# Patient Record
Sex: FEMALE | Race: WHITE | NOT HISPANIC OR LATINO | Employment: OTHER | URBAN - METROPOLITAN AREA
[De-identification: names, ages, dates, MRNs, and addresses within clinical notes are randomized per-mention and may not be internally consistent; named-entity substitution may affect disease eponyms.]

---

## 2017-01-03 ENCOUNTER — TELEPHONE (OUTPATIENT)
Dept: PHYSICAL MEDICINE AND REHAB | Facility: CLINIC | Age: 72
End: 2017-01-03

## 2017-01-04 ENCOUNTER — OFFICE VISIT (OUTPATIENT)
Dept: PHYSICAL MEDICINE AND REHAB | Facility: CLINIC | Age: 72
End: 2017-01-04
Payer: MEDICARE

## 2017-01-04 VITALS
SYSTOLIC BLOOD PRESSURE: 121 MMHG | WEIGHT: 175.06 LBS | HEART RATE: 76 BPM | HEIGHT: 64 IN | BODY MASS INDEX: 29.89 KG/M2 | DIASTOLIC BLOOD PRESSURE: 70 MMHG

## 2017-01-04 DIAGNOSIS — M17.12 PRIMARY OSTEOARTHRITIS OF LEFT KNEE: Primary | ICD-10-CM

## 2017-01-04 PROCEDURE — 99999 PR PBB SHADOW E&M-EST. PATIENT-LVL II: CPT | Mod: PBBFAC,,, | Performed by: PHYSICAL MEDICINE & REHABILITATION

## 2017-01-04 PROCEDURE — 99499 UNLISTED E&M SERVICE: CPT | Mod: S$PBB,,, | Performed by: PHYSICAL MEDICINE & REHABILITATION

## 2017-01-04 PROCEDURE — M0076 PROLOTHERAPY: HCPCS | Mod: ,,, | Performed by: PHYSICAL MEDICINE & REHABILITATION

## 2017-01-04 PROCEDURE — 99212 OFFICE O/P EST SF 10 MIN: CPT | Mod: PBBFAC,PN | Performed by: PHYSICAL MEDICINE & REHABILITATION

## 2017-01-04 RX ORDER — TRAMADOL HYDROCHLORIDE 50 MG/1
50 TABLET ORAL EVERY 6 HOURS PRN
Qty: 40 TABLET | Refills: 1 | Status: SHIPPED | OUTPATIENT
Start: 2017-01-04 | End: 2017-01-14

## 2017-01-04 NOTE — MR AVS SNAPSHOT
[facility]  [address] [place] [address] [place] [address]  Phone: [phone]  Fax: [phone]                  Margarita Dunne   2017 11:00 AM   Office Visit    Description:  Female : 1945   Provider:  Shelby Miguel DO   Department:  [facility]           Reason for Visit     Follow-up           Diagnoses this Visit        Comments    Primary osteoarthritis of left knee    -  Primary            To Do List           Future Appointments        Provider Department Dept Phone    2017 9:00 AM Heather Salguero MD [facility] - [phone]    2017 1:00 PM Shelby Miguel DO [facility] [phone]      Goals (5 Years of Data)     None       These Medications        Disp Refills Start End    tramadol (ULTRAM) 50 mg tablet 40 tablet 1 2017    Take 1 tablet (50 mg total) by mouth every 6 (six) hours as needed for Pain. - Oral    Pharmacy: [facility] [address] [place] [address] N  Ph #: [phone]         [facility]     [facility] On Call Nurse Care Line -  Assistance  Registered nurses in the [facility] provide clinical advisement, health education, appointment booking, and other advisory services.  Call for this free service at [phone].             Medications           Message regarding Medications     Verify the changes and/or additions to your medication regime listed below are the same as discussed with your clinician today.  If any of these changes or additions are incorrect, please notify your healthcare provider.        START taking these NEW medications        Refills    tramadol (ULTRAM) 50 mg tablet 1    Sig: Take 1 tablet (50 mg total) by mouth every 6 (six) hours as needed for Pain.    Class: Print    Route: Oral           Verify that the below list of medications is an accurate representation of the medications you are currently taking.   "If none reported, the list may be blank. If incorrect, please contact your healthcare provider. Carry this list with you in case of emergency.           Current Medications     albuterol (PROVENTIL/VENTOLIN) 90 mcg/actuation inhaler Inhale 2 puffs into the lungs every 6 (six) hours as needed for Shortness of Breath.    atorvastatin (LIPITOR) 20 MG tablet TAKE ONE TABLET BY MOUTH ONCE DAILY    atorvastatin (LIPITOR) 20 MG tablet Take 1 tablet (20 mg total) by mouth once daily. Vacation, patient forgot med    conjugated estrogens (PREMARIN) vaginal cream Place 0.5 g vaginally twice a week.    multivitamin (THERAGRAN) per tablet Take 1 tablet by mouth once daily.    rizatriptan (MAXALT) 10 MG tablet     rizatriptan (MAXALT) 10 MG tablet TAKE ONE TABLET BY MOUTH ONCE AS NEEDED FOR  MIGRAINE AS DIRECTED    SYMBICORT 80-4.5 mcg/actuation HFAA INHALE TWO PUFFS BY MOUTH TWICE DAILY    triamcinolone acetonide 0.1% (KENALOG) 0.1 % cream Use topically daily for pruritis, thins the skin    baclofen (LIORESAL) 20 MG tablet Take 1 tablet (20 mg total) by mouth every evening. Start w. Half a tab nightly for 1wk. If tolerated in to one full tab    diclofenac sodium (VOLTAREN) 1 % Gel APPLY 4 GM TOPICALLY 4 TIMES DAILY    magnesium oxide-Mg AA chelate (MG-PLUS-PROTEIN) 133 mg Tab Take by mouth.    tramadol (ULTRAM) 50 mg tablet Take 1 tablet (50 mg total) by mouth every 6 (six) hours as needed for Pain.    valacyclovir (VALTREX) 500 MG tablet TAKE ONE TABLET BY MOUTH ONCE DAILY    VENTOLIN HFA 90 mcg/actuation inhaler INHALE 2 PUFFS INTO THE LUNGS EVERY 6 HOURS AS NEEDED    VOLTAREN 1 % Gel APPLY 4 GM TOPICALLY 4 TIMES DAILY           Clinical Reference Information           Vital Signs - Last Recorded  Most recent update: 1/4/2017 11:10 AM by Priyanka Ohara MA    BP Pulse Ht Wt BMI    121/70 76 5' 4" (1.626 m) 79.4 kg (175 lb 0.7 oz) 30.05 kg/m2      Blood Pressure          Most Recent Value    BP  121/70      Allergies as of " 1/4/2017     Doxycycline    Pcn [Penicillins]    Sulfa (Sulfonamide Antibiotics)    Corticosteroids (Glucocorticoids)      Immunizations Administered on Date of Encounter - 1/4/2017     None      Orders Placed During Today's Visit      Normal Orders This Visit     US Guidance For Needle Placement

## 2017-01-04 NOTE — PROGRESS NOTES
PROCEDURE NOTE:KNEE PROLOTHERAPY  Risk and benefit of right US guided intrarticular left KNEE injection given to pt. 25 g 2 inch needle utilized. lateral approach.  Injection performed after sterile prep w. Betadine and chlorohexedine, verbal  And written consent obtained no complications. Skin was prepped and needle entry was anesthetized with 1% lidocaine. 5ml of 15 %dextrose utilized.    Risk and benefit of US guided left MCL  injection given to pt. 25 g 2 inch needle utilized. Injection performed after sterile prep w. Betadine , verbal  And written consent explained, no complications. Skin was prepped and needle entry was anesthetized with 1% lidocaine. 8ml of 15 %dextrose utilized.   US guidance was used since it has been shown to have greater efficiency w. Accurate needle placement.    Ultrasound interpretation was performed prior to the procedure to identify the target and any adjacent neurovascular structures.  Subsequently, interpretation was performed during real- time needle guidance confirming placement. Post- intervention interpretation was also performed confirming appropriate injectate flow and hemostasis.  Images indicating needle placement have been saved in [employer].    Ultrasound interpretation was performed prior to the procedure to identify the target and any adjacent neurovascular structures.  Subsequently, interpretation was performed during real- time needle guidance confirming placement. Post- intervention interpretation was also performed confirming appropriate injectate flow and hemostasis.  Images indicating needle placement have been saved in [employer].

## 2017-01-05 ENCOUNTER — PATIENT MESSAGE (OUTPATIENT)
Dept: PHYSICAL MEDICINE AND REHAB | Facility: CLINIC | Age: 72
End: 2017-01-05

## 2017-01-10 ENCOUNTER — TELEPHONE (OUTPATIENT)
Dept: PHYSICAL MEDICINE AND REHAB | Facility: CLINIC | Age: 72
End: 2017-01-10

## 2017-01-10 NOTE — TELEPHONE ENCOUNTER
----- Message from Diane Eldridge sent at 1/10/2017 12:34 PM CST -----  Please call patient to reschedule appt [phone] (home)

## 2017-02-20 ENCOUNTER — OFFICE VISIT (OUTPATIENT)
Dept: OBSTETRICS AND GYNECOLOGY | Facility: CLINIC | Age: 72
End: 2017-02-20
Payer: MEDICARE

## 2017-02-20 ENCOUNTER — HOSPITAL ENCOUNTER (OUTPATIENT)
Dept: RADIOLOGY | Facility: CLINIC | Age: 72
Discharge: HOME | End: 2017-02-20
Attending: OBSTETRICS & GYNECOLOGY
Payer: MEDICARE

## 2017-02-20 VITALS
BODY MASS INDEX: 29.33 KG/M2 | DIASTOLIC BLOOD PRESSURE: 82 MMHG | WEIGHT: 170.88 LBS | SYSTOLIC BLOOD PRESSURE: 142 MMHG

## 2017-02-20 DIAGNOSIS — Z12.11 COLON CANCER SCREENING: ICD-10-CM

## 2017-02-20 DIAGNOSIS — Z01.419 ROUTINE GYNECOLOGICAL EXAMINATION: Primary | ICD-10-CM

## 2017-02-20 DIAGNOSIS — Z11.51 SCREENING FOR HPV (HUMAN PAPILLOMAVIRUS): ICD-10-CM

## 2017-02-20 DIAGNOSIS — N89.8 VAGINAL DISCHARGE: ICD-10-CM

## 2017-02-20 DIAGNOSIS — Z12.31 VISIT FOR SCREENING MAMMOGRAM: ICD-10-CM

## 2017-02-20 DIAGNOSIS — Z78.0 MENOPAUSE: ICD-10-CM

## 2017-02-20 DIAGNOSIS — M85.80 OSTEOPENIA: ICD-10-CM

## 2017-02-20 DIAGNOSIS — Z12.4 CERVICAL CANCER SCREENING: ICD-10-CM

## 2017-02-20 PROCEDURE — 77067 SCR MAMMO BI INCL CAD: CPT | Mod: TC

## 2017-02-20 PROCEDURE — 99999 PR PBB SHADOW E&M-EST. PATIENT-LVL III: CPT | Mod: PBBFAC,,, | Performed by: OBSTETRICS & GYNECOLOGY

## 2017-02-20 PROCEDURE — G0101 CA SCREEN;PELVIC/BREAST EXAM: HCPCS | Mod: S$PBB,,, | Performed by: OBSTETRICS & GYNECOLOGY

## 2017-02-20 PROCEDURE — 77067 SCR MAMMO BI INCL CAD: CPT | Mod: 26,,, | Performed by: RADIOLOGY

## 2017-02-20 PROCEDURE — 77063 BREAST TOMOSYNTHESIS BI: CPT | Mod: 26,,, | Performed by: RADIOLOGY

## 2017-02-20 PROCEDURE — 88175 CYTOPATH C/V AUTO FLUID REDO: CPT

## 2017-02-20 NOTE — MR AVS SNAPSHOT
[facility]  [address] [place] [address]  Phone: [phone]                  Margarita Dunen   2017 9:40 AM   Office Visit    Description:  Female : 1945   Provider:  Heather Salguero MD   Department:  [facility]           Reason for Visit     Well Woman     Vaginal Discharge           Diagnoses this Visit        Comments    Cervical cancer screening    -  Primary     Screening for HPV (human papillomavirus)         Visit for screening mammogram                To Do List           Future Appointments        Provider Department Dept Phone    3/8/2017 2:00 PM Shelby Miguel DO [facility] [phone]      Goals (5 Years of Data)     None      [facility]     [facility] Care Line -  Assistance  Registered nurses in the [facility] provide clinical advisement, health education, appointment booking, and other advisory services.  Call for this free service at [phone].             Medications           Message regarding Medications     Verify the changes and/or additions to your medication regime listed below are the same as discussed with your clinician today.  If any of these changes or additions are incorrect, please notify your healthcare provider.        STOP taking these medications     baclofen (LIORESAL) 20 MG tablet Take 1 tablet (20 mg total) by mouth every evening. Start w. Half a tab nightly for 1wk. If tolerated in to one full tab           Verify that the below list of medications is an accurate representation of the medications you are currently taking.  If none reported, the list may be blank. If incorrect, please contact your healthcare provider. Carry this list with you in case of emergency.           Current Medications     albuterol (PROVENTIL/VENTOLIN) 90 mcg/actuation inhaler Inhale 2 puffs into the lungs every 6 (six) hours as needed for Shortness of Breath.    atorvastatin  (LIPITOR) 20 MG tablet TAKE ONE TABLET BY MOUTH ONCE DAILY    conjugated estrogens (PREMARIN) vaginal cream Place 0.5 g vaginally twice a week.    magnesium oxide-Mg AA chelate (MG-PLUS-PROTEIN) 133 mg Tab Take by mouth.    multivitamin (THERAGRAN) per tablet Take 1 tablet by mouth once daily.    rizatriptan (MAXALT) 10 MG tablet     SYMBICORT 80-4.5 mcg/actuation HFAA INHALE TWO PUFFS BY MOUTH TWICE DAILY    valacyclovir (VALTREX) 500 MG tablet TAKE ONE TABLET BY MOUTH ONCE DAILY    VENTOLIN HFA 90 mcg/actuation inhaler INHALE 2 PUFFS INTO THE LUNGS EVERY 6 HOURS AS NEEDED    VOLTAREN 1 % Gel APPLY 4 GM TOPICALLY 4 TIMES DAILY    diclofenac sodium (VOLTAREN) 1 % Gel APPLY 4 GM TOPICALLY 4 TIMES DAILY    rizatriptan (MAXALT) 10 MG tablet TAKE ONE TABLET BY MOUTH ONCE AS NEEDED FOR  MIGRAINE AS DIRECTED    triamcinolone acetonide 0.1% (KENALOG) 0.1 % cream Use topically daily for pruritis, thins the skin           Clinical Reference Information           Your Vitals Were     BP Weight BMI          142/82 77.5 kg (170 lb 13.7 oz) 29.33 kg/m2        Blood Pressure          Most Recent Value    BP  (!)  142/82      Allergies as of 2/20/2017     Doxycycline    Pcn [Penicillins]    Sulfa (Sulfonamide Antibiotics)    Corticosteroids (Glucocorticoids)      Immunizations Administered on Date of Encounter - 2/20/2017     None      Orders Placed During Today's Visit      Normal Orders This Visit    HPV DNA probe, amplified     Liquid-based pap smear, screening     Future Labs/Procedures Expected by Expires    Mammo Digital Screening Bilat With CAD  2/20/2017 4/20/2018      Language Assistance Services     ATTENTION: Language assistance services are available, free of charge. Please call [phone].      ATENCIÓN: Si habla yueañol, tiene a vazquez disposición servicios gratuitos de asistencia lingüística. Llame al [phone].     CHÚ Ý: N?u b?n nói Ti?ng Vi?t, có các d?ch v? h? tr? ngôn ng? mi?n phí dành cho b?n. G?i s?  [phone].         [facility] complies with applicable Federal civil rights laws and does not discriminate on the basis of race, color, national origin, age, disability, or sex.

## 2017-02-20 NOTE — PROGRESS NOTES
Chief Complaint   Patient presents with    Well Woman     left breat swelling months ago    Vaginal Discharge     2 times 6 months ago 1 time each time appeared pink in color       History of Present Illness: Margarita Dunne is a 72 y.o. female that presents today 2/20/2017 for well gyn visit. She reports noticing some pink discharge several months ago that resolved.    Past Medical History   Diagnosis Date    Allergy     Angio-edema     Arthritis     Asthma     Cataract     Fibromyalgia 7/8/2012    Glaucoma suspect, steroid responders     Hand joint pain 7/8/2012    Headache(784.0)     Osteoporosis     Recurrent upper respiratory infection (URI)     Urticaria        Past Surgical History   Procedure Laterality Date    Dexa  2014     osteopenia       Current Outpatient Prescriptions   Medication Sig Dispense Refill    albuterol (PROVENTIL/VENTOLIN) 90 mcg/actuation inhaler Inhale 2 puffs into the lungs every 6 (six) hours as needed for Shortness of Breath. 1 each 10    atorvastatin (LIPITOR) 20 MG tablet TAKE ONE TABLET BY MOUTH ONCE DAILY 30 tablet 0    conjugated estrogens (PREMARIN) vaginal cream Place 0.5 g vaginally twice a week. 30 g 2    magnesium oxide-Mg AA chelate (MG-PLUS-PROTEIN) 133 mg Tab Take by mouth.      multivitamin (THERAGRAN) per tablet Take 1 tablet by mouth once daily.      rizatriptan (MAXALT) 10 MG tablet       SYMBICORT 80-4.5 mcg/actuation HFAA INHALE TWO PUFFS BY MOUTH TWICE DAILY 11 g 6    valacyclovir (VALTREX) 500 MG tablet TAKE ONE TABLET BY MOUTH ONCE DAILY 30 tablet 0    VENTOLIN HFA 90 mcg/actuation inhaler INHALE 2 PUFFS INTO THE LUNGS EVERY 6 HOURS AS NEEDED 18 each 6    VOLTAREN 1 % Gel APPLY 4 GM TOPICALLY 4 TIMES DAILY 2 Tube 0    diclofenac sodium (VOLTAREN) 1 % Gel APPLY 4 GM TOPICALLY 4 TIMES DAILY 2 Tube 11    rizatriptan (MAXALT) 10 MG tablet TAKE ONE TABLET BY MOUTH ONCE AS NEEDED FOR  MIGRAINE AS DIRECTED 30 tablet 0    triamcinolone acetonide  0.1% (KENALOG) 0.1 % cream Use topically daily for pruritis, thins the skin 1 Tube 0     No current facility-administered medications for this visit.        Review of patient's allergies indicates:   Allergen Reactions    Doxycycline Nausea And Vomiting    Pcn [penicillins] Rash    Sulfa (sulfonamide antibiotics) Rash    Corticosteroids (glucocorticoids) Other (See Comments)     migraine for 2 weeks       Family History   Problem Relation Age of Onset    Hypertension Mother     Diabetes Mother     Stroke Mother     Dementia Mother     Cancer Father      pancreas    Allergies Father     Allergic rhinitis Father     Macular degeneration Cousin     Allergic rhinitis Paternal Aunt     Allergies Paternal Aunt     Allergic rhinitis Paternal Uncle     Allergies Paternal Uncle     Glaucoma Neg Hx     Amblyopia Neg Hx     Blindness Neg Hx     Cataracts Neg Hx     Retinal detachment Neg Hx     Strabismus Neg Hx     Thyroid disease Neg Hx     Angioedema Neg Hx     Asthma Neg Hx     Eczema Neg Hx     Immunodeficiency Neg Hx        Social History     Social History    Marital status:      Spouse name: N/A    Number of children: 0    Years of education: N/A     Social History Main Topics    Smoking status: Never Smoker    Smokeless tobacco: Never Used    Alcohol use Yes      Comment: social wine use in the past not current.    Drug use: No    Sexual activity: Not Asked     Other Topics Concern    None     Social History Narrative       OB History    Para Term  AB SAB TAB Ectopic Multiple Living   0 0 0 0 0 0 0 0 0 0             Review of Symptoms:  GENERAL: Denies weight gain or weight loss. Feeling well overall.   SKIN: Denies rash or lesions.   HEAD: Denies head injury or headache.   NODES: Denies enlarged lymph nodes.   CHEST: Denies chest pain or shortness of breath.   CARDIOVASCULAR: Denies palpitations or left sided chest pain.   ABDOMEN: No abdominal pain,  constipation, diarrhea, nausea, vomiting or rectal bleeding.   URINARY: No frequency, dysuria, hematuria, or burning on urination.  HEMATOLOGIC: No easy bruisability or excessive bleeding.   MUSCULOSKELETAL: Denies joint pain or swelling.     Visit Vitals    BP (!) 142/82    Wt 77.5 kg (170 lb 13.7 oz)     Physical Exam:  APPEARANCE: Well nourished, well developed, in no acute distress.  SKIN: Normal skin turgor, no lesions.  NECK: Neck symmetric without masses   RESPIRATORY: Normal respiratory effort with no retractions or use of accessory muscles  CARDIOVASCULAR: Peripheral vascular system with no swelling no varicosities and palpation of pulses normal  LYMPHATIC: No enlargements of the lymph nodes noted in the neck, axillae, or groin  ABDOMEN: Soft. No tenderness or masses. No hepatosplenomegaly. No hernias.  BREASTS: Symmetrical, no skin changes or visible lesions. No palpable masses, nipple discharge or adenopathy bilaterally.  PELVIC: Normal external female genitalia without lesions. Normal hair distribution. Adequate perineal body, normal urethral meatus. Urethra with no masses.  Bladder nontender. Vagina moist and well rugated without lesions or discharge. Cervix pink and without lesions. No significant cystocele or rectocele. Bimanual exam showed uterus normal size, shape, position, mobile and nontender. Adnexa without masses or tenderness. Urethra and bladder normal. PAP DONE  EXTREMITIES: No clubbing cyanosis or edema.    ASSESSMENT/PLAN:  Routine gynecological examination    Cervical cancer screening  -     Liquid-based pap smear, screening    Screening for HPV (human papillomavirus)  -     HPV DNA probe, amplified    Visit for screening mammogram  -     Cancel: Mammo Digital Screening Bilat With CAD; Future; Expected date: 2/20/17    Osteopenia-2014  -     DXA Bone Density Spine And Hip_Axial Skeleton; Future; Expected date: 2/20/17    Colon cancer screening- refuses colonoscopy    Menopause  -      DXA Bone Density Spine And Hip_Axial Skeleton; Future; Expected date: 2/20/17    Vaginal discharge  -     US Pelvis Limited Non OB; Future; Expected date: 2/20/17          Patient was counseled today on Pap guidelines, recommendation for pelvic exams, mammograms every other year after the age of 40 and annually after the age of 50, Colonoscopy after the age of 50, Dexa Bone Scan and calcium and vitamin D supplementation in menopause and to see her PCP for other health maintenance.   FOLLOW-UP:prn

## 2017-03-01 LAB — HUMAN PAPILLOMAVIRUS (HPV): NOT DETECTED

## 2017-03-08 ENCOUNTER — HOSPITAL ENCOUNTER (OUTPATIENT)
Dept: RADIOLOGY | Facility: HOSPITAL | Age: 72
Discharge: HOME | End: 2017-03-08
Attending: OBSTETRICS & GYNECOLOGY
Payer: MEDICARE

## 2017-03-08 DIAGNOSIS — Z78.0 MENOPAUSE: ICD-10-CM

## 2017-03-08 DIAGNOSIS — N89.8 VAGINAL DISCHARGE: ICD-10-CM

## 2017-03-08 DIAGNOSIS — M85.80 OSTEOPENIA: ICD-10-CM

## 2017-03-08 PROCEDURE — 76830 TRANSVAGINAL US NON-OB: CPT | Mod: 26,,, | Performed by: RADIOLOGY

## 2017-03-08 PROCEDURE — 76856 US EXAM PELVIC COMPLETE: CPT | Mod: 26,,, | Performed by: RADIOLOGY

## 2017-03-08 PROCEDURE — 77080 DXA BONE DENSITY AXIAL: CPT | Mod: TC,PO

## 2017-03-08 PROCEDURE — 77080 DXA BONE DENSITY AXIAL: CPT | Mod: 26,,, | Performed by: RADIOLOGY

## 2017-03-08 PROCEDURE — 76856 US EXAM PELVIC COMPLETE: CPT | Mod: TC,PO

## 2017-03-09 ENCOUNTER — TELEPHONE (OUTPATIENT)
Dept: OBSTETRICS AND GYNECOLOGY | Facility: CLINIC | Age: 72
End: 2017-03-09

## 2017-03-09 NOTE — TELEPHONE ENCOUNTER
----- Message from Marilyn Pardo sent at 3/9/2017  3:43 PM CST -----  Contact: Margarita Mcqueen phone call regarding appointment for results. I only had appointments in May. Please call back [phone]. Thank you!

## 2017-03-14 ENCOUNTER — OFFICE VISIT (OUTPATIENT)
Dept: OBSTETRICS AND GYNECOLOGY | Facility: CLINIC | Age: 72
End: 2017-03-14
Payer: MEDICARE

## 2017-03-14 ENCOUNTER — TELEPHONE (OUTPATIENT)
Dept: INTERNAL MEDICINE | Facility: CLINIC | Age: 72
End: 2017-03-14

## 2017-03-14 VITALS — BODY MASS INDEX: 29.35 KG/M2 | WEIGHT: 171.94 LBS | HEIGHT: 64 IN

## 2017-03-14 DIAGNOSIS — N89.8 VAGINAL DISCHARGE: Primary | ICD-10-CM

## 2017-03-14 PROCEDURE — 99999 PR PBB SHADOW E&M-EST. PATIENT-LVL II: CPT | Mod: PBBFAC,,, | Performed by: OBSTETRICS & GYNECOLOGY

## 2017-03-14 PROCEDURE — 99212 OFFICE O/P EST SF 10 MIN: CPT | Mod: PBBFAC,PO | Performed by: OBSTETRICS & GYNECOLOGY

## 2017-03-14 PROCEDURE — 99213 OFFICE O/P EST LOW 20 MIN: CPT | Mod: S$PBB,,, | Performed by: OBSTETRICS & GYNECOLOGY

## 2017-03-14 NOTE — PROGRESS NOTES
Chief Complaint   Patient presents with    Results     US       History of Present Illness: Margarita Dunne is a 72 y.o. female that presents today 3/14/2017 for   Chief Complaint   Patient presents with    Results     US         Past Medical History:   Diagnosis Date    Allergy     Angio-edema     Arthritis     Asthma     Cataract     Fibromyalgia 7/8/2012    Glaucoma suspect, steroid responders     Hand joint pain 7/8/2012    Headache(784.0)     Osteoporosis     Recurrent upper respiratory infection (URI)     Urticaria        Past Surgical History:   Procedure Laterality Date    dexa  2014    osteopenia       Current Outpatient Prescriptions   Medication Sig Dispense Refill    albuterol (PROVENTIL/VENTOLIN) 90 mcg/actuation inhaler Inhale 2 puffs into the lungs every 6 (six) hours as needed for Shortness of Breath. 1 each 10    atorvastatin (LIPITOR) 20 MG tablet TAKE ONE TABLET BY MOUTH ONCE DAILY 30 tablet 0    diclofenac sodium (VOLTAREN) 1 % Gel APPLY 4 GM TOPICALLY 4 TIMES DAILY 2 Tube 11    multivitamin (THERAGRAN) per tablet Take 1 tablet by mouth once daily.      rizatriptan (MAXALT) 10 MG tablet       SYMBICORT 80-4.5 mcg/actuation HFAA INHALE TWO PUFFS BY MOUTH TWICE DAILY 11 g 6    triamcinolone acetonide 0.1% (KENALOG) 0.1 % cream Use topically daily for pruritis, thins the skin 1 Tube 0    valacyclovir (VALTREX) 500 MG tablet TAKE ONE TABLET BY MOUTH ONCE DAILY 30 tablet 0    conjugated estrogens (PREMARIN) vaginal cream Place 0.5 g vaginally twice a week. 30 g 2    magnesium oxide-Mg AA chelate (MG-PLUS-PROTEIN) 133 mg Tab Take by mouth.       No current facility-administered medications for this visit.        Review of patient's allergies indicates:   Allergen Reactions    Doxycycline Nausea And Vomiting    Pcn [penicillins] Rash    Sulfa (sulfonamide antibiotics) Rash    Corticosteroids (glucocorticoids) Other (See Comments)     migraine for 2 weeks       Family History  "  Problem Relation Age of Onset    Hypertension Mother     Diabetes Mother     Stroke Mother     Dementia Mother     Cancer Father      pancreas    Allergies Father     Allergic rhinitis Father     Macular degeneration Cousin     Allergic rhinitis Paternal Aunt     Allergies Paternal Aunt     Allergic rhinitis Paternal Uncle     Allergies Paternal Uncle     Glaucoma Neg Hx     Amblyopia Neg Hx     Blindness Neg Hx     Cataracts Neg Hx     Retinal detachment Neg Hx     Strabismus Neg Hx     Thyroid disease Neg Hx     Angioedema Neg Hx     Asthma Neg Hx     Eczema Neg Hx     Immunodeficiency Neg Hx        Social History   Substance Use Topics    Smoking status: Never Smoker    Smokeless tobacco: Never Used    Alcohol use Yes      Comment: social wine use in the past not current.       OB History    Para Term  AB SAB TAB Ectopic Multiple Living   0 0 0 0 0 0 0 0 0 0             Review of Symptoms:  GENERAL: Denies weight gain or weight loss. Feeling well overall.   SKIN: Denies rash or lesions.   HEAD: Denies head injury or headache.   NODES: Denies enlarged lymph nodes.   CHEST: Denies chest pain or shortness of breath.   CARDIOVASCULAR: Denies palpitations or left sided chest pain.   ABDOMEN: No abdominal pain, constipation, diarrhea, nausea, vomiting or rectal bleeding.   URINARY: No frequency, dysuria, hematuria, or burning on urination.  HEMATOLOGIC: No easy bruisability or excessive bleeding.   MUSCULOSKELETAL: Denies joint pain or swelling.     Ht 5' 4" (1.626 m)  Wt 78 kg (171 lb 15.3 oz)  BMI 29.52 kg/m2    ASSESSMENT/PLAN:  Vaginal discharge-pink discharge      We discussed EMB versus hysteroscopy D&C to evaluate the uterus for possible uterine cancer and she wants to wait and she will let us know. We discussed undiagnosed cancer and risks of need for additional treatment with advanced stages. She desires to notify us when ready    30 minutes spent today with greater " than half in counseling.

## 2017-03-14 NOTE — MR AVS SNAPSHOT
[facility] - OB/GYN  [address] [place] [address]  Phone: [phone]                  Margarita Dunne   3/14/2017 1:20 PM   Office Visit    Description:  Female : 1945   Provider:  Heather Salguero MD   Department:  [facility] - OB/GYN           Reason for Visit     Results                To Do List           Goals (5 Years of Data)     None      [facility]     [facility] -  Assistance  Registered nurses in the [facility] On Call Center provide clinical advisement, health education, appointment booking, and other advisory services.  Call for this free service at [phone].             Medications           Message regarding Medications     Verify the changes and/or additions to your medication regime listed below are the same as discussed with your clinician today.  If any of these changes or additions are incorrect, please notify your healthcare provider.        STOP taking these medications     VENTOLIN HFA 90 mcg/actuation inhaler INHALE 2 PUFFS INTO THE LUNGS EVERY 6 HOURS AS NEEDED           Verify that the below list of medications is an accurate representation of the medications you are currently taking.  If none reported, the list may be blank. If incorrect, please contact your healthcare provider. Carry this list with you in case of emergency.           Current Medications     albuterol (PROVENTIL/VENTOLIN) 90 mcg/actuation inhaler Inhale 2 puffs into the lungs every 6 (six) hours as needed for Shortness of Breath.    atorvastatin (LIPITOR) 20 MG tablet TAKE ONE TABLET BY MOUTH ONCE DAILY    diclofenac sodium (VOLTAREN) 1 % Gel APPLY 4 GM TOPICALLY 4 TIMES DAILY    multivitamin (THERAGRAN) per tablet Take 1 tablet by mouth once daily.    rizatriptan (MAXALT) 10 MG tablet     SYMBICORT 80-4.5 mcg/actuation HFAA INHALE TWO PUFFS BY MOUTH TWICE DAILY    triamcinolone acetonide 0.1% (KENALOG) 0.1 % cream Use topically daily for pruritis, thins  "the skin    valacyclovir (VALTREX) 500 MG tablet TAKE ONE TABLET BY MOUTH ONCE DAILY    conjugated estrogens (PREMARIN) vaginal cream Place 0.5 g vaginally twice a week.    magnesium oxide-Mg AA chelate (MG-PLUS-PROTEIN) 133 mg Tab Take by mouth.           Clinical Reference Information           Your Vitals Were     Height Weight BMI          5' 4" (1.626 m) 78 kg (171 lb 15.3 oz) 29.52 kg/m2        Allergies as of 3/14/2017     Doxycycline    Pcn [Penicillins]    Sulfa (Sulfonamide Antibiotics)    Corticosteroids (Glucocorticoids)      Immunizations Administered on Date of Encounter - 3/14/2017     None      Language Assistance Services     ATTENTION: Language assistance services are available, free of charge. Please call [phone].      ATENCIÓN: Si jennifer goldberg, tiene a vazquez disposición servicios gratuitos de asistencia lingüística. Llame al [phone].     VISHAL Ý: N?u b?n nói Ti?ng Vi?t, có các d?ch v? h? tr? ngôn ng? mi?n phí dành cho b?n. G?i s? [phone].         [facility] - [facility] complies with applicable Federal civil rights laws and does not discriminate on the basis of race, color, national origin, age, disability, or sex.        "

## 2017-03-14 NOTE — TELEPHONE ENCOUNTER
----- Message from Mariangel Fox sent at 3/14/2017  2:56 PM CDT -----  Contact: Patient  The patient has been recommended to have a D&C.  She would like to speak with Dr. nAderson about this.  Please call her at [phone].    Thanks!

## 2017-03-15 ENCOUNTER — PATIENT MESSAGE (OUTPATIENT)
Dept: OBSTETRICS AND GYNECOLOGY | Facility: CLINIC | Age: 72
End: 2017-03-15

## 2017-03-20 ENCOUNTER — PATIENT MESSAGE (OUTPATIENT)
Dept: OBSTETRICS AND GYNECOLOGY | Facility: CLINIC | Age: 72
End: 2017-03-20

## 2017-03-20 ENCOUNTER — TELEPHONE (OUTPATIENT)
Dept: INTERNAL MEDICINE | Facility: CLINIC | Age: 72
End: 2017-03-20

## 2017-03-20 DIAGNOSIS — D39.9 NEOPLASM OF UNCERTAIN BEHAVIOR OF FEMALE GENITAL ORGAN: ICD-10-CM

## 2017-03-20 DIAGNOSIS — R93.89 THICKENED ENDOMETRIUM: ICD-10-CM

## 2017-03-20 DIAGNOSIS — N89.8 VAGINAL DISCHARGE: Primary | ICD-10-CM

## 2017-03-20 NOTE — TELEPHONE ENCOUNTER
Spoke w/ pt, she would like to know if there are some test that can be ordered for endometrial or uterine cancer? Or should she be referred out to a specialist?

## 2017-03-20 NOTE — TELEPHONE ENCOUNTER
Not that I am aware of. Her Gyn may know of something I don't but I don't think there is any test. GMl

## 2017-03-20 NOTE — TELEPHONE ENCOUNTER
----- Message from Freiad Graff MA sent at 3/20/2017  9:56 AM CDT -----  Contact: self - [phone]  Requesting to speak with Dr Anderson regarding labs for cancer markers. Please call. Thanks!

## 2017-03-20 NOTE — TELEPHONE ENCOUNTER
I spoke with anesthesia at [facility], they are unable to see the patient for a pre-pre op exam he did notify me that she can have a spinal for the hysteroscopy D&C. Please advise.

## 2017-03-21 ENCOUNTER — LAB VISIT (OUTPATIENT)
Dept: LAB | Facility: HOSPITAL | Age: 72
End: 2017-03-21
Attending: OBSTETRICS & GYNECOLOGY
Payer: MEDICARE

## 2017-03-21 DIAGNOSIS — D39.9 NEOPLASM OF UNCERTAIN BEHAVIOR OF FEMALE GENITAL ORGAN: ICD-10-CM

## 2017-03-21 DIAGNOSIS — N89.8 VAGINAL DISCHARGE: ICD-10-CM

## 2017-03-21 LAB — CANCER AG125 SERPL-ACNC: 12 U/ML

## 2017-03-21 PROCEDURE — 86304 IMMUNOASSAY TUMOR CA 125: CPT

## 2017-03-21 NOTE — TELEPHONE ENCOUNTER
Scheduled labs. Patient would like to wait for those results before making a decision about surgery. She has severe anxiety about general anesthesia. We did speak about an in office EMB which she will also consider.

## 2017-03-23 LAB — HE4: 58 PMOL/L

## 2017-03-24 ENCOUNTER — TELEPHONE (OUTPATIENT)
Dept: OBSTETRICS AND GYNECOLOGY | Facility: CLINIC | Age: 72
End: 2017-03-24

## 2017-03-24 NOTE — TELEPHONE ENCOUNTER
Spoke with patient notified that I spoke with Dr Salguero and she does not see a need to retake labs at this time. Patient verbalized understanding no further questions.

## 2017-03-24 NOTE — TELEPHONE ENCOUNTER
PAteint states that she read online that she should not take the vit B, multi vit, or Biotin prior to the  or the HE 4 she states she had taken all of them and she would like to know if she should have these done over due to this. Please advise.

## 2017-03-29 ENCOUNTER — TELEPHONE (OUTPATIENT)
Dept: OBSTETRICS AND GYNECOLOGY | Facility: CLINIC | Age: 72
End: 2017-03-29

## 2017-03-29 NOTE — TELEPHONE ENCOUNTER
----- Message from Yessi Wolf sent at 3/29/2017 11:03 AM CDT -----  Contact: patient  Patient calling in regards to scheduling an appt to discuss having a DNC or an office biopsy. And she wants to go over the blood test results. She would like to come in next week if possible. She doesn't want to wait until May. Please advise.  Call back .  Thanks!

## 2017-03-30 RX ORDER — VALACYCLOVIR HYDROCHLORIDE 500 MG/1
TABLET, FILM COATED ORAL
Qty: 30 TABLET | Refills: 0 | Status: SHIPPED | OUTPATIENT
Start: 2017-03-30 | End: 2017-07-16 | Stop reason: SDUPTHER

## 2017-04-11 ENCOUNTER — OFFICE VISIT (OUTPATIENT)
Dept: OBSTETRICS AND GYNECOLOGY | Facility: CLINIC | Age: 72
End: 2017-04-11
Payer: MEDICARE

## 2017-04-11 VITALS — WEIGHT: 171.75 LBS | BODY MASS INDEX: 29.48 KG/M2

## 2017-04-11 DIAGNOSIS — N89.8 VAGINAL DISCHARGE: Primary | ICD-10-CM

## 2017-04-11 DIAGNOSIS — R93.89 THICKENED ENDOMETRIUM: ICD-10-CM

## 2017-04-11 PROCEDURE — 99999 PR PBB SHADOW E&M-EST. PATIENT-LVL II: CPT | Mod: PBBFAC,,, | Performed by: OBSTETRICS & GYNECOLOGY

## 2017-04-11 PROCEDURE — 99212 OFFICE O/P EST SF 10 MIN: CPT | Mod: PBBFAC,PO | Performed by: OBSTETRICS & GYNECOLOGY

## 2017-04-11 PROCEDURE — 58100 BIOPSY OF UTERUS LINING: CPT | Mod: PBBFAC,PO | Performed by: OBSTETRICS & GYNECOLOGY

## 2017-04-11 PROCEDURE — 88305 TISSUE EXAM BY PATHOLOGIST: CPT | Mod: 26,,, | Performed by: PATHOLOGY

## 2017-04-11 PROCEDURE — 58100 BIOPSY OF UTERUS LINING: CPT | Mod: S$PBB,,, | Performed by: OBSTETRICS & GYNECOLOGY

## 2017-04-11 PROCEDURE — 88305 TISSUE EXAM BY PATHOLOGIST: CPT | Performed by: PATHOLOGY

## 2017-04-11 PROCEDURE — 99213 OFFICE O/P EST LOW 20 MIN: CPT | Mod: 25,S$PBB,, | Performed by: OBSTETRICS & GYNECOLOGY

## 2017-04-11 RX ORDER — IBUPROFEN 600 MG/1
400 TABLET ORAL
Status: COMPLETED | OUTPATIENT
Start: 2017-04-11 | End: 2017-04-11

## 2017-04-11 RX ORDER — DICLOFENAC SODIUM 10 MG/G
GEL TOPICAL
Qty: 2 TUBE | Refills: 0 | Status: SHIPPED | OUTPATIENT
Start: 2017-04-11 | End: 2017-05-12 | Stop reason: SDUPTHER

## 2017-04-11 RX ADMIN — IBUPROFEN 600 MG: 600 TABLET ORAL at 03:04

## 2017-04-11 NOTE — MR AVS SNAPSHOT
[facility] - OB/GYN  [address] [place] [address]  Phone: [phone]                  Margarita Dunne   2017 2:40 PM   Office Visit    Description:  Female : 1945   Provider:  Heather Salguero MD   Department:  [facility] - OB/GYN           Reason for Visit     Results                To Do List           Goals (5 Years of Data)     None      [facility]     [facility] On Call Nurse Care Line -  Assistance  Unless otherwise directed by your provider, please contact [facility], our nurse care line that is available for  assistance.     Registered nurses in the [facility] Center provide: appointment scheduling, clinical advisement, health education, and other advisory services.  Call: [phone] (toll free)               Medications           Message regarding Medications     Verify the changes and/or additions to your medication regime listed below are the same as discussed with your clinician today.  If any of these changes or additions are incorrect, please notify your healthcare provider.             Verify that the below list of medications is an accurate representation of the medications you are currently taking.  If none reported, the list may be blank. If incorrect, please contact your healthcare provider. Carry this list with you in case of emergency.           Current Medications     albuterol (PROVENTIL/VENTOLIN) 90 mcg/actuation inhaler Inhale 2 puffs into the lungs every 6 (six) hours as needed for Shortness of Breath.    atorvastatin (LIPITOR) 20 MG tablet TAKE ONE TABLET BY MOUTH ONCE DAILY    multivitamin (THERAGRAN) per tablet Take 1 tablet by mouth once daily.    rizatriptan (MAXALT) 10 MG tablet     SYMBICORT 80-4.5 mcg/actuation HFAA INHALE TWO PUFFS BY MOUTH TWICE DAILY    triamcinolone acetonide 0.1% (KENALOG) 0.1 % cream Use topically daily for pruritis, thins the skin    VOLTAREN 1 % Gel APPLY 4 GM TOPICALLY 4 TIMES DAILY     conjugated estrogens (PREMARIN) vaginal cream Place 0.5 g vaginally twice a week.    magnesium oxide-Mg AA chelate (MG-PLUS-PROTEIN) 133 mg Tab Take by mouth.    valacyclovir (VALTREX) 500 MG tablet TAKE ONE TABLET BY MOUTH ONCE DAILY    valacyclovir (VALTREX) 500 MG tablet TAKE ONE TABLET BY MOUTH ONCE DAILY           Clinical Reference Information           Your Vitals Were     Weight BMI             77.9 kg (171 lb 11.8 oz) 29.48 kg/m2         Allergies as of 4/11/2017     Doxycycline    Pcn [Penicillins]    Sulfa (Sulfonamide Antibiotics)    Corticosteroids (Glucocorticoids)      Immunizations Administered on Date of Encounter - 4/11/2017     None      Language Assistance Services     ATTENTION: Language assistance services are available, free of charge. Please call [phone].      ATENCIÓN: Si beckiela yusuf, tiene a vazquez disposición servicios gratuitos de asistencia lingüística. Llame al [phone].     VISHAL Ý: N?u b?n nói Ti?ng Vi?t, có các d?ch v? h? tr? ngôn ng? mi?n phí dành cho b?n. G?i s? [phone].         [facility] - [facility] complies with applicable Federal civil rights laws and does not discriminate on the basis of race, color, national origin, age, disability, or sex.

## 2017-04-11 NOTE — PROGRESS NOTES
Chief Complaint   Patient presents with    Results     discuss the results of her recent labs, discuss the options for treatment       History of Present Illness: Margarita Dunne is a 72 y.o. female that presents today 4/11/2017 for   Chief Complaint   Patient presents with    Results     discuss the results of her recent labs, discuss the options for treatment     She reports that she does not want general anesthesia at all. She says that she would rather die than have general anesthesia. She says that she is concerned that she would have symptoms of feeling loopy after surgery. She reports that her mother had vascular dementia and she worries about this may happen to her after general anesthesia. She reports desire for office EMB.       Past Medical History:   Diagnosis Date    Allergy     Angio-edema     Arthritis     Asthma     Cataract     Chest pain at rest     Fibromyalgia 7/8/2012    Glaucoma suspect, steroid responders     Hand joint pain 7/8/2012    Headache     Migraine     Osteoporosis     Recurrent upper respiratory infection (URI)     Urticaria        Past Surgical History:   Procedure Laterality Date    dexa  2014    osteopenia       Current Outpatient Prescriptions   Medication Sig Dispense Refill    albuterol (PROVENTIL/VENTOLIN) 90 mcg/actuation inhaler Inhale 2 puffs into the lungs every 6 (six) hours as needed for Shortness of Breath. 1 each 10    atorvastatin (LIPITOR) 20 MG tablet TAKE ONE TABLET BY MOUTH ONCE DAILY 30 tablet 0    multivitamin (THERAGRAN) per tablet Take 1 tablet by mouth once daily.      rizatriptan (MAXALT) 10 MG tablet       SYMBICORT 80-4.5 mcg/actuation HFAA INHALE TWO PUFFS BY MOUTH TWICE DAILY 11 g 6    triamcinolone acetonide 0.1% (KENALOG) 0.1 % cream Use topically daily for pruritis, thins the skin 1 Tube 0    VOLTAREN 1 % Gel APPLY 4 GM TOPICALLY 4 TIMES DAILY 2 Tube 0    conjugated estrogens (PREMARIN) vaginal cream Place 0.5 g vaginally twice  a week. 30 g 2    magnesium oxide-Mg AA chelate (MG-PLUS-PROTEIN) 133 mg Tab Take by mouth.      valacyclovir (VALTREX) 500 MG tablet TAKE ONE TABLET BY MOUTH ONCE DAILY 30 tablet 0    valacyclovir (VALTREX) 500 MG tablet TAKE ONE TABLET BY MOUTH ONCE DAILY 30 tablet 0     No current facility-administered medications for this visit.        Review of patient's allergies indicates:   Allergen Reactions    Doxycycline Nausea And Vomiting    Pcn [penicillins] Rash    Sulfa (sulfonamide antibiotics) Rash    Corticosteroids (glucocorticoids) Other (See Comments)     migraine for 2 weeks       Family History   Problem Relation Age of Onset    Hypertension Mother     Diabetes Mother     Stroke Mother     Dementia Mother     Cancer Father      pancreas    Allergies Father     Allergic rhinitis Father     Macular degeneration Cousin     Allergic rhinitis Paternal Aunt     Allergies Paternal Aunt     Allergic rhinitis Paternal Uncle     Allergies Paternal Uncle     Glaucoma Neg Hx     Amblyopia Neg Hx     Blindness Neg Hx     Cataracts Neg Hx     Retinal detachment Neg Hx     Strabismus Neg Hx     Thyroid disease Neg Hx     Angioedema Neg Hx     Asthma Neg Hx     Eczema Neg Hx     Immunodeficiency Neg Hx        Social History   Substance Use Topics    Smoking status: Never Smoker    Smokeless tobacco: Never Used    Alcohol use Yes      Comment: social wine use in the past not current.       OB History    Para Term  AB SAB TAB Ectopic Multiple Living   0 0 0 0 0 0 0 0 0 0             Review of Symptoms:  GENERAL: Denies weight gain or weight loss. Feeling well overall.       Wt 77.9 kg (171 lb 11.8 oz)  BMI 29.48 kg/m2  Physical Exam:  APPEARANCE: Well nourished, well developed, in no acute distress.    ASSESSMENT/PLAN:  Vaginal discharge-pink discharge    Thickened endometrium        ENDOMETRIAL BIOPSY:    Female patient presents for an endometrial biopsy due to abnormal  bleeding.      UPT is negative.    PRE ENDOMETRIAL BIOPSY COUNSELING:  The patient was informed of the risk of bleeding, infection, uterine perforation and pain and that the test will rule-out endometrial cancer with accuracy greater than 95%. She was counseled on the alternatives to endometrial biopsy and agrees to proceed.    PROCEDURE:  TIME OUT PERFORMED.  The cervix was visualized with a speculum.  A single tooth tenaculum was placed on the anterior lip prior to the biopsy.  A sterile endometrial pipelle was passed without difficulty to a depth of 8 cm.  Adequate endometrial tissue was obtained.    The specimen was placed in formalyn and sent to Pathology for histology evaluation.  The patient tolerated the procedure well.    ASSESSMENT:   Abnormal uterine bleeding  626.8.    POST ENDOMETRIAL BIOPSY COUNSELING:  Manage post biopsy cramping with NSAIDs or Tylenol.  Expect spotting or light bleeding for a few days.  Report bleeding heavier than a period, fever > 101.0 F, worsening pain or a foul smelling vaginal discharge.    Counseling lasted approximately 15 minutes and all her questions were answered.    FOLLOW-UP: Pending biopsy results.

## 2017-04-18 ENCOUNTER — PATIENT MESSAGE (OUTPATIENT)
Dept: OBSTETRICS AND GYNECOLOGY | Facility: CLINIC | Age: 72
End: 2017-04-18

## 2017-04-19 ENCOUNTER — PATIENT MESSAGE (OUTPATIENT)
Dept: OBSTETRICS AND GYNECOLOGY | Facility: CLINIC | Age: 72
End: 2017-04-19

## 2017-05-08 ENCOUNTER — TELEPHONE (OUTPATIENT)
Dept: OBSTETRICS AND GYNECOLOGY | Facility: CLINIC | Age: 72
End: 2017-05-08

## 2017-05-12 RX ORDER — DICLOFENAC SODIUM 10 MG/G
GEL TOPICAL
Qty: 2 TUBE | Refills: 0 | Status: SHIPPED | OUTPATIENT
Start: 2017-05-12 | End: 2017-07-16 | Stop reason: SDUPTHER

## 2017-05-17 RX ORDER — RIZATRIPTAN BENZOATE 10 MG/1
10 TABLET ORAL ONCE AS NEEDED
Qty: 10 TABLET | Refills: 3 | Status: SHIPPED | OUTPATIENT
Start: 2017-05-17 | End: 2017-05-23 | Stop reason: SDUPTHER

## 2017-05-23 RX ORDER — RIZATRIPTAN BENZOATE 10 MG/1
10 TABLET ORAL DAILY PRN
Qty: 10 TABLET | Refills: 3 | Status: SHIPPED | OUTPATIENT
Start: 2017-05-23 | End: 2017-08-22 | Stop reason: SDUPTHER

## 2017-05-24 ENCOUNTER — OFFICE VISIT (OUTPATIENT)
Dept: OBSTETRICS AND GYNECOLOGY | Facility: CLINIC | Age: 72
End: 2017-05-24
Payer: MEDICARE

## 2017-05-24 VITALS
BODY MASS INDEX: 29.59 KG/M2 | WEIGHT: 172.38 LBS | DIASTOLIC BLOOD PRESSURE: 70 MMHG | SYSTOLIC BLOOD PRESSURE: 142 MMHG

## 2017-05-24 DIAGNOSIS — R93.89 THICKENED ENDOMETRIUM: Primary | ICD-10-CM

## 2017-05-24 PROCEDURE — 99213 OFFICE O/P EST LOW 20 MIN: CPT | Mod: PBBFAC,PN,25 | Performed by: OBSTETRICS & GYNECOLOGY

## 2017-05-24 PROCEDURE — 58100 BIOPSY OF UTERUS LINING: CPT | Mod: PBBFAC,PN | Performed by: OBSTETRICS & GYNECOLOGY

## 2017-05-24 PROCEDURE — 99499 UNLISTED E&M SERVICE: CPT | Mod: S$PBB,,, | Performed by: OBSTETRICS & GYNECOLOGY

## 2017-05-24 PROCEDURE — 58100 BIOPSY OF UTERUS LINING: CPT | Mod: S$PBB,,, | Performed by: OBSTETRICS & GYNECOLOGY

## 2017-05-24 PROCEDURE — 88305 TISSUE EXAM BY PATHOLOGIST: CPT | Performed by: PATHOLOGY

## 2017-05-24 PROCEDURE — 88305 TISSUE EXAM BY PATHOLOGIST: CPT | Mod: 26,,, | Performed by: PATHOLOGY

## 2017-05-24 PROCEDURE — 99999 PR PBB SHADOW E&M-EST. PATIENT-LVL III: CPT | Mod: PBBFAC,,, | Performed by: OBSTETRICS & GYNECOLOGY

## 2017-05-24 NOTE — PROGRESS NOTES
ENDOMETRIAL BIOPSY:    Female patient presents for an endometrial biopsy due to abnormal bleeding.      UPT is negative.    PRE ENDOMETRIAL BIOPSY COUNSELING:  The patient was informed of the risk of bleeding, infection, uterine perforation and pain and that the test will rule-out endometrial cancer with accuracy greater than 95%. She was counseled on the alternatives to endometrial biopsy and agrees to proceed.    PROCEDURE:  TIME OUT PERFORMED.  The cervix was visualized with a speculum.  A single tooth tenaculum was placed on the anterior lip prior to the biopsy.  A sterile endometrial pipelle was passed without difficulty to a depth of 8 cm.  Adequate endometrial tissue was obtained.    The specimen was placed in formalyn and sent to Pathology for histology evaluation.  The patient tolerated the procedure well.    ASSESSMENT:   Abnormal uterine bleeding  626.8.    POST ENDOMETRIAL BIOPSY COUNSELING:  Manage post biopsy cramping with NSAIDs or Tylenol.  Expect spotting or light bleeding for a few days.  Report bleeding heavier than a period, fever > 101.0 F, worsening pain or a foul smelling vaginal discharge.    Counseling lasted approximately 15 minutes and all her questions were answered.    FOLLOW-UP: Pending biopsy results.

## 2017-07-17 RX ORDER — DICLOFENAC SODIUM 10 MG/G
GEL TOPICAL
Qty: 2 TUBE | Refills: 3 | Status: SHIPPED | OUTPATIENT
Start: 2017-07-17 | End: 2017-08-23 | Stop reason: SDUPTHER

## 2017-07-17 RX ORDER — VALACYCLOVIR HYDROCHLORIDE 500 MG/1
TABLET, FILM COATED ORAL
Qty: 30 TABLET | Refills: 0 | Status: SHIPPED | OUTPATIENT
Start: 2017-07-17 | End: 2017-08-23

## 2017-07-20 RX ORDER — BUDESONIDE AND FORMOTEROL FUMARATE DIHYDRATE 80; 4.5 UG/1; UG/1
AEROSOL RESPIRATORY (INHALATION)
Qty: 11 G | Refills: 6 | Status: SHIPPED | OUTPATIENT
Start: 2017-07-20 | End: 2018-09-04 | Stop reason: SDUPTHER

## 2017-07-25 DIAGNOSIS — R51.9 INCREASED FREQUENCY OF HEADACHES: Primary | ICD-10-CM

## 2017-07-25 RX ORDER — ATORVASTATIN CALCIUM 20 MG/1
20 TABLET, FILM COATED ORAL DAILY
Qty: 30 TABLET | Refills: 0 | Status: SHIPPED | OUTPATIENT
Start: 2017-07-25 | End: 2017-08-23

## 2017-07-25 NOTE — TELEPHONE ENCOUNTER
----- Message from Keila Connolly sent at 7/25/2017 10:47 AM CDT -----  Contact: self/[phone]  Pt called in regards to getting a referral to see  (neurology) Lyn toledo for migraine headaches.      Please advise

## 2017-07-26 NOTE — TELEPHONE ENCOUNTER
----- Message from Yohana Roth sent at 7/26/2017  7:28 AM CDT -----  Contact: Self Call  [phone]  Patient would like to get a referral.  Does the patient already have the specialty clinic appointment scheduled:  N/a  If yes, what date is the appointment scheduled:   n/a  Referral to what specialty:  Neurology  Reason (be specific):  Headaches / Migraine  Does the patient want the referral with a specific physician:  Dr. Lyn Ybarra  [facility]    Comments:  Phone  [phone] and Fax  [phone] / [facility]

## 2017-07-27 ENCOUNTER — TELEPHONE (OUTPATIENT)
Dept: NEUROLOGY | Facility: CLINIC | Age: 72
End: 2017-07-27

## 2017-07-27 NOTE — TELEPHONE ENCOUNTER
----- Message from Jana Gray sent at 7/27/2017 10:19 AM CDT -----  Call [phone] ..the patient has a referral from Dr Columba Anderson in system ... Asking to schedule appt for migraines

## 2017-08-14 ENCOUNTER — TELEPHONE (OUTPATIENT)
Dept: OBSTETRICS AND GYNECOLOGY | Facility: CLINIC | Age: 72
End: 2017-08-14

## 2017-08-14 DIAGNOSIS — Z12.31 VISIT FOR SCREENING MAMMOGRAM: Primary | ICD-10-CM

## 2017-08-14 NOTE — TELEPHONE ENCOUNTER
----- Message from Maurizio García sent at 8/14/2017  2:52 PM CDT -----  Contact: self-   [phone]  Patient called asking for orders for mammogram and requesting to schedule mammogram at the location... Thanks!

## 2017-08-21 ENCOUNTER — TELEPHONE (OUTPATIENT)
Dept: INTERNAL MEDICINE | Facility: CLINIC | Age: 72
End: 2017-08-21

## 2017-08-21 ENCOUNTER — OFFICE VISIT (OUTPATIENT)
Dept: NEUROLOGY | Facility: CLINIC | Age: 72
End: 2017-08-21
Payer: MEDICARE

## 2017-08-21 VITALS
TEMPERATURE: 99 F | SYSTOLIC BLOOD PRESSURE: 134 MMHG | HEART RATE: 83 BPM | DIASTOLIC BLOOD PRESSURE: 78 MMHG | WEIGHT: 171.31 LBS | BODY MASS INDEX: 29.24 KG/M2 | HEIGHT: 64 IN | RESPIRATION RATE: 18 BRPM

## 2017-08-21 DIAGNOSIS — E78.00 PURE HYPERCHOLESTEROLEMIA: Primary | ICD-10-CM

## 2017-08-21 DIAGNOSIS — R20.2 NUMBNESS AND TINGLING OF BOTH LEGS BELOW KNEES: ICD-10-CM

## 2017-08-21 DIAGNOSIS — G43.109 MIGRAINE WITH AURA AND WITHOUT STATUS MIGRAINOSUS, NOT INTRACTABLE: Primary | ICD-10-CM

## 2017-08-21 DIAGNOSIS — R20.0 NUMBNESS AND TINGLING OF BOTH LEGS BELOW KNEES: ICD-10-CM

## 2017-08-21 PROCEDURE — 1159F MED LIST DOCD IN RCRD: CPT | Mod: ,,, | Performed by: PSYCHIATRY & NEUROLOGY

## 2017-08-21 PROCEDURE — 1126F AMNT PAIN NOTED NONE PRSNT: CPT | Mod: ,,, | Performed by: PSYCHIATRY & NEUROLOGY

## 2017-08-21 PROCEDURE — 99999 PR PBB SHADOW E&M-EST. PATIENT-LVL III: CPT | Mod: PBBFAC,,, | Performed by: PSYCHIATRY & NEUROLOGY

## 2017-08-21 PROCEDURE — 99213 OFFICE O/P EST LOW 20 MIN: CPT | Mod: PBBFAC,PN | Performed by: PSYCHIATRY & NEUROLOGY

## 2017-08-21 PROCEDURE — 3008F BODY MASS INDEX DOCD: CPT | Mod: ,,, | Performed by: PSYCHIATRY & NEUROLOGY

## 2017-08-21 PROCEDURE — 99215 OFFICE O/P EST HI 40 MIN: CPT | Mod: S$PBB,,, | Performed by: PSYCHIATRY & NEUROLOGY

## 2017-08-21 RX ORDER — LAMOTRIGINE 25 MG/1
TABLET ORAL
Qty: 98 TABLET | Refills: 0 | Status: SHIPPED | OUTPATIENT
Start: 2017-08-21 | End: 2017-08-22 | Stop reason: ALTCHOICE

## 2017-08-21 NOTE — PROGRESS NOTES
Date of service: 8/21/2017  Referring provider: Dr. Columba Anderson    Subjective:      Chief complaint: Migraine       Patient ID: Margarita Dunne is a 72 y.o. lady with migraine with aura, fibromyalgia, asthma, glaucoma (suspect) who presents for evaluation of her migraines. She is a new patient to me. She had  Previously seen neurologist Dr. Medrano in 2015.     History of Present Illness    Headache History:  Age at onset and course over time: began in 1968 college as menstrual migraines, improved for a time with menopause but in 2013 started returning (moved to [place] from the [facility] and increased from 1 migraine every other month to several per month). Followed with Angel Rivera and Marcela but was not on preventatives at that time. Recently experienced increase frequency x last 3-6 months, had 2 in one day, interfering more with her life. Also has hemicranial headaches which she does not think are the same as her migraines, no other symptoms.   Location: either temple   Quality: throbbing, tight band   Severity: current 2/10; worst 8/10   Duration: >4 hours   Frequency: 1-6 per month on review of 2017 diary, recently in 3-6 range. May and June had 1 day with 2 migraines in each day.   Alleviated by: sleep, darkness, local pressure, massage, ice, medication   Exacerbated by: fatigue, light, noise, smells, stress  Associated with: all migraines have visual aura (blurred vision, sparkling lights, scotoma), hunger, craving sweets , Photophobia, phonophobia, osmophobia, problems with memory   Sleep habits: no difficulty falling asleep; but sleep is terrible because she wakes up, can't get back to sleep, this is since menopausea   Caffeine intake: 0    Current acute treatment:  -- maxalt - helps with migraine, no aura, never repeats    Current prevention:  -- none    Previously tried/failed acute treatment:  -- ergotomine  -- sumatriptan - ineffective  -- excedrin   -- iburpofen  -- tylenol  -- phenergan     Previously  tried/failed preventative treatment:  -- none     Review of patient's allergies indicates:   Allergen Reactions    Doxycycline Nausea And Vomiting    Pcn [penicillins] Rash    Sulfa (sulfonamide antibiotics) Rash    Corticosteroids (glucocorticoids) Other (See Comments)     migraine for 2 weeks     Current Outpatient Prescriptions   Medication Sig Dispense Refill    albuterol (PROVENTIL/VENTOLIN) 90 mcg/actuation inhaler Inhale 2 puffs into the lungs every 6 (six) hours as needed for Shortness of Breath. 1 each 10    atorvastatin (LIPITOR) 20 MG tablet Take 1 tablet (20 mg total) by mouth once daily. 30 tablet 0    multivitamin (THERAGRAN) per tablet Take 1 tablet by mouth once daily.      rizatriptan (MAXALT) 10 MG tablet Take 1 tablet (10 mg total) by mouth daily as needed for Migraine (take 1 (10 mg tablet) once daily as needed). 10 tablet 3    SYMBICORT 80-4.5 mcg/actuation HFAA INHALE TWO PUFFS BY MOUTH TWICE DAILY 11 g 6    triamcinolone acetonide 0.1% (KENALOG) 0.1 % cream Use topically daily for pruritis, thins the skin 1 Tube 0    valacyclovir (VALTREX) 500 MG tablet TAKE ONE TABLET BY MOUTH ONCE DAILY 30 tablet 0    valacyclovir (VALTREX) 500 MG tablet TAKE ONE TABLET BY MOUTH ONCE DAILY 30 tablet 0    VOLTAREN 1 % Gel APPLY 4 GRAMS TOPICALLY 4 TIMES DAILY AS DIRECTED 2 Tube 3    lamotrigine (LAMICTAL) 25 MG tablet 1 tab PO daily x 2 weeks, then 2 tab PO daily x 2 weeks, then 4 tab daily x 2 weeks then get new prescription. 98 tablet 0     No current facility-administered medications for this visit.        Past Medical History  Past Medical History:   Diagnosis Date    Allergy     Angio-edema     Arthritis     Asthma     Cataract     Chest pain at rest     Fibromyalgia 7/8/2012    Glaucoma suspect, steroid responders     Hand joint pain 7/8/2012    Headache(784.0)     Migraine     Osteoporosis     Recurrent upper respiratory infection (URI)     Urticaria        Past Surgical  History  Past Surgical History:   Procedure Laterality Date    dexa  2014    osteopenia       Family History  Family History   Problem Relation Age of Onset    Hypertension Mother     Diabetes Mother     Stroke Mother     Dementia Mother     Cancer Father      pancreas    Allergies Father     Allergic rhinitis Father     Macular degeneration Cousin     Allergic rhinitis Paternal Aunt     Allergies Paternal Aunt     Allergic rhinitis Paternal Uncle     Allergies Paternal Uncle     Glaucoma Neg Hx     Amblyopia Neg Hx     Blindness Neg Hx     Cataracts Neg Hx     Retinal detachment Neg Hx     Strabismus Neg Hx     Thyroid disease Neg Hx     Angioedema Neg Hx     Asthma Neg Hx     Eczema Neg Hx     Immunodeficiency Neg Hx        Social History  Social History     Social History    Marital status:      Spouse name: N/A    Number of children: 0    Years of education: N/A     Occupational History    Not on file.     Social History Main Topics    Smoking status: Never Smoker    Smokeless tobacco: Never Used    Alcohol use Yes      Comment: social wine use in the past not current.    Drug use: No    Sexual activity: Not on file     Other Topics Concern    Not on file     Social History Narrative    No narrative on file        Review of Systems  Constitutional: no fever, no chills  Eyes: no change to vision, no redness, no tearing  Ears, nose, mouth, throat: no hearing loss, no tinnitus, no rhinorrhea, no difficulty swallowing   Cardiovascular: no palpitations + wheezing   Respiratory: no shortness of breath  Gastrointestinal: no diarrhea, no constipation  Gu: + incontinence   Musculoskeletal: + muscle pain, + joint pain  Skin: no rashes  Neurologic: + numbness, weakness, no change to speech, loss of coordination   Endocrine: + heat or cold intolerance     Objective:        Vitals:    08/21/17 1352   BP: 134/78   Pulse: 83   Resp: 18   Temp: 98.5 °F (36.9 °C)     Body mass index is  29.4 kg/m².    Constitutional: appears in no acute distress, well-developed, well-nourished     Eyes: normal conjunctiva, PERRLA    Ears, nose, mouth, throat: external appearance of ears and nose normal, hearing intact to finger rub     Cardiovascular: regular rate and rhythm, no murmurs appreciated, no carotid bruits     Respiratory: unlabored respirations, breath sounds normal bilaterally    Gastrointestinal: no abdominal masses, + diffuse tenderness, no rebound, no visible hernia    Musculoskeletal: normal tone in all four extremities. No atrophy. No abnormal movements. Strength in all muscles groups of the upper and lower extremities is 5/5. Normal gait and station. Digits and nails normal.      Spine: cervical spine with very reduced ROM in extension and lateral rotation; + trapezius muscle spasm + FLOYD SHABBIR tenderness, neg facet loading     Psychiatric: normal judgment and insight. Oriented to person, place, and time.     Neurologic:   Cortical functions: recent and remote memory intact, normal attention span and concentration, speech fluent, adequate fund of knowledge   Cranial nerves: visual fields full, PERRLA, EOMI, facial sensation REDUCED to light touch and temp in V3 (chronic), symmetric facial strength, hearing REDUCED on left, palate elevates symmetrically, shoulder shrug 5/5, tongue protrudes midline   Reflexes: 2+ in the upper extremities, 1+ in lower extremities, no Babinski, no Ferris  Sensation: intact to light touch and temperature in upper exremities but reduced in a stocking pattern in the lower extremities  Coordination: normal finger to noise    Data Review:     Results for orders placed or performed during the hospital encounter of 09/26/13   CT Head Without Contrast    Narrative    Comparison: None    Technique: 5 mm noncontrast axial images through the brain were obtained.    Findings:     The brain is normally formed and exhibits normal density throughout with no indication of  acute/recent major vascular distribution cerebral infarction, intraparenchymal hemorrhage, or intra-axial space occupying lesion. There is preserved gray-white   matter junction differentiation. The ventricular system is normal in appearance for age. No hydrocephalus. No effacement of the skull-base cisterns. No abnormal extra-axial fluid collections or blood products. The paranasal sinuses and mastoid air cells   are unremarkable. The calvarium shows no significant abnormality.    Impression     No acute intracranial abnormalities are appreciated.      Electronically signed by: Celia Fraser MD  Date:     09/26/13  Time:    12:44        Lab Results   Component Value Date     08/12/2016    K 4.6 08/12/2016     08/12/2016    CO2 24 08/12/2016    BUN 19 08/12/2016    CREATININE 0.9 08/12/2016     08/12/2016    HGBA1C 5.9 08/12/2016    AST 35 08/12/2016    ALT 40 08/12/2016    ALBUMIN 4.1 08/12/2016    PROT 7.2 08/12/2016    BILITOT 0.7 08/12/2016    CHOL 139 08/12/2016    HDL 46 08/12/2016    LDLCALC 79.4 08/12/2016    TRIG 68 08/12/2016       Lab Results   Component Value Date    WBC 5.92 08/12/2016    HGB 14.9 08/12/2016    HCT 45.2 08/12/2016    MCV 93 08/12/2016     08/12/2016       Lab Results   Component Value Date    TSH 1.680 06/10/2015     5/2016  A1C 5.9     Assessment & Plan:       Problem List Items Addressed This Visit     Migraine with aura and without status migrainosus, not intractable - Primary    Current Assessment & Plan     Currently experiences up to 6 distinct days of migraine with classic visual aura per month. Frequency is increasing over time. A preventative medication is recommended at this point especially since migraines are interfering with quality of life. We discussed that specifically for aura prevention we prefer to use anti-epileptics. However, we are limited with use of depakote as patient is poorly tolerant of medication side effects, topiramate and  zonisamide due to having glaucoma. We are also limited with TCAs from the glaucoma and side effect standpoint and beta blockers due to concurrent asthma. We discussed lamotrigine as the logical solution, which is fairly well tolerated, has efficacy against aura, and may help with migraine prevention as well. Discussed side effects of SJS if titrated too quickly so for this reason provided a dosing chart and also 10% incidence of benign rash.     For acute, can continue using rizatriptan but can combine with NSAID, also OK to repeat up to 3x in one day if needed for recurrent headache.          Relevant Medications    lamotrigine (LAMICTAL) 25 MG tablet    Numbness and tingling of both legs below knees    Current Assessment & Plan     Incidental finding on exam, concerned for a length dependent peripheral neuropathy. Not diabetic, and based on low a1C probably not pre-diabetic either. Will send for NCS/EMG and then order remaining neuropathy labs if needed.         Relevant Orders    Nerve conduction test    EMG - 2 Extremities      Other Visit Diagnoses    None.             WORKUP:  -- nerve conduction study     PREVENTION (use daily regardless of headache):  -- start lamotrigine 25mg daily (night or day) and increase up to the dose that works for you based on the table   -- start a magnesium supplement (either magnesium citrate 600mg daily or magnesium oxide 400mg twice per day) magnesium oxide may cause loose stools     ACUTE TREATMENT (use total no more than 10 days per month unless otherwise stated):  -- continue using rizatriptan, may repeat in 2 hours for a total of 3 tabs per day. May take with Advil or Aleve for increased efficacy.       Return in about 2 months (around 10/21/2017).     A total of 40 minutes were spent face to face with the patient and more than half of this time was spent coordinating care and/or counseling.       Crys Delong MD    12/14/17 ADDENDUM    EMG/NCs results reviewed. Study was  not tolerated thus findings are minimally helpful but do suggest presence of an axonal neuropathy. Will send for neuropathy blood work prior to next visit.     Crys Delong

## 2017-08-21 NOTE — ASSESSMENT & PLAN NOTE
Incidental finding on exam, concerned for a length dependent peripheral neuropathy. Not diabetic, and based on low a1C probably not pre-diabetic either. Will send for NCS/EMG and then order remaining neuropathy labs if needed.

## 2017-08-21 NOTE — TELEPHONE ENCOUNTER
----- Message from Daniella Grace sent at 8/21/2017  9:13 AM CDT -----  Contact: Self/ [phone]   Type: Orders Request    What orders/ testing are being requested? Blood work     Is there a future appointment scheduled for the patient with PCP? Yes     When? 08/23/2017     Comments: pt is calling to have blood work on Tuesday in [place]. Please call and advise     Thank you

## 2017-08-21 NOTE — LETTER
August 21, 2017      Columba Anderson MD  [address]  [facility] [address]           [facility]  [address] [facility] [address] [place] [address]  Phone: [phone]  Fax: [phone]          Patient: Margarita Dunne   MR Number: [id number]   YOB: 1945   Date of Visit: 8/21/2017       Dear Dr. Columba Anderson:    Thank you for referring Margarita Dunne to me for evaluation. Attached you will find relevant portions of my assessment and plan of care.    If you have questions, please do not hesitate to call me. I look forward to following Margarita Dunne along with you.    Sincerely,    Crys Delong MD    Enclosure  CC:  No Recipients    If you would like to receive this communication electronically, please contact [email or web address] or [phone] to request more information on [employer] Link access.    For providers and/or their staff who would like to refer a patient to [facility], please contact us through our one-stop-shop provider referral line, [facility], at [phone].    If you feel you have received this communication in error or would no longer like to receive these types of communications, please e-mail [email or web address]

## 2017-08-21 NOTE — ASSESSMENT & PLAN NOTE
Currently experiences up to 6 distinct days of migraine with classic visual aura per month. Frequency is increasing over time. A preventative medication is recommended at this point especially since migraines are interfering with quality of life. We discussed that specifically for aura prevention we prefer to use anti-epileptics. However, we are limited with use of depakote as patient is poorly tolerant of medication side effects, topiramate and zonisamide due to having glaucoma. We are also limited with TCAs from the glaucoma and side effect standpoint and beta blockers due to concurrent asthma. We discussed lamotrigine as the logical solution, which is fairly well tolerated, has efficacy against aura, and may help with migraine prevention as well. Discussed side effects of SJS if titrated too quickly so for this reason provided a dosing chart and also 10% incidence of benign rash.     For acute, can continue using rizatriptan but can combine with NSAID, also OK to repeat up to 3x in one day if needed for recurrent headache.

## 2017-08-21 NOTE — TELEPHONE ENCOUNTER
Procedures Ordered This Visit    CBC auto differential              Comprehensive metabolic panel              Lipid panel               Please schedule for Tuesday as she requested

## 2017-08-22 ENCOUNTER — LAB VISIT (OUTPATIENT)
Dept: LAB | Facility: HOSPITAL | Age: 72
End: 2017-08-22
Attending: INTERNAL MEDICINE
Payer: MEDICARE

## 2017-08-22 ENCOUNTER — PATIENT MESSAGE (OUTPATIENT)
Dept: NEUROLOGY | Facility: CLINIC | Age: 72
End: 2017-08-22

## 2017-08-22 DIAGNOSIS — E78.00 PURE HYPERCHOLESTEROLEMIA: ICD-10-CM

## 2017-08-22 LAB
ALBUMIN SERPL BCP-MCNC: 3.8 G/DL
ALP SERPL-CCNC: 85 U/L
ALT SERPL W/O P-5'-P-CCNC: 23 U/L
ANION GAP SERPL CALC-SCNC: 8 MMOL/L
AST SERPL-CCNC: 24 U/L
BASOPHILS # BLD AUTO: 0.04 K/UL
BASOPHILS NFR BLD: 0.8 %
BILIRUB SERPL-MCNC: 0.6 MG/DL
BUN SERPL-MCNC: 14 MG/DL
CALCIUM SERPL-MCNC: 9.5 MG/DL
CHLORIDE SERPL-SCNC: 108 MMOL/L
CHOLEST/HDLC SERPL: 3.5 {RATIO}
CO2 SERPL-SCNC: 25 MMOL/L
CREAT SERPL-MCNC: 0.9 MG/DL
DIFFERENTIAL METHOD: ABNORMAL
EOSINOPHIL # BLD AUTO: 0.1 K/UL
EOSINOPHIL NFR BLD: 2.1 %
ERYTHROCYTE [DISTWIDTH] IN BLOOD BY AUTOMATED COUNT: 13.6 %
EST. GFR  (AFRICAN AMERICAN): >60 ML/MIN/1.73 M^2
EST. GFR  (NON AFRICAN AMERICAN): >60 ML/MIN/1.73 M^2
GLUCOSE SERPL-MCNC: 96 MG/DL
HCT VFR BLD AUTO: 41.7 %
HDL/CHOLESTEROL RATIO: 28.4 %
HDLC SERPL-MCNC: 169 MG/DL
HDLC SERPL-MCNC: 48 MG/DL
HGB BLD-MCNC: 14.1 G/DL
LDLC SERPL CALC-MCNC: 101.6 MG/DL
LYMPHOCYTES # BLD AUTO: 1.2 K/UL
LYMPHOCYTES NFR BLD: 23.2 %
MCH RBC QN AUTO: 31.3 PG
MCHC RBC AUTO-ENTMCNC: 33.8 G/DL
MCV RBC AUTO: 93 FL
MONOCYTES # BLD AUTO: 0.5 K/UL
MONOCYTES NFR BLD: 10.1 %
NEUTROPHILS # BLD AUTO: 3.3 K/UL
NEUTROPHILS NFR BLD: 63.6 %
NONHDLC SERPL-MCNC: 121 MG/DL
PLATELET # BLD AUTO: 241 K/UL
PMV BLD AUTO: 10.5 FL
POTASSIUM SERPL-SCNC: 4.7 MMOL/L
PROT SERPL-MCNC: 7.2 G/DL
RBC # BLD AUTO: 4.5 M/UL
SODIUM SERPL-SCNC: 141 MMOL/L
TRIGL SERPL-MCNC: 97 MG/DL
WBC # BLD AUTO: 5.14 K/UL

## 2017-08-22 PROCEDURE — 80053 COMPREHEN METABOLIC PANEL: CPT

## 2017-08-22 PROCEDURE — 85025 COMPLETE CBC W/AUTO DIFF WBC: CPT

## 2017-08-22 PROCEDURE — 80061 LIPID PANEL: CPT

## 2017-08-22 PROCEDURE — 36415 COLL VENOUS BLD VENIPUNCTURE: CPT | Mod: PO

## 2017-08-22 RX ORDER — RIZATRIPTAN BENZOATE 10 MG/1
TABLET ORAL
Qty: 10 TABLET | Refills: 3 | Status: SHIPPED | OUTPATIENT
Start: 2017-08-22 | End: 2018-05-24 | Stop reason: SDUPTHER

## 2017-08-23 ENCOUNTER — OFFICE VISIT (OUTPATIENT)
Dept: INTERNAL MEDICINE | Facility: CLINIC | Age: 72
End: 2017-08-23
Payer: MEDICARE

## 2017-08-23 ENCOUNTER — DOCUMENTATION ONLY (OUTPATIENT)
Dept: INTERNAL MEDICINE | Facility: CLINIC | Age: 72
End: 2017-08-23

## 2017-08-23 VITALS
HEIGHT: 64 IN | OXYGEN SATURATION: 98 % | WEIGHT: 170.63 LBS | SYSTOLIC BLOOD PRESSURE: 132 MMHG | DIASTOLIC BLOOD PRESSURE: 64 MMHG | HEART RATE: 74 BPM | BODY MASS INDEX: 29.13 KG/M2

## 2017-08-23 DIAGNOSIS — Z23 IMMUNIZATION DUE: ICD-10-CM

## 2017-08-23 DIAGNOSIS — M54.2 NECK PAIN: Primary | ICD-10-CM

## 2017-08-23 DIAGNOSIS — M25.50 ARTHRALGIA, UNSPECIFIED JOINT: ICD-10-CM

## 2017-08-23 DIAGNOSIS — Z12.11 COLON CANCER SCREENING: ICD-10-CM

## 2017-08-23 DIAGNOSIS — J45.20 MILD INTERMITTENT ASTHMA WITHOUT COMPLICATION: ICD-10-CM

## 2017-08-23 DIAGNOSIS — L29.9 ITCHING: ICD-10-CM

## 2017-08-23 DIAGNOSIS — E78.00 PURE HYPERCHOLESTEROLEMIA: ICD-10-CM

## 2017-08-23 PROCEDURE — 99213 OFFICE O/P EST LOW 20 MIN: CPT | Mod: PBBFAC | Performed by: INTERNAL MEDICINE

## 2017-08-23 PROCEDURE — G0009 ADMIN PNEUMOCOCCAL VACCINE: HCPCS | Mod: PBBFAC

## 2017-08-23 PROCEDURE — 90670 PCV13 VACCINE IM: CPT | Mod: PBBFAC

## 2017-08-23 PROCEDURE — 99999 PR PBB SHADOW E&M-EST. PATIENT-LVL III: CPT | Mod: PBBFAC,,, | Performed by: INTERNAL MEDICINE

## 2017-08-23 PROCEDURE — 1125F AMNT PAIN NOTED PAIN PRSNT: CPT | Mod: ,,, | Performed by: INTERNAL MEDICINE

## 2017-08-23 PROCEDURE — 1159F MED LIST DOCD IN RCRD: CPT | Mod: ,,, | Performed by: INTERNAL MEDICINE

## 2017-08-23 PROCEDURE — 99214 OFFICE O/P EST MOD 30 MIN: CPT | Mod: S$PBB,,, | Performed by: INTERNAL MEDICINE

## 2017-08-23 RX ORDER — DICLOFENAC SODIUM 10 MG/G
GEL TOPICAL
Qty: 2 TUBE | Refills: 6 | Status: SHIPPED | OUTPATIENT
Start: 2017-08-23 | End: 2018-08-06 | Stop reason: SDUPTHER

## 2017-08-23 NOTE — PROGRESS NOTES
Subjective:      Patient ID: Margarita Dunne is a 72 y.o. female.    Chief Complaint: No chief complaint on file.    HPI:  HPI   Patient has Form to be completed    Neurology consultation: for increase in migraines, appt was yesterday She feels that she needs help with Aura: declined lamotrigine, keppra uncertain. She currently is on maxalt.    Biopsy of uterus: office biopsy, 1st biopsy inconclusive, 2nd biopsy no cancer. Patient should return for an appt to her Gyn. Patient did take herself off premarin vaginal cream:    Statins: took 1/2 a tablet of statin, neurology found that she had sensory decrease from the knee down: Nerve conduction study was ordered. Diet vegetarian diet with fish may help as she is stopping the statin.    Joint gel: Voltaren gel, uses it at  night and helps    Chronic itch: arms, neck and under the arm.Now has had it 6 or more years. Discussed  Mast cell disorders , she will be seeing a dermatologist next week,Discussed Singulair    Patient on Symbicort for asthma: 2 puffs bid, she will see Dr. Boss next week    MVI she is still on      Consultant:  Neurology: Dr. Crys Delong  OB-Gyn: Dr. 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    Glaucoma suspect - Both Eyes    Nuclear sclerosis - Both Eyes    Chronic rhinitis    Eyelid myokymia - Right Eye    Herpes labialis    Visual field defect - Both Eyes    Penicillin allergy    Itching    Hyperlipidemia    Migraine with aura and without status migrainosus, not intractable    Osteopenia    Numbness and tingling of both legs below knees     Past Medical History:   Diagnosis Date    Allergy     Angio-edema     Arthritis     Asthma     Cataract     Chest pain at rest     Fibromyalgia 7/8/2012    Glaucoma suspect, steroid responders     Hand joint pain 7/8/2012     "Headache(784.0)     Migraine     Osteoporosis     Recurrent upper respiratory infection (URI)     Urticaria      Past Surgical History:   Procedure Laterality Date    dexa  2014    osteopenia     Family History   Problem Relation Age of Onset    Hypertension Mother     Diabetes Mother     Stroke Mother     Dementia Mother     Cancer Father      pancreas    Allergies Father     Allergic rhinitis Father     Macular degeneration Cousin     Allergic rhinitis Paternal Aunt     Allergies Paternal Aunt     Allergic rhinitis Paternal Uncle     Allergies Paternal Uncle     Glaucoma Neg Hx     Amblyopia Neg Hx     Blindness Neg Hx     Cataracts Neg Hx     Retinal detachment Neg Hx     Strabismus Neg Hx     Thyroid disease Neg Hx     Angioedema Neg Hx     Asthma Neg Hx     Eczema Neg Hx     Immunodeficiency Neg Hx      Review of Systems   Constitutional: Negative for activity change, chills, fever and unexpected weight change.   HENT: Negative for hearing loss, rhinorrhea and trouble swallowing.    Eyes: Positive for visual disturbance. Negative for discharge.   Respiratory: Positive for wheezing. Negative for cough, chest tightness and shortness of breath.    Cardiovascular: Negative for chest pain and palpitations.   Gastrointestinal: Negative for abdominal distention, abdominal pain, blood in stool, constipation, diarrhea and vomiting.   Endocrine: Positive for polyuria. Negative for polydipsia.   Genitourinary: Negative for difficulty urinating, dysuria, hematuria and menstrual problem.   Musculoskeletal: Positive for arthralgias and neck pain. Negative for back pain and joint swelling.   Neurological: Positive for weakness and headaches.   Psychiatric/Behavioral: Negative for confusion and dysphoric mood.     Objective:     Vitals:    08/23/17 1258   BP: 132/64   Pulse: 74   SpO2: 98%   Weight: 77.4 kg (170 lb 10.2 oz)   Height: 5' 4" (1.626 m)   PainSc:   6   PainLoc: Knee     Body mass index " is 29.29 kg/m².  Physical Exam   Constitutional: She is oriented to person, place, and time. She appears well-developed and well-nourished. No distress.   Neck: Carotid bruit is not present. No thyromegaly present.   Cardiovascular: Normal rate, regular rhythm and normal heart sounds.  PMI is not displaced.    Pulmonary/Chest: Effort normal and breath sounds normal. No respiratory distress.   Abdominal: Soft. Bowel sounds are normal. She exhibits no distension. There is no tenderness.   Musculoskeletal: She exhibits no edema.   Neurological: She is alert and oriented to person, place, and time.     Assessment:     1. Neck pain    2. Immunization due    3. Pure hypercholesterolemia    4. Itching    5. Arthralgia, unspecified joint    6. Mild intermittent asthma without complication      Plan:   Diagnoses and all orders for this visit:    Neck pain  -     Ambulatory consult to Physical Therapy    Immunization due  -     (In Office Administered) Pneumococcal Conjugate Vaccine (13 Valent) (IM)    Pure hypercholesterolemia: Patient was told that she had sensory deficits in her legs below the knee and has stopped the statin.  Her neurologist told her that this could be related to the statin.  She will work on diet she wants no additional medication    Itching: For 6 years the patient has had various areas of itching and I have discussed mast cell disorder.  She is seeing a dermatology at times Singulair will help for this    Arthralgia, unspecified joint: Patient uses Voltaren gel and I've told her that I would refill this for her    Mild intermittent asthma without complication: She is stable at this time and will see her pulmonologist    Other orders  -     VOLTAREN 1 % Gel; APPLY 4 GRAMS TOPICALLY 4 TIMES DAILY AS DIRECTED    Return in about 1 year (around 8/23/2018).         Medication List          Accurate as of 8/23/17  1:58 PM. If you have any questions, ask your nurse or doctor.               CHANGE how you take  these medications    valacyclovir 500 MG tablet  Commonly known as:  VALTREX  TAKE ONE TABLET BY MOUTH ONCE DAILY  What changed:  Another medication with the same name was removed. Continue taking this medication, and follow the directions you see here.     VOLTAREN 1 % Gel  Generic drug:  diclofenac sodium  APPLY 4 GRAMS TOPICALLY 4 TIMES DAILY AS DIRECTED  What changed:  See the new instructions.        CONTINUE taking these medications    albuterol 90 mcg/actuation inhaler  Commonly known as:  PROVENTIL/VENTOLIN  Inhale 2 puffs into the lungs every 6 (six) hours as needed for Shortness of Breath.     multivitamin per tablet  Commonly known as:  THERAGRAN     rizatriptan 10 MG tablet  Commonly known as:  MAXALT  1 tab PO at onset of migraine, may repeat ever 2 hours for max 3 tab in 24 hr     SYMBICORT 80-4.5 mcg/actuation Hfaa  Generic drug:  budesonide-formoterol 80-4.5 mcg  INHALE TWO PUFFS BY MOUTH TWICE DAILY     triamcinolone acetonide 0.1% 0.1 % cream  Commonly known as:  KENALOG  Use topically daily for pruritis, thins the skin        STOP taking these medications    atorvastatin 20 MG tablet  Commonly known as:  LIPITOR           Where to Get Your Medications      These medications were sent to [facility] [address] [place] [address], [place] [address] [facility] [address]    Phone:  [phone]   · VOLTAREN 1 % Gel

## 2017-08-29 ENCOUNTER — TELEPHONE (OUTPATIENT)
Dept: NEUROLOGY | Facility: CLINIC | Age: 72
End: 2017-08-29

## 2017-09-20 ENCOUNTER — OFFICE VISIT (OUTPATIENT)
Dept: PULMONOLOGY | Facility: CLINIC | Age: 72
End: 2017-09-20
Payer: MEDICARE

## 2017-09-20 ENCOUNTER — HOSPITAL ENCOUNTER (OUTPATIENT)
Dept: PULMONOLOGY | Facility: CLINIC | Age: 72
Discharge: HOME | End: 2017-09-20
Payer: MEDICARE

## 2017-09-20 VITALS
BODY MASS INDEX: 51.91 KG/M2 | DIASTOLIC BLOOD PRESSURE: 76 MMHG | HEIGHT: 63 IN | HEART RATE: 76 BPM | WEIGHT: 293 LBS | RESPIRATION RATE: 14 BRPM | OXYGEN SATURATION: 98 % | SYSTOLIC BLOOD PRESSURE: 138 MMHG

## 2017-09-20 DIAGNOSIS — M79.7 FIBROMYALGIA: ICD-10-CM

## 2017-09-20 DIAGNOSIS — J45.30 CHRONIC ASTHMA, MILD PERSISTENT, UNCOMPLICATED: ICD-10-CM

## 2017-09-20 DIAGNOSIS — G43.109 MIGRAINE WITH AURA AND WITHOUT STATUS MIGRAINOSUS, NOT INTRACTABLE: Primary | ICD-10-CM

## 2017-09-20 DIAGNOSIS — L29.9 ITCHING: ICD-10-CM

## 2017-09-20 LAB
PRE FEV1 FVC: 66
PRE FEV1: 1.94
PRE FVC: 2.92
PREDICTED FEV1 FVC: 77
PREDICTED FEV1: 2.14
PREDICTED FVC: 2.78

## 2017-09-20 PROCEDURE — 99214 OFFICE O/P EST MOD 30 MIN: CPT | Mod: 25,S$PBB,, | Performed by: INTERNAL MEDICINE

## 2017-09-20 PROCEDURE — 1159F MED LIST DOCD IN RCRD: CPT | Mod: ,,, | Performed by: INTERNAL MEDICINE

## 2017-09-20 PROCEDURE — 94010 BREATHING CAPACITY TEST: CPT | Mod: 26,S$PBB,, | Performed by: INTERNAL MEDICINE

## 2017-09-20 PROCEDURE — 99213 OFFICE O/P EST LOW 20 MIN: CPT | Mod: PBBFAC | Performed by: INTERNAL MEDICINE

## 2017-09-20 PROCEDURE — 1126F AMNT PAIN NOTED NONE PRSNT: CPT | Mod: ,,, | Performed by: INTERNAL MEDICINE

## 2017-09-20 PROCEDURE — 94010 BREATHING CAPACITY TEST: CPT | Mod: PBBFAC | Performed by: INTERNAL MEDICINE

## 2017-09-20 PROCEDURE — 99999 PR PBB SHADOW E&M-EST. PATIENT-LVL III: CPT | Mod: PBBFAC,,, | Performed by: INTERNAL MEDICINE

## 2017-09-20 NOTE — PATIENT INSTRUCTIONS
Continue present respiratory medications (roles reviewed at today's visit).  Patient encouraged to discuss issue of current medications aggravating migraines with Neurology.  Call if she needs to alter current regimen of resp meds; otherwise, return visit 1 year.

## 2017-09-20 NOTE — PROGRESS NOTES
Subjective:       Patient ID: Margarita Dunne is a 72 y.o. female.    Chief Complaint: Asthma    HPI HISTORY OF PRESENT ILLNESS:  Mrs. Dunne is a 72-year-old nonsmoker, who comes   for an interval assessment of chronic asthma.  Her most recent previous visit   here was in September of last year.  She feels that her respiratory symptoms   have been fairly stable during this past year.  She continues to use Symbicort,   but has reduced the dose to one puff twice daily.  She has not felt that she   needed to use albuterol in recent months.  She is not having any ongoing   nighttime respiratory problems.  She has not had any recent symptoms to suggest   respiratory infection.    Mrs. Dunne does have an ongoing problem with migraine headaches.  She is   concerned that these may be aggravated by some of her medications.    Specifically, she is concerned about whether Symbicort might be playing an   aggravating role.  She has been seen in [facility] for assessment and   management of this problem.    Finally, Mrs. Dunne reports a problem with itching, which seems to occur in the   evening.  Her symptoms are typically localized over the forearms or chest.  She   has not recognized any associated rash.  She has not taken any medications for   control of this problem.    DATA:  I have reviewed the results of a spirometry study done earlier today.    The forced vital capacity is 2.92 L, which is 109% of predicted.  The FEV1 is   1.94 L, or 93% of predicted.  The ratio of these values is 66%.  When today's   spirometry results are compared to a previous study from January 2013, the   current values are mild to marginally higher.  The current study shows mild   obstruction.      BHAVNA/KIKI  dd: 09/20/2017 20:28:19 (CDT)  td: 09/21/2017 13:17:22 (CDT)  Doc ID   #[id number]  Job ID #[id number]    CC:       Review of Systems   Constitutional: Negative for fever and fatigue.   HENT: Negative for postnasal drip, sinus pressure, voice change  and congestion.    Respiratory: Negative for cough, sputum production, shortness of breath, wheezing and dyspnea on extertion.    Cardiovascular: Negative for chest pain and leg swelling.   Genitourinary: Negative for difficulty urinating.   Musculoskeletal: Positive for myalgias. Negative for arthralgias and back pain.   Skin: Negative for rash.   Gastrointestinal: Negative for abdominal pain and acid reflux.   Neurological: Positive for headaches. Negative for dizziness and weakness.   Hematological: Negative for adenopathy.       Objective:      Physical Exam   Constitutional: She is oriented to person, place, and time. She appears well-developed and well-nourished.   HENT:   Head: Normocephalic.   Nose: Nose normal.   Mouth/Throat: No oropharyngeal exudate.   Neck: Normal range of motion. No JVD present. No tracheal deviation present. No thyromegaly present.   Cardiovascular: Normal rate, regular rhythm and normal heart sounds.    No murmur heard.  Pulmonary/Chest: Normal expansion. No stridor. She has no wheezes. She has no rhonchi. She has no rales. She exhibits no tenderness.   Abdominal: Soft. There is no tenderness.   Musculoskeletal: She exhibits no edema.   Lymphadenopathy:     She has no cervical adenopathy.   Neurological: She is alert and oriented to person, place, and time.   Skin: Skin is warm and dry. No rash noted. No erythema. Nails show no clubbing.   Psychiatric: She has a normal mood and affect.   Vitals reviewed.    Personal Diagnostic Review    No flowsheet data found.      Assessment:       1. Migraine with aura and without status migrainosus, not intractable    2. Chronic asthma, mild persistent, uncomplicated    3. Fibromyalgia    4. Itching        Outpatient Encounter Prescriptions as of 9/20/2017   Medication Sig Dispense Refill    albuterol (PROVENTIL/VENTOLIN) 90 mcg/actuation inhaler Inhale 2 puffs into the lungs every 6 (six) hours as needed for Shortness of Breath. 1 each 10     multivitamin (THERAGRAN) per tablet Take 1 tablet by mouth once daily.      rizatriptan (MAXALT) 10 MG tablet 1 tab PO at onset of migraine, may repeat ever 2 hours for max 3 tab in 24 hr 10 tablet 3    SYMBICORT 80-4.5 mcg/actuation HFAA INHALE TWO PUFFS BY MOUTH TWICE DAILY 11 g 6    triamcinolone acetonide 0.1% (KENALOG) 0.1 % cream Use topically daily for pruritis, thins the skin 1 Tube 0    valacyclovir (VALTREX) 500 MG tablet TAKE ONE TABLET BY MOUTH ONCE DAILY 30 tablet 0    VOLTAREN 1 % Gel APPLY 4 GRAMS TOPICALLY 4 TIMES DAILY AS DIRECTED 2 Tube 6     No facility-administered encounter medications on file as of 9/20/2017.      No orders of the defined types were placed in this encounter.    Plan:     Continue present respiratory medications (roles reviewed at today's visit).  Patient encouraged to discuss issue of current medications aggravating migraines with Neurology.  Call if she needs to alter current regimen of resp meds; otherwise, return visit 1 year.

## 2017-09-22 ENCOUNTER — CLINICAL SUPPORT (OUTPATIENT)
Dept: REHABILITATION | Facility: HOSPITAL | Age: 72
End: 2017-09-22
Attending: INTERNAL MEDICINE
Payer: MEDICARE

## 2017-09-22 DIAGNOSIS — M54.2 NECK PAIN: Primary | ICD-10-CM

## 2017-09-22 PROCEDURE — G8981 BODY POS CURRENT STATUS: HCPCS | Mod: CK,PN

## 2017-09-22 PROCEDURE — G8982 BODY POS GOAL STATUS: HCPCS | Mod: CK,PN

## 2017-09-22 PROCEDURE — 97161 PT EVAL LOW COMPLEX 20 MIN: CPT | Mod: PN

## 2017-09-22 NOTE — PROGRESS NOTES
OUTPATIENT PHYSICAL THERAPY  PHYSICAL THERAPY EVALUATION    Name: Margarita Dunne  Clinic Number: [id number]    Evaluation Date: 09/22/2017  Visit #: 1/20   Precautions: osteoporosis     Diagnosis:   Encounter Diagnosis   Name Primary?    Neck pain Yes     Physician: Columba Anderson MD  Treatment Orders: PT Eval and Treat  Past Medical History:   Diagnosis Date    Allergy     Angio-edema     Arthritis     Asthma     Cataract     Chest pain at rest     Fibromyalgia 7/8/2012    Glaucoma suspect, steroid responders     Hand joint pain 7/8/2012    Headache(784.0)     Migraine     Osteoporosis     Recurrent upper respiratory infection (URI)     Urticaria      Current Outpatient Prescriptions   Medication Sig    albuterol (PROVENTIL/VENTOLIN) 90 mcg/actuation inhaler Inhale 2 puffs into the lungs every 6 (six) hours as needed for Shortness of Breath.    multivitamin (THERAGRAN) per tablet Take 1 tablet by mouth once daily.    rizatriptan (MAXALT) 10 MG tablet 1 tab PO at onset of migraine, may repeat ever 2 hours for max 3 tab in 24 hr    SYMBICORT 80-4.5 mcg/actuation HFAA INHALE TWO PUFFS BY MOUTH TWICE DAILY    triamcinolone acetonide 0.1% (KENALOG) 0.1 % cream Use topically daily for pruritis, thins the skin    valacyclovir (VALTREX) 500 MG tablet TAKE ONE TABLET BY MOUTH ONCE DAILY    VOLTAREN 1 % Gel APPLY 4 GRAMS TOPICALLY 4 TIMES DAILY AS DIRECTED     No current facility-administered medications for this visit.      Review of patient's allergies indicates:   Allergen Reactions    Doxycycline Nausea And Vomiting    Pcn [penicillins] Rash    Sulfa (sulfonamide antibiotics) Rash    Corticosteroids (glucocorticoids) Other (See Comments)     migraine for 2 weeks       History   Prior Therapy/PMH: none recently  Social History:  Semi-retired  Previous functional status: ongoing neck and shld pain   Current functional status: increasing neck pain with daily activity, active with studying and taking  classes  Work: part time supervisor for social workers    Subjective   History of Present Illness: ongoing neck pain with migraines   Chief complaint: neck pain   Pain: current 5/10, worst 8/10, best 4/10, Aching and Dull  Radicular symptoms: none   Aggravating factors: turning head and looking up  Easing factors: voltaren gel, tylenol,   Pts goals: reduce pain     Objective   Mental status: alert    Static Postural Assessment: ~forward head posture, increased cervical lordosis and thoracic kyphosis,  protracted shoulders, rotation/ abduction and winging of the scapulae  R shld/R hip higher than L   APT ~ increased lordosis   Fwd scaps, rounded shlds   Fwd head with decreased cervical lordosis    GAIT: Margarita ambulates with no assistive device with independently.     GAIT DEVIATIONS: Margarita displays decreased bonilla    Dynamic Movement Screen:  - Cervical Flexion: FN  - Cervical Ext: DP  - Cervical Rot R: FN  - Cervical Rot L: DP  - Apley ER R: FN  - Apley ER L: DN  - Apley IR R: DN  - Apley IR L: DP  - Trunk Flexion: DN   - Trunk Ext: DP   - Trunk Rot R: DN  - Trunk Rot L: DN    * FN =Functional Non-painful   * FP = Functional Painful   * DN = Dysfunctional Non-painful   * DP = Dysfunctional Painful     ROM:  Cervical Extension 50% limited with pain   Cervical SB R 25% limited/ L 50% with pain   Cervical Rotation R 50% with pain/ L Rotation 50% with pain       Strength:   tightness of the upper trapezius and levator scapula on the dorsal side/tightness of the pectoralis major and minor.   Weakness of the deep cervical flexors/weakness of the middle and lower trapezius.     Mid trap = 2  Low trap =1   Deep cervical flexors = 2     Level of strength:   3 = Normal   2 = Compensation (recruits other muscles)  1= Weak        Pt/family was provided educational information, including: role of PT, goals for PT, scheduling - pt verbalized understanding. Discussed insurance plan with pt.     TREATMENT   Time In: 8:00  AM  Time Out:  9:00 AM    Discussed Plan of Care with patient: Yes    Margarita received 5 minutes of therapeutic exercise including:   scap retraction       Written Home Exercises Provided: yes  Exercises were reviewed and Margarita was able to demonstrate them prior to the end of the session. Pt received a written copy of exercises to perform at home. Margarita demonstrated good  understanding of the education provided.     Assessment   Margarita is a 72 y.o. female referred to outpatient physical therapy with a medical diagnosis of neck pain . Demonstrates limitations as described in the problem list. Pt prognosis is Good. Positive prognostic factors include motivated . Negative prognostic factors include age, chronicity, activity tolerance. Pt will benefit from skilled outpatient physical therapy to address the above stated deficits, provide pt/family education, and to maximize pt's level of independence.     Medical necessity is demonstrated by the following IMPAIRMENTS/PROBLEMS:  weakness, decreased upper extremity function, pain, decreased ROM, impaired joint extensibility and impaired muscle length  Co-morbidities and personal factors: osteoarthritis.  Activity Limitations: changing and maintaining  Body position   Participation restrictions: turning head and neck .   Clinical presentation: evolving and changing  Complexity: low  Pt's spiritual, cultural and educational needs considered and pt agreeable to plan of care and goals as stated below:       Anticipated Barriers for physical therapy: insurance limitation     GOALS: 8-10 weeks   Independent with HEP for mgmt of neck pain sx   Improved thoracic and cervical ROM WFl with minimal pain   Improved postural awareness   Increased mid and lower trap strength WFL for improved postural stability   FOTO reporting to predicted level of function       Plan     Certification Period: 9/22/17  - 12/6/17    Outpatient physical therapy 1- 2 times weekly to include: pt ed, HEP,  therapeutic exercises, therapeutic activities, neuromuscular re-education/ balance exercises, manual therapy, and modalities prn. Cont PT for  10 weeks. Pt may be seen by PTA as part of the rehabilitation team.     I certify the need for these services furnished under this plan of treatment and while under my care.    Jose Arora, PT

## 2017-10-02 ENCOUNTER — CLINICAL SUPPORT (OUTPATIENT)
Dept: REHABILITATION | Facility: HOSPITAL | Age: 72
End: 2017-10-02
Attending: INTERNAL MEDICINE
Payer: MEDICARE

## 2017-10-02 DIAGNOSIS — M54.2 NECK PAIN: Primary | ICD-10-CM

## 2017-10-02 PROCEDURE — 97140 MANUAL THERAPY 1/> REGIONS: CPT | Mod: PN

## 2017-10-02 PROCEDURE — 97110 THERAPEUTIC EXERCISES: CPT | Mod: PN

## 2017-10-04 ENCOUNTER — CLINICAL SUPPORT (OUTPATIENT)
Dept: REHABILITATION | Facility: HOSPITAL | Age: 72
End: 2017-10-04
Attending: INTERNAL MEDICINE
Payer: MEDICARE

## 2017-10-04 DIAGNOSIS — M54.2 NECK PAIN: Primary | ICD-10-CM

## 2017-10-04 PROCEDURE — 97140 MANUAL THERAPY 1/> REGIONS: CPT | Mod: PN

## 2017-10-04 PROCEDURE — 97110 THERAPEUTIC EXERCISES: CPT | Mod: PN

## 2017-10-04 NOTE — PROGRESS NOTES
OUTPATIENT PHYSICAL THERAPY        Name: Margarita Dunne  Clinic Number: [id number]     Evaluation Date: 10/09/2017  Visit #: 5/20   Precautions: osteoporosis      Diagnosis:        Encounter Diagnosis   Name Primary?    Neck pain Yes      Physician: Columba Anderson MD  Treatment Orders: PT Eval and Treat       Past Medical History:   Diagnosis Date    Allergy      Angio-edema      Arthritis      Asthma      Cataract      Chest pain at rest      Fibromyalgia 7/8/2012    Glaucoma suspect, steroid responders      Hand joint pain 7/8/2012    Headache(784.0)      Migraine      Osteoporosis      Recurrent upper respiratory infection (URI)      Urticaria             Current Outpatient Prescriptions   Medication Sig    albuterol (PROVENTIL/VENTOLIN) 90 mcg/actuation inhaler Inhale 2 puffs into the lungs every 6 (six) hours as needed for Shortness of Breath.    multivitamin (THERAGRAN) per tablet Take 1 tablet by mouth once daily.    rizatriptan (MAXALT) 10 MG tablet 1 tab PO at onset of migraine, may repeat ever 2 hours for max 3 tab in 24 hr    SYMBICORT 80-4.5 mcg/actuation HFAA INHALE TWO PUFFS BY MOUTH TWICE DAILY    triamcinolone acetonide 0.1% (KENALOG) 0.1 % cream Use topically daily for pruritis, thins the skin    valacyclovir (VALTREX) 500 MG tablet TAKE ONE TABLET BY MOUTH ONCE DAILY    valacyclovir (VALTREX) 500 MG tablet TAKE ONE TABLET BY MOUTH ONCE DAILY    VOLTAREN 1 % Gel APPLY 4 GRAMS TOPICALLY 4 TIMES DAILY AS DIRECTED      No current facility-administered medications for this visit.             Review of patient's allergies indicates:   Allergen Reactions    Doxycycline Nausea And Vomiting    Pcn [penicillins] Rash    Sulfa (sulfonamide antibiotics) Rash    Corticosteroids (glucocorticoids) Other (See Comments)       migraine for 2 weeks         History   Prior Therapy/PMH: none recently  Social History:  Semi-retired  Previous functional status: ongoing neck and shld pain    Current functional status: increasing neck pain with daily activity, active with studying and taking classes  Work: part time supervisor for social workers     Subjective   History of Present Illness: ongoing neck pain with migraines   Chief complaint: neck pain   Pain: neck 4-5/10   Radicular symptoms: none   Aggravating factors: turning head and looking up  Easing factors: voltaren gel, tylenol,   Pts goals: reduce pain      Objective   Mental status: alert        Pt/family was provided educational information, including: postural re-education      TREATMENT   Time In: 8:00 AM   Time Out:  900 AM     Discussed Plan of Care with patient: Yes     Margarita received 30 minutes ( 15 min 1:1)  of therapeutic exercise including:   Supine  diaphragmatic breathing    pec stretch over towel roll   bruggers over towel roll 2 x 20      Seated:   Upper trap stretch  scap retractions    Cervical flexion/extension with towel roll        20  minutes of manual therapy:   Suboccipital release   STM upper traps, superficial MFR pec minor        Written Home Exercises Provided: yes  Exercises were reviewed and Margarita was able to demonstrate them prior to the end of the session. Pt received a written copy of exercises to perform at home. Margarita demonstrated good  understanding of the education provided.      Assessment      + tight upper traps, inhibited/weak lower traps improved activation of lower traps after session today.   Improved tolerance for session today, progress exercises for postural strength as tolerated         Plan         Outpatient physical therapy 1- 2 times weekly to include: pt ed, HEP, therapeutic exercises, therapeutic activities, neuromuscular re-education/ balance exercises, manual therapy, and modalities prn. Cont PT for  6  weeks. Pt may be seen by PTA as part of the rehabilitation team.      I certify the need for these services furnished under this plan of treatment and while under my care.     Jose Arora,  PT

## 2017-10-05 NOTE — PLAN OF CARE
OUTPATIENT PHYSICAL THERAPY  PHYSICAL THERAPY EVALUATION    Name: Margarita Dunne  Clinic Number: [id number]    Evaluation Date: 09/22/2017  Visit #: 1/20   Precautions: osteoporosis     Diagnosis:   Encounter Diagnosis   Name Primary?    Neck pain Yes     Physician: Columba Anderson MD  Treatment Orders: PT Eval and Treat  Past Medical History:   Diagnosis Date    Allergy     Angio-edema     Arthritis     Asthma     Cataract     Chest pain at rest     Fibromyalgia 7/8/2012    Glaucoma suspect, steroid responders     Hand joint pain 7/8/2012    Headache(784.0)     Migraine     Osteoporosis     Recurrent upper respiratory infection (URI)     Urticaria      Current Outpatient Prescriptions   Medication Sig    albuterol (PROVENTIL/VENTOLIN) 90 mcg/actuation inhaler Inhale 2 puffs into the lungs every 6 (six) hours as needed for Shortness of Breath.    multivitamin (THERAGRAN) per tablet Take 1 tablet by mouth once daily.    rizatriptan (MAXALT) 10 MG tablet 1 tab PO at onset of migraine, may repeat ever 2 hours for max 3 tab in 24 hr    SYMBICORT 80-4.5 mcg/actuation HFAA INHALE TWO PUFFS BY MOUTH TWICE DAILY    triamcinolone acetonide 0.1% (KENALOG) 0.1 % cream Use topically daily for pruritis, thins the skin    valacyclovir (VALTREX) 500 MG tablet TAKE ONE TABLET BY MOUTH ONCE DAILY    VOLTAREN 1 % Gel APPLY 4 GRAMS TOPICALLY 4 TIMES DAILY AS DIRECTED     No current facility-administered medications for this visit.      Review of patient's allergies indicates:   Allergen Reactions    Doxycycline Nausea And Vomiting    Pcn [penicillins] Rash    Sulfa (sulfonamide antibiotics) Rash    Corticosteroids (glucocorticoids) Other (See Comments)     migraine for 2 weeks       History   Prior Therapy/PMH: none recently  Social History:  Semi-retired  Previous functional status: ongoing neck and shld pain   Current functional status: increasing neck pain with daily activity, active with studying and taking  classes  Work: part time supervisor for social workers    Subjective   History of Present Illness: ongoing neck pain with migraines   Chief complaint: neck pain   Pain: current 5/10, worst 8/10, best 4/10, Aching and Dull  Radicular symptoms: none   Aggravating factors: turning head and looking up  Easing factors: voltaren gel, tylenol,   Pts goals: reduce pain     Objective   Mental status: alert    Static Postural Assessment: ~forward head posture, increased cervical lordosis and thoracic kyphosis,  protracted shoulders, rotation/ abduction and winging of the scapulae  R shld/R hip higher than L   APT ~ increased lordosis   Fwd scaps, rounded shlds   Fwd head with decreased cervical lordosis    GAIT: Margarita ambulates with no assistive device with independently.     GAIT DEVIATIONS: Margarita displays decreased bonilla    Dynamic Movement Screen:  - Cervical Flexion: FN  - Cervical Ext: DP  - Cervical Rot R: FN  - Cervical Rot L: DP  - Apley ER R: FN  - Apley ER L: DN  - Apley IR R: DN  - Apley IR L: DP  - Trunk Flexion: DN   - Trunk Ext: DP   - Trunk Rot R: DN  - Trunk Rot L: DN    * FN =Functional Non-painful   * FP = Functional Painful   * DN = Dysfunctional Non-painful   * DP = Dysfunctional Painful     ROM:  Cervical Extension 50% limited with pain   Cervical SB R 25% limited/ L 50% with pain   Cervical Rotation R 50% with pain/ L Rotation 50% with pain       Strength:   tightness of the upper trapezius and levator scapula on the dorsal side/tightness of the pectoralis major and minor.   Weakness of the deep cervical flexors/weakness of the middle and lower trapezius.     Mid trap = 2  Low trap =1   Deep cervical flexors = 2     Level of strength:   3 = Normal   2 = Compensation (recruits other muscles)  1= Weak        Pt/family was provided educational information, including: role of PT, goals for PT, scheduling - pt verbalized understanding. Discussed insurance plan with pt.     TREATMENT   Time In: 8:00  AM  Time Out:  9:00 AM    Discussed Plan of Care with patient: Yes    Margarita received 5 minutes of therapeutic exercise including:   scap retraction       Written Home Exercises Provided: yes  Exercises were reviewed and Margarita was able to demonstrate them prior to the end of the session. Pt received a written copy of exercises to perform at home. Margarita demonstrated good  understanding of the education provided.     Assessment   Magrarita is a 72 y.o. female referred to outpatient physical therapy with a medical diagnosis of neck pain . Demonstrates limitations as described in the problem list. Pt prognosis is Good. Positive prognostic factors include motivated . Negative prognostic factors include age, chronicity, activity tolerance. Pt will benefit from skilled outpatient physical therapy to address the above stated deficits, provide pt/family education, and to maximize pt's level of independence.     Medical necessity is demonstrated by the following IMPAIRMENTS/PROBLEMS:  weakness, decreased upper extremity function, pain, decreased ROM, impaired joint extensibility and impaired muscle length  Co-morbidities and personal factors: osteoarthritis.  Activity Limitations: changing and maintaining  Body position   Participation restrictions: turning head and neck .   Clinical presentation: evolving and changing  Complexity: low  Pt's spiritual, cultural and educational needs considered and pt agreeable to plan of care and goals as stated below:       Anticipated Barriers for physical therapy: insurance limitation     GOALS: 8-10 weeks   Independent with HEP for mgmt of neck pain sx   Improved thoracic and cervical ROM WFl with minimal pain   Improved postural awareness   Increased mid and lower trap strength WFL for improved postural stability   FOTO reporting to predicted level of function       Plan     Certification Period: 9/22/17  - 12/6/17    Outpatient physical therapy 1- 2 times weekly to include: pt ed, HEP,  therapeutic exercises, therapeutic activities, neuromuscular re-education/ balance exercises, manual therapy, and modalities prn. Cont PT for  10 weeks. Pt may be seen by PTA as part of the rehabilitation team.     I certify the need for these services furnished under this plan of treatment and while under my care.    Jose Arora, PT

## 2017-10-05 NOTE — PROGRESS NOTES
OUTPATIENT PHYSICAL THERAPY      Name: Margarita Dunne  Clinic Number: [id number]    Evaluation Date: 10/04/2017  Visit #: 2/20   Precautions: osteoporosis     Diagnosis:   Encounter Diagnosis   Name Primary?    Neck pain Yes     Physician: Columba Anderson MD  Treatment Orders: PT Eval and Treat  Past Medical History:   Diagnosis Date    Allergy     Angio-edema     Arthritis     Asthma     Cataract     Chest pain at rest     Fibromyalgia 7/8/2012    Glaucoma suspect, steroid responders     Hand joint pain 7/8/2012    Headache(784.0)     Migraine     Osteoporosis     Recurrent upper respiratory infection (URI)     Urticaria      Current Outpatient Prescriptions   Medication Sig    albuterol (PROVENTIL/VENTOLIN) 90 mcg/actuation inhaler Inhale 2 puffs into the lungs every 6 (six) hours as needed for Shortness of Breath.    multivitamin (THERAGRAN) per tablet Take 1 tablet by mouth once daily.    rizatriptan (MAXALT) 10 MG tablet 1 tab PO at onset of migraine, may repeat ever 2 hours for max 3 tab in 24 hr    SYMBICORT 80-4.5 mcg/actuation HFAA INHALE TWO PUFFS BY MOUTH TWICE DAILY    triamcinolone acetonide 0.1% (KENALOG) 0.1 % cream Use topically daily for pruritis, thins the skin    valacyclovir (VALTREX) 500 MG tablet TAKE ONE TABLET BY MOUTH ONCE DAILY    VOLTAREN 1 % Gel APPLY 4 GRAMS TOPICALLY 4 TIMES DAILY AS DIRECTED     No current facility-administered medications for this visit.      Review of patient's allergies indicates:   Allergen Reactions    Doxycycline Nausea And Vomiting    Pcn [penicillins] Rash    Sulfa (sulfonamide antibiotics) Rash    Corticosteroids (glucocorticoids) Other (See Comments)     migraine for 2 weeks           Subjective   History of Present Illness: ongoing neck pain with migraines   Chief complaint: neck pain   Pain: current 5/10, worst 8/10, best 4/10, Aching and Dull    Pts goals: reduce pain     Objective       Pt/family was provided educational  information, including: HEP      TREATMENT   Time In: 1:00 PM  Time Out:  2:00 PM       Margarita received 50  minutes of therapeutic exercise ( 20 min 1:1)  including:     Supine   pec stretch over towel   diaphragmatic breathing   scap retraction/depression   Passive UT and levator scap stretch     Seated   Thoracic ext   Upper cervical flexion with towel roll support   Cervical rotation self mobs with towel     Standing scap retract/shld ext     Manual therapy x 10 min suboccipital release, STM upper traps     Written Home Exercises Provided: yes  Exercises were reviewed and Margarita was able to demonstrate them prior to the end of the session. Pt received a written copy of exercises to perform at home. Margarita demonstrated good  understanding of the education provided.     Assessment   Margarita is a 72 y.o. female referred to outpatient physical therapy with a medical diagnosis of neck pain . Demonstrates limitations as described in the problem list. Pt prognosis is Good. Positive prognostic factors include motivated . Negative prognostic factors include age, chronicity, activity tolerance. Pt will benefit from skilled outpatient physical therapy to address the above stated deficits, provide pt/family education, and to maximize pt's level of independence.     Medical necessity is demonstrated by the following IMPAIRMENTS/PROBLEMS:  weakness, decreased upper extremity function, pain, decreased ROM, impaired joint extensibility and impaired muscle length  Co-morbidities and personal factors: osteoarthritis.  Activity Limitations: changing and maintaining  Body position   Participation restrictions: turning head and neck .   Clinical presentation: evolving and changing  Complexity: low  Pt's spiritual, cultural and educational needs considered and pt agreeable to plan of care and goals as stated below:       Anticipated Barriers for physical therapy: insurance limitation     GOALS: 8-10 weeks   Independent with HEP for mgmt of  neck pain sx   Improved thoracic and cervical ROM WFl with minimal pain   Improved postural awareness   Increased mid and lower trap strength WFL for improved postural stability   FOTO reporting to predicted level of function       Plan     Certification Period: 9/22/17  - 12/6/17    Outpatient physical therapy 1- 2 times weekly to include: pt ed, HEP, therapeutic exercises, therapeutic activities, neuromuscular re-education/ balance exercises, manual therapy, and modalities prn. Cont PT for  10 weeks. Pt may be seen by PTA as part of the rehabilitation team.     I certify the need for these services furnished under this plan of treatment and while under my care.    Jose Arora, PT

## 2017-10-09 ENCOUNTER — CLINICAL SUPPORT (OUTPATIENT)
Dept: REHABILITATION | Facility: HOSPITAL | Age: 72
End: 2017-10-09
Attending: INTERNAL MEDICINE
Payer: MEDICARE

## 2017-10-09 DIAGNOSIS — M54.2 NECK PAIN: Primary | ICD-10-CM

## 2017-10-09 PROCEDURE — 97110 THERAPEUTIC EXERCISES: CPT | Mod: PN

## 2017-10-09 PROCEDURE — 97140 MANUAL THERAPY 1/> REGIONS: CPT | Mod: PN

## 2017-10-09 RX ORDER — VALACYCLOVIR HYDROCHLORIDE 500 MG/1
TABLET, FILM COATED ORAL
Qty: 30 TABLET | Refills: 0 | Status: SHIPPED | OUTPATIENT
Start: 2017-10-09 | End: 2018-01-26 | Stop reason: SDUPTHER

## 2017-10-09 NOTE — PROGRESS NOTES
OUTPATIENT PHYSICAL THERAPY      Name: Margarita Dunne  Clinic Number: [id number]    Evaluation Date: 10/09/2017  Visit #: 4/20   Precautions: osteoporosis     Diagnosis:   Encounter Diagnosis   Name Primary?    Neck pain Yes     Physician: Columba Anderson MD  Treatment Orders: PT Eval and Treat  Past Medical History:   Diagnosis Date    Allergy     Angio-edema     Arthritis     Asthma     Cataract     Chest pain at rest     Fibromyalgia 7/8/2012    Glaucoma suspect, steroid responders     Hand joint pain 7/8/2012    Headache(784.0)     Migraine     Osteoporosis     Recurrent upper respiratory infection (URI)     Urticaria      Current Outpatient Prescriptions   Medication Sig    albuterol (PROVENTIL/VENTOLIN) 90 mcg/actuation inhaler Inhale 2 puffs into the lungs every 6 (six) hours as needed for Shortness of Breath.    multivitamin (THERAGRAN) per tablet Take 1 tablet by mouth once daily.    rizatriptan (MAXALT) 10 MG tablet 1 tab PO at onset of migraine, may repeat ever 2 hours for max 3 tab in 24 hr    SYMBICORT 80-4.5 mcg/actuation HFAA INHALE TWO PUFFS BY MOUTH TWICE DAILY    triamcinolone acetonide 0.1% (KENALOG) 0.1 % cream Use topically daily for pruritis, thins the skin    valacyclovir (VALTREX) 500 MG tablet TAKE ONE TABLET BY MOUTH ONCE DAILY    valacyclovir (VALTREX) 500 MG tablet TAKE ONE TABLET BY MOUTH ONCE DAILY    VOLTAREN 1 % Gel APPLY 4 GRAMS TOPICALLY 4 TIMES DAILY AS DIRECTED     No current facility-administered medications for this visit.      Review of patient's allergies indicates:   Allergen Reactions    Doxycycline Nausea And Vomiting    Pcn [penicillins] Rash    Sulfa (sulfonamide antibiotics) Rash    Corticosteroids (glucocorticoids) Other (See Comments)     migraine for 2 weeks       History   Prior Therapy/PMH: none recently  Social History:  Semi-retired  Previous functional status: ongoing neck and shld pain   Current functional status: increasing neck pain  with daily activity, active with studying and taking classes  Work: part time supervisor for social [employer]    Subjective   History of Present Illness: ongoing neck pain with migraines   Chief complaint: neck pain   Pain: neck 4-5/10   Radicular symptoms: none   Aggravating factors: turning head and looking up  Easing factors: voltaren gel, tylenol,   Pts goals: reduce pain     Objective   Mental status: alert      Pt/family was provided educational information, including: postural re-education     TREATMENT   Time In: 1:00 PM   Time Out:  2:00 PM    Discussed Plan of Care with patient: Yes    Margarita received 30 minutes of therapeutic exercise including:   Supine  diaphragmatic breathing    pec stretch over towel roll   bruggers over towel roll 2 x 20     Seated:   Upper trap stretch  scap retractions    Cervical flexion/extension with towel roll       25 minutes of manual therapy:   Suboccipital release   STM upper traps, superficial MFR pec minor      Written Home Exercises Provided: yes  Exercises were reviewed and Margarita was able to demonstrate them prior to the end of the session. Pt received a written copy of exercises to perform at home. Margarita demonstrated good  understanding of the education provided.     Assessment     + tight upper traps, inhibited/weak lower traps improved activation of lower traps after session today.   Improved tolerance for session today, progress exercises for postural strength as tolerated       Plan       Outpatient physical therapy 1- 2 times weekly to include: pt ed, HEP, therapeutic exercises, therapeutic activities, neuromuscular re-education/ balance exercises, manual therapy, and modalities prn. Cont PT for  6  weeks. Pt may be seen by PTA as part of the rehabilitation team.     I certify the need for these services furnished under this plan of treatment and while under my care.    Jose Arora, PT

## 2017-10-11 ENCOUNTER — CLINICAL SUPPORT (OUTPATIENT)
Dept: REHABILITATION | Facility: HOSPITAL | Age: 72
End: 2017-10-11
Attending: INTERNAL MEDICINE
Payer: MEDICARE

## 2017-10-11 DIAGNOSIS — M54.2 NECK PAIN: Primary | ICD-10-CM

## 2017-10-11 PROCEDURE — 97140 MANUAL THERAPY 1/> REGIONS: CPT | Mod: PN

## 2017-10-11 PROCEDURE — G8990 OTHER PT/OT CURRENT STATUS: HCPCS | Mod: CJ,PN

## 2017-10-11 PROCEDURE — 97110 THERAPEUTIC EXERCISES: CPT | Mod: PN

## 2017-10-11 PROCEDURE — G8991 OTHER PT/OT GOAL STATUS: HCPCS | Mod: CJ,PN

## 2017-10-11 NOTE — PROGRESS NOTES
Name: Margarita Dunne  [facility] Number: [id number]  Date of Treatment: 10/11/2017   Diagnosis:   Encounter Diagnosis   Name Primary?    Neck pain Yes       Time in: 1000   Time Out: 1100  Total Treatment Time: 60  Group Time: 0      Subjective:    Margarita reports improvement of symptoms.  Patient reports their pain to be 5/10 on a 0-10 scale with 0 being no pain and 10 being the worst pain imaginable.    Objective      Margarita received therapeutic exercises to develop strength, ROM, flexibility and posture for 25 minutes including:     diaphragmatic breathing: side bend and breathe, pursed lip breathing over towel roll   scap sets 2 x 10   UT stretch     Margarita received the following manual therapy techniques: Myofacial release, Soft tissue Mobilization, Friction Massage and MFD were applied to the: upper traps and suboiccipitals for 30 minutes.       Written Home Exercises Provided: Good UT stretch, scap sets, diaphragmatic breathing   Pt demo good understanding of the education provided. Margarita demonstrated good return demonstration of activities.       Assessment:     Pt will continue to benefit from skilled PT intervention. Medical Necessity is demonstrated by:  Pain limits function of effected part for some activities.    Patient is making good progress towards established goals.    New/Revised goals: none      Plan:  Continue with established Plan of Care towards PT goals.   Jose Arora

## 2017-10-17 ENCOUNTER — TELEPHONE (OUTPATIENT)
Dept: NEUROLOGY | Facility: CLINIC | Age: 72
End: 2017-10-17

## 2017-10-17 NOTE — TELEPHONE ENCOUNTER
----- Message from Leonieanant Clifford sent at 10/17/2017  9:34 AM CDT -----  Patient states that she need to know the name of the doctor you referred her for the nerve conduction test.  Please call [phone].

## 2017-10-20 ENCOUNTER — CLINICAL SUPPORT (OUTPATIENT)
Dept: REHABILITATION | Facility: HOSPITAL | Age: 72
End: 2017-10-20
Attending: INTERNAL MEDICINE
Payer: MEDICARE

## 2017-10-20 DIAGNOSIS — M54.2 NECK PAIN: Primary | ICD-10-CM

## 2017-10-20 PROCEDURE — 97140 MANUAL THERAPY 1/> REGIONS: CPT | Mod: PN

## 2017-10-20 PROCEDURE — 97110 THERAPEUTIC EXERCISES: CPT | Mod: PN

## 2017-10-20 NOTE — PROGRESS NOTES
Name: Margarita Dunne  [facility] Number: [id number]  Date of Treatment: 10/20/2017   Diagnosis:   Encounter Diagnosis   Name Primary?    Neck pain Yes       Time in: 1000   Time Out: 1100  Total Treatment Time: 60  Group Time: 0      Subjective:    Margarita reports improvement of symptoms.  Patient reports their pain to be 5/10 on a 0-10 scale with 0 being no pain and 10 being the worst pain imaginable.    Objective      Margarita received therapeutic exercises to develop strength, ROM, flexibility and posture for 15 minutes including:     diaphragmatic breathing: side bend and breathe, pursed lip breathing over towel roll   scap sets 2 x 10   No money     Margarita received the following manual therapy techniques: Myofacial release, Soft tissue Mobilization, Friction Massage and MFD were applied to the: upper traps and suboiccipitals for 30 minutes.       Written Home Exercises Provided: Good UT stretch, scap sets, diaphragmatic breathing   Pt demo good understanding of the education provided. Margarita demonstrated good return demonstration of activities.       Assessment: Improving activation of lower traps.     Pt will continue to benefit from skilled PT intervention. Medical Necessity is demonstrated by:  Pain limits function of effected part for some activities.    Patient is making good progress towards established goals.    New/Revised goals: none    Plan:  Continue with established Plan of Care towards PT goals.   Jose Arora

## 2017-10-27 ENCOUNTER — CLINICAL SUPPORT (OUTPATIENT)
Dept: REHABILITATION | Facility: HOSPITAL | Age: 72
End: 2017-10-27
Attending: INTERNAL MEDICINE
Payer: MEDICARE

## 2017-10-27 DIAGNOSIS — M54.2 NECK PAIN: Primary | ICD-10-CM

## 2017-10-27 PROCEDURE — 97110 THERAPEUTIC EXERCISES: CPT | Mod: PN

## 2017-10-27 PROCEDURE — 97140 MANUAL THERAPY 1/> REGIONS: CPT | Mod: PN

## 2017-10-27 NOTE — PROGRESS NOTES
Name: Margarita Dunne  [facility] Number: [id number]  Date of Treatment: 10/27/2017   Diagnosis:   Encounter Diagnosis   Name Primary?    Neck pain Yes       Time in: 900   Time Out: 945  Total Treatment Time: 45  Group Time: 0      Subjective:    Margarita reports no change in sx, pt has personal stressor related to new diagnosis of her spouse.     Patient reports their pain to be 5/10 on a 0-10 scale with 0 being no pain and 10 being the worst pain imaginable.    Objective    Margarita received therapeutic exercises to develop strength, ROM, flexibility and posture for 15  minutes including:       diaphragmatic breathing: side bend and breathe, pursed lip breathing over towel roll   scap sets 2 x 10   bruggers with YTB x 30   Chin tucks supine x 30   Seated UT and lev scap stretch x 3 sets     Seated scap sets x 20       Margarita received the following manual therapy techniques: Myofacial release, Soft tissue Mobilization, Friction Massage and MFD were applied to the: upper traps and suboiccipitals for 30 minutes.       Written Home Exercises Provided: Good UT stretch, scap sets, diaphragmatic breathing   Pt demo good understanding of the education provided. Margarita demonstrated good return demonstration of activities.       Assessment: Improving activation of lower traps, good progress towards goals, improved postural awareness      Pt will continue to benefit from skilled PT intervention. Medical Necessity is demonstrated by:  Pain limits function of effected part for some activities.    Patient is making good progress towards established goals.    New/Revised goals: none    Plan:  Continue with established Plan of Care towards PT goals.   Jose Arora

## 2017-11-28 ENCOUNTER — TELEPHONE (OUTPATIENT)
Dept: NEUROLOGY | Facility: CLINIC | Age: 72
End: 2017-11-28

## 2017-11-28 NOTE — TELEPHONE ENCOUNTER
Spoke with the pt, informed her that Dr. Delong will not be in the office Dec 6th and we need to reschedule. Appt rescheduled, pt verbalized understanding.

## 2017-12-15 ENCOUNTER — TELEPHONE (OUTPATIENT)
Dept: NEUROLOGY | Facility: CLINIC | Age: 72
End: 2017-12-15

## 2017-12-15 DIAGNOSIS — R20.0 NUMBNESS AND TINGLING OF BOTH LEGS BELOW KNEES: ICD-10-CM

## 2017-12-15 DIAGNOSIS — E78.5 HYPERLIPIDEMIA, UNSPECIFIED HYPERLIPIDEMIA TYPE: Primary | ICD-10-CM

## 2017-12-15 DIAGNOSIS — M85.80 OSTEOPENIA, UNSPECIFIED LOCATION: ICD-10-CM

## 2017-12-15 DIAGNOSIS — R20.2 NUMBNESS AND TINGLING OF BOTH LEGS BELOW KNEES: ICD-10-CM

## 2017-12-15 NOTE — TELEPHONE ENCOUNTER
----- Message from Crys Delong MD sent at 12/14/2017  4:43 PM CST -----  Reviewed results of her nerve conduction test which was limited by tolerance but suggestive of neuropathy in her leg.    I have ordered neuropathy labs, please instruct patient to get labs done before her follow up with me on 12/21. Results take about 1 week.

## 2017-12-15 NOTE — TELEPHONE ENCOUNTER
----- Message from Liseth Daly RT sent at 12/15/2017  9:20 AM CST -----  Contact: pt    pt , requesting a call back soon concerning her labs, thanks.

## 2017-12-18 NOTE — TELEPHONE ENCOUNTER
Returned call and spoke with patient. Appointment for lab work scheduled for 12/19 at 8:15 am. Patient verbalized understanding.

## 2017-12-19 ENCOUNTER — LAB VISIT (OUTPATIENT)
Dept: LAB | Facility: HOSPITAL | Age: 72
End: 2017-12-19
Attending: PSYCHIATRY & NEUROLOGY
Payer: MEDICARE

## 2017-12-19 DIAGNOSIS — E78.5 HYPERLIPIDEMIA, UNSPECIFIED HYPERLIPIDEMIA TYPE: ICD-10-CM

## 2017-12-19 DIAGNOSIS — R20.2 NUMBNESS AND TINGLING OF BOTH LEGS BELOW KNEES: ICD-10-CM

## 2017-12-19 DIAGNOSIS — R20.0 NUMBNESS AND TINGLING OF BOTH LEGS BELOW KNEES: ICD-10-CM

## 2017-12-19 DIAGNOSIS — M85.80 OSTEOPENIA, UNSPECIFIED LOCATION: ICD-10-CM

## 2017-12-19 LAB
25(OH)D3+25(OH)D2 SERPL-MCNC: 27 NG/ML
CHOLEST SERPL-MCNC: 214 MG/DL
CHOLEST/HDLC SERPL: 3.6 {RATIO}
ERYTHROCYTE [SEDIMENTATION RATE] IN BLOOD BY WESTERGREN METHOD: 8 MM/HR
FOLATE SERPL-MCNC: 12.5 NG/ML
HDLC SERPL-MCNC: 60 MG/DL
HDLC SERPL: 28 %
LDLC SERPL CALC-MCNC: 140.4 MG/DL
NONHDLC SERPL-MCNC: 154 MG/DL
TRIGL SERPL-MCNC: 68 MG/DL

## 2017-12-19 PROCEDURE — 80061 LIPID PANEL: CPT

## 2017-12-19 PROCEDURE — 84425 ASSAY OF VITAMIN B-1: CPT

## 2017-12-19 PROCEDURE — 84630 ASSAY OF ZINC: CPT

## 2017-12-19 PROCEDURE — 84446 ASSAY OF VITAMIN E: CPT

## 2017-12-19 PROCEDURE — 84207 ASSAY OF VITAMIN B-6: CPT

## 2017-12-19 PROCEDURE — 86334 IMMUNOFIX E-PHORESIS SERUM: CPT | Mod: 26,,, | Performed by: PATHOLOGY

## 2017-12-19 PROCEDURE — 36415 COLL VENOUS BLD VENIPUNCTURE: CPT | Mod: PO

## 2017-12-19 PROCEDURE — 84165 PROTEIN E-PHORESIS SERUM: CPT | Mod: 26,,, | Performed by: PATHOLOGY

## 2017-12-19 PROCEDURE — 82306 VITAMIN D 25 HYDROXY: CPT

## 2017-12-19 PROCEDURE — 84165 PROTEIN E-PHORESIS SERUM: CPT

## 2017-12-19 PROCEDURE — 86038 ANTINUCLEAR ANTIBODIES: CPT

## 2017-12-19 PROCEDURE — 86334 IMMUNOFIX E-PHORESIS SERUM: CPT

## 2017-12-19 PROCEDURE — 85651 RBC SED RATE NONAUTOMATED: CPT | Mod: PO

## 2017-12-19 PROCEDURE — 82525 ASSAY OF COPPER: CPT

## 2017-12-19 PROCEDURE — 82746 ASSAY OF FOLIC ACID SERUM: CPT

## 2017-12-20 LAB
ALBUMIN SERPL ELPH-MCNC: 4.04 G/DL
ALPHA1 GLOB SERPL ELPH-MCNC: 0.31 G/DL
ALPHA2 GLOB SERPL ELPH-MCNC: 0.78 G/DL
ANA SER QL IF: NORMAL
ARSENIC BLD-MCNC: <1 NG/ML (ref 0–12)
B-GLOBULIN SERPL ELPH-MCNC: 0.83 G/DL
CADMIUM BLD-MCNC: 0.3 NG/ML (ref 0–4.9)
CITY: NORMAL
COUNTY: NORMAL
GAMMA GLOB SERPL ELPH-MCNC: 0.95 G/DL
GUARDIAN FIRST NAME: NORMAL
GUARDIAN LAST NAME: NORMAL
HOME PHONE: NORMAL
INTERPRETATION SERPL IFE-IMP: NORMAL
LEAD BLD-MCNC: 1.9 MCG/DL (ref 0–4.9)
MERCURY BLD-MCNC: 3 NG/ML (ref 0–9)
PATHOLOGIST INTERPRETATION IFE: NORMAL
PATHOLOGIST INTERPRETATION SPE: NORMAL
PROT SERPL-MCNC: 6.9 G/DL
RACE: NORMAL
STATE: NORMAL
STREET ADDRESS: NORMAL
VENOUS/CAPILLARY: NORMAL
VIT B12 SERPL-MCNC: 458 NG/L
ZIP: NORMAL

## 2017-12-21 ENCOUNTER — OFFICE VISIT (OUTPATIENT)
Dept: NEUROLOGY | Facility: CLINIC | Age: 72
End: 2017-12-21
Payer: MEDICARE

## 2017-12-21 VITALS
SYSTOLIC BLOOD PRESSURE: 135 MMHG | BODY MASS INDEX: 30.56 KG/M2 | DIASTOLIC BLOOD PRESSURE: 77 MMHG | HEART RATE: 77 BPM | RESPIRATION RATE: 16 BRPM | WEIGHT: 172.5 LBS | HEIGHT: 63 IN

## 2017-12-21 DIAGNOSIS — E55.9 VITAMIN D DEFICIENCY: ICD-10-CM

## 2017-12-21 DIAGNOSIS — R41.3 MEMORY LOSS: ICD-10-CM

## 2017-12-21 DIAGNOSIS — R20.2 NUMBNESS AND TINGLING OF BOTH LEGS BELOW KNEES: ICD-10-CM

## 2017-12-21 DIAGNOSIS — R20.0 NUMBNESS AND TINGLING OF BOTH LEGS BELOW KNEES: ICD-10-CM

## 2017-12-21 DIAGNOSIS — G43.109 MIGRAINE WITH AURA AND WITHOUT STATUS MIGRAINOSUS, NOT INTRACTABLE: ICD-10-CM

## 2017-12-21 LAB
COPPER SERPL-MCNC: 1270 UG/L (ref 810–1990)
VIT B1 SERPL-MCNC: 46 UG/L (ref 38–122)
ZINC SERPL-MCNC: 75 UG/DL (ref 60–130)

## 2017-12-21 PROCEDURE — 99999 PR PBB SHADOW E&M-EST. PATIENT-LVL IV: CPT | Mod: PBBFAC,,, | Performed by: PSYCHIATRY & NEUROLOGY

## 2017-12-21 PROCEDURE — 99214 OFFICE O/P EST MOD 30 MIN: CPT | Mod: PBBFAC,PN | Performed by: PSYCHIATRY & NEUROLOGY

## 2017-12-21 PROCEDURE — 99214 OFFICE O/P EST MOD 30 MIN: CPT | Mod: S$PBB,,, | Performed by: PSYCHIATRY & NEUROLOGY

## 2017-12-21 NOTE — PATIENT INSTRUCTIONS
WORKUP:  -- I'll let you know when the remaining lab tests return     PREVENTION (use daily regardless of headache):  -- start a magnesium supplement (either magnesium citrate 500mg daily or magnesium oxide 400mg twice per day) magnesium oxide may cause loose stools for aura  -- other options for aura prevention include starting low dose lamotrigine 25mg daily and staying on that dose (rather than raising it) or low dose keppra 500mg daily. Both are seizure medications.     ACUTE TREATMENT (use total no more than 10 days per month unless otherwise stated):  -- continue using rizatriptan, may repeat in 2 hours for a total of 3 tabs per day. May take with Advil or Aleve for increased efficacy.

## 2017-12-21 NOTE — PROGRESS NOTES
Date of service: 12/21/2017  Referring provider: No ref. provider found    Subjective:      Chief complaint: Follow-up (Headaches. Pt states she is doing a little better since last visit. Medications taken as need basis.)       Patient ID: Margarita Dunne is a 72 y.o. lady with migraine with aura, peripheral neuropathy, fibromyalgia, asthma, glaucoma (suspect) who presents for follow up migraines and neuropathy.     History of Present Illness    INTERVAL HISTORY - 12/21/17    Last seen 4 months ago during which time I recommended lamotrigine for migraine with aura and rizatriptan for acute. I incidentally found a length dependent sensory loss and send her for NCS.EMG which was not tolerated so I sent for serological workup.     She did not try the lamotrigine due to concern for rash/SJS as adverse effect. She has not used magnesium.     She has done a course of PT for her neck in the fall.     Today she reports her migraines have reduced by almost half - 2.5 per month, with 5 in November though. They are usually right temple. Her current pain score is 4, with range of 2 to 0.     She reports changes to her short and long term memory. This is gradually progressive.     HEADACHE HISTORY - 8/21/17   Age at onset and course over time: began in 1968 college as menstrual migraines, improved for a time with menopause but in 2013 started returning (moved to [place] from the [facility] and increased from 1 migraine every other month to several per month). Followed with Angel Rivera and Marcela but was not on preventatives at that time. Recently experienced increase frequency x last 3-6 months, had 2 in one day, interfering more with her life. Also has hemicranial headaches which she does not think are the same as her migraines, no other symptoms.   Location: either temple   Quality: throbbing, tight band   Severity: current 2/10; worst 8/10   Duration: >4 hours   Frequency: 1-6 per month on review of 2017 diary, recently in 3-6 range.  May and June had 1 day with 2 migraines in each day.   Alleviated by: sleep, darkness, local pressure, massage, ice, medication   Exacerbated by: fatigue, light, noise, smells, stress  Associated with: all migraines have visual aura (blurred vision, sparkling lights, scotoma), hunger, craving sweets , Photophobia, phonophobia, osmophobia, problems with memory   Sleep habits: no difficulty falling asleep; but sleep is terrible because she wakes up, can't get back to sleep, this is since menopausea   Caffeine intake: 0    Current acute treatment:  -- maxalt - helps with migraine, no aura, never repeats    Current prevention:  -- none    Previously tried/failed acute treatment:  -- ergotomine  -- sumatriptan - ineffective  -- excedrin   -- iburpofen  -- tylenol  -- phenergan     Previously tried/failed preventative treatment:  -- none     Review of patient's allergies indicates:   Allergen Reactions    Doxycycline Nausea And Vomiting    Pcn [penicillins] Rash    Sulfa (sulfonamide antibiotics) Rash    Corticosteroids (glucocorticoids) Other (See Comments)     migraine for 2 weeks     Current Outpatient Prescriptions   Medication Sig Dispense Refill    albuterol (PROVENTIL/VENTOLIN) 90 mcg/actuation inhaler Inhale 2 puffs into the lungs every 6 (six) hours as needed for Shortness of Breath. 1 each 10    multivitamin (THERAGRAN) per tablet Take 1 tablet by mouth once daily.      rizatriptan (MAXALT) 10 MG tablet 1 tab PO at onset of migraine, may repeat ever 2 hours for max 3 tab in 24 hr 10 tablet 3    SYMBICORT 80-4.5 mcg/actuation HFAA INHALE TWO PUFFS BY MOUTH TWICE DAILY 11 g 6    triamcinolone acetonide 0.1% (KENALOG) 0.1 % cream Use topically daily for pruritis, thins the skin 1 Tube 0    valacyclovir (VALTREX) 500 MG tablet TAKE ONE TABLET BY MOUTH ONCE DAILY 30 tablet 0    VOLTAREN 1 % Gel APPLY 4 GRAMS TOPICALLY 4 TIMES DAILY AS DIRECTED 2 Tube 6     No current facility-administered medications for  this visit.        Past Medical History  Past Medical History:   Diagnosis Date    Allergy     Angio-edema     Arthritis     Asthma     Cataract     Chest pain at rest     Fibromyalgia 7/8/2012    Glaucoma suspect, steroid responders     Hand joint pain 7/8/2012    Headache(784.0)     Migraine     Osteoporosis     Recurrent upper respiratory infection (URI)     Urticaria        Past Surgical History  Past Surgical History:   Procedure Laterality Date    dexa  2014    osteopenia       Family History  Family History   Problem Relation Age of Onset    Hypertension Mother     Diabetes Mother     Stroke Mother     Dementia Mother     Cancer Father      pancreas    Allergies Father     Allergic rhinitis Father     Macular degeneration Cousin     Allergic rhinitis Paternal Aunt     Allergies Paternal Aunt     Allergic rhinitis Paternal Uncle     Allergies Paternal Uncle     Glaucoma Neg Hx     Amblyopia Neg Hx     Blindness Neg Hx     Cataracts Neg Hx     Retinal detachment Neg Hx     Strabismus Neg Hx     Thyroid disease Neg Hx     Angioedema Neg Hx     Asthma Neg Hx     Eczema Neg Hx     Immunodeficiency Neg Hx        Social History  Social History     Social History    Marital status:      Spouse name: N/A    Number of children: 0    Years of education: N/A     Occupational History    Not on file.     Social History Main Topics    Smoking status: Never Smoker    Smokeless tobacco: Never Used    Alcohol use Yes      Comment: social wine use in the past not current.    Drug use: No    Sexual activity: Not on file     Other Topics Concern    Not on file     Social History Narrative    No narrative on file        Review of Systems  Constitutional: no fever, no chills + change to appetite   Eyes: no change to vision, no redness, no tearing  Ears, nose, mouth, throat: no hearing loss, + tinnitus, no rhinorrhea, no difficulty swallowing   Cardiovascular: no palpitations +  wheezing   Respiratory: no shortness of breath  Gastrointestinal: no diarrhea, no constipation  Gu: + incontinence   Musculoskeletal: + muscle pain, + joint pain  Skin: no rashes  Neurologic: + numbness, weakness, no change to speech, loss of coordination   Endocrine: + heat or cold intolerance     Objective:        Vitals:    12/21/17 1133   BP: 135/77   Pulse: 77   Resp: 16     Body mass index is 30.56 kg/m².    General: Well developed, well nourished.  No acute distress.  HEENT: Atraumatic, normocephalic.  Neck: Trachea midline  Cardiovascular: Vitals reviewed. Normal peripheral perfusion.   Pulmonary: No increased work of breathing.  Abdomen/GI: No guarding  Musculoskeletal: No obvious joint deformities, moves all extremities symmetrically and well.     Neurological exam:  Mental status: Awake and alert. Oriented to situation.  Speech fluent and appropriate. Recent and remote memory appear to be intact.  Fund of knowledge normal. MOCA 23/30 see below.   Cranial nerves: Pupils equal round, extraocular movements intact, facial strength intact bilaterally, hearing grossly intact bilaterally.  Reflexes: 2+ in the upper extremities, 1+ in lower extremities, no Babinski, no Ferris  Sensation: intact to light touch and temperature in upper exremities but reduced in a stocking pattern in the lower extremities  Gait: Normal     Data Review:     11/17/17 NCS/EMG Dr Montano   Study was not tolerated thus findings are minimally helpful but do suggest presence of an axonal neuropathy.   1. Right fibular motor amplitude demonstrated reduced amplitude distally, sural sensory amplitudes were normal - suggest axonal motor greater than sensory neuropathy  2. Left median nerve with prolonged latency     Results for orders placed or performed during the hospital encounter of 09/26/13   CT Head Without Contrast    Narrative    Comparison: None    Technique: 5 mm noncontrast axial images through the brain were obtained.    Findings:      The brain is normally formed and exhibits normal density throughout with no indication of acute/recent major vascular distribution cerebral infarction, intraparenchymal hemorrhage, or intra-axial space occupying lesion. There is preserved gray-white   matter junction differentiation. The ventricular system is normal in appearance for age. No hydrocephalus. No effacement of the skull-base cisterns. No abnormal extra-axial fluid collections or blood products. The paranasal sinuses and mastoid air cells   are unremarkable. The calvarium shows no significant abnormality.    Impression     No acute intracranial abnormalities are appreciated.      Electronically signed by: Celia Fraser MD  Date:     09/26/13  Time:    12:44        Lab Results   Component Value Date     08/22/2017    K 4.7 08/22/2017     08/22/2017    CO2 25 08/22/2017    BUN 14 08/22/2017    CREATININE 0.9 08/22/2017    GLU 96 08/22/2017    HGBA1C 5.9 08/12/2016    AST 24 08/22/2017    ALT 23 08/22/2017    ALBUMIN 3.8 08/22/2017    PROT 7.2 08/22/2017    BILITOT 0.6 08/22/2017    CHOL 214 (H) 12/19/2017    HDL 60 12/19/2017    LDLCALC 140.4 12/19/2017    TRIG 68 12/19/2017       Lab Results   Component Value Date    WBC 5.14 08/22/2017    HGB 14.1 08/22/2017    HCT 41.7 08/22/2017    MCV 93 08/22/2017     08/22/2017       Lab Results   Component Value Date    TSH 1.680 06/10/2015     5/2016  A1C 5.9     Vit D 27 low     12/19/17 normal -   Zinc  SPEP/SERGEY  Thiamine  ESR 8  Heavy metal screen  Folate  RONALD  Copper    Pending:  Vit E  B6  UPEP      Assessment & Plan:       Problem List Items Addressed This Visit        Neuro    Migraine with aura and without status migrainosus, not intractable    Overview     Migraines with prominent visual auras have spontaneously gone down in frequency. We again discussed the use of antiepileptic regimens for prevention which she again declined. I did encourage again the use of magnesium. We also  discussed off label methods such as PFO closure if one is present (we do not know) which I did not recommend testing for as she has not tried to control symptoms medically, and also the transcranial magnetic stimulation         Memory loss    Overview     MOCA score low at 23/30 but some tests were borderline abnormal. Will monitor.             Endocrine    Vitamin D deficiency    Overview     A new problem we screened for on labs. Borderline low. I recommended over the counter supplementation            Other    Numbness and tingling of both legs below knees    Overview     Does appear to have an axonal neuropathy on 11/2017 NCS but full study was not tolerated. Workup for secondary causes negative so most likely idiopathic                    WORKUP:  -- I'll let you know when the remaining lab tests return     PREVENTION (use daily regardless of headache):  -- start a magnesium supplement (either magnesium citrate 500mg daily or magnesium oxide 400mg twice per day) magnesium oxide may cause loose stools for aura  -- other options for aura prevention include starting low dose lamotrigine 25mg daily and staying on that dose (rather than raising it) or low dose keppra 500mg daily. Both are seizure medications.     ACUTE TREATMENT (use total no more than 10 days per month unless otherwise stated):  -- continue using rizatriptan, may repeat in 2 hours for a total of 3 tabs per day. May take with Advil or Aleve for increased efficacy.       Return in about 3 months (around 3/21/2018).     A total of 30 minutes were spent face to face with the patient and more than half of this time was spent coordinating care and/or counseling.       Crys Delong MD

## 2017-12-22 ENCOUNTER — PATIENT MESSAGE (OUTPATIENT)
Dept: NEUROLOGY | Facility: CLINIC | Age: 72
End: 2017-12-22

## 2017-12-22 LAB
A-TOCOPHEROL VIT E SERPL-MCNC: 1161 UG/DL (ref 500–1800)
PYRIDOXAL SERPL-MCNC: 46 UG/L (ref 5–50)

## 2017-12-29 ENCOUNTER — TELEPHONE (OUTPATIENT)
Dept: INTERNAL MEDICINE | Facility: CLINIC | Age: 72
End: 2017-12-29

## 2018-01-25 ENCOUNTER — OFFICE VISIT (OUTPATIENT)
Dept: INTERNAL MEDICINE | Facility: CLINIC | Age: 73
End: 2018-01-25
Payer: MEDICARE

## 2018-01-25 VITALS
OXYGEN SATURATION: 97 % | WEIGHT: 172 LBS | DIASTOLIC BLOOD PRESSURE: 68 MMHG | HEART RATE: 86 BPM | HEIGHT: 63 IN | SYSTOLIC BLOOD PRESSURE: 128 MMHG | BODY MASS INDEX: 30.48 KG/M2

## 2018-01-25 DIAGNOSIS — E78.00 HYPERCHOLESTEROLEMIA: Primary | ICD-10-CM

## 2018-01-25 PROCEDURE — 99214 OFFICE O/P EST MOD 30 MIN: CPT | Mod: S$PBB,,, | Performed by: INTERNAL MEDICINE

## 2018-01-25 PROCEDURE — 99999 PR PBB SHADOW E&M-EST. PATIENT-LVL III: CPT | Mod: PBBFAC,,, | Performed by: INTERNAL MEDICINE

## 2018-01-25 PROCEDURE — 99213 OFFICE O/P EST LOW 20 MIN: CPT | Mod: PBBFAC | Performed by: INTERNAL MEDICINE

## 2018-01-25 RX ORDER — ROSUVASTATIN CALCIUM 5 MG/1
5 TABLET, COATED ORAL NIGHTLY
Qty: 30 TABLET | Refills: 2 | Status: SHIPPED | OUTPATIENT
Start: 2018-01-25 | End: 2018-11-08 | Stop reason: SDUPTHER

## 2018-01-25 NOTE — PROGRESS NOTES
Subjective:      Patient ID: Margarita Dunne is a 73 y.o. female.    Chief Complaint: Follow-up    HPI:  HPI   Discussion  Migraines: 2.5 migraines a month average until January 10 instances of aura and migraine. She is under the care of her neurologist. She was taken off the cholesterol medication as her neurologist felt that is may have been causing her leg symptoms. A recent cholesterol was elevated. Her ASCVD was 14.4% .  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    Glaucoma suspect - Both Eyes    Nuclear sclerosis - Both Eyes    Chronic rhinitis    Eyelid myokymia - Right Eye    Herpes labialis    Visual field defect - Both Eyes    Penicillin allergy    Itching    Hyperlipidemia    Migraine with aura and without status migrainosus, not intractable    Osteopenia    Numbness and tingling of both legs below knees    Neck pain    Vitamin D deficiency    Memory loss     Past Medical History:   Diagnosis Date    Allergy     Angio-edema     Arthritis     Asthma     Cataract     Chest pain at rest     Fibromyalgia 7/8/2012    Glaucoma suspect, steroid responders     Hand joint pain 7/8/2012    Headache(784.0)     Migraine     Osteoporosis     Recurrent upper respiratory infection (URI)     Urticaria      Past Surgical History:   Procedure Laterality Date    dexa  2014    osteopenia     Family History   Problem Relation Age of Onset    Hypertension Mother     Diabetes Mother     Stroke Mother     Dementia Mother     Cancer Father      pancreas    Allergies Father     Allergic rhinitis Father     Macular degeneration Cousin     Allergic rhinitis Paternal Aunt     Allergies Paternal Aunt     Allergic rhinitis Paternal Uncle     Allergies Paternal Uncle     Glaucoma Neg Hx     Amblyopia Neg Hx     Blindness Neg Hx     Cataracts Neg Hx     Retinal detachment Neg Hx      "Strabismus Neg Hx     Thyroid disease Neg Hx     Angioedema Neg Hx     Asthma Neg Hx     Eczema Neg Hx     Immunodeficiency Neg Hx      Review of Systems   Constitutional: Negative for chills, fever and unexpected weight change.   HENT: Negative for trouble swallowing.    Respiratory: Negative for cough, shortness of breath and wheezing.    Cardiovascular: Negative for chest pain and palpitations.   Gastrointestinal: Negative for abdominal distention, abdominal pain, blood in stool and vomiting.   Musculoskeletal: Negative for back pain.     Objective:     Vitals:    01/25/18 0742 01/25/18 0822   BP:  128/68   Pulse: 86    SpO2: 97%    Weight: 78 kg (172 lb)    Height: 5' 3" (1.6 m)    PainSc:   6    PainLoc: Generalized      Body mass index is 30.47 kg/m².  Physical Exam   Constitutional: She is oriented to person, place, and time. She appears well-developed and well-nourished. No distress.   Neck: Carotid bruit is not present. No thyromegaly present.   Cardiovascular: Normal rate, regular rhythm and normal heart sounds.  PMI is not displaced.    Pulmonary/Chest: Effort normal and breath sounds normal. No respiratory distress.   Abdominal: Soft. Bowel sounds are normal. She exhibits no distension. There is no tenderness.   Musculoskeletal: She exhibits no edema.   Neurological: She is alert and oriented to person, place, and time.     Assessment:     1. Hypercholesterolemia      Plan:   Margarita was seen today for follow-up.    Diagnoses and all orders for this visit:    Hypercholesterolemia  -     Lipid panel; Future  -     Comprehensive metabolic panel; Future    Other orders  -     rosuvastatin (CRESTOR) 5 MG tablet; Take 1 tablet (5 mg total) by mouth every evening.      Follow up per Portal.     Medication List          Accurate as of 1/25/18  8:33 AM. If you have any questions, ask your nurse or doctor.               START taking these medications    rosuvastatin 5 MG tablet  Commonly known as:  " CRESTOR  Take 1 tablet (5 mg total) by mouth every evening.  Started by:  Columba Anderson MD        CONTINUE taking these medications    albuterol 90 mcg/actuation inhaler  Commonly known as:  PROVENTIL/VENTOLIN  Inhale 2 puffs into the lungs every 6 (six) hours as needed for Shortness of Breath.     multivitamin per tablet  Commonly known as:  THERAGRAN     rizatriptan 10 MG tablet  Commonly known as:  MAXALT  1 tab PO at onset of migraine, may repeat ever 2 hours for max 3 tab in 24 hr     SYMBICORT 80-4.5 mcg/actuation Hfaa  Generic drug:  budesonide-formoterol 80-4.5 mcg  INHALE TWO PUFFS BY MOUTH TWICE DAILY     triamcinolone acetonide 0.1% 0.1 % cream  Commonly known as:  KENALOG  Use topically daily for pruritis, thins the skin     valACYclovir 500 MG tablet  Commonly known as:  VALTREX  TAKE ONE TABLET BY MOUTH ONCE DAILY     VOLTAREN 1 % Gel  Generic drug:  diclofenac sodium  APPLY 4 GRAMS TOPICALLY 4 TIMES DAILY AS DIRECTED           Where to Get Your Medications      These medications were sent to [facility] [address] [place] [address] - [address]  [address], [facility] [address]    Phone:  [phone]   · rosuvastatin 5 MG tablet

## 2018-01-26 RX ORDER — VALACYCLOVIR HYDROCHLORIDE 500 MG/1
TABLET, FILM COATED ORAL
Qty: 30 TABLET | Refills: 0 | Status: SHIPPED | OUTPATIENT
Start: 2018-01-26 | End: 2018-03-23 | Stop reason: SDUPTHER

## 2018-02-06 ENCOUNTER — CLINICAL SUPPORT (OUTPATIENT)
Dept: OPHTHALMOLOGY | Facility: CLINIC | Age: 73
End: 2018-02-06
Payer: MEDICARE

## 2018-02-06 ENCOUNTER — OFFICE VISIT (OUTPATIENT)
Dept: OPHTHALMOLOGY | Facility: CLINIC | Age: 73
End: 2018-02-06
Payer: MEDICARE

## 2018-02-06 DIAGNOSIS — H53.433 ARCUATE VISUAL FIELD DEFECT OF BOTH EYES: ICD-10-CM

## 2018-02-06 DIAGNOSIS — H25.13 NUCLEAR SCLEROTIC CATARACT OF BOTH EYES: ICD-10-CM

## 2018-02-06 DIAGNOSIS — H47.013: ICD-10-CM

## 2018-02-06 DIAGNOSIS — H53.453 OTHER LOCALIZED VISUAL FIELD DEFECT, BILATERAL: ICD-10-CM

## 2018-02-06 DIAGNOSIS — G43.709 CHRONIC MIGRAINE WITHOUT AURA WITHOUT STATUS MIGRAINOSUS, NOT INTRACTABLE: ICD-10-CM

## 2018-02-06 DIAGNOSIS — H40.003 GLAUCOMA SUSPECT OF BOTH EYES: Primary | ICD-10-CM

## 2018-02-06 PROCEDURE — 92012 INTRM OPH EXAM EST PATIENT: CPT | Mod: S$PBB,,, | Performed by: OPHTHALMOLOGY

## 2018-02-06 PROCEDURE — 92134 CPTRZ OPH DX IMG PST SGM RTA: CPT | Mod: PBBFAC | Performed by: OPHTHALMOLOGY

## 2018-02-06 PROCEDURE — 99999 PR PBB SHADOW E&M-EST. PATIENT-LVL II: CPT | Mod: PBBFAC,,, | Performed by: OPHTHALMOLOGY

## 2018-02-06 PROCEDURE — 99212 OFFICE O/P EST SF 10 MIN: CPT | Mod: PBBFAC | Performed by: OPHTHALMOLOGY

## 2018-02-06 NOTE — PROGRESS NOTES
HPI     DLS: 6/17/14    Pt her efor HRT review;    Meds: No GTTS    1) Glaucoma Suspect   2) AION   3) NS OU   4) Migraines   5) VF Defect     Last edited by Elle Goodman on 2/6/2018  3:53 PM. (History)            Assessment /Plan     For exam results, see Encounter Report.    Glaucoma suspect of both eyes    Nuclear sclerotic cataract of both eyes    Chronic migraine without aura without status migrainosus, not intractable        1. NON-GLAUCOMATOUS VF LOSS- etiology uncertain- ?AION     - ?? 2/2 vascular spasms assoc with migraines  Has seen neuro-ophthalmologist - Dr. Bhakta   Has seen Dr. Iyer - [facility]     2. Glaucoma suspect 2/2 VF loss - see above - would be LTG   First HVF 1999   First photos 1994     Family history neg   Glaucoma meds none   H/O adverse rxn to glaucoma drops none   LASERS none   GLAUCOMA SURGERIES none   OTHER EYE SURGERIES none   CDR 0.8 with sup notch / 0.85 with sup notch   Tbase 11-16 / 13-16   Tmax 16/16   Ttarget 14 OU  HVF 13 test 1999 to 2014 - IAD od // IAD os   Gonio +3 ou with pale TM   /586   OCT 4 tests 2006 to 2014 - RNFL - bord. S od / dec S os   HRT 4 tests 2007 to 2014 - MR nl od / dec S/ IT os   Disc photos 1994, 2005-slides / 3/30/2009, 8/2/2011 - [facility]     - Ttoday 12/12  - Test done today HRT     3. Nuclear sclerosis - not visually significant. Observe. MR =Rx today. Distance only. Patient happy w/ OTC readers    4. Migraines   -Triggered by certain foods, sausage, cheese, conley   -More frequent - pre-menopausal     5. Steroid exposure - ? Steroid responder - on every other day inhaled steroids     Plan   Stable IOP  VF  Appears stable today and has been stable for some time now  Patient not very interested in starting any IOP drops  Discussed with patient that her nerves do appear glaucomatous with the notching but still not interested in treatment  Photos from 1994, 2005 and 2011 all similar    Cataracts may start becoming visually significnat- can start testing  at future appointments      Patient had steroid injections in joints in March- but IOP remains good- no more steroid injections    RTC 4 month HVF/DFE/Photos    I have seen and personally examined the patient.  I agree with the findings, assessment and plan of the resident and/or fellow.     Opal Painting MD

## 2018-03-23 RX ORDER — VALACYCLOVIR HYDROCHLORIDE 500 MG/1
TABLET, FILM COATED ORAL
Qty: 30 TABLET | Refills: 0 | Status: SHIPPED | OUTPATIENT
Start: 2018-03-23 | End: 2018-05-24 | Stop reason: SDUPTHER

## 2018-05-24 RX ORDER — VALACYCLOVIR HYDROCHLORIDE 500 MG/1
TABLET, FILM COATED ORAL
Qty: 30 TABLET | Refills: 0 | Status: SHIPPED | OUTPATIENT
Start: 2018-05-24 | End: 2018-09-28 | Stop reason: SDUPTHER

## 2018-05-24 RX ORDER — RIZATRIPTAN BENZOATE 10 MG/1
TABLET ORAL
Qty: 10 TABLET | Refills: 3 | Status: SHIPPED | OUTPATIENT
Start: 2018-05-24 | End: 2019-07-05 | Stop reason: SDUPTHER

## 2018-06-19 ENCOUNTER — OFFICE VISIT (OUTPATIENT)
Dept: OPHTHALMOLOGY | Facility: CLINIC | Age: 73
End: 2018-06-19
Payer: MEDICARE

## 2018-06-19 ENCOUNTER — CLINICAL SUPPORT (OUTPATIENT)
Dept: OPHTHALMOLOGY | Facility: CLINIC | Age: 73
End: 2018-06-19
Payer: MEDICARE

## 2018-06-19 DIAGNOSIS — G43.709 CHRONIC MIGRAINE WITHOUT AURA WITHOUT STATUS MIGRAINOSUS, NOT INTRACTABLE: ICD-10-CM

## 2018-06-19 DIAGNOSIS — H47.013: ICD-10-CM

## 2018-06-19 DIAGNOSIS — H40.003 GLAUCOMA SUSPECT OF BOTH EYES: Primary | ICD-10-CM

## 2018-06-19 DIAGNOSIS — H40.003 GLAUCOMA SUSPECT OF BOTH EYES: ICD-10-CM

## 2018-06-19 DIAGNOSIS — H25.13 NUCLEAR SCLEROTIC CATARACT OF BOTH EYES: ICD-10-CM

## 2018-06-19 DIAGNOSIS — H53.433 ARCUATE VISUAL FIELD DEFECT OF BOTH EYES: ICD-10-CM

## 2018-06-19 PROCEDURE — 99999 PR PBB SHADOW E&M-EST. PATIENT-LVL II: CPT | Mod: PBBFAC,,, | Performed by: OPHTHALMOLOGY

## 2018-06-19 PROCEDURE — 99212 OFFICE O/P EST SF 10 MIN: CPT | Mod: PBBFAC | Performed by: OPHTHALMOLOGY

## 2018-06-19 PROCEDURE — 92083 EXTENDED VISUAL FIELD XM: CPT | Mod: PBBFAC

## 2018-06-19 PROCEDURE — 92083 EXTENDED VISUAL FIELD XM: CPT | Mod: 26,S$PBB,, | Performed by: OPHTHALMOLOGY

## 2018-06-19 PROCEDURE — 92012 INTRM OPH EXAM EST PATIENT: CPT | Mod: S$PBB,,, | Performed by: OPHTHALMOLOGY

## 2018-08-06 ENCOUNTER — OFFICE VISIT (OUTPATIENT)
Dept: INTERNAL MEDICINE | Facility: CLINIC | Age: 73
End: 2018-08-06
Payer: MEDICARE

## 2018-08-06 ENCOUNTER — LAB VISIT (OUTPATIENT)
Dept: LAB | Facility: HOSPITAL | Age: 73
End: 2018-08-06
Attending: INTERNAL MEDICINE
Payer: MEDICARE

## 2018-08-06 VITALS
BODY MASS INDEX: 30.31 KG/M2 | WEIGHT: 171.06 LBS | SYSTOLIC BLOOD PRESSURE: 132 MMHG | HEART RATE: 77 BPM | OXYGEN SATURATION: 98 % | DIASTOLIC BLOOD PRESSURE: 78 MMHG | HEIGHT: 63 IN

## 2018-08-06 DIAGNOSIS — E78.00 PURE HYPERCHOLESTEROLEMIA: ICD-10-CM

## 2018-08-06 DIAGNOSIS — R10.32 LLQ PAIN: ICD-10-CM

## 2018-08-06 DIAGNOSIS — N93.9 VAGINAL SPOTTING: Primary | ICD-10-CM

## 2018-08-06 DIAGNOSIS — R52 PAIN MANAGEMENT: ICD-10-CM

## 2018-08-06 LAB
ALBUMIN SERPL BCP-MCNC: 4.2 G/DL
ALP SERPL-CCNC: 97 U/L
ALT SERPL W/O P-5'-P-CCNC: 24 U/L
ANION GAP SERPL CALC-SCNC: 8 MMOL/L
AST SERPL-CCNC: 21 U/L
BASOPHILS # BLD AUTO: 0.06 K/UL
BASOPHILS NFR BLD: 0.8 %
BILIRUB SERPL-MCNC: 0.5 MG/DL
BUN SERPL-MCNC: 19 MG/DL
CALCIUM SERPL-MCNC: 9.9 MG/DL
CHLORIDE SERPL-SCNC: 107 MMOL/L
CHOLEST SERPL-MCNC: 232 MG/DL
CHOLEST/HDLC SERPL: 4.7 {RATIO}
CO2 SERPL-SCNC: 25 MMOL/L
CREAT SERPL-MCNC: 0.8 MG/DL
CREAT SERPL-MCNC: 0.8 MG/DL
DIFFERENTIAL METHOD: ABNORMAL
EOSINOPHIL # BLD AUTO: 0.1 K/UL
EOSINOPHIL NFR BLD: 1.8 %
ERYTHROCYTE [DISTWIDTH] IN BLOOD BY AUTOMATED COUNT: 13.4 %
EST. GFR  (AFRICAN AMERICAN): >60 ML/MIN/1.73 M^2
EST. GFR  (AFRICAN AMERICAN): >60 ML/MIN/1.73 M^2
EST. GFR  (NON AFRICAN AMERICAN): >60 ML/MIN/1.73 M^2
EST. GFR  (NON AFRICAN AMERICAN): >60 ML/MIN/1.73 M^2
GLUCOSE SERPL-MCNC: 88 MG/DL
HCT VFR BLD AUTO: 44.2 %
HDLC SERPL-MCNC: 49 MG/DL
HDLC SERPL: 21.1 %
HGB BLD-MCNC: 14.7 G/DL
LDLC SERPL CALC-MCNC: 159.6 MG/DL
LYMPHOCYTES # BLD AUTO: 1.5 K/UL
LYMPHOCYTES NFR BLD: 21.4 %
MCH RBC QN AUTO: 31.1 PG
MCHC RBC AUTO-ENTMCNC: 33.3 G/DL
MCV RBC AUTO: 94 FL
MONOCYTES # BLD AUTO: 0.6 K/UL
MONOCYTES NFR BLD: 8.2 %
NEUTROPHILS # BLD AUTO: 4.9 K/UL
NEUTROPHILS NFR BLD: 67.7 %
NONHDLC SERPL-MCNC: 183 MG/DL
PLATELET # BLD AUTO: 257 K/UL
PMV BLD AUTO: 9.7 FL
POTASSIUM SERPL-SCNC: 4.2 MMOL/L
PROT SERPL-MCNC: 7.7 G/DL
RBC # BLD AUTO: 4.72 M/UL
SODIUM SERPL-SCNC: 140 MMOL/L
TRIGL SERPL-MCNC: 117 MG/DL
WBC # BLD AUTO: 7.18 K/UL

## 2018-08-06 PROCEDURE — 99215 OFFICE O/P EST HI 40 MIN: CPT | Mod: S$PBB,,, | Performed by: INTERNAL MEDICINE

## 2018-08-06 PROCEDURE — 85025 COMPLETE CBC W/AUTO DIFF WBC: CPT

## 2018-08-06 PROCEDURE — 99215 OFFICE O/P EST HI 40 MIN: CPT | Mod: PBBFAC | Performed by: INTERNAL MEDICINE

## 2018-08-06 PROCEDURE — 99999 PR PBB SHADOW E&M-EST. PATIENT-LVL V: CPT | Mod: PBBFAC,,, | Performed by: INTERNAL MEDICINE

## 2018-08-06 PROCEDURE — 80061 LIPID PANEL: CPT

## 2018-08-06 PROCEDURE — 36415 COLL VENOUS BLD VENIPUNCTURE: CPT

## 2018-08-06 PROCEDURE — 80053 COMPREHEN METABOLIC PANEL: CPT

## 2018-08-06 RX ORDER — DICLOFENAC SODIUM 10 MG/G
GEL TOPICAL
Qty: 2 TUBE | Refills: 6 | Status: SHIPPED | OUTPATIENT
Start: 2018-08-06 | End: 2020-05-17

## 2018-08-06 NOTE — PROGRESS NOTES
Subjective:      Patient ID: Margarita Dunne is a 73 y.o. female.    Chief Complaint: Annual Exam    HPI:  HPI   Chronic pain:is her overall problems according to the patient    In the past 6 months she had vaginal spotting, she had a work up in spring on 2017.    Stomach pain:She is uncertain of the etiology, it  Is in the left lower quadrant. She has been treated in the past for diverticulitis. When she eats certain foods she has noted it such as a very rich food. She has never had a colonoscopy. She is very claustrophobic but is willing to do a CT.    Migraines:  Dr. Delong has been handling her migraines , please follow up in 3/2018    Dental:    EMG/NCV: patient was unable to complete the test, and did not follow up    Pain in the right hip: below the waist , getting up is when it is the worst and over the right lateral hip    She was getting injections but did not help: neck, hip and knees. 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    Glaucoma suspect - Both Eyes    Nuclear sclerosis - Both Eyes    Chronic rhinitis    Eyelid myokymia - Right Eye    Herpes labialis    Visual field defect - Both Eyes    Penicillin allergy    Itching    Hyperlipidemia    Migraine with aura and without status migrainosus, not intractable    Osteopenia    Numbness and tingling of both legs below knees    Neck pain    Vitamin D deficiency    Memory loss    Pain management     Past Medical History:   Diagnosis Date    Allergy     Angio-edema     Arthritis     Asthma     Cataract     Chest pain at rest     Fibromyalgia 7/8/2012    Glaucoma suspect, steroid responders     Hand joint pain 7/8/2012    Headache(784.0)     Migraine     Osteoporosis     Recurrent upper respiratory infection  "(URI)     Urticaria      Past Surgical History:   Procedure Laterality Date    dexa  2014    osteopenia     Family History   Problem Relation Age of Onset    Hypertension Mother     Diabetes Mother     Stroke Mother     Dementia Mother     Cancer Father         pancreas    Allergies Father     Allergic rhinitis Father     Macular degeneration Cousin     Allergic rhinitis Paternal Aunt     Allergies Paternal Aunt     Allergic rhinitis Paternal Uncle     Allergies Paternal Uncle     Glaucoma Neg Hx     Amblyopia Neg Hx     Blindness Neg Hx     Cataracts Neg Hx     Retinal detachment Neg Hx     Strabismus Neg Hx     Thyroid disease Neg Hx     Angioedema Neg Hx     Asthma Neg Hx     Eczema Neg Hx     Immunodeficiency Neg Hx      Review of Systems   Constitutional: Negative for activity change and unexpected weight change.   HENT: Negative for hearing loss, rhinorrhea and trouble swallowing.    Eyes: Negative for discharge and visual disturbance.   Respiratory: Positive for wheezing. Negative for chest tightness.    Cardiovascular: Positive for chest pain. Negative for palpitations.   Gastrointestinal: Negative for blood in stool, constipation, diarrhea and vomiting.   Endocrine: Negative for polydipsia and polyuria.   Genitourinary: Negative for difficulty urinating, dysuria, hematuria and menstrual problem.   Musculoskeletal: Positive for arthralgias, joint swelling and neck pain.   Neurological: Positive for headaches. Negative for weakness.   Psychiatric/Behavioral: Negative for confusion and dysphoric mood.     Objective:     Vitals:    08/06/18 1103   BP: 132/78   Pulse: 77   SpO2: 98%   Weight: 77.6 kg (171 lb 1.2 oz)   Height: 5' 3" (1.6 m)   PainSc:   6   PainLoc: Head     Body mass index is 30.3 kg/m².  Physical Exam   Constitutional: She is oriented to person, place, and time. She appears well-developed and well-nourished. No distress.   Neck: Carotid bruit is not present. No " thyromegaly present.   Cardiovascular: Normal rate, regular rhythm and normal heart sounds.  PMI is not displaced.    Pulmonary/Chest: Effort normal and breath sounds normal. No respiratory distress.   Abdominal: Soft. Bowel sounds are normal. She exhibits no distension. There is no tenderness.   Genitourinary:   Genitourinary Comments: Rectal hemorrhoids   Musculoskeletal: She exhibits no edema.   Neurological: She is alert and oriented to person, place, and time.     Assessment:     1. Vaginal spotting    2. LLQ pain    3. Pure hypercholesterolemia    4. Pain management      Plan:   Margarita was seen today for annual exam.    Diagnoses and all orders for this visit:    Vaginal spotting: patient needs further evaluation  -     Ambulatory consult to Gynecology  -        LLQ pain: patient has never had a colonoscopy, declines colonoscopy unless absolutely needed, she has clostraphabia but believes she can have a CT      CT Abdomen Pelvis With Contrast; Future  -     Creatinine, serum; Future    Pure hypercholesterolemia: on statin  -     CBC auto differential; Future  -     Comprehensive metabolic panel; Future  -     Lipid panel; Future    Pain management  Suggest:  Antiinflammatory Diet  Voltaren gel  Tylenol 1000 tid prn      Other orders  -     VOLTAREN 1 % Gel; APPLY 4 GRAMS TOPICALLY 4 TIMES DAILY AS DIRECTED    Follow up with dentist, neurologist  Follow-up in about 1 year (around 8/6/2019).     Medication List          Accurate as of 8/6/18  7:07 PM. If you have any questions, ask your nurse or doctor.               CONTINUE taking these medications    albuterol 90 mcg/actuation inhaler  Commonly known as:  PROVENTIL/VENTOLIN  Inhale 2 puffs into the lungs every 6 (six) hours as needed for Shortness of Breath.     multivitamin per tablet  Commonly known as:  THERAGRAN     rizatriptan 10 MG tablet  Commonly known as:  MAXALT  TAKE ONE TABLET BY MOUTH AT ONSET OF MIGRAINE, MAY REPEAT EVERY 2 HOURS. DO NOT EXCEED 3  TABLETS IN 24 HOURS.     rosuvastatin 5 MG tablet  Commonly known as:  CRESTOR  Take 1 tablet (5 mg total) by mouth every evening.     SYMBICORT 80-4.5 mcg/actuation Hfaa  Generic drug:  budesonide-formoterol 80-4.5 mcg  INHALE TWO PUFFS BY MOUTH TWICE DAILY     triamcinolone acetonide 0.1% 0.1 % cream  Commonly known as:  KENALOG  Use topically daily for pruritis, thins the skin     valACYclovir 500 MG tablet  Commonly known as:  VALTREX  TAKE 1 CAPLET BY MOUTH ONCE DAILY     VOLTAREN 1 % Gel  Generic drug:  diclofenac sodium  APPLY 4 GRAMS TOPICALLY 4 TIMES DAILY AS DIRECTED           Where to Get Your Medications      These medications were sent to [facility] - [place]  [address]  [address], [place] [address]    Phone:  [phone]   · VOLTAREN 1 % Gel

## 2018-08-08 ENCOUNTER — TELEPHONE (OUTPATIENT)
Dept: INTERNAL MEDICINE | Facility: CLINIC | Age: 73
End: 2018-08-08

## 2018-08-08 NOTE — TELEPHONE ENCOUNTER
----- Message from Rula Bowers sent at 8/8/2018  7:58 AM CDT -----  Contact: self [phone]  Patient requesting a call from the office in regards to her Ct scan . Please call and advise, Thanks

## 2018-08-22 ENCOUNTER — OFFICE VISIT (OUTPATIENT)
Dept: OBSTETRICS AND GYNECOLOGY | Facility: CLINIC | Age: 73
End: 2018-08-22
Payer: MEDICARE

## 2018-08-22 VITALS
BODY MASS INDEX: 30.07 KG/M2 | DIASTOLIC BLOOD PRESSURE: 70 MMHG | WEIGHT: 169.69 LBS | SYSTOLIC BLOOD PRESSURE: 130 MMHG | HEIGHT: 63 IN

## 2018-08-22 DIAGNOSIS — N95.0 POSTMENOPAUSAL BLEEDING: Primary | ICD-10-CM

## 2018-08-22 PROCEDURE — 88305 TISSUE EXAM BY PATHOLOGIST: CPT | Performed by: PATHOLOGY

## 2018-08-22 PROCEDURE — 99214 OFFICE O/P EST MOD 30 MIN: CPT | Mod: 25,S$PBB,, | Performed by: OBSTETRICS & GYNECOLOGY

## 2018-08-22 PROCEDURE — 58100 BIOPSY OF UTERUS LINING: CPT | Mod: PBBFAC,PO | Performed by: OBSTETRICS & GYNECOLOGY

## 2018-08-22 PROCEDURE — 88305 TISSUE EXAM BY PATHOLOGIST: CPT | Mod: 26,,, | Performed by: PATHOLOGY

## 2018-08-22 PROCEDURE — 99999 PR PBB SHADOW E&M-EST. PATIENT-LVL III: CPT | Mod: PBBFAC,,, | Performed by: OBSTETRICS & GYNECOLOGY

## 2018-08-22 PROCEDURE — 99213 OFFICE O/P EST LOW 20 MIN: CPT | Mod: PBBFAC,PO | Performed by: OBSTETRICS & GYNECOLOGY

## 2018-08-22 PROCEDURE — 58100 BIOPSY OF UTERUS LINING: CPT | Mod: S$PBB,,, | Performed by: OBSTETRICS & GYNECOLOGY

## 2018-08-22 NOTE — LETTER
August 22, 2018      Columba Anderson MD  [address]  [facility] [address]           [facility]  [address], [address]  5th [address] [facility]  [place] [address]  Phone: [phone]          Patient: Margarita Dunne   MR Number: [id number]   YOB: 1945   Date of Visit: 8/22/2018       Dear Dr. Columba Anderson:    Thank you for referring Margarita Dunne to me for evaluation. Attached you will find relevant portions of my assessment and plan of care.    If you have questions, please do not hesitate to call me. I look forward to following Margarita Dunne along with you.    Sincerely,    Ondina Landon MD    Enclosure  CC:  No Recipients    If you would like to receive this communication electronically, please contact [email or web address] or [phone] to request more information on [employer] access.    For providers and/or their staff who would like to refer a patient to [facility], please contact us through our one-stop-shop provider referral line, [facility], at [phone].    If you feel you have received this communication in error or would no longer like to receive these types of communications, please e-mail [email or web address]

## 2018-08-22 NOTE — PROGRESS NOTES
GYNECOLOGY OFFICE NOTE    Reason for visit: postmenopausal bleeding    HPI: Pt is a 73 y.o.  female  who presents for evaluation of vaginal spotting (Postmenopausal bleeding). Reports constipation and the following day noticed pink/mucus with wiping. Happened twice in the last 2 month. Had a similar episode last year and U/S showed thickened EMS at 16mm and EMBX was negative. Pt reports menopause at the age of 48. We discussed my recommendation to repeat her U/S since the last was 1 yr ago. Pt declines. States she would rather just repeat her EMBX. We discussed differential: endometrial polyp, endometrial hyperplasia, endometrial cancer and possible need for hysteroscopy.     Past Medical History:   Diagnosis Date    Allergy     Angio-edema     Arthritis     Asthma     Cataract     Chest pain at rest     Fibromyalgia 2012    Glaucoma suspect, steroid responders     Hand joint pain 2012    Headache(784.0)     Migraine     Osteoporosis     Recurrent upper respiratory infection (URI)     Urticaria        Past Surgical History:   Procedure Laterality Date    dexa      osteopenia       Family History   Problem Relation Age of Onset    Hypertension Mother     Diabetes Mother     Stroke Mother     Dementia Mother     Cancer Father         pancreas    Allergies Father     Allergic rhinitis Father     Macular degeneration Cousin     Allergic rhinitis Paternal Aunt     Allergies Paternal Aunt     Allergic rhinitis Paternal Uncle     Allergies Paternal Uncle     Glaucoma Neg Hx     Amblyopia Neg Hx     Blindness Neg Hx     Cataracts Neg Hx     Retinal detachment Neg Hx     Strabismus Neg Hx     Thyroid disease Neg Hx     Angioedema Neg Hx     Asthma Neg Hx     Eczema Neg Hx     Immunodeficiency Neg Hx        Social History     Tobacco Use    Smoking status: Never Smoker    Smokeless tobacco: Never Used   Substance Use Topics    Alcohol use: Yes     Comment:  "social wine use in the past not current.    Drug use: No       OB History    Para Term  AB Living   0 0 0 0 0 0   SAB TAB Ectopic Multiple Live Births   0 0 0 0               Current Outpatient Medications   Medication Sig    albuterol (PROVENTIL/VENTOLIN) 90 mcg/actuation inhaler Inhale 2 puffs into the lungs every 6 (six) hours as needed for Shortness of Breath.    multivitamin (THERAGRAN) per tablet Take 1 tablet by mouth once daily.    rizatriptan (MAXALT) 10 MG tablet TAKE ONE TABLET BY MOUTH AT ONSET OF MIGRAINE, MAY REPEAT EVERY 2 HOURS. DO NOT EXCEED 3 TABLETS IN 24 HOURS.    rosuvastatin (CRESTOR) 5 MG tablet Take 1 tablet (5 mg total) by mouth every evening.    SYMBICORT 80-4.5 mcg/actuation HFAA INHALE TWO PUFFS BY MOUTH TWICE DAILY    triamcinolone acetonide 0.1% (KENALOG) 0.1 % cream Use topically daily for pruritis, thins the skin    valACYclovir (VALTREX) 500 MG tablet TAKE 1 CAPLET BY MOUTH ONCE DAILY    VOLTAREN 1 % Gel APPLY 4 GRAMS TOPICALLY 4 TIMES DAILY AS DIRECTED     No current facility-administered medications for this visit.        Allergies: Doxycycline; Pcn [penicillins]; Sulfa (sulfonamide antibiotics); and Corticosteroids (glucocorticoids)     /70   Ht 5' 3" (1.6 m)   Wt 77 kg (169 lb 10.7 oz)   BMI 30.06 kg/m²     ROS:  GENERAL: Denies fever or chills.   SKIN: Denies rash or lesions.   HEAD: Denies head injury or headache.   CHEST: Denies chest pain or shortness of breath.   CARDIOVASCULAR: Denies palpitations or chest pain.   ABDOMEN: No abdominal pain, constipation, diarrhea, nausea, vomiting or rectal bleeding.   URINARY: No dysuria, hematuria, or burning on urination.  REPRODUCTIVE: See HPI.   BREASTS: see HPI  NEUROLOGIC: Denies syncope or weakness.     Physical Exam:  GENERAL: alert, appears stated age and cooperative  NEUROLOGIC: orientated to person, place and time, normal mood and affect   CHEST: Normal respiratory effort  NECK: normal " appearance, no thyromegaly masses or tenderness  SKIN: no acne, striae, hirsutism  BREAST EXAM: not examined  ABDOMEN: abdomen is soft without significant tenderness, masses, organomegaly or guarding, no hernias noted  EXTERNAL GENITALIA:  normal general appearance  URETHRA: normal urethra, normal urethral meatus  VAGINA:  normal mucosa without prolapse or lesions  CERVIX:  Normal- mucus grey/brown at os  UTERUS:  mobile, non-tender  ADNEXA:  normal adnexa in size, nontender and no masses    DATE: August 22nd, 202018    TIME: 1010 AM    PROCEDURE: Endometrial biopsy    INDICATION: PMB    PATIENT CONSENT:     PRE ENDOMETRIAL BIOPSY COUNSELING:The patient was informed of the risk of bleeding, infection, uterine perforation and pain and that the test will rule-out endometrial cancer with accuracy greater than 95%. She was counseled on the alternatives to endometrial biopsy.  All of her questions were answered. Counseling lasted approximately 15 minutes and all her questions were answered.    Patient gives written consent    ANESTHESIA: None    Labs: UPT performed prior to the procedure was negative.    PROCEDURE NOTE:  The cervix was visualized with a speculum and swabbed with Betadinex3.  The anterior lip of the cervix was grasped with a single toothed tenaculum, and a sterile endometrial pipelle was inserted into the uterus to a sound length of 8 cm. 5 passes were made with the pipelle and scant amount of tissue was obtained. The specimen was placed in formalin and sent to Pathology for evaluation.     COMPLICATIONS: None    DISPOSITION: The patient tolerated the above procedure well.    POST ENDOMETRIAL BIOPSY COUNSELING:  - Manage post biopsy cramping with NSAIDs or Tylenol.  - Expect spotting or light bleeding for a few days.  - Report bleeding heavier than a period, fever > 101.0 F, worsening pain or a foul smelling vaginal discharge.    Diagnosis:  1. Postmenopausal bleeding        Plan:   1. F/u EMBX results.  Order for U/S placed but pt declines.     Orders Placed This Encounter    Tissue Specimen To Pathology, Obstetrics/Gynecology    US OB/GYN Procedure ([facility])       Patient was counseled today on the new ACS guidelines for cervical cytology screening as well as the current recommendations for breast cancer screening. She was counseled to follow up with her PCP for other routine health maintenance.     Follow-up pending biopsy results     Ondina Landon MD  OB/GYN  Pager: [phone]

## 2018-08-29 ENCOUNTER — PATIENT MESSAGE (OUTPATIENT)
Dept: OBSTETRICS AND GYNECOLOGY | Facility: CLINIC | Age: 73
End: 2018-08-29

## 2018-08-29 DIAGNOSIS — Z12.39 SCREENING FOR BREAST CANCER: Primary | ICD-10-CM

## 2018-08-30 NOTE — TELEPHONE ENCOUNTER
"1. The brown substance is old blood mixed with cervical mucus that was at the cervix prior to the biopsy.  2. A pap smear was not performed because we discontinue pap smear screenings at the age of 63.   3. As we previously discussed the ultrasound measures the uterine lining. It should measure <4mm in a postmenopausal patient. I would like to make sure nothing has been missed. If the lining is >4mm there could be something else inside of the uterus that was not sampled. The actual pathology report is below. Instead of resampling I would suggest a simple ultrasound instead.  4. Mammogram order was placed.     "Predominantly mucus and benign (noncancerous) squamous and endocervical glandular epithelium  Very rare strips of atrophic endometrial epithelium  Resampling may be helpful if clinically indicated"    Ondina Landon MD, FACOG  OB/GYN  Pager: [phone]    "

## 2018-08-31 ENCOUNTER — PATIENT MESSAGE (OUTPATIENT)
Dept: OBSTETRICS AND GYNECOLOGY | Facility: CLINIC | Age: 73
End: 2018-08-31

## 2018-09-04 DIAGNOSIS — J45.20 MILD INTERMITTENT CHRONIC ASTHMA WITHOUT COMPLICATION: Primary | ICD-10-CM

## 2018-09-04 DIAGNOSIS — J45.21 MILD INTERMITTENT CHRONIC ASTHMA WITH ACUTE EXACERBATION: Primary | ICD-10-CM

## 2018-09-04 RX ORDER — ALBUTEROL SULFATE 90 UG/1
2 AEROSOL, METERED RESPIRATORY (INHALATION) EVERY 6 HOURS PRN
Qty: 1 INHALER | Refills: 11 | Status: SHIPPED | OUTPATIENT
Start: 2018-09-04 | End: 2019-12-05 | Stop reason: SDUPTHER

## 2018-09-04 RX ORDER — BUDESONIDE AND FORMOTEROL FUMARATE DIHYDRATE 80; 4.5 UG/1; UG/1
2 AEROSOL RESPIRATORY (INHALATION) 2 TIMES DAILY
Qty: 11 G | Refills: 11 | Status: SHIPPED | OUTPATIENT
Start: 2018-09-04 | End: 2019-09-27 | Stop reason: SDUPTHER

## 2018-09-24 ENCOUNTER — TELEPHONE (OUTPATIENT)
Dept: PULMONOLOGY | Facility: CLINIC | Age: 73
End: 2018-09-24

## 2018-09-24 DIAGNOSIS — J45.909 ASTHMA, UNSPECIFIED ASTHMA SEVERITY, UNSPECIFIED WHETHER COMPLICATED, UNSPECIFIED WHETHER PERSISTENT: Primary | ICD-10-CM

## 2018-09-26 ENCOUNTER — HOSPITAL ENCOUNTER (OUTPATIENT)
Dept: PULMONOLOGY | Facility: CLINIC | Age: 73
Discharge: HOME | End: 2018-09-26
Payer: MEDICARE

## 2018-09-26 ENCOUNTER — OFFICE VISIT (OUTPATIENT)
Dept: PULMONOLOGY | Facility: CLINIC | Age: 73
End: 2018-09-26
Payer: MEDICARE

## 2018-09-26 VITALS
OXYGEN SATURATION: 96 % | HEART RATE: 81 BPM | SYSTOLIC BLOOD PRESSURE: 128 MMHG | DIASTOLIC BLOOD PRESSURE: 64 MMHG | HEIGHT: 64 IN | WEIGHT: 171 LBS | BODY MASS INDEX: 29.19 KG/M2

## 2018-09-26 DIAGNOSIS — J45.909 ASTHMA, UNSPECIFIED ASTHMA SEVERITY, UNSPECIFIED WHETHER COMPLICATED, UNSPECIFIED WHETHER PERSISTENT: ICD-10-CM

## 2018-09-26 DIAGNOSIS — J45.20 MILD INTERMITTENT CHRONIC ASTHMA WITHOUT COMPLICATION: Primary | ICD-10-CM

## 2018-09-26 LAB
POST FEV1 FVC: 0.8
POST FEV1: 2.17
POST FVC: 2.71
PRE FEV1 FVC: 74
PRE FEV1: 2.3
PRE FVC: 3.1
PREDICTED FEV1 FVC: 77
PREDICTED FEV1: 2.12
PREDICTED FVC: 2.77

## 2018-09-26 PROCEDURE — 99213 OFFICE O/P EST LOW 20 MIN: CPT | Mod: PBBFAC | Performed by: INTERNAL MEDICINE

## 2018-09-26 PROCEDURE — 99999 PR PBB SHADOW E&M-EST. PATIENT-LVL III: CPT | Mod: PBBFAC,,, | Performed by: INTERNAL MEDICINE

## 2018-09-26 PROCEDURE — 94729 DIFFUSING CAPACITY: CPT | Mod: PBBFAC | Performed by: INTERNAL MEDICINE

## 2018-09-26 PROCEDURE — 99214 OFFICE O/P EST MOD 30 MIN: CPT | Mod: 25,S$PBB,, | Performed by: INTERNAL MEDICINE

## 2018-09-26 PROCEDURE — 94060 EVALUATION OF WHEEZING: CPT | Mod: 26,S$PBB,, | Performed by: INTERNAL MEDICINE

## 2018-09-26 PROCEDURE — 94060 EVALUATION OF WHEEZING: CPT | Mod: PBBFAC | Performed by: INTERNAL MEDICINE

## 2018-09-26 PROCEDURE — 94729 DIFFUSING CAPACITY: CPT | Mod: 26,S$PBB,, | Performed by: INTERNAL MEDICINE

## 2018-09-26 NOTE — PROGRESS NOTES
Subjective:       Patient ID: Margarita Dunne is a 73 y.o. female.    Chief Complaint: Asthma    HPI   Margarita Dunne 73 y.o. female    has a past medical history of Allergy, Angio-edema, Arthritis, Asthma, Cataract, Chest pain at rest, Fibromyalgia (7/8/2012), Glaucoma suspect, steroid responders, Hand joint pain (7/8/2012), Headache(784.0), Migraine, Osteoporosis, Recurrent upper respiratory infection (URI), and Urticaria.    has a past surgical history that includes dexa (2014).   reports that  has never smoked. she has never used smokeless tobacco. She reports that she drinks alcohol. She reports that she does not use drugs.  Referred by: No ref. provider found  Who had concerns including Asthma.  The patient's last visit with me was on Visit date not found.    Started with asthma in college   Well controlled   Never hospitalized  Prior events with ER  Not active, no exercise  Usually non aerobic   Trouble with knees and right hip, and feet  Currently moving to [facility]    Takes symbicort 1 puff twice a day  Uses albuterol once a month  No fever chills, ns, wt changes, nausea, vomiting, diarrhea, constipation, chest pain, tightness, pressure  LTNS    Review of Systems   All other systems reviewed and are negative.      Objective:      Physical Exam   Constitutional: She is oriented to person, place, and time. She appears well-developed and well-nourished. She appears not cachectic. No distress.   HENT:   Head: Normocephalic.   Right Ear: External ear normal.   Left Ear: External ear normal.   Neck: Normal range of motion. No thyromegaly present.   Cardiovascular: Normal rate, regular rhythm, normal heart sounds and intact distal pulses. Exam reveals no gallop and no friction rub.   No murmur heard.  Pulmonary/Chest: Normal expansion, symmetric chest wall expansion, effort normal and breath sounds normal. No stridor. No respiratory distress. She has no decreased breath sounds. She has no wheezes. She has no  rhonchi. She has no rales. Chest wall is not dull to percussion. She exhibits no tenderness. Negative for egophony. Negative for tactile fremitus.   Abdominal: She exhibits no distension.   Musculoskeletal: She exhibits no edema, tenderness or deformity.   Lymphadenopathy:     She has no cervical adenopathy.   Neurological: She is alert and oriented to person, place, and time. No cranial nerve deficit. Gait normal.   Skin: Skin is warm and dry. No rash noted. She is not diaphoretic. No cyanosis or erythema. No pallor. Nails show no clubbing.   Psychiatric: She has a normal mood and affect. Her behavior is normal. Judgment and thought content normal.     Personal Diagnostic Review    No flowsheet data found.      Assessment:       1. Mild intermittent chronic asthma without complication        Outpatient Encounter Medications as of 9/26/2018   Medication Sig Dispense Refill    albuterol 90 mcg/actuation inhaler Inhale 2 puffs into the lungs every 6 (six) hours as needed for Wheezing. Rescue 1 Inhaler 11    budesonide-formoterol 80-4.5 mcg (SYMBICORT) 80-4.5 mcg/actuation HFAA Inhale 2 puffs into the lungs 2 (two) times daily. Controller 11 g 11    multivitamin (THERAGRAN) per tablet Take 1 tablet by mouth once daily.      rizatriptan (MAXALT) 10 MG tablet TAKE ONE TABLET BY MOUTH AT ONSET OF MIGRAINE, MAY REPEAT EVERY 2 HOURS. DO NOT EXCEED 3 TABLETS IN 24 HOURS. 10 tablet 3    rosuvastatin (CRESTOR) 5 MG tablet Take 1 tablet (5 mg total) by mouth every evening. 30 tablet 2    triamcinolone acetonide 0.1% (KENALOG) 0.1 % cream Use topically daily for pruritis, thins the skin 1 Tube 0    valACYclovir (VALTREX) 500 MG tablet TAKE 1 CAPLET BY MOUTH ONCE DAILY 30 tablet 0    VOLTAREN 1 % Gel APPLY 4 GRAMS TOPICALLY 4 TIMES DAILY AS DIRECTED 2 Tube 6     No facility-administered encounter medications on file as of 9/26/2018.      No orders of the defined types were placed in this encounter.    Plan:             Answers for HPI/ROS submitted by the patient on 9/25/2018   Asthma  In the past 4 weeks, how much of the time did your asthma keep you from getting as much done at work, school, or at home?: a little of the time  During the past 4 weeks, how often have you had shortness of breath?: once or twice a week  During the past 4 weeks, how often did your asthma symptoms (Wheezing, coughing, shortness of breath, chest tightness or pain) wake you up at night or earlier that usual in the morning?: not at all  During the past 4 weeks, how often have you used your rescue inhaler or nebulizer medication (such as albuterol)?: once a week or less  How would you rate your asthma control during the past 4 weeks?: well controlled   : 21       I personally reviewed the      1. PFT wnl- no obstruction, restriction or diffusion impairment    Assessment:  Margarita was seen today for asthma.    Diagnoses and all orders for this visit:    Mild intermittent chronic asthma without complication        Plan:  Well controlled asthma  Continue 1p bid symbicort  Daily exercise  Prn albuterol    Follow-up in about 1 year (around 9/26/2019).    There are no Patient Instructions on file for this visit.    Immunization History   Administered Date(s) Administered    Influenza - High Dose 12/05/2012, 10/23/2013, 10/12/2015, 10/14/2016, 09/18/2017    Pneumococcal Conjugate - 13 Valent 08/23/2017

## 2018-09-28 RX ORDER — VALACYCLOVIR HYDROCHLORIDE 500 MG/1
TABLET, FILM COATED ORAL
Qty: 30 TABLET | Refills: 0 | Status: SHIPPED | OUTPATIENT
Start: 2018-09-28 | End: 2018-11-08 | Stop reason: SDUPTHER

## 2018-10-09 ENCOUNTER — OFFICE VISIT (OUTPATIENT)
Dept: OPTOMETRY | Facility: CLINIC | Age: 73
End: 2018-10-09
Payer: MEDICARE

## 2018-10-09 DIAGNOSIS — H52.4 PRESBYOPIA OF BOTH EYES: ICD-10-CM

## 2018-10-09 DIAGNOSIS — H25.13 NUCLEAR SCLEROSIS OF BOTH EYES: Primary | ICD-10-CM

## 2018-10-09 DIAGNOSIS — H52.203 ASTIGMATISM OF BOTH EYES, UNSPECIFIED TYPE: ICD-10-CM

## 2018-10-09 DIAGNOSIS — H40.003 GLAUCOMA SUSPECT OF BOTH EYES: ICD-10-CM

## 2018-10-09 PROCEDURE — 99999 PR PBB SHADOW E&M-EST. PATIENT-LVL III: CPT | Mod: PBBFAC,,, | Performed by: OPTOMETRIST

## 2018-10-09 PROCEDURE — 99213 OFFICE O/P EST LOW 20 MIN: CPT | Mod: PBBFAC | Performed by: OPTOMETRIST

## 2018-10-09 PROCEDURE — 92004 COMPRE OPH EXAM NEW PT 1/>: CPT | Mod: S$PBB,,, | Performed by: OPTOMETRIST

## 2018-10-09 PROCEDURE — 92015 DETERMINE REFRACTIVE STATE: CPT | Mod: ,,, | Performed by: OPTOMETRIST

## 2018-10-09 NOTE — PATIENT INSTRUCTIONS
Bilateral nuclear sclerosis, with bilateral central axial punctate lens opacities.  No need for cataract surgery in either eye at this time.    Astigmatic refractive error in each eye, with satisfactory (and essentially equal) best-corrected VA.    New spectacle lens Rx issued for use as desired.    Followed by Dr. Painting as glaucoma suspect.  Mrs. Dunne notes prior finding of visual field defect.  Not currently under treatment for IOP control/reduction.  Seeing Dr. Painting on regular basis.    Recheck refraction in one year - or prior if notes any problems noted in the interim.

## 2018-10-09 NOTE — PROGRESS NOTES
HPI     Concerns About Ocular Health      Additional comments: General eye examination and refraction.  Followed as glaucoma suspect by Dr. Painting.  No glaucoma meds.  Regular HVF tests.  Last visit with Dr. Painting 06/2018.               Comments     Patient returns for overdue follow up. Pt states she is not wearing any   correction at this time. She feels distance is good. Pt states occasional   pain OU and itching. Denies any flashes, does have floater.    Patient's age: 73 y.o. WF  Occupation: retired   Approximate date of last eye examination:  06/19/2018  Name of last eye doctor seen:   City/State: [facility]  Wears glasses? NO     If yes, wears  Full-time or part-time?  OTC readers   Present glasses are: Bifocal, SV Distance, SV Reading?    Approximate age of present glasses:    Got new glasses following last exam, or subsequently?:     Any problem with VA with glasses?    Wears CLs?:  n/a           Headaches?  Migraine headaches, but none within past month   Eye pain/discomfort?  no                                                                                    Flashes?  No  Floaters?  Yes, occasionally   Diplopia/Double vision?  No  Patient's Ocular History:         Any eye surgeries? No         Any eye injury?  No         Any treatment for eye disease?  Followed as Glaucoma Suspect by Dr. Painting.  No treatment for IOP control/reduction in either eye.   Family history of eye disease?  P-Aunt Glaucoma  Significant patient medical history:         1. Diabetes?  No       If yes, IDDM or NIDDM?  n/a   2. HBP?  No              3. Other (describe):  Joint pain   ! OTC eyedrops currently using:  Saline   ! Prescription eye meds currently using:  No   ! Any history of allergy/adverse reaction to any eye meds used   previously?  No   ! Any history of allergy/adverse reaction to eyedrops used during prior   eye exam(s)? No   ! Any history of allergy/adverse reaction to Novacaine or similar meds?  "  No   ! Any history of allergy/adverse reaction to Epinephrine or similar meds?   unsure    ! Patient okay with use of anesthetic eyedrops to check eye pressure?    Yes        ! Patient okay with use of eyedrops to dilate pupils today?  Yes   !  Allergies/Medications/Medical History/Family History reviewed today?    Yes      PD =   66/63  Desired reading distance =  20"                                                                                                                      Last edited by Garth Larsen, OD on 10/9/2018 10:22 AM. (History)            Assessment /Plan     For exam results, see Encounter Report.    1. Nuclear sclerosis of both eyes     2. Glaucoma suspect of both eyes     3. Astigmatism of both eyes, unspecified type     4. Presbyopia of both eyes                      Bilateral nuclear sclerosis, with bilateral central axial punctate lens opacities.  No need for cataract surgery in either eye at this time.    Astigmatic refractive error in each eye, with satisfactory (and essentially equal) best-corrected VA.    New spectacle lens Rx issued for use as desired.    Followed by Dr. Painting as glaucoma suspect.  Mrs. Dunne notes prior finding of visual field defect.  Not currently under treatment for IOP control/reduction.  Seeing Dr. Painting on regular basis.    Recheck refraction in one year - or prior if notes any problems noted in the interim.     "

## 2018-10-16 ENCOUNTER — OFFICE VISIT (OUTPATIENT)
Dept: OPHTHALMOLOGY | Facility: CLINIC | Age: 73
End: 2018-10-16
Payer: MEDICARE

## 2018-10-16 DIAGNOSIS — H53.433 ARCUATE VISUAL FIELD DEFECT OF BOTH EYES: ICD-10-CM

## 2018-10-16 DIAGNOSIS — H25.13 NUCLEAR SCLEROTIC CATARACT OF BOTH EYES: ICD-10-CM

## 2018-10-16 DIAGNOSIS — H47.013: ICD-10-CM

## 2018-10-16 DIAGNOSIS — H40.013 OPEN ANGLE WITH BORDERLINE FINDINGS AND LOW GLAUCOMA RISK IN BOTH EYES: Primary | ICD-10-CM

## 2018-10-16 DIAGNOSIS — G43.709 CHRONIC MIGRAINE WITHOUT AURA WITHOUT STATUS MIGRAINOSUS, NOT INTRACTABLE: ICD-10-CM

## 2018-10-16 PROCEDURE — 99999 PR PBB SHADOW E&M-EST. PATIENT-LVL II: CPT | Mod: PBBFAC,,, | Performed by: OPHTHALMOLOGY

## 2018-10-16 PROCEDURE — 99212 OFFICE O/P EST SF 10 MIN: CPT | Mod: PBBFAC | Performed by: OPHTHALMOLOGY

## 2018-10-16 PROCEDURE — 92012 INTRM OPH EXAM EST PATIENT: CPT | Mod: S$PBB,,, | Performed by: OPHTHALMOLOGY

## 2018-10-16 NOTE — PROGRESS NOTES
HPI     Glaucoma      Additional comments: 4 month ck               Comments     DLS: 6/19/18    1) Non-glaucomatous VF Loss  2) ?AION  3) Glaucoma Suspect  4) Migraines  5) NS OU  6) Steroid Responder              Last edited by Waleska Luo MA on 10/16/2018  9:43 AM. (History)            Assessment /Plan     For exam results, see Encounter Report.    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 NON-GLAUCOMATOUS VF LOSS- etiology uncertain- ?AION     - ?? 2/2 vascular spasms assoc with migraines  Has seen neuro-ophthalmologist - Dr. Bhakta   Has seen Dr. Iyer - [facility]     2. Glaucoma suspect 2/2 VF loss - see above - would be LTG   First HVF 1999   First photos 1994     Family history neg   Glaucoma meds none   H/O adverse rxn to glaucoma drops none   LASERS none   GLAUCOMA SURGERIES none   OTHER EYE SURGERIES none   CDR 0.8 with sup notch / 0.85 with sup notch   Tbase 11-16 / 13-16   Tmax 16/16   Ttarget 14 OU  HVF 13 test 1999 to 2014 - IAD od // IAD os   Gonio +3 ou with pale TM   /586   OCT 4 tests 2006 to 2014 - RNFL - bord. S od / dec S os   HRT 4 tests 2007 to 2014 - MR nl od / dec S/ IT os   Disc photos 1994, 2005-slides / 3/30/2009, 8/2/2011 - OIS     - Ttoday 10/11  - Test done today HVF    3. Nuclear sclerosis - not visually significant. Observe. MR =Rx today. Distance only. Patient happy w/ OTC readers    4. Migraines   -Triggered by certain foods, sausage, cheese, conley   -More frequent - pre-menopausal     5. Steroid exposure - ? Steroid responder - on every other day inhaled steroids     Plan   Stable IOP  VF  Appears stable today and has been stable for some time now  Patient not very interested in starting any IOP drops  Discussed with  patient that her nerves do appear glaucomatous with the notching but still not interested in treatment  Photos from 1994, 2005 and 2011 all similar    Cataracts may start becoming visually significnat- can start testing at future appointments      Patient had steroid injections in joints in March- but IOP remains good- no more steroid injections    RTC 4 month DFE/Photos  - did not get dilated today,  dilated so she wanted to defer    I have seen and personally examined the patient.  I agree with the findings, assessment and plan of the resident and/or fellow.     Opal Painting MD

## 2018-10-30 ENCOUNTER — TELEPHONE (OUTPATIENT)
Dept: INTERNAL MEDICINE | Facility: CLINIC | Age: 73
End: 2018-10-30

## 2018-11-08 RX ORDER — ROSUVASTATIN CALCIUM 5 MG/1
TABLET, COATED ORAL
Qty: 30 TABLET | Refills: 2 | Status: SHIPPED | OUTPATIENT
Start: 2018-11-08 | End: 2019-04-10 | Stop reason: SDUPTHER

## 2018-11-09 RX ORDER — VALACYCLOVIR HYDROCHLORIDE 500 MG/1
TABLET, FILM COATED ORAL
Qty: 30 TABLET | Refills: 0 | Status: SHIPPED | OUTPATIENT
Start: 2018-11-09 | End: 2018-12-27 | Stop reason: SDUPTHER

## 2018-11-29 ENCOUNTER — TELEPHONE (OUTPATIENT)
Dept: INTERNAL MEDICINE | Facility: CLINIC | Age: 73
End: 2018-11-29

## 2018-11-29 NOTE — TELEPHONE ENCOUNTER
----- Message from Janette Troncoso sent at 11/29/2018  1:45 PM CST -----  Contact: Patient [phone]  Patient is calling about cologuard test    [facility] is  requesting the completed form with the ICDcodes of z 12.11 or   Z12..2. Form must be completed by  written on by the approving  Dr.    Contact information for Laboratory is listed on the form.        Support [phone]    Please call if any additional information is needed. [phone].    Thank You

## 2018-12-06 ENCOUNTER — OFFICE VISIT (OUTPATIENT)
Dept: FAMILY MEDICINE | Facility: CLINIC | Age: 73
End: 2018-12-06
Payer: MEDICARE

## 2018-12-06 VITALS
OXYGEN SATURATION: 96 % | DIASTOLIC BLOOD PRESSURE: 76 MMHG | SYSTOLIC BLOOD PRESSURE: 130 MMHG | TEMPERATURE: 98 F | HEIGHT: 64 IN | BODY MASS INDEX: 29.28 KG/M2 | WEIGHT: 171.5 LBS | HEART RATE: 77 BPM

## 2018-12-06 DIAGNOSIS — J01.90 ACUTE SINUSITIS, RECURRENCE NOT SPECIFIED, UNSPECIFIED LOCATION: ICD-10-CM

## 2018-12-06 DIAGNOSIS — J45.901 EXACERBATION OF ASTHMA, UNSPECIFIED ASTHMA SEVERITY, UNSPECIFIED WHETHER PERSISTENT: Primary | ICD-10-CM

## 2018-12-06 PROCEDURE — 99999 PR PBB SHADOW E&M-EST. PATIENT-LVL III: CPT | Mod: PBBFAC,,, | Performed by: NURSE PRACTITIONER

## 2018-12-06 PROCEDURE — 99213 OFFICE O/P EST LOW 20 MIN: CPT | Mod: S$PBB,,, | Performed by: NURSE PRACTITIONER

## 2018-12-06 PROCEDURE — 99213 OFFICE O/P EST LOW 20 MIN: CPT | Mod: PBBFAC,PO | Performed by: NURSE PRACTITIONER

## 2018-12-06 RX ORDER — AZITHROMYCIN 250 MG/1
TABLET, FILM COATED ORAL
Qty: 6 TABLET | Refills: 0 | Status: SHIPPED | OUTPATIENT
Start: 2018-12-06 | End: 2018-12-11

## 2018-12-06 RX ORDER — PROMETHAZINE HYDROCHLORIDE AND DEXTROMETHORPHAN HYDROBROMIDE 6.25; 15 MG/5ML; MG/5ML
5 SYRUP ORAL 3 TIMES DAILY PRN
Qty: 240 ML | Refills: 0 | Status: SHIPPED | OUTPATIENT
Start: 2018-12-06 | End: 2018-12-16

## 2018-12-06 RX ORDER — PREDNISONE 20 MG/1
TABLET ORAL
Qty: 10 TABLET | Refills: 0 | Status: SHIPPED | OUTPATIENT
Start: 2018-12-06 | End: 2018-12-28

## 2018-12-06 NOTE — PROGRESS NOTES
Subjective:       Patient ID: Margarita Dunne is a 73 y.o. female.    Chief Complaint: URI; Cough; and Asthma    Patient has asthma, has had uri and the cough and congestion symptoms x 5 weeks. Worsening chest congestion and sinus pressure.  She has been taking OTC medications, and her Symbicort and albuterol. No relief.     TETANUS VACCINE due on 01/08/1961  Zoster Vaccine due on 01/08/2003  Pneumococcal Vaccine (65+ Low/Medium Risk)(2 of 2 - PPSV23) due on 08/23/2018    Past Medical History:  Past Medical History:  No date: Allergy  No date: Angio-edema  No date: Arthritis  No date: Asthma  No date: Cataract  No date: Chest pain at rest  7/8/2012: Fibromyalgia  No date: Glaucoma suspect, steroid responders  7/8/2012: Hand joint pain  No date: Headache(784.0)  No date: Migraine  No date: Osteoporosis  No date: Recurrent upper respiratory infection (URI)  No date: Urticaria  Past Surgical History:  2014: dexa      Comment:  osteopenia  Review of patient's allergies indicates:   -- Doxycycline -- Nausea And Vomiting   -- Corticosteroids (glucocorticoids)     --  Pt gets migraines for 2 weeks   -- Pcn (penicillins) -- Rash   -- Sulfa (sulfonamide antibiotics) -- Rash  Current Outpatient Medications on File Prior to Visit:  albuterol 90 mcg/actuation inhaler, Inhale 2 puffs into the lungs every 6 (six) hours as needed for Wheezing. Rescue, Disp: 1 Inhaler, Rfl: 11  budesonide-formoterol 80-4.5 mcg (SYMBICORT) 80-4.5 mcg/actuation HFAA, Inhale 2 puffs into the lungs 2 (two) times daily. Controller, Disp: 11 g, Rfl: 11  multivitamin (THERAGRAN) per tablet, Take 1 tablet by mouth once daily., Disp: , Rfl:   rizatriptan (MAXALT) 10 MG tablet, TAKE ONE TABLET BY MOUTH AT ONSET OF MIGRAINE, MAY REPEAT EVERY 2 HOURS. DO NOT EXCEED 3 TABLETS IN 24 HOURS., Disp: 10 tablet, Rfl: 3  rosuvastatin (CRESTOR) 5 MG tablet, TAKE ONE TABLET BY MOUTH IN THE EVENING, Disp: 30 tablet, Rfl: 2  triamcinolone acetonide 0.1% (KENALOG) 0.1 %  cream, Use topically daily for pruritis, thins the skin, Disp: 1 Tube, Rfl: 0  valACYclovir (VALTREX) 500 MG tablet, TAKE 1 TABLET BY MOUTH ONCE DAILY, Disp: 30 tablet, Rfl: 0  VOLTAREN 1 % Gel, APPLY 4 GRAMS TOPICALLY 4 TIMES DAILY AS DIRECTED, Disp: 2 Tube, Rfl: 6    No current facility-administered medications on file prior to visit.     Social History    Socioeconomic History      Marital status:       Spouse name: Not on file      Number of children: 0      Years of education: Not on file      Highest education level: Not on file    Social Needs      Financial resource strain: Not on file      Food insecurity - worry: Not on file      Food insecurity - inability: Not on file      Transportation needs - medical: Not on file      Transportation needs - non-medical: Not on file    Occupational History      Not on file    Tobacco Use      Smoking status: Never Smoker      Smokeless tobacco: Never Used    Substance and Sexual Activity      Alcohol use: Yes        Comment: social wine use in the past not current.      Drug use: No      Sexual activity: Not on file    Other Topics      Concerns:        Not on file    Social History Narrative      Not on file    Review of patient's family history indicates:  Problem: Hypertension      Relation: Mother          Age of Onset: (Not Specified)  Problem: Diabetes      Relation: Mother          Age of Onset: (Not Specified)  Problem: Stroke      Relation: Mother          Age of Onset: (Not Specified)  Problem: Dementia      Relation: Mother          Age of Onset: (Not Specified)  Problem: Cancer      Relation: Father          Age of Onset: (Not Specified)          Comment: pancreas  Problem: Allergies      Relation: Father          Age of Onset: (Not Specified)  Problem: Allergic rhinitis      Relation: Father          Age of Onset: (Not Specified)  Problem: Macular degeneration      Relation: Cousin          Age of Onset: (Not Specified)  Problem: Allergic rhinitis       Relation: Paternal Aunt          Age of Onset: (Not Specified)  Problem: Allergies      Relation: Paternal Aunt          Age of Onset: (Not Specified)  Problem: Allergic rhinitis      Relation: Paternal Uncle          Age of Onset: (Not Specified)  Problem: Allergies      Relation: Paternal Uncle          Age of Onset: (Not Specified)  Problem: Glaucoma      Relation: Neg Hx          Age of Onset: (Not Specified)  Problem: Amblyopia      Relation: Neg Hx          Age of Onset: (Not Specified)  Problem: Blindness      Relation: Neg Hx          Age of Onset: (Not Specified)  Problem: Cataracts      Relation: Neg Hx          Age of Onset: (Not Specified)  Problem: Retinal detachment      Relation: Neg Hx          Age of Onset: (Not Specified)  Problem: Strabismus      Relation: Neg Hx          Age of Onset: (Not Specified)  Problem: Thyroid disease      Relation: Neg Hx          Age of Onset: (Not Specified)  Problem: Angioedema      Relation: Neg Hx          Age of Onset: (Not Specified)  Problem: Asthma      Relation: Neg Hx          Age of Onset: (Not Specified)  Problem: Eczema      Relation: Neg Hx          Age of Onset: (Not Specified)  Problem: Immunodeficiency      Relation: Neg Hx          Age of Onset: (Not Specified)            Review of Systems   Constitutional: Positive for fatigue. Negative for chills and fever.   HENT: Positive for sinus pressure and sore throat.    Respiratory: Positive for cough and wheezing. Negative for shortness of breath.    Cardiovascular: Negative.    Gastrointestinal: Negative.    Neurological: Positive for headaches. Negative for dizziness and light-headedness.       Objective:      Physical Exam   Constitutional: She is oriented to person, place, and time. No distress.   HENT:   Head: Normocephalic and atraumatic. Head is without Holt's sign and without abrasion.   Right Ear: A middle ear effusion is present.   Left Ear: A middle ear effusion is present.   Nose: Mucosal  edema and rhinorrhea present. Right sinus exhibits no maxillary sinus tenderness and no frontal sinus tenderness. Left sinus exhibits no maxillary sinus tenderness and no frontal sinus tenderness.   Mouth/Throat: Posterior oropharyngeal erythema present. No posterior oropharyngeal edema.   Eyes: Pupils are equal, round, and reactive to light.   Cardiovascular: Normal rate and regular rhythm.   Pulmonary/Chest: Effort normal. She has decreased breath sounds. She has wheezes. She has no rhonchi.   Abdominal: Soft. Bowel sounds are normal. She exhibits no distension. There is no tenderness.   Musculoskeletal: She exhibits no edema.   Neurological: She is alert and oriented to person, place, and time.   Skin: She is not diaphoretic.   Vitals reviewed.      Assessment:       1. Exacerbation of asthma, unspecified asthma severity, unspecified whether persistent    2. Acute sinusitis, recurrence not specified, unspecified location        Plan:     Continue symbicort and albuterol, add mucinex.   1. Exacerbation of asthma, unspecified asthma severity, unspecified whether persistent    - predniSONE (DELTASONE) 20 MG tablet; 1 pill daily x 2 days, then 1/2 pill daily x 2 days  Dispense: 10 tablet; Refill: 0  - promethazine-dextromethorphan (PROMETHAZINE-DM) 6.25-15 mg/5 mL Syrp; Take 5 mLs by mouth 3 (three) times daily as needed.  Dispense: 240 mL; Refill: 0    2. Acute sinusitis, recurrence not specified, unspecified location    - azithromycin (Z-FABIAN) 250 MG tablet; Take 2 tablets by mouth on day 1; Take 1 tablet by mouth on days 2-5  Dispense: 6 tablet; Refill: 0  - promethazine-dextromethorphan (PROMETHAZINE-DM) 6.25-15 mg/5 mL Syrp; Take 5 mLs by mouth 3 (three) times daily as needed.  Dispense: 240 mL; Refill: 0,

## 2018-12-12 ENCOUNTER — PATIENT MESSAGE (OUTPATIENT)
Dept: FAMILY MEDICINE | Facility: CLINIC | Age: 73
End: 2018-12-12

## 2018-12-27 RX ORDER — VALACYCLOVIR HYDROCHLORIDE 500 MG/1
TABLET, FILM COATED ORAL
Qty: 30 TABLET | Refills: 0 | Status: SHIPPED | OUTPATIENT
Start: 2018-12-27 | End: 2019-03-25 | Stop reason: SDUPTHER

## 2018-12-28 ENCOUNTER — OFFICE VISIT (OUTPATIENT)
Dept: OBSTETRICS AND GYNECOLOGY | Facility: CLINIC | Age: 73
End: 2018-12-28
Payer: MEDICARE

## 2018-12-28 VITALS
BODY MASS INDEX: 29.97 KG/M2 | SYSTOLIC BLOOD PRESSURE: 154 MMHG | DIASTOLIC BLOOD PRESSURE: 88 MMHG | WEIGHT: 174.63 LBS

## 2018-12-28 DIAGNOSIS — N95.0 POSTMENOPAUSAL BLEEDING: Primary | ICD-10-CM

## 2018-12-28 DIAGNOSIS — R93.89 THICKENED ENDOMETRIUM: ICD-10-CM

## 2018-12-28 PROCEDURE — 99213 OFFICE O/P EST LOW 20 MIN: CPT | Mod: S$PBB,,, | Performed by: OBSTETRICS & GYNECOLOGY

## 2018-12-28 PROCEDURE — 99213 OFFICE O/P EST LOW 20 MIN: CPT | Mod: PBBFAC,PN | Performed by: OBSTETRICS & GYNECOLOGY

## 2018-12-28 PROCEDURE — 99999 PR PBB SHADOW E&M-EST. PATIENT-LVL III: CPT | Mod: PBBFAC,,, | Performed by: OBSTETRICS & GYNECOLOGY

## 2018-12-28 NOTE — PROGRESS NOTES
"Chief Complaint   Patient presents with    Vaginal Bleeding     states she had "watery bloody" discharge 12/24, states it comes and goes    Medication Management     she was on prednisone for 6 days 1 week ago       History of Present Illness: Margarita Dunne is a 73 y.o. female that presents today 12/28/2018 for   Chief Complaint   Patient presents with    Vaginal Bleeding     states she had "watery bloody" discharge 12/24, states it comes and goes    Medication Management     she was on prednisone for 6 days 1 week ago   she reports for the last 3-4 days she has had watery pink discharge. She reports recent prednisone 2 weeks. She reports that she doesn't want any progestin due to it causing her migraines. She reports that she will have an ultrasound but abdominal only.       Past Medical History:   Diagnosis Date    Allergy     Angio-edema     Arthritis     Asthma     Cataract     Chest pain at rest     Fibromyalgia 7/8/2012    Glaucoma suspect, steroid responders     Hand joint pain 7/8/2012    Headache(784.0)     Migraine     Osteoporosis     Recurrent upper respiratory infection (URI)     Urticaria        Past Surgical History:   Procedure Laterality Date    dexa  2014    osteopenia       Current Outpatient Medications   Medication Sig Dispense Refill    albuterol 90 mcg/actuation inhaler Inhale 2 puffs into the lungs every 6 (six) hours as needed for Wheezing. Rescue 1 Inhaler 11    budesonide-formoterol 80-4.5 mcg (SYMBICORT) 80-4.5 mcg/actuation HFAA Inhale 2 puffs into the lungs 2 (two) times daily. Controller 11 g 11    multivitamin (THERAGRAN) per tablet Take 1 tablet by mouth once daily.      rizatriptan (MAXALT) 10 MG tablet TAKE ONE TABLET BY MOUTH AT ONSET OF MIGRAINE, MAY REPEAT EVERY 2 HOURS. DO NOT EXCEED 3 TABLETS IN 24 HOURS. 10 tablet 3    rosuvastatin (CRESTOR) 5 MG tablet TAKE ONE TABLET BY MOUTH IN THE EVENING 30 tablet 2    triamcinolone acetonide 0.1% (KENALOG) 0.1 % " cream Use topically daily for pruritis, thins the skin 1 Tube 0    valACYclovir (VALTREX) 500 MG tablet TAKE 1 TABLET BY MOUTH ONCE DAILY 30 tablet 0    VOLTAREN 1 % Gel APPLY 4 GRAMS TOPICALLY 4 TIMES DAILY AS DIRECTED 2 Tube 6     No current facility-administered medications for this visit.        Review of patient's allergies indicates:   Allergen Reactions    Doxycycline Nausea And Vomiting    Corticosteroids (glucocorticoids)      Pt gets migraines for 2 weeks    Pcn [penicillins] Rash    Sulfa (sulfonamide antibiotics) Rash       Family History   Problem Relation Age of Onset    Hypertension Mother     Diabetes Mother     Stroke Mother     Dementia Mother     Cancer Father         pancreas    Allergies Father     Allergic rhinitis Father     Macular degeneration Cousin     Allergic rhinitis Paternal Aunt     Allergies Paternal Aunt     Allergic rhinitis Paternal Uncle     Allergies Paternal Uncle     Glaucoma Neg Hx     Amblyopia Neg Hx     Blindness Neg Hx     Cataracts Neg Hx     Retinal detachment Neg Hx     Strabismus Neg Hx     Thyroid disease Neg Hx     Angioedema Neg Hx     Asthma Neg Hx     Eczema Neg Hx     Immunodeficiency Neg Hx        Social History     Tobacco Use    Smoking status: Never Smoker    Smokeless tobacco: Never Used   Substance Use Topics    Alcohol use: Yes     Comment: social wine use in the past not current.    Drug use: No       OB History    Para Term  AB Living   0 0 0 0 0 0   SAB TAB Ectopic Multiple Live Births   0 0 0 0               Review of Symptoms:  GENERAL: Denies weight gain or weight loss. Feeling well overall.   SKIN: Denies rash or lesions.   HEAD: Denies head injury or headache.   NODES: Denies enlarged lymph nodes.   CHEST: Denies chest pain or shortness of breath.   CARDIOVASCULAR: Denies palpitations or left sided chest pain.   ABDOMEN: No abdominal pain, constipation, diarrhea, nausea, vomiting or rectal bleeding.    URINARY: No frequency, dysuria, hematuria, or burning on urination.  HEMATOLOGIC: No easy bruisability or excessive bleeding.   MUSCULOSKELETAL: Denies joint pain or swelling.     BP (!) 154/88   Wt 79.2 kg (174 lb 9.7 oz)   Physical Exam:  APPEARANCE: Well nourished, well developed, in no acute distress.  SKIN: Normal skin turgor, no lesions.  NECK: Neck symmetric without masses   RESPIRATORY: Normal respiratory effort with no retractions or use of accessory muscles  CARDIOVASCULAR: Peripheral vascular system with no swelling no varicosities and palpation of pulses normal  LYMPHATIC: No enlargements of the lymph nodes noted in the neck, axillae, or groin  ABDOMEN: Soft. No tenderness or masses. No hepatosplenomegaly. No hernias.    ASSESSMENT/PLAN:  Postmenopausal bleeding  -     US Pelvis Comp with Transvag NON-OB (xpd; Future; Expected date: 12/28/2018    Thickened endometrium  -     US Pelvis Comp with Transvag NON-OB (xpd; Future; Expected date: 12/28/2018        15 minutes spent today with this patient. Greater than half spent in counseling today.

## 2018-12-30 NOTE — TELEPHONE ENCOUNTER
----- Message from Raine Narvaez sent at 5/8/2017 10:04 AM CDT -----  Contact: self  Patient wants to speak with a nurse regarding scheduling a biopsy. Please call back at [phone] (home)      no back pain, no gout, no musculoskeletal pain, no neck pain, and no weakness.

## 2019-01-07 ENCOUNTER — TELEPHONE (OUTPATIENT)
Dept: OBSTETRICS AND GYNECOLOGY | Facility: CLINIC | Age: 74
End: 2019-01-07

## 2019-01-07 ENCOUNTER — HOSPITAL ENCOUNTER (OUTPATIENT)
Dept: RADIOLOGY | Facility: HOSPITAL | Age: 74
Discharge: HOME | End: 2019-01-07
Attending: OBSTETRICS & GYNECOLOGY
Payer: MEDICARE

## 2019-01-07 DIAGNOSIS — R93.89 THICKENED ENDOMETRIUM: ICD-10-CM

## 2019-01-07 DIAGNOSIS — N95.0 POSTMENOPAUSAL BLEEDING: ICD-10-CM

## 2019-01-07 PROCEDURE — 76856 US PELVIS COMPLETE NON OB: ICD-10-PCS | Mod: 26,,, | Performed by: RADIOLOGY

## 2019-01-07 PROCEDURE — 76856 US EXAM PELVIC COMPLETE: CPT | Mod: 26,,, | Performed by: RADIOLOGY

## 2019-01-07 PROCEDURE — 76856 US EXAM PELVIC COMPLETE: CPT | Mod: TC,PN

## 2019-01-07 NOTE — TELEPHONE ENCOUNTER
----- Message from Renee Villegas sent at 1/7/2019  2:05 PM CST -----  Contact: self [phone]  Please call her with the ultrasound results.  Thank you!

## 2019-01-08 ENCOUNTER — TELEPHONE (OUTPATIENT)
Dept: OBSTETRICS AND GYNECOLOGY | Facility: CLINIC | Age: 74
End: 2019-01-08

## 2019-01-08 NOTE — TELEPHONE ENCOUNTER
Patient notified she was unable to schedule at this time she states she will call back to schedule.

## 2019-01-08 NOTE — TELEPHONE ENCOUNTER
----- Message from Patricia Causey sent at 1/8/2019  4:28 PM CST -----  Contact: Patient  Type:  Patient Returning Call    Who Called:  Patient  Who Left Message for Patient:  ?  Does the patient know what this is regarding?:  Biopsy  Best Call Back Number:  [phone]    Please call to advise  Thank you

## 2019-01-14 ENCOUNTER — OFFICE VISIT (OUTPATIENT)
Dept: OBSTETRICS AND GYNECOLOGY | Facility: CLINIC | Age: 74
End: 2019-01-14
Payer: MEDICARE

## 2019-01-14 VITALS — BODY MASS INDEX: 29.78 KG/M2 | SYSTOLIC BLOOD PRESSURE: 130 MMHG | WEIGHT: 173.5 LBS | DIASTOLIC BLOOD PRESSURE: 82 MMHG

## 2019-01-14 DIAGNOSIS — R93.89 THICKENED ENDOMETRIUM: Primary | ICD-10-CM

## 2019-01-14 PROCEDURE — 99999 PR PBB SHADOW E&M-EST. PATIENT-LVL IV: ICD-10-PCS | Mod: PBBFAC,,, | Performed by: OBSTETRICS & GYNECOLOGY

## 2019-01-14 PROCEDURE — 99499 NO LOS: ICD-10-PCS | Mod: S$PBB,,, | Performed by: OBSTETRICS & GYNECOLOGY

## 2019-01-14 PROCEDURE — 88305 TISSUE EXAM BY PATHOLOGIST: CPT | Mod: 26,,, | Performed by: PATHOLOGY

## 2019-01-14 PROCEDURE — 99499 UNLISTED E&M SERVICE: CPT | Mod: S$PBB,,, | Performed by: OBSTETRICS & GYNECOLOGY

## 2019-01-14 PROCEDURE — 58100 PR BIOPSY OF UTERUS LINING: ICD-10-PCS | Mod: S$PBB,,, | Performed by: OBSTETRICS & GYNECOLOGY

## 2019-01-14 PROCEDURE — 99999 PR PBB SHADOW E&M-EST. PATIENT-LVL IV: CPT | Mod: PBBFAC,,, | Performed by: OBSTETRICS & GYNECOLOGY

## 2019-01-14 PROCEDURE — 99214 OFFICE O/P EST MOD 30 MIN: CPT | Mod: PBBFAC,PN | Performed by: OBSTETRICS & GYNECOLOGY

## 2019-01-14 PROCEDURE — 88305 TISSUE EXAM BY PATHOLOGIST: CPT | Performed by: PATHOLOGY

## 2019-01-14 PROCEDURE — 88305 TISSUE SPECIMEN TO PATHOLOGY, OBSTETRICS/GYNECOLOGY: ICD-10-PCS | Mod: 26,,, | Performed by: PATHOLOGY

## 2019-01-14 PROCEDURE — 58100 BIOPSY OF UTERUS LINING: CPT | Mod: PBBFAC,PN | Performed by: OBSTETRICS & GYNECOLOGY

## 2019-01-14 PROCEDURE — 58100 BIOPSY OF UTERUS LINING: CPT | Mod: S$PBB,,, | Performed by: OBSTETRICS & GYNECOLOGY

## 2019-02-18 NOTE — PROGRESS NOTES
Assessment /Plan     For exam results, see Encounter Report.    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 NON-GLAUCOMATOUS VF LOSS- etiology uncertain- ?AION     - ?? 2/2 vascular spasms assoc with migraines  Has seen neuro-ophthalmologist - Dr. Bhakta   Has seen Dr. Iyer - [facility]     2. Glaucoma suspect 2/2 VF loss - see above - would be LTG   First HVF 1999   First photos 1994     Family history neg   Glaucoma meds none   H/O adverse rxn to glaucoma drops none   LASERS none   GLAUCOMA SURGERIES none   OTHER EYE SURGERIES none   CDR 0.8 with sup notch / 0.85 with sup notch   Tbase 11-16 / 13-16   Tmax 16/16   Ttarget 14 OU  HVF 13 test 1999 to 2014 - IAD od // IAD os   Gonio +3 ou with pale TM   /586   OCT 4 tests 2006 to 2014 - RNFL - bord. S od / dec S os   HRT 4 tests 2007 to 2014 - MR nl od / dec S/ IT os   Disc photos 1994, 2005-slides / 3/30/2009, 8/2/2011, 2019  - OIS     - Ttoday 12/14  - Test done today DFE/Photos    3. Nuclear sclerosis - not visually significant. Observe. MR =Rx today. Distance only. Patient happy w/ OTC readers    4. Migraines   -Triggered by certain foods, sausage, cheese, conley   -More frequent - pre-menopausal     5. Steroid exposure - ? Steroid responder - on every other day inhaled steroids     Plan   Stable IOP  VF  Appears stable today and has been stable for some time now  Patient not very interested in starting any IOP drops  Discussed with patient that her nerves do appear glaucomatous with the notching but still not interested in treatment  Photos from 1994, 2005 and 2011 all similar    Cataracts may start becoming visually significnat- can start testing at future appointments      Patient had steroid injections in joints in March- but IOP remains good- no more steroid  injections    RTC 4 month HVF/OCT - same day as ave     I have seen and personally examined the patient.  I agree with the findings, assessment and plan of the resident and/or fellow.     Opal Painting MD

## 2019-02-19 ENCOUNTER — OFFICE VISIT (OUTPATIENT)
Dept: OPHTHALMOLOGY | Facility: CLINIC | Age: 74
End: 2019-02-19
Payer: MEDICARE

## 2019-02-19 DIAGNOSIS — H47.013: ICD-10-CM

## 2019-02-19 DIAGNOSIS — H25.13 NUCLEAR SCLEROTIC CATARACT OF BOTH EYES: ICD-10-CM

## 2019-02-19 DIAGNOSIS — H40.013 OPEN ANGLE WITH BORDERLINE FINDINGS AND LOW GLAUCOMA RISK IN BOTH EYES: Primary | ICD-10-CM

## 2019-02-19 DIAGNOSIS — H53.433 ARCUATE VISUAL FIELD DEFECT OF BOTH EYES: ICD-10-CM

## 2019-02-19 PROCEDURE — 92014 PR EYE EXAM, EST PATIENT,COMPREHESV: ICD-10-PCS | Mod: S$PBB,,, | Performed by: OPHTHALMOLOGY

## 2019-02-19 PROCEDURE — 92250 COLOR FUNDUS PHOTOGRAPHY - OU - BOTH EYES: ICD-10-PCS | Mod: 26,S$PBB,, | Performed by: OPHTHALMOLOGY

## 2019-02-19 PROCEDURE — 92014 COMPRE OPH EXAM EST PT 1/>: CPT | Mod: S$PBB,,, | Performed by: OPHTHALMOLOGY

## 2019-02-19 PROCEDURE — 99999 PR PBB SHADOW E&M-EST. PATIENT-LVL II: ICD-10-PCS | Mod: PBBFAC,,, | Performed by: OPHTHALMOLOGY

## 2019-02-19 PROCEDURE — 92250 FUNDUS PHOTOGRAPHY W/I&R: CPT | Mod: PBBFAC | Performed by: OPHTHALMOLOGY

## 2019-02-19 PROCEDURE — 99212 OFFICE O/P EST SF 10 MIN: CPT | Mod: PBBFAC | Performed by: OPHTHALMOLOGY

## 2019-02-19 PROCEDURE — 99999 PR PBB SHADOW E&M-EST. PATIENT-LVL II: CPT | Mod: PBBFAC,,, | Performed by: OPHTHALMOLOGY

## 2019-03-26 RX ORDER — VALACYCLOVIR HYDROCHLORIDE 500 MG/1
TABLET, FILM COATED ORAL
Qty: 30 TABLET | Refills: 0 | Status: SHIPPED | OUTPATIENT
Start: 2019-03-26 | End: 2019-07-29 | Stop reason: SDUPTHER

## 2019-04-10 ENCOUNTER — OFFICE VISIT (OUTPATIENT)
Dept: INTERNAL MEDICINE | Facility: CLINIC | Age: 74
End: 2019-04-10
Payer: MEDICARE

## 2019-04-10 VITALS
BODY MASS INDEX: 29.4 KG/M2 | WEIGHT: 171.31 LBS | HEART RATE: 86 BPM | SYSTOLIC BLOOD PRESSURE: 140 MMHG | OXYGEN SATURATION: 98 % | DIASTOLIC BLOOD PRESSURE: 70 MMHG

## 2019-04-10 DIAGNOSIS — R10.30 LOWER ABDOMINAL PAIN: Primary | ICD-10-CM

## 2019-04-10 LAB
BILIRUB UR QL STRIP: NEGATIVE
CLARITY UR REFRACT.AUTO: ABNORMAL
COLOR UR AUTO: YELLOW
GLUCOSE UR QL STRIP: NEGATIVE
HGB UR QL STRIP: ABNORMAL
KETONES UR QL STRIP: NEGATIVE
LEUKOCYTE ESTERASE UR QL STRIP: NEGATIVE
MICROSCOPIC COMMENT: NORMAL
NITRITE UR QL STRIP: NEGATIVE
PH UR STRIP: 5 [PH] (ref 5–8)
PROT UR QL STRIP: NEGATIVE
RBC #/AREA URNS AUTO: 1 /HPF (ref 0–4)
SP GR UR STRIP: 1.01 (ref 1–1.03)
SQUAMOUS #/AREA URNS AUTO: 1 /HPF
URN SPEC COLLECT METH UR: ABNORMAL
WBC #/AREA URNS AUTO: 2 /HPF (ref 0–5)

## 2019-04-10 PROCEDURE — 99999 PR PBB SHADOW E&M-EST. PATIENT-LVL III: ICD-10-PCS | Mod: PBBFAC,,, | Performed by: PHYSICIAN ASSISTANT

## 2019-04-10 PROCEDURE — 99214 PR OFFICE/OUTPT VISIT, EST, LEVL IV, 30-39 MIN: ICD-10-PCS | Mod: S$PBB,,, | Performed by: PHYSICIAN ASSISTANT

## 2019-04-10 PROCEDURE — 99214 OFFICE O/P EST MOD 30 MIN: CPT | Mod: S$PBB,,, | Performed by: PHYSICIAN ASSISTANT

## 2019-04-10 PROCEDURE — 99999 PR PBB SHADOW E&M-EST. PATIENT-LVL III: CPT | Mod: PBBFAC,,, | Performed by: PHYSICIAN ASSISTANT

## 2019-04-10 PROCEDURE — 81001 URINALYSIS AUTO W/SCOPE: CPT

## 2019-04-10 PROCEDURE — 99213 OFFICE O/P EST LOW 20 MIN: CPT | Mod: PBBFAC | Performed by: PHYSICIAN ASSISTANT

## 2019-04-10 RX ORDER — ROSUVASTATIN CALCIUM 5 MG/1
5 TABLET, COATED ORAL NIGHTLY
Qty: 30 TABLET | Refills: 5 | Status: SHIPPED | OUTPATIENT
Start: 2019-04-10 | End: 2020-11-02 | Stop reason: ALTCHOICE

## 2019-04-10 NOTE — PROGRESS NOTES
Subjective:       Patient ID: Margarita Dunne is a 74 y.o. female.    Chief Complaint: Abdominal Pain    HPI   Established pt of Columba Anderson MD (new to me)      C/o lower abdominal pain over the past 1.5 years that has worsened of the past month, now occuring more frequently. Describes the pain as aching. Sometime on the right side, sometimes the entire low abdomen. Pain better with eating. May occ apply ice to her lower abdomen that helps as well. Has not associated pain with certain foods. Denies fevers, n/v, diarrhea, constipation, melena or brbpr. Saw PCP for the same about 8 months ago, CT imaging was recommended at that time but pt never obtained.     Also recently followed with GYN for for postmenopausal bleeding s/p endometrial biopsy that was normal.     Review of patient's allergies indicates:   Allergen Reactions    Doxycycline Nausea And Vomiting    Corticosteroids (glucocorticoids)      Causes pt to have migraines for 2 weeks    Pcn [penicillins] Rash    Sulfa (sulfonamide antibiotics) Rash     Past Medical History:   Diagnosis Date    Allergy     Angio-edema     Arthritis     Asthma     Cataract     Chest pain at rest     Fibromyalgia 7/8/2012    Glaucoma suspect, steroid responders     Hand joint pain 7/8/2012    Headache(784.0)     Migraine     Osteoporosis     Recurrent upper respiratory infection (URI)     Urticaria      Social History     Tobacco Use    Smoking status: Never Smoker    Smokeless tobacco: Never Used   Substance Use Topics    Alcohol use: Yes     Comment: social wine use in the past not current.    Drug use: No         Current Outpatient Medications:     albuterol 90 mcg/actuation inhaler, Inhale 2 puffs into the lungs every 6 (six) hours as needed for Wheezing. Rescue, Disp: 1 Inhaler, Rfl: 11    budesonide-formoterol 80-4.5 mcg (SYMBICORT) 80-4.5 mcg/actuation HFAA, Inhale 2 puffs into the lungs 2 (two) times daily. Controller, Disp: 11 g, Rfl: 11     multivitamin (THERAGRAN) per tablet, Take 1 tablet by mouth once daily., Disp: , Rfl:     rizatriptan (MAXALT) 10 MG tablet, TAKE ONE TABLET BY MOUTH AT ONSET OF MIGRAINE, MAY REPEAT EVERY 2 HOURS. DO NOT EXCEED 3 TABLETS IN 24 HOURS., Disp: 10 tablet, Rfl: 3    rosuvastatin (CRESTOR) 5 MG tablet, Take 1 tablet (5 mg total) by mouth every evening., Disp: 30 tablet, Rfl: 5    valACYclovir (VALTREX) 500 MG tablet, TAKE 1 TABLET BY MOUTH ONCE DAILY, Disp: 30 tablet, Rfl: 0    VOLTAREN 1 % Gel, APPLY 4 GRAMS TOPICALLY 4 TIMES DAILY AS DIRECTED, Disp: 2 Tube, Rfl: 6      Review of Systems   Constitutional: Negative for chills, fever and unexpected weight change.   Eyes: Negative for visual disturbance.   Respiratory: Negative for cough, shortness of breath and wheezing.    Cardiovascular: Negative for chest pain and leg swelling.   Gastrointestinal: Negative for abdominal pain, nausea and vomiting.   Endocrine: Negative for polydipsia, polyphagia and polyuria.   Skin: Negative for rash.   Neurological: Negative for weakness, light-headedness and headaches.       Objective: BP (!) 140/70   Pulse 86   Wt 77.7 kg (171 lb 4.8 oz)   SpO2 98%   BMI 29.40 kg/m²         Physical Exam   Constitutional: She appears well-developed and well-nourished. No distress.   HENT:   Head: Normocephalic and atraumatic.   Mouth/Throat: Oropharynx is clear and moist.   Cardiovascular: Normal rate and regular rhythm. Exam reveals no friction rub.   No murmur heard.  Pulmonary/Chest: Effort normal and breath sounds normal. She has no wheezes. She has no rales.   Abdominal: Soft. Bowel sounds are normal. There is tenderness.   Musculoskeletal: She exhibits no edema.   Lymphadenopathy:     She has no cervical adenopathy.   Neurological: She is alert.   Skin: Skin is warm and dry. Capillary refill takes less than 2 seconds. No rash noted.   Vitals reviewed.      Assessment:       1. Lower abdominal pain        Plan:         Margarita was seen  today for abdominal pain.    Diagnoses and all orders for this visit:    Chronic Lower abdominal pain  -     Comprehensive metabolic panel; Future  -     CBC auto differential; Future  -     CT Abdomen Pelvis With Contrast; Future  -     URINALYSIS  - Imaging and lab as above  - Pending results, yue if imaging unremarkable recommend GI follow up.       Shayy Horne PA-C

## 2019-04-11 ENCOUNTER — PATIENT MESSAGE (OUTPATIENT)
Dept: INTERNAL MEDICINE | Facility: CLINIC | Age: 74
End: 2019-04-11

## 2019-04-11 DIAGNOSIS — R10.30 LOWER ABDOMINAL PAIN: Primary | ICD-10-CM

## 2019-04-12 ENCOUNTER — TELEPHONE (OUTPATIENT)
Dept: INTERNAL MEDICINE | Facility: CLINIC | Age: 74
End: 2019-04-12

## 2019-04-12 ENCOUNTER — HOSPITAL ENCOUNTER (OUTPATIENT)
Dept: RADIOLOGY | Facility: HOSPITAL | Age: 74
Discharge: HOME | End: 2019-04-12
Attending: PHYSICIAN ASSISTANT
Payer: MEDICARE

## 2019-04-12 ENCOUNTER — PATIENT MESSAGE (OUTPATIENT)
Dept: INTERNAL MEDICINE | Facility: CLINIC | Age: 74
End: 2019-04-12

## 2019-04-12 DIAGNOSIS — R10.30 LOWER ABDOMINAL PAIN: ICD-10-CM

## 2019-04-12 PROCEDURE — 74176 CT ABD & PELVIS W/O CONTRAST: CPT | Mod: TC

## 2019-04-12 PROCEDURE — 74176 CT ABD & PELVIS W/O CONTRAST: CPT | Mod: 26,,, | Performed by: RADIOLOGY

## 2019-04-12 PROCEDURE — 74176 CT ABDOMEN PELVIS WITHOUT CONTRAST: ICD-10-PCS | Mod: 26,,, | Performed by: RADIOLOGY

## 2019-04-16 ENCOUNTER — TELEPHONE (OUTPATIENT)
Dept: INTERNAL MEDICINE | Facility: CLINIC | Age: 74
End: 2019-04-16

## 2019-04-16 DIAGNOSIS — R10.30 LOWER ABDOMINAL PAIN: Primary | ICD-10-CM

## 2019-04-16 RX ORDER — DICYCLOMINE HYDROCHLORIDE 10 MG/1
10 CAPSULE ORAL 4 TIMES DAILY
Qty: 28 CAPSULE | Refills: 0 | Status: SHIPPED | OUTPATIENT
Start: 2019-04-16 | End: 2019-04-23

## 2019-04-30 RX ORDER — DICLOFENAC SODIUM 10 MG/G
GEL TOPICAL
Qty: 2 TUBE | Refills: 6 | Status: SHIPPED | OUTPATIENT
Start: 2019-04-30 | End: 2019-05-01

## 2019-05-01 ENCOUNTER — OFFICE VISIT (OUTPATIENT)
Dept: GASTROENTEROLOGY | Facility: CLINIC | Age: 74
End: 2019-05-01
Payer: MEDICARE

## 2019-05-01 VITALS
HEIGHT: 64 IN | BODY MASS INDEX: 28.34 KG/M2 | DIASTOLIC BLOOD PRESSURE: 72 MMHG | RESPIRATION RATE: 18 BRPM | HEART RATE: 86 BPM | SYSTOLIC BLOOD PRESSURE: 117 MMHG | WEIGHT: 166 LBS

## 2019-05-01 DIAGNOSIS — R10.30 LOWER ABDOMINAL PAIN: Primary | ICD-10-CM

## 2019-05-01 DIAGNOSIS — R12 HEARTBURN: ICD-10-CM

## 2019-05-01 DIAGNOSIS — K59.00 CONSTIPATION, UNSPECIFIED CONSTIPATION TYPE: ICD-10-CM

## 2019-05-01 DIAGNOSIS — R13.14 PHARYNGOESOPHAGEAL DYSPHAGIA: ICD-10-CM

## 2019-05-01 DIAGNOSIS — Z87.19 HISTORY OF DIVERTICULOSIS: ICD-10-CM

## 2019-05-01 DIAGNOSIS — R93.89 ABNORMAL PELVIC ULTRASOUND: ICD-10-CM

## 2019-05-01 DIAGNOSIS — R19.4 CHANGE IN BOWEL HABITS: ICD-10-CM

## 2019-05-01 PROCEDURE — 99999 PR PBB SHADOW E&M-EST. PATIENT-LVL IV: ICD-10-PCS | Mod: PBBFAC,,, | Performed by: NURSE PRACTITIONER

## 2019-05-01 PROCEDURE — 99999 PR PBB SHADOW E&M-EST. PATIENT-LVL IV: CPT | Mod: PBBFAC,,, | Performed by: NURSE PRACTITIONER

## 2019-05-01 PROCEDURE — 99214 OFFICE O/P EST MOD 30 MIN: CPT | Mod: S$PBB,,, | Performed by: NURSE PRACTITIONER

## 2019-05-01 PROCEDURE — 99214 PR OFFICE/OUTPT VISIT, EST, LEVL IV, 30-39 MIN: ICD-10-PCS | Mod: S$PBB,,, | Performed by: NURSE PRACTITIONER

## 2019-05-01 PROCEDURE — 99214 OFFICE O/P EST MOD 30 MIN: CPT | Mod: PBBFAC,PO | Performed by: NURSE PRACTITIONER

## 2019-05-01 RX ORDER — ACETAMINOPHEN 325 MG/1
325 TABLET ORAL EVERY 6 HOURS PRN
COMMUNITY
End: 2019-06-03

## 2019-05-01 NOTE — PROGRESS NOTES
Subjective:       Patient ID: Margarita Dunne is a 74 y.o. female Body mass index is 28.49 kg/m².    Chief Complaint: Abdominal Pain (constipation )    This patient is new to me.  Referring Provider: CYN MACARIO for lower abdominal pain    Abdominal Pain   This is a chronic problem. Episode onset: started about 1.5 years ago. The problem occurs constantly. Progression since onset: worsened since ~3/2019; improving since on low fodmap diet (started about 3 days ago) The pain is located in the RLQ (lower abdomen; startes to RLQ). The pain is at a severity of 2/10. The quality of the pain is aching. Associated symptoms include constipation (chronic since childhood; worsened recently- a few weeks ago; citrucel BID (increased to BID recently); bowel movements once every other day; feels like she never fully empties bowels), flatus (not more than usual) and weight loss (lost about 4-6 lbs over the past 2 weeks since she has been watching what she eats). Pertinent negatives include no belching, diarrhea, dysuria, fever, frequency, hematochezia, melena, nausea or vomiting. The pain is aggravated by bowel movement (more apparent in the morning). The pain is relieved by eating and urination (after bowel movement, drinking water). Treatments tried: low fodmap diet started recently- helping; PAST: bentyl- never took. The treatment provided mild relief. Prior diagnostic workup includes ultrasound and CT scan (seeing Dr. Heather Salguero, GYN). Her past medical history is significant for GERD (occasional; improved since on low fodmap).     Review of Systems   Constitutional: Positive for weight loss (lost about 4-6 lbs over the past 2 weeks since she has been watching what she eats). Negative for appetite change, chills, fatigue and fever.   HENT: Positive for trouble swallowing (chronic with food; denies problems with liquids or pills). Negative for sore throat.    Respiratory: Positive for shortness of breath (history of asthma;  denies currently). Negative for cough and choking.    Cardiovascular: Negative for chest pain.   Gastrointestinal: Positive for abdominal pain, constipation (chronic since childhood; worsened recently- a few weeks ago; citrucel BID (increased to BID recently); bowel movements once every other day; feels like she never fully empties bowels) and flatus (not more than usual). Negative for anal bleeding, blood in stool, diarrhea, hematochezia, melena, nausea, rectal pain and vomiting.   Genitourinary: Negative for difficulty urinating, dysuria, flank pain and frequency.   Neurological: Negative for weakness.       No LMP recorded. Patient is postmenopausal.  Past Medical History:   Diagnosis Date    Allergy     Angio-edema     Arthritis     Asthma     Cataract     Chest pain at rest     Diverticulosis     Fibromyalgia 7/8/2012    Glaucoma suspect, steroid responders     Hand joint pain 7/8/2012    Headache(784.0)     Migraine     Osteoporosis     Recurrent upper respiratory infection (URI)     Urticaria      Past Surgical History:   Procedure Laterality Date    dexa  2014    osteopenia     Family History   Problem Relation Age of Onset    Hypertension Mother     Diabetes Mother     Stroke Mother     Dementia Mother     Cancer Father         pancreas    Allergies Father     Allergic rhinitis Father     Macular degeneration Cousin     Allergic rhinitis Paternal Aunt     Allergies Paternal Aunt     Allergic rhinitis Paternal Uncle     Allergies Paternal Uncle     Glaucoma Neg Hx     Amblyopia Neg Hx     Blindness Neg Hx     Cataracts Neg Hx     Retinal detachment Neg Hx     Strabismus Neg Hx     Thyroid disease Neg Hx     Angioedema Neg Hx     Asthma Neg Hx     Eczema Neg Hx     Immunodeficiency Neg Hx     Colon cancer Neg Hx     Cystic fibrosis Neg Hx     Ulcerative colitis Neg Hx     Stomach cancer Neg Hx     Esophageal cancer Neg Hx      Wt Readings from Last 10 Encounters:    05/01/19 75.3 kg (166 lb 0.1 oz)   04/10/19 77.7 kg (171 lb 4.8 oz)   01/14/19 78.7 kg (173 lb 8 oz)   12/28/18 79.2 kg (174 lb 9.7 oz)   12/06/18 77.8 kg (171 lb 8.3 oz)   09/26/18 77.6 kg (171 lb)   08/22/18 77 kg (169 lb 10.7 oz)   08/06/18 77.6 kg (171 lb 1.2 oz)   01/25/18 78 kg (172 lb)   12/21/17 78.2 kg (172 lb 8 oz)     Lab Results   Component Value Date    WBC 7.57 04/10/2019    HGB 13.8 04/10/2019    HCT 42.3 04/10/2019    MCV 94 04/10/2019     04/10/2019     CMP  Sodium   Date Value Ref Range Status   04/10/2019 141 136 - 145 mmol/L Final     Potassium   Date Value Ref Range Status   04/10/2019 3.6 3.5 - 5.1 mmol/L Final     Chloride   Date Value Ref Range Status   04/10/2019 105 95 - 110 mmol/L Final     CO2   Date Value Ref Range Status   04/10/2019 29 23 - 29 mmol/L Final     Glucose   Date Value Ref Range Status   04/10/2019 97 70 - 110 mg/dL Final     BUN, Bld   Date Value Ref Range Status   04/10/2019 11 8 - 23 mg/dL Final     Creatinine   Date Value Ref Range Status   04/10/2019 0.9 0.5 - 1.4 mg/dL Final     Calcium   Date Value Ref Range Status   04/10/2019 10.1 8.7 - 10.5 mg/dL Final     Total Protein   Date Value Ref Range Status   04/10/2019 7.5 6.0 - 8.4 g/dL Final     Albumin   Date Value Ref Range Status   04/10/2019 3.9 3.5 - 5.2 g/dL Final     Total Bilirubin   Date Value Ref Range Status   04/10/2019 0.4 0.1 - 1.0 mg/dL Final     Comment:     For infants and newborns, interpretation of results should be based  on gestational age, weight and in agreement with clinical  observations.  Premature Infant recommended reference ranges:  Up to 24 hours.............<8.0 mg/dL  Up to 48 hours............<12.0 mg/dL  3-5 days..................<15.0 mg/dL  6-29 days.................<15.0 mg/dL       Alkaline Phosphatase   Date Value Ref Range Status   04/10/2019 119 55 - 135 U/L Final     AST   Date Value Ref Range Status   04/10/2019 19 10 - 40 U/L Final     ALT   Date Value Ref Range  Status   04/10/2019 20 10 - 44 U/L Final     Anion Gap   Date Value Ref Range Status   04/10/2019 7 (L) 8 - 16 mmol/L Final     eGFR if    Date Value Ref Range Status   04/10/2019 >60 >60 mL/min/1.73 m^2 Final     eGFR if non    Date Value Ref Range Status   04/10/2019 >60 >60 mL/min/1.73 m^2 Final     Comment:     Calculation used to obtain the estimated glomerular filtration  rate (eGFR) is the CKD-EPI equation.        Lab Results   Component Value Date    TSH 1.680 06/10/2015     Reviewed prior medical records including radiology report of 4/12/19 ct abdomen pelvis; 1/7/19 pelvic ultrasound; & endoscopy history (see surgical history).    Objective:      Physical Exam   Constitutional: She is oriented to person, place, and time. She appears well-developed and well-nourished. No distress.   HENT:   Mouth/Throat: Oropharynx is clear and moist and mucous membranes are normal. No oral lesions. No oropharyngeal exudate.   Eyes: Pupils are equal, round, and reactive to light. Conjunctivae are normal. No scleral icterus.   Pulmonary/Chest: Effort normal and breath sounds normal. No respiratory distress. She has no wheezes.   Abdominal: Soft. Normal appearance and bowel sounds are normal. She exhibits no distension, no abdominal bruit and no mass. There is tenderness in the suprapubic area and left lower quadrant. There is no rigidity, no rebound, no guarding, no tenderness at McBurney's point and negative Santana's sign.   Neurological: She is alert and oriented to person, place, and time.   Skin: Skin is warm and dry. No rash noted. She is not diaphoretic. No erythema. No pallor.   Non-jaundiced   Psychiatric: She has a normal mood and affect. Her behavior is normal. Judgment and thought content normal.   Nursing note and vitals reviewed.      Assessment:       1. Lower abdominal pain    2. Constipation, unspecified constipation type    3. Change in bowel habits    4. History of  diverticulosis    5. Abnormal pelvic ultrasound    6. Heartburn    7. Pharyngoesophageal dysphagia        Plan:       Lower abdominal pain  - recommended scheduling Colonoscopy, discussed procedure with patient, discussed the risks and benefits of colonoscopy, patient verbalized understanding but refused colonoscopy at this time; patient reports she wants to think about it and continue low FODMAP diet; patient reports she will call/follow-up to schedule colonoscopy when she is ready  - recommended OTC probiotic, such as Align, as directed  - avoid lactose    Constipation, unspecified constipation type & Change in bowel habits  - recommended scheduling Colonoscopy, discussed procedure with patient, discussed the risks and benefits of colonoscopy, patient verbalized understanding but refused colonoscopy at this time; patient reports she wants to think about it and continue low FODMAP diet; patient reports she will call/follow-up to schedule colonoscopy when she is ready  Recommend daily exercise as tolerated, adequate water intake (six 8-oz glasses of water daily), and high fiber diet. CONTINUE OTC fiber supplements, Citrucel (take as directed, separate from other oral medications by >2 hours).  -Recommend taking an OTC stool softener such as Colace as directed to avoid hard stools and straining with bowel movements PRN  -Recommend trying OTC MiraLax once daily (17g PO) as directed PRN  -If still no improvement with these measures, call/follow-up    History of diverticulosis  - recommended scheduling Colonoscopy, discussed procedure with patient, discussed the risks and benefits of colonoscopy, patient verbalized understanding but refused colonoscopy at this time; patient reports she wants to think about it and continue low FODMAP diet; patient reports she will call/follow-up to schedule colonoscopy when she is ready  - discussed the diagnosis of diverticulosis and diverticulitis, & to prevent diverticulitis, high  fiber diet is recommended.  - Recommended daily exercise, adequate water intake (six 8-oz glasses of water daily), and high fiber diet. OTC fiber supplements are recommended if diet does not reach daily fiber goal (25 grams daily), such as Metamucil, Citrucel, or FiberCon (take as directed, separate from other oral medications by >2 hours).    Abnormal pelvic ultrasound  Recommend follow-up with GYN for continued evaluation and management.    Heartburn  -Educated patient on lifestyle modifications to help control/reduce reflux/abdominal pain including: avoid large meals, avoid eating within 2-3 hours of bedtime (avoid late night eating & lying down soon after eating), elevate head of bed if nocturnal symptoms are present, smoking cessation (if current smoker), & weight loss (if overweight).   -Educated to avoid known foods which trigger reflux symptoms & to minimize/avoid high-fat foods, chocolate, caffeine, citrus, alcohol, & tomato products.  -Advised to avoid/limit use of NSAID's, since they can cause GI upset, bleeding, and/or ulcers. If needed, take with food.    - discussed about scheduling EGD, discussed procedure with patient including risks and benefits, patient verbalized understanding and patient refused EGD  - Possible UGI pending results of testing and if symptoms persist    Pharyngoesophageal dysphagia  - discussed about scheduling EGD, discussed procedure with patient including risks and benefits, patient verbalized understanding and patient refused EGD  - educated patient to eat smaller more frequent meals and to eat slowly and advised to eat a soft diet.  - possible UGI/esophagram/esophageal manometry if symptoms persist    Follow up in about 1 month (around 6/1/2019), or if symptoms worsen or fail to improve.      If no improvement in symptoms or symptoms worsen, call/follow-up at clinic or go to ER.

## 2019-05-01 NOTE — PATIENT INSTRUCTIONS
Abdominal Pain    Abdominal pain is pain in the stomach or belly area. Everyone has this pain from time to time. In many cases it goes away on its own. But abdominal pain can sometimes be due to a serious problem, such as appendicitis. So its important to know when to seek help.  Causes of abdominal pain  There are many possible causes of abdominal pain. Common causes in adults include:  · Constipation, diarrhea, or gas  · Stomach acid flowing back up into the esophagus (acid reflux or heartburn)  · Severe acid reflux, called GERD (gastroesophageal reflux disease)  · A sore in the lining of the stomach or small intestine (peptic ulcer)  · Inflammation of the gallbladder, liver, or pancreas  · Gallstones or kidney stones  · Appendicitis   · Intestinal blockage   · An internal organ pushing through a muscle or other tissue (hernia)  · Urinary tract infections  · In women, menstrual cramps, fibroids, or endometriosis  · Inflammation or infection of the intestines  Diagnosing the cause of abdominal pain  Your healthcare provider will do a physical exam help find the cause of your pain. If needed, tests will be ordered. Belly pain has many possible causes. So it can be hard to find the reason for your pain. Giving details about your pain can help. Tell your provider where and when you feel the pain, and what makes it better or worse. Also let your provider know if you have other symptoms such as:  · Fever  · Tiredness  · Upset stomach (nausea)  · Vomiting  · Changes in bathroom habits  Treating abdominal pain  Some causes of pain need emergency medical treatment right away. These include appendicitis or a bowel blockage. Other problems can be treated with rest, fluids, or medicines. Your healthcare provider can give you specific instructions for treatment or self-care based on what is causing your pain.  If you have vomiting or diarrhea, sip water or other clear fluids. When you are ready to eat solid foods again,  start with small amounts of easy-to-digest, low-fat foods. These include apple sauce, toast, or crackers.   When to seek medical care  Call 911 or go to the hospital right away if you:  · Cant pass stool and are vomiting  · Are vomiting blood or have bloody diarrhea or black, tarry diarrhea  · Have chest, neck, or shoulder pain  · Feel like you might pass out  · Have pain in your shoulder blades with nausea  · Have sudden, severe belly pain  · Have new, severe pain unlike any you have felt before  · Have a belly that is rigid, hard, and tender to touch  Call your healthcare provider if you have:  · Pain for more than 5 days  · Bloating for more than 2 days  · Diarrhea for more than 5 days  · A fever of 100.4°F (38.0°C) or higher, or as directed by your provider  · Pain that gets worse  · Weight loss for no reason  · Continued lack of appetite  · Blood in your stool  How to prevent abdominal pain  Here are some tips to help prevent abdominal pain:  · Eat smaller amounts of food at one time.  · Avoid greasy, fried, or other high-fat foods.  · Avoid foods that give you gas.  · Exercise regularly.  · Drink plenty of fluids.  To help prevent GERD symptoms:  · Quit smoking.  · Reduce alcohol and certain foods that increase stomach acid.  · Avoid aspirin and over-the-counter pain and fever medicines (NSAIDS or nonsteroidal anti-inflammatory drugs), if possible  · Lose extra weight.  · Finish eating at least 2 hours before you go to bed or lie down.  · Raise the head of your bed.  Date Last Reviewed: 7/1/2016  © 5663-8392 [employer]. [address] [place] [address], [place] [address]. All rights reserved. This information is not intended as a substitute for professional medical care. Always follow your healthcare professional's instructions.

## 2019-05-01 NOTE — LETTER
May 1, 2019      Shayy Horne PA-C  [address]  [facility] [address]           [facility] [address] [facility] [address] [place] [address]  Phone: [phone]          Patient: Margarita Dunne   MR Number: [id number]   YOB: 1945   Date of Visit: 5/1/2019       Dear Shayy Horne:    Thank you for referring Margarita Dunne to me for evaluation. Attached you will find relevant portions of my assessment and plan of care.    If you have questions, please do not hesitate to call me. I look forward to following Margarita Dunne along with you.    Sincerely,    Barbi Pruett, [facility]    Enclosure  CC:  No Recipients    If you would like to receive this communication electronically, please contact [email or web address] or [phone] to request more information on [employer] Link access.    For providers and/or their staff who would like to refer a patient to [facility], please contact us through our one-stop-shop provider referral line, Fidelia Sinclair, at [phone].    If you feel you have received this communication in error or would no longer like to receive these types of communications, please e-mail [email or web address]

## 2019-05-21 ENCOUNTER — PATIENT MESSAGE (OUTPATIENT)
Dept: INTERNAL MEDICINE | Facility: CLINIC | Age: 74
End: 2019-05-21

## 2019-05-22 NOTE — TELEPHONE ENCOUNTER
Spoke with representative from [facility] states she will refax the results to the office and stated the Results were Negative

## 2019-06-03 ENCOUNTER — OFFICE VISIT (OUTPATIENT)
Dept: OPHTHALMOLOGY | Facility: CLINIC | Age: 74
End: 2019-06-03
Payer: MEDICARE

## 2019-06-03 ENCOUNTER — CLINICAL SUPPORT (OUTPATIENT)
Dept: OPHTHALMOLOGY | Facility: CLINIC | Age: 74
End: 2019-06-03
Payer: MEDICARE

## 2019-06-03 DIAGNOSIS — H40.013 OPEN ANGLE WITH BORDERLINE FINDINGS AND LOW GLAUCOMA RISK IN BOTH EYES: Primary | ICD-10-CM

## 2019-06-03 DIAGNOSIS — H40.003 GLAUCOMA SUSPECT OF BOTH EYES: ICD-10-CM

## 2019-06-03 DIAGNOSIS — G43.709 CHRONIC MIGRAINE WITHOUT AURA WITHOUT STATUS MIGRAINOSUS, NOT INTRACTABLE: ICD-10-CM

## 2019-06-03 DIAGNOSIS — H53.433 ARCUATE VISUAL FIELD DEFECT OF BOTH EYES: ICD-10-CM

## 2019-06-03 DIAGNOSIS — H47.013: ICD-10-CM

## 2019-06-03 DIAGNOSIS — H25.13 NUCLEAR SCLEROTIC CATARACT OF BOTH EYES: ICD-10-CM

## 2019-06-03 PROCEDURE — 92014 COMPRE OPH EXAM EST PT 1/>: CPT | Mod: S$PBB,,, | Performed by: OPHTHALMOLOGY

## 2019-06-03 PROCEDURE — 92133 CPTRZD OPH DX IMG PST SGM ON: CPT | Mod: PBBFAC | Performed by: OPHTHALMOLOGY

## 2019-06-03 PROCEDURE — 92133 POSTERIOR SEGMENT OCT OPTIC NERVE(OCULAR COHERENCE TOMOGRAPHY) - OU - BOTH EYES: ICD-10-PCS | Mod: 26,S$PBB,, | Performed by: OPHTHALMOLOGY

## 2019-06-03 PROCEDURE — 92083 HUMPHREY VISUAL FIELD - OU - BOTH EYES: ICD-10-PCS | Mod: 26,S$PBB,, | Performed by: OPHTHALMOLOGY

## 2019-06-03 PROCEDURE — 99999 PR PBB SHADOW E&M-EST. PATIENT-LVL I: ICD-10-PCS | Mod: PBBFAC,,,

## 2019-06-03 PROCEDURE — 92133 CPTRZD OPH DX IMG PST SGM ON: CPT | Mod: 26,S$PBB,, | Performed by: OPHTHALMOLOGY

## 2019-06-03 PROCEDURE — 99999 PR PBB SHADOW E&M-EST. PATIENT-LVL II: ICD-10-PCS | Mod: PBBFAC,,, | Performed by: OPHTHALMOLOGY

## 2019-06-03 PROCEDURE — 92083 EXTENDED VISUAL FIELD XM: CPT | Mod: PBBFAC | Performed by: OPHTHALMOLOGY

## 2019-06-03 PROCEDURE — 99211 OFF/OP EST MAY X REQ PHY/QHP: CPT | Mod: PBBFAC,25

## 2019-06-03 PROCEDURE — 99999 PR PBB SHADOW E&M-EST. PATIENT-LVL I: CPT | Mod: PBBFAC,,,

## 2019-06-03 PROCEDURE — 92083 EXTENDED VISUAL FIELD XM: CPT | Mod: 26,S$PBB,, | Performed by: OPHTHALMOLOGY

## 2019-06-03 PROCEDURE — 99999 PR PBB SHADOW E&M-EST. PATIENT-LVL II: CPT | Mod: PBBFAC,,, | Performed by: OPHTHALMOLOGY

## 2019-06-03 PROCEDURE — 92014 PR EYE EXAM, EST PATIENT,COMPREHESV: ICD-10-PCS | Mod: S$PBB,,, | Performed by: OPHTHALMOLOGY

## 2019-06-03 PROCEDURE — 99212 OFFICE O/P EST SF 10 MIN: CPT | Mod: PBBFAC,27,25 | Performed by: OPHTHALMOLOGY

## 2019-06-03 NOTE — PROGRESS NOTES
HPI     Glaucoma      Additional comments: HVF and OCT review today and pt states no   complaints today              Comments     DLS: 2/19/19    1) Non-glaucomatous VF Loss  2) ?AION  3) Glaucoma Suspect  4) Migraines  5) NS OU  6) Steroid Responder              Last edited by Waleska Luo MA on 6/3/2019 11:47 AM. (History)            Assessment /Plan     For exam results, see Encounter Report.    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 NON-GLAUCOMATOUS VF LOSS- etiology uncertain- ?AION     - ?? 2/2 vascular spasms assoc with migraines  Has seen neuro-ophthalmologist - Dr. Bhakta   Has seen Dr. Iyer - [facility]     2. Glaucoma suspect 2/2 VF loss - see above - would be LTG // felt to be AION's not glaucoma   First HVF 1999   First photos 1994     Family history neg   Glaucoma meds none   H/O adverse rxn to glaucoma drops none   LASERS none   GLAUCOMA SURGERIES none   OTHER EYE SURGERIES none   CDR 0.8 with sup notch / 0.85 with sup notch   Tbase 11-16 / 13-16   Tmax 16/16   Ttarget 14 OU  HVF 14 test 1999 to 2019 - IAD od // IAD os   Gonio +3 ou with pale TM   /586   OCT 5 tests 2006 to 2019 - RNFL - dec. S od / dec S os   HRT 4 tests 2007 to 2014 - MR nl od / dec S/ IT os   Disc photos 1994, 2005-slides / 3/30/2009, 8/2/2011, 2019  - OIS     - Ttoday 13/14  - Test done today HVF/OCT    3. Nuclear sclerosis - not visually significant. Observe. MR =Rx today. Distance only. Patient happy w/ OTC readers    4. Migraines   -Triggered by certain foods, sausage, cheese, conley   -More frequent - pre-menopausal     5. Steroid exposure - ? Steroid responder - on every other day inhaled steroids     Plan   Stable IOP  VF  Appears  stable today and has been stable for some time now  Patient not very interested in starting any IOP drops  Discussed with patient that her nerves do appear glaucomatous with the notching but still not interested in treatment  Photos from 1994, 2005 and 2011 all similar    Cataracts may start becoming visually significnat- can start testing at future appointments      Patient had steroid injections in joints in March- but IOP remains good- no more steroid injections    RTC 4 - 6 month HRT - same day as ave     I have seen and personally examined the patient.  I agree with the findings, assessment and plan of the resident and/or fellow.     Opal Painting MD

## 2019-06-03 NOTE — PROGRESS NOTES
hvf done;rel/fix/poor od good os coop. Good ou-checked chart for latex allergy patient preferred pirate patch.-[facility]

## 2019-07-05 ENCOUNTER — OFFICE VISIT (OUTPATIENT)
Dept: NEUROLOGY | Facility: CLINIC | Age: 74
End: 2019-07-05
Payer: MEDICARE

## 2019-07-05 VITALS — HEIGHT: 64 IN | BODY MASS INDEX: 27.33 KG/M2 | RESPIRATION RATE: 16 BRPM | WEIGHT: 160.06 LBS

## 2019-07-05 DIAGNOSIS — G43.109 MIGRAINE WITH AURA AND WITHOUT STATUS MIGRAINOSUS, NOT INTRACTABLE: Primary | ICD-10-CM

## 2019-07-05 DIAGNOSIS — R41.3 MEMORY LOSS: ICD-10-CM

## 2019-07-05 PROCEDURE — 99214 OFFICE O/P EST MOD 30 MIN: CPT | Mod: S$PBB,,, | Performed by: PSYCHIATRY & NEUROLOGY

## 2019-07-05 PROCEDURE — 99999 PR PBB SHADOW E&M-EST. PATIENT-LVL III: ICD-10-PCS | Mod: PBBFAC,,, | Performed by: PSYCHIATRY & NEUROLOGY

## 2019-07-05 PROCEDURE — 99213 OFFICE O/P EST LOW 20 MIN: CPT | Mod: PBBFAC,PO | Performed by: PSYCHIATRY & NEUROLOGY

## 2019-07-05 PROCEDURE — 99214 PR OFFICE/OUTPT VISIT, EST, LEVL IV, 30-39 MIN: ICD-10-PCS | Mod: S$PBB,,, | Performed by: PSYCHIATRY & NEUROLOGY

## 2019-07-05 PROCEDURE — 99999 PR PBB SHADOW E&M-EST. PATIENT-LVL III: CPT | Mod: PBBFAC,,, | Performed by: PSYCHIATRY & NEUROLOGY

## 2019-07-05 RX ORDER — RIZATRIPTAN BENZOATE 10 MG/1
TABLET ORAL
Qty: 10 TABLET | Refills: 11 | Status: SHIPPED | OUTPATIENT
Start: 2019-07-05 | End: 2019-11-07 | Stop reason: SDUPTHER

## 2019-07-05 NOTE — PROGRESS NOTES
Date of service: 7/5/2019  Referring provider: No ref. provider found    Subjective:      Chief complaint: Migraine       Patient ID: Margarita Dunne is a 74 y.o. lady with migraine with aura, peripheral neuropathy, fibromyalgia, asthma, glaucoma (suspect) who presents for follow up migraines     History of Present Illness    INTERVAL HISTORY - 7/5/19    Mrs. Dunne is here for routine headache follow-up.  She was last seen over 1 year ago.  She reports that in the interim she has done well having a few months where she had no headaches at all, but typically about 2-3 migraines per month.  These all come with aura.  Because the visual aura is disabling she takes her rizatriptan as soon as it occurs and is able to cris the migraine within 1-2 hours.  It will last longer if there is a delay in taking the rizatriptan.  She reports she is in the long process of moving from the [place] to the [place].  Part of the reason she is moving because the bump PNS of the cause way [place] is a trigger for her migraines.  Migraines continue to be associated with the same symptoms as documented original history.    She has had some GI problems she is no longer a magnesium.  Her  has been ill with multiple myeloma.  They are doing renovation on 2 houses.  She is bothered by her MOCA score being low last visit thus asks for it to be repeated.    Current pain score is 0.    INTERVAL HISTORY - 12/21/17    Last seen 4 months ago during which time I recommended lamotrigine for migraine with aura and rizatriptan for acute. I incidentally found a length dependent sensory loss and send her for NCS.EMG which was not tolerated so I sent for serological workup.     She did not try the lamotrigine due to concern for rash/SJS as adverse effect. She has not used magnesium.     She has done a course of PT for her neck in the fall.     Today she reports her migraines have reduced by almost half - 2.5 per month, with  5 in November though. They are usually right temple. Her current pain score is 4, with range of 2 to 0.     She reports changes to her short and long term memory. This is gradually progressive.     HEADACHE HISTORY - 8/21/17   Age at onset and course over time: began in 1968 college as menstrual migraines, improved for a time with menopause but in 2013 started returning (moved to [place] from the [facility] and increased from 1 migraine every other month to several per month). Followed with Angel Rivera and Marcela but was not on preventatives at that time. Recently experienced increase frequency x last 3-6 months, had 2 in one day, interfering more with her life. Also has hemicranial headaches which she does not think are the same as her migraines, no other symptoms.   Location: either temple   Quality: throbbing, tight band   Severity: current 2/10; worst 8/10   Duration: >4 hours   Frequency: 1-6 per month on review of 2017 diary, recently in 3-6 range. May and June had 1 day with 2 migraines in each day.   Alleviated by: sleep, darkness, local pressure, massage, ice, medication   Exacerbated by: fatigue, light, noise, smells, stress  Associated with: all migraines have visual aura (blurred vision, sparkling lights, scotoma), hunger, craving sweets , Photophobia, phonophobia, osmophobia, problems with memory   Sleep habits: no difficulty falling asleep; but sleep is terrible because she wakes up, can't get back to sleep, this is since menopausea   Caffeine intake: 0    Current acute treatment:  -- maxalt - helps with migraine, not aura, never repeats    Current prevention:  -- none    Previously tried/failed acute treatment:  -- ergotomine  -- sumatriptan - ineffective  -- excedrin   -- iburpofen  -- tylenol  -- phenergan     Previously tried/failed preventative treatment:  -- none     Review of patient's allergies indicates:   Allergen Reactions    Doxycycline Nausea And Vomiting    Pcn [penicillins] Rash    Sulfa  (sulfonamide antibiotics) Rash    Corticosteroids (glucocorticoids) Other (See Comments)     migraine for 2 weeks     Current Outpatient Medications   Medication Sig Dispense Refill    albuterol 90 mcg/actuation inhaler Inhale 2 puffs into the lungs every 6 (six) hours as needed for Wheezing. Rescue 1 Inhaler 11    budesonide-formoterol 80-4.5 mcg (SYMBICORT) 80-4.5 mcg/actuation HFAA Inhale 2 puffs into the lungs 2 (two) times daily. Controller 11 g 11    methylcellulose (CITRUCEL ORAL) Take by mouth 2 (two) times daily.      rizatriptan (MAXALT) 10 MG tablet TAKE ONE TABLET BY MOUTH AT ONSET OF MIGRAINE, MAY REPEAT EVERY 2 HOURS. DO NOT EXCEED 3 TABLETS IN 24 HOURS. 10 tablet 11    rosuvastatin (CRESTOR) 5 MG tablet Take 1 tablet (5 mg total) by mouth every evening. 30 tablet 5    valACYclovir (VALTREX) 500 MG tablet TAKE 1 TABLET BY MOUTH ONCE DAILY 30 tablet 0    VOLTAREN 1 % Gel APPLY 4 GRAMS TOPICALLY 4 TIMES DAILY AS DIRECTED 2 Tube 6    multivitamin (THERAGRAN) per tablet Take 1 tablet by mouth once daily.       No current facility-administered medications for this visit.        Past Medical History  Past Medical History:   Diagnosis Date    Allergy     Angio-edema     Arthritis     Asthma     Cataract     Chest pain at rest     Diverticulosis     Fibromyalgia 7/8/2012    Glaucoma suspect, steroid responders     Hand joint pain 7/8/2012    Headache(784.0)     Migraine     Osteoporosis     Recurrent upper respiratory infection (URI)     Urticaria        Past Surgical History  Past Surgical History:   Procedure Laterality Date    dexa  2014    osteopenia       Family History  Family History   Problem Relation Age of Onset    Hypertension Mother     Diabetes Mother     Stroke Mother     Dementia Mother     Cancer Father         pancreas    Allergies Father     Allergic rhinitis Father     Macular degeneration Cousin     Allergic rhinitis Paternal Aunt     Allergies Paternal  Aunt     Allergic rhinitis Paternal Uncle     Allergies Paternal Uncle     Glaucoma Neg Hx     Amblyopia Neg Hx     Blindness Neg Hx     Cataracts Neg Hx     Retinal detachment Neg Hx     Strabismus Neg Hx     Thyroid disease Neg Hx     Angioedema Neg Hx     Asthma Neg Hx     Eczema Neg Hx     Immunodeficiency Neg Hx     Colon cancer Neg Hx     Cystic fibrosis Neg Hx     Ulcerative colitis Neg Hx     Stomach cancer Neg Hx     Esophageal cancer Neg Hx        Social History  Social History     Socioeconomic History    Marital status:      Spouse name: Not on file    Number of children: 0    Years of education: Not on file    Highest education level: Not on file   Occupational History    Not on file   Social Needs    Financial resource strain: Not on file    Food insecurity:     Worry: Not on file     Inability: Not on file    Transportation needs:     Medical: Not on file     Non-medical: Not on file   Tobacco Use    Smoking status: Never Smoker    Smokeless tobacco: Never Used   Substance and Sexual Activity    Alcohol use: Not Currently     Comment: social wine use in the past not current. last was 15 years ago    Drug use: No    Sexual activity: Not on file   Lifestyle    Physical activity:     Days per week: Not on file     Minutes per session: Not on file    Stress: Not on file   Relationships    Social connections:     Talks on phone: Not on file     Gets together: Not on file     Attends Mu-ism service: Not on file     Active member of club or organization: Not on file     Attends meetings of clubs or organizations: Not on file     Relationship status: Not on file   Other Topics Concern    Not on file   Social History Narrative    Not on file        Review of Systems    14-point review of systems as follows:   No check kimmy indicates NEGATIVE response   Constitutional: [] weight loss, [] change to appetite   Eyes: [] change in vision, [] double vision   Ears, nose,  mouth, throat: [] frequent nose bleeds, [x] ringing in the ears   Respiratory: [] cough, [] wheezing   Cardiovascular: [] chest pain, [] palpitations   Gastrointestinal: [] jaundice, [] nausea/vomiting   Genitourinary: [] incontinence, [] burning with urination   Hematologic/lymphatic: [] easy bruising/bleeding, [] night sweats   Neurological: [] numbness, [] weakness   Endocrine: [x] fatigue, [] heat/cold intolerance   Allergy/Immunologic: [] fevers, [] chills   Musculoskeletal: [] muscle pain, [] joint pain   Psychiatric: [] thoughts of harming self/others, [] depression   Integumentary: [] rashes, [] sores that do not heal       Objective:        Vitals:    07/05/19 1533   Resp: 16     Body mass index is 27.47 kg/m².     MOCA 25/30 with loss of 1 point for trails, 3 points for recall, 1 point for wrong date (July 6 rather than 5).  See media for scanned test.    12/2017   General: Well developed, well nourished.  No acute distress.  HEENT: Atraumatic, normocephalic.  Neck: Trachea midline  Cardiovascular: Vitals reviewed. Normal peripheral perfusion.   Pulmonary: No increased work of breathing.  Abdomen/GI: No guarding  Musculoskeletal: No obvious joint deformities, moves all extremities symmetrically and well.     Neurological exam:  Mental status: Awake and alert. Oriented to situation.  Speech fluent and appropriate. Recent and remote memory appear to be intact.  Fund of knowledge normal. MOCA 23/30 see below.   Cranial nerves: Pupils equal round, extraocular movements intact, facial strength intact bilaterally, hearing grossly intact bilaterally.  Reflexes: 2+ in the upper extremities, 1+ in lower extremities, no Babinski, no Ferris  Sensation: intact to light touch and temperature in upper exremities but reduced in a stocking pattern in the lower extremities  Gait: Normal     Data Review:     11/17/17 NCS/EMG Dr Montano   Study was not tolerated thus findings are minimally helpful but do suggest presence of  an axonal neuropathy.   1. Right fibular motor amplitude demonstrated reduced amplitude distally, sural sensory amplitudes were normal - suggest axonal motor greater than sensory neuropathy  2. Left median nerve with prolonged latency     Results for orders placed or performed during the hospital encounter of 09/26/13   CT Head Without Contrast    Narrative    Comparison: None    Technique: 5 mm noncontrast axial images through the brain were obtained.    Findings:     The brain is normally formed and exhibits normal density throughout with no indication of acute/recent major vascular distribution cerebral infarction, intraparenchymal hemorrhage, or intra-axial space occupying lesion. There is preserved gray-white   matter junction differentiation. The ventricular system is normal in appearance for age. No hydrocephalus. No effacement of the skull-base cisterns. No abnormal extra-axial fluid collections or blood products. The paranasal sinuses and mastoid air cells   are unremarkable. The calvarium shows no significant abnormality.    Impression     No acute intracranial abnormalities are appreciated.      Electronically signed by: Celia Fraser MD  Date:     09/26/13  Time:    12:44        Lab Results   Component Value Date     04/10/2019    K 3.6 04/10/2019     04/10/2019    CO2 29 04/10/2019    BUN 11 04/10/2019    CREATININE 0.9 04/10/2019    GLU 97 04/10/2019    HGBA1C 5.9 08/12/2016    AST 19 04/10/2019    ALT 20 04/10/2019    ALBUMIN 3.9 04/10/2019    PROT 7.5 04/10/2019    BILITOT 0.4 04/10/2019    CHOL 232 (H) 08/06/2018    HDL 49 08/06/2018    LDLCALC 159.6 (H) 08/06/2018    TRIG 117 08/06/2018       Lab Results   Component Value Date    WBC 7.57 04/10/2019    HGB 13.8 04/10/2019    HCT 42.3 04/10/2019    MCV 94 04/10/2019     04/10/2019       Lab Results   Component Value Date    TSH 1.680 06/10/2015     5/2016  A1C 5.9     Vit D 27 low     12/19/17 normal -    Zinc  SPEP/SERGEY  Thiamine  ESR 8  Heavy metal screen  Folate  RONALD  Copper    Pending:  Vit E  B6  UPEP      Assessment & Plan:       Problem List Items Addressed This Visit        Neuro    Migraine with aura and without status migrainosus, not intractable - Primary    Overview     Migraines with prominent visual auras have spontaneously gone down in frequency. Respond very well to rizatriptan. Continue         Relevant Medications    rizatriptan (MAXALT) 10 MG tablet    Memory loss    Overview     In 2017 MOCA score low at 23/30 but some tests were borderline abnormal.   MOCA repeated 7/5/19 25/30  Written cognitive resources provided  Monitor                   WORKUP:  -- MOCA next visit     PREVENTION (use daily regardless of headache):  -- consider magnesium glycinate if migraines increasing     ACUTE TREATMENT (use total no more than 10 days per month unless otherwise stated):  -- continue using rizatriptan, may repeat in 2 hours for a total of 3 tabs per day. May take with Advil or Aleve for increased efficacy.       Follow up in about 1 year (around 7/5/2020).       Crys Delong MD

## 2019-07-05 NOTE — PATIENT INSTRUCTIONS
WORKUP:  -- none     PREVENTION (use daily regardless of headache):  -- consider magnesium glycinate if migraines increasing     ACUTE TREATMENT (use total no more than 10 days per month unless otherwise stated):  -- continue using rizatriptan, may repeat in 2 hours for a total of 3 tabs per day. May take with Advil or Aleve for increased efficacy.     ------------------     To prevent further memory loss, some of the best preventative measures are following a healthy diet, getting regular exercise, and ensuring good sleep habits.  Approximately 30 to 45 minutes of brisk physical activity (brisk walking, swimming, stationary bicycle, etc.) 5 days a week has been shown to improve function in vascular dementias, and can lower the risk of stroke and slow progression of memory loss.     A Mediterranean style diet, or the DASH diet with lots of fresh fruits and vegetables, whole grains, more fish and chicken, less red meat, less dairy, less processed foods is also beneficial. For more information see:    https://www.rush.[facility]/news/diet-may-help-prevent-alzheimers      Minimize or eliminate the use of alcohol, and discuss any prescription medications you might be taking with your doctor to avoid medications which can cause sedation or worsen cognitive function.     Establish a regular, consistent sleep pattern and practice good sleep hygiene.  Avoid screen time (computer, TV, smartphones or tablets) or heavy meals for at least an hour before bedtime, and avoid caffeine or stimulants after 2 PM. Exercise earlier in the day or mornings and keep your sleeping environment comfortable.      Socializing with friends and family and staying both mentally and physically active is also very important. Continue with hobbies or activities that are engaging and practice activities that are mentally stimulating (word puzzles, Sudoku, etc) and stay active in the community.    Some supplements which may be of potential benefit  include:  - tumeric (curcumin) supplement  - omega-3 fatty acids with sufficient amounts of EPA and DHA, 1000 to 2000 mg a day   - Vitamin D3 supplement 2000 units (IU) daily   - Green tea and green tea extracts may also help (caffeine free)   - supplements containing Phosphatidylserine (PS) and phosphatidylcholine (PC)     There may be some benefit to dietary supplements, however there is no definitive evidence to support the prevention of cognitive impairment or dementia.     Please look into the following websites, to help you find resources including day programs, caregivers and caregiver support, legal questions, support groups, etc.    [facility] on [facility] ([facility])  [facility] - [address]  [facility] - [address]    Group meetings facilitated by Erikc Rosas MA, DERRELL [phone]    ADDRESS  Mailing Address:  [address] [place] [address] [address] Address:  [address] [place] [address]   Web Address:  [email or web address]       Services offered at this location:  Chore, Congregate Meals, Home Delivered Meals, Homemaker, Information and Assistance, Legal, Material Aid, Medical Alert, Medication Management, NFCSP Information and Assistance, NFCSP In-Home Respite, NFCSP Material Aid, NGCSP Personal Care , NFCSP Public Education, NFCSP Sitter Service, NFCSP Support Groups, Nutrition Counseling, Nutrition Education, Outreach, Recreation, Transportation, Utility Assistance, Wellness, Area Agency on Aging, [place] on Aging        Website for the Alzheimer's Association:  http://www.alz.org/    Excellent resources for finding community resources   Action plan navigator and online tools   Call 1593.615.7043 for 24/7 helpline    [facility] Office of Aging and Adult Services:  Http://www.ldh.[place].gov/index.cfm/subhome/12/n/7    [facility]ace With Dementia: http://carepartnermentoring.com/    Website for [facility] Agency on Ageing:  http://goea.[place].[facility]/index.cfm?md=mar&tmp=category&[email or web address]&[email or web address]&ssid=[email or web address]&startIndex=1      PATIENT/FAMILY RESOURCES:  1. Alzheimer's Association                                                                 http://www.alz.org  2. Alzheimer's Foundation of [place]                                               http://www.alzfdn.org  3. The Alzheimer's Disease Education and Referral Center             https://www.citlaly.nih.gov/alzheimers  4. Lewy Body Dementia Association                                                  http://www.lbda.org  5. National [place] of Mental Health                                                 http://www.nimh.nih.gov  6. National [place] on Mental Illness                                                http://www.josé miguel.org  7. Mental Health [place]                                                                   http://www.mentalhealthamerica.net  8. Mental Health.gov                                                                           http://www.mentalhealth.gov  9. National [place] for Behavioral Health                                          http://www.thenationalcouncil.org  10. Substance Abuse and Mental Health Services Administration   http://www.samhsa.gov    11. Licensed local counselors, social workers, psychiatrists, psychologists - one starting point is the Psychology Today website, therapist finder

## 2019-07-25 ENCOUNTER — HOSPITAL ENCOUNTER (OUTPATIENT)
Dept: RADIOLOGY | Facility: HOSPITAL | Age: 74
Discharge: HOME | End: 2019-07-25
Attending: INTERNAL MEDICINE
Payer: MEDICARE

## 2019-07-25 ENCOUNTER — OFFICE VISIT (OUTPATIENT)
Dept: INTERNAL MEDICINE | Facility: CLINIC | Age: 74
End: 2019-07-25
Payer: MEDICARE

## 2019-07-25 VITALS
DIASTOLIC BLOOD PRESSURE: 62 MMHG | BODY MASS INDEX: 26.84 KG/M2 | SYSTOLIC BLOOD PRESSURE: 118 MMHG | HEART RATE: 83 BPM | OXYGEN SATURATION: 97 % | HEIGHT: 64 IN | WEIGHT: 157.19 LBS

## 2019-07-25 DIAGNOSIS — Z01.00 ENCOUNTER FOR VISION SCREENING: ICD-10-CM

## 2019-07-25 DIAGNOSIS — Z71.3 NUTRITIONAL COUNSELING: ICD-10-CM

## 2019-07-25 DIAGNOSIS — R53.83 FATIGUE, UNSPECIFIED TYPE: ICD-10-CM

## 2019-07-25 DIAGNOSIS — R10.84 ABDOMINAL PAIN, GENERALIZED: Primary | ICD-10-CM

## 2019-07-25 DIAGNOSIS — E78.00 ELEVATED LDL CHOLESTEROL LEVEL: ICD-10-CM

## 2019-07-25 DIAGNOSIS — Z12.31 ENCOUNTER FOR SCREENING MAMMOGRAM FOR BREAST CANCER: ICD-10-CM

## 2019-07-25 DIAGNOSIS — N92.6 MENSTRUAL BLEEDING PROBLEM: ICD-10-CM

## 2019-07-25 PROCEDURE — 99999 PR PBB SHADOW E&M-EST. PATIENT-LVL V: CPT | Mod: PBBFAC,,, | Performed by: INTERNAL MEDICINE

## 2019-07-25 PROCEDURE — 99214 OFFICE O/P EST MOD 30 MIN: CPT | Mod: S$PBB,,, | Performed by: INTERNAL MEDICINE

## 2019-07-25 PROCEDURE — 99214 PR OFFICE/OUTPT VISIT, EST, LEVL IV, 30-39 MIN: ICD-10-PCS | Mod: S$PBB,,, | Performed by: INTERNAL MEDICINE

## 2019-07-25 PROCEDURE — 77067 SCR MAMMO BI INCL CAD: CPT | Mod: 26,,, | Performed by: RADIOLOGY

## 2019-07-25 PROCEDURE — 77063 BREAST TOMOSYNTHESIS BI: CPT | Mod: 26,,, | Performed by: RADIOLOGY

## 2019-07-25 PROCEDURE — 77067 MAMMO DIGITAL SCREENING BILAT WITH TOMOSYNTHESIS_CAD: ICD-10-PCS | Mod: 26,,, | Performed by: RADIOLOGY

## 2019-07-25 PROCEDURE — 77067 SCR MAMMO BI INCL CAD: CPT | Mod: TC

## 2019-07-25 PROCEDURE — 99215 OFFICE O/P EST HI 40 MIN: CPT | Mod: PBBFAC | Performed by: INTERNAL MEDICINE

## 2019-07-25 PROCEDURE — 99999 PR PBB SHADOW E&M-EST. PATIENT-LVL V: ICD-10-PCS | Mod: PBBFAC,,, | Performed by: INTERNAL MEDICINE

## 2019-07-25 PROCEDURE — 77063 MAMMO DIGITAL SCREENING BILAT WITH TOMOSYNTHESIS_CAD: ICD-10-PCS | Mod: 26,,, | Performed by: RADIOLOGY

## 2019-07-25 NOTE — PROGRESS NOTES
Subjective:      Patient ID: Margarita Dunne is a 74 y.o. female.    Chief Complaint: No chief complaint on file.    HPI:  Abdominal Pain   This is a new (Started a year and a half ago at [facility] and worsened  in April) problem. Episode frequency: Wakes up with pain, the longer she goes without eating the worse she feels. She has tried a FODMAP diet. She has lost about 15 pounds. She stays on the diet because she is afraid of the pain. She has done a cologuard which was negative but not a colonoscop. Pain scale: Pain ranges from 3-12. Associated symptoms include arthralgias, constipation and headaches. Pertinent negatives include no diarrhea, dysuria, hematuria or vomiting. Treatments tried: Stool softener, mettamucil, patient is eating the FODMAP diet. Prior diagnostic workup includes CT scan (CT was negative).      Patient was seen for stomach pain in April.She was seen by Ms. Horne in our office and Ms. Pruett through  in [place].     Patient has had spotting. She has a Gynecologist on the [facility] she is working with.    There are concerns that she may have a urologic issue.  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    Glaucoma suspect - Both Eyes    Nuclear sclerosis - Both Eyes    Chronic rhinitis    Eyelid myokymia - Right Eye    Herpes labialis    Visual field defect - Both Eyes    Penicillin allergy    Itching    Hyperlipidemia    Migraine with aura and without status migrainosus, not intractable    Osteopenia    Numbness and tingling of both legs below knees    Neck pain    Vitamin D deficiency    Memory loss    Pain management     Past Medical History:   Diagnosis Date    Allergy     Angio-edema     Arthritis     Asthma     Cataract     Chest pain at rest     Diverticulosis     Fibromyalgia 7/8/2012    Glaucoma suspect, steroid responders     Hand joint pain  7/8/2012    Headache(784.0)     Migraine     Osteoporosis     Recurrent upper respiratory infection (URI)     Urticaria      Past Surgical History:   Procedure Laterality Date    dexa  2014    osteopenia     Family History   Problem Relation Age of Onset    Hypertension Mother     Diabetes Mother     Stroke Mother     Dementia Mother     Cancer Father         pancreas    Allergies Father     Allergic rhinitis Father     Macular degeneration Cousin     Allergic rhinitis Paternal Aunt     Allergies Paternal Aunt     Allergic rhinitis Paternal Uncle     Allergies Paternal Uncle     Glaucoma Neg Hx     Amblyopia Neg Hx     Blindness Neg Hx     Cataracts Neg Hx     Retinal detachment Neg Hx     Strabismus Neg Hx     Thyroid disease Neg Hx     Angioedema Neg Hx     Asthma Neg Hx     Eczema Neg Hx     Immunodeficiency Neg Hx     Colon cancer Neg Hx     Cystic fibrosis Neg Hx     Ulcerative colitis Neg Hx     Stomach cancer Neg Hx     Esophageal cancer Neg Hx      Review of Systems   Constitutional: Positive for activity change and unexpected weight change.   HENT: Negative for hearing loss, rhinorrhea and trouble swallowing.    Eyes: Negative for discharge and visual disturbance.   Respiratory: Positive for wheezing. Negative for chest tightness.         History of asthma   Cardiovascular: Positive for chest pain. Negative for palpitations.   Gastrointestinal: Positive for abdominal pain and constipation. Negative for blood in stool, diarrhea and vomiting.        Discussed   Endocrine: Negative for polydipsia and polyuria.   Genitourinary: Negative for difficulty urinating, dysuria, hematuria and menstrual problem.   Musculoskeletal: Positive for arthralgias, joint swelling and neck pain.        Chronic   Neurological: Positive for weakness and headaches.        Chronic   Psychiatric/Behavioral: Positive for dysphoric mood. Negative for confusion.     Objective:     Vitals:    07/25/19  "1413   BP: 118/62   Pulse: 83   SpO2: 97%   Weight: 71.3 kg (157 lb 3 oz)   Height: 5' 4" (1.626 m)   PainSc:   6   PainLoc: Abdomen     Body mass index is 26.98 kg/m².  Physical Exam   Constitutional: She is oriented to person, place, and time. She appears well-developed and well-nourished. No distress.   Neck: Carotid bruit is not present. No thyromegaly present.   Cardiovascular: Normal rate, regular rhythm and normal heart sounds. PMI is not displaced.   Pulmonary/Chest: Effort normal and breath sounds normal. No respiratory distress.   Abdominal: Soft. Bowel sounds are normal. She exhibits no distension. There is no tenderness.   Musculoskeletal: She exhibits no edema.   Neurological: She is alert and oriented to person, place, and time.     Assessment:     1. Abdominal pain, generalized    2. Fatigue, unspecified type    3. Elevated LDL cholesterol level    4. Menstrual bleeding problem    5. Encounter for screening mammogram for breast cancer    6. Encounter for vision screening      Plan:   Diagnoses and all orders for this visit:    Abdominal pain, generalized  Comments:  ? SIBO  Orders:  -     Giardia / Cryptosporidum, EIA; Future  -     Stool Exam-Ova,Cysts,Parasites; Future  -     Ambulatory consult to Gastroenterology  -     Ambulatory consult to Urology  -     CBC auto differential; Future  -     Comprehensive metabolic panel; Future  -     Sedimentation rate; Future  -     C-reactive protein; Future  -     Lipase; Future  -     Amylase; Future    Fatigue, unspecified type  -     TSH; Future    Elevated LDL cholesterol level  -     Lipid panel; Future    Menstrual bleeding problem    Encounter for screening mammogram for breast cancer  -     Mammo Digital Screening Bilat w/ Wilbur; Future    Encounter for vision screening  -     Ambulatory consult to Optometry    Other orders  -     Cancel: Ambulatory consult to Urology        Problem List Items Addressed This Visit     None      Visit Diagnoses     " Abdominal pain, generalized    -  Primary    ? SIBO    Relevant Orders    Giardia / Cryptosporidum, EIA    Stool Exam-Ova,Cysts,Parasites    Ambulatory consult to Gastroenterology    Ambulatory consult to Urology    CBC auto differential    Comprehensive metabolic panel    Sedimentation rate    C-reactive protein    Lipase    Amylase    Fatigue, unspecified type        Relevant Orders    TSH    Elevated LDL cholesterol level        Relevant Orders    Lipid panel    Menstrual bleeding problem        Encounter for screening mammogram for breast cancer        Relevant Orders    Mammo Digital Screening Bilat w/ Wilbur    Encounter for vision screening        Relevant Orders    Ambulatory consult to Optometry        Orders Placed This Encounter   Procedures    Mammo Digital Screening Bilat w/ Wilbur     Standing Status:   Future     Standing Expiration Date:   9/23/2020     Order Specific Question:   May the Radiologist modify the order per protocol to meet the clinical needs of the patient?     Answer:   Yes    Giardia / Cryptosporidum, EIA     Standing Status:   Future     Standing Expiration Date:   7/24/2020    Stool Exam-Ova,Cysts,Parasites     Standing Status:   Future     Standing Expiration Date:   7/24/2020    CBC auto differential     Standing Status:   Future     Standing Expiration Date:   9/23/2019    Comprehensive metabolic panel     Standing Status:   Future     Standing Expiration Date:   9/23/2019    TSH     Standing Status:   Future     Standing Expiration Date:   9/23/2019    Sedimentation rate     Standing Status:   Future     Standing Expiration Date:   7/24/2020    C-reactive protein     Standing Status:   Future     Standing Expiration Date:   7/24/2020    Lipase     Standing Status:   Future     Standing Expiration Date:   9/22/2020    Amylase     Standing Status:   Future     Standing Expiration Date:   9/22/2020    Lipid panel     Standing Status:   Future     Standing Expiration Date:    9/23/2019    Ambulatory consult to Gastroenterology     Referral Priority:   Routine     Referral Type:   Consultation     Referral Reason:   Specialty Services Required     Requested Specialty:   Gastroenterology     Number of Visits Requested:   1    Ambulatory consult to Urology     Referral Priority:   Routine     Referral Type:   Consultation     Referral Reason:   Specialty Services Required     Referred to Provider:   Roger Avelar DNP     Requested Specialty:   Urology     Number of Visits Requested:   1    Ambulatory consult to Optometry     Referral Priority:   Routine     Referral Type:   Vision (Optometry)     Referral Reason:   Specialty Services Required     Requested Specialty:   Optometry     Number of Visits Requested:   1     No follow-ups on file.     Medication List           Accurate as of 7/25/19  3:07 PM. If you have any questions, ask your nurse or doctor.               CONTINUE taking these medications    albuterol 90 mcg/actuation inhaler  Commonly known as:  PROVENTIL/VENTOLIN HFA  Inhale 2 puffs into the lungs every 6 (six) hours as needed for Wheezing. Rescue     budesonide-formoterol 80-4.5 mcg 80-4.5 mcg/actuation Hfaa  Commonly known as:  SYMBICORT  Inhale 2 puffs into the lungs 2 (two) times daily. Controller     CITRUCEL ORAL     multivitamin per tablet  Commonly known as:  THERAGRAN     rizatriptan 10 MG tablet  Commonly known as:  MAXALT  TAKE ONE TABLET BY MOUTH AT ONSET OF MIGRAINE, MAY REPEAT EVERY 2 HOURS. DO NOT EXCEED 3 TABLETS IN 24 HOURS.     rosuvastatin 5 MG tablet  Commonly known as:  CRESTOR  Take 1 tablet (5 mg total) by mouth every evening.     valACYclovir 500 MG tablet  Commonly known as:  VALTREX  TAKE 1 TABLET BY MOUTH ONCE DAILY     VOLTAREN 1 % Gel  Generic drug:  diclofenac sodium  APPLY 4 GRAMS TOPICALLY 4 TIMES DAILY AS DIRECTED

## 2019-07-29 RX ORDER — VALACYCLOVIR HYDROCHLORIDE 500 MG/1
TABLET, FILM COATED ORAL
Qty: 30 TABLET | Refills: 0 | Status: ON HOLD | OUTPATIENT
Start: 2019-07-29 | End: 2019-12-11 | Stop reason: HOSPADM

## 2019-08-02 ENCOUNTER — OFFICE VISIT (OUTPATIENT)
Dept: OBSTETRICS AND GYNECOLOGY | Facility: CLINIC | Age: 74
End: 2019-08-02
Payer: MEDICARE

## 2019-08-02 VITALS
HEIGHT: 64 IN | WEIGHT: 156.06 LBS | DIASTOLIC BLOOD PRESSURE: 80 MMHG | BODY MASS INDEX: 26.64 KG/M2 | SYSTOLIC BLOOD PRESSURE: 138 MMHG

## 2019-08-02 DIAGNOSIS — K57.50 DIVERTICULOSIS OF BOTH SMALL AND LARGE INTESTINE WITHOUT BLEEDING: ICD-10-CM

## 2019-08-02 DIAGNOSIS — R63.4 WEIGHT LOSS: ICD-10-CM

## 2019-08-02 DIAGNOSIS — R93.89 THICKENED ENDOMETRIUM: ICD-10-CM

## 2019-08-02 DIAGNOSIS — N95.0 POSTMENOPAUSAL BLEEDING: Primary | ICD-10-CM

## 2019-08-02 DIAGNOSIS — J45.20 MILD INTERMITTENT CHRONIC ASTHMA WITHOUT COMPLICATION: ICD-10-CM

## 2019-08-02 DIAGNOSIS — R10.30 LOWER ABDOMINAL PAIN: ICD-10-CM

## 2019-08-02 PROCEDURE — 99214 OFFICE O/P EST MOD 30 MIN: CPT | Mod: S$PBB,,, | Performed by: OBSTETRICS & GYNECOLOGY

## 2019-08-02 PROCEDURE — 99213 OFFICE O/P EST LOW 20 MIN: CPT | Mod: PBBFAC,PN | Performed by: OBSTETRICS & GYNECOLOGY

## 2019-08-02 PROCEDURE — 99999 PR PBB SHADOW E&M-EST. PATIENT-LVL III: CPT | Mod: PBBFAC,,, | Performed by: OBSTETRICS & GYNECOLOGY

## 2019-08-02 PROCEDURE — 99999 PR PBB SHADOW E&M-EST. PATIENT-LVL III: ICD-10-PCS | Mod: PBBFAC,,, | Performed by: OBSTETRICS & GYNECOLOGY

## 2019-08-02 PROCEDURE — 99214 PR OFFICE/OUTPT VISIT, EST, LEVL IV, 30-39 MIN: ICD-10-PCS | Mod: S$PBB,,, | Performed by: OBSTETRICS & GYNECOLOGY

## 2019-08-02 RX ORDER — ACETAMINOPHEN 325 MG/1
325 TABLET ORAL EVERY 6 HOURS PRN
COMMUNITY
End: 2020-07-24

## 2019-08-02 NOTE — PROGRESS NOTES
Chief Complaint   Patient presents with    postmenopausal bleeding    had an EMB in january       History of Present Illness: Margarita Dunne is a 74 y.o. female that presents today 8/2/2019 for   Chief Complaint   Patient presents with    postmenopausal bleeding    had an EMB in january     She reports some ache pain in the lower abdomen worse the last 3 months.  She reports intermittent vaginal bleeding since January. She reports that she has more pain when she has a BM.  She reports 1-2 days of bleeding every 4-6 weeks.  She is seeing Gi, had a cologard, doing fodmap diet and doing probiotic. She reports that she really really doesn't want surgery. She reports that she is very stressed at this time in her life.  She deals with her  with multiple myeloma. She is moving to [place]. She has always refused and surgery. She reports that she bleeding feels like a menstrual bleeding. She reports that recently she has had 12 spotting episodes.  She reports that she notices it mostly after a hard bowel movement with pushing. She reports that she has bleeding that is red and dark and thick. She reports that she gets a migraine around the bleeding times. She says she has always had a migraine with her periods.     Past Medical History:   Diagnosis Date    Allergy     Angio-edema     Arthritis     Asthma     Cataract     Chest pain at rest     Diverticulosis     Fibromyalgia 7/8/2012    Glaucoma suspect, steroid responders     Hand joint pain 7/8/2012    Headache(784.0)     Migraine     Osteoporosis     Recurrent upper respiratory infection (URI)     Urticaria        Past Surgical History:   Procedure Laterality Date    dexa  2014    osteopenia       Current Outpatient Medications   Medication Sig Dispense Refill    acetaminophen (TYLENOL) 325 MG tablet Take 325 mg by mouth every 6 (six) hours as needed for Pain.      albuterol 90 mcg/actuation inhaler Inhale 2 puffs into the lungs every 6  (six) hours as needed for Wheezing. Rescue 1 Inhaler 11    budesonide-formoterol 80-4.5 mcg (SYMBICORT) 80-4.5 mcg/actuation HFAA Inhale 2 puffs into the lungs 2 (two) times daily. Controller 11 g 11    methylcellulose (CITRUCEL ORAL) Take by mouth 2 (two) times daily.      valACYclovir (VALTREX) 500 MG tablet TAKE 1 CAPLET BY MOUTH ONCE DAILY 30 tablet 0    VOLTAREN 1 % Gel APPLY 4 GRAMS TOPICALLY 4 TIMES DAILY AS DIRECTED 2 Tube 6    multivitamin (THERAGRAN) per tablet Take 1 tablet by mouth once daily.      rizatriptan (MAXALT) 10 MG tablet TAKE ONE TABLET BY MOUTH AT ONSET OF MIGRAINE, MAY REPEAT EVERY 2 HOURS. DO NOT EXCEED 3 TABLETS IN 24 HOURS. 10 tablet 11    rosuvastatin (CRESTOR) 5 MG tablet Take 1 tablet (5 mg total) by mouth every evening. 30 tablet 5     No current facility-administered medications for this visit.        Review of patient's allergies indicates:   Allergen Reactions    Doxycycline Nausea And Vomiting    Corticosteroids (glucocorticoids)      Causes pt to have migraines for 2 weeks    Pcn [penicillins] Rash    Sulfa (sulfonamide antibiotics) Rash       Family History   Problem Relation Age of Onset    Hypertension Mother     Diabetes Mother     Stroke Mother     Dementia Mother     Cancer Father         pancreas    Allergies Father     Allergic rhinitis Father     Macular degeneration Cousin     Allergic rhinitis Paternal Aunt     Allergies Paternal Aunt     Allergic rhinitis Paternal Uncle     Allergies Paternal Uncle     Glaucoma Neg Hx     Amblyopia Neg Hx     Blindness Neg Hx     Cataracts Neg Hx     Retinal detachment Neg Hx     Strabismus Neg Hx     Thyroid disease Neg Hx     Angioedema Neg Hx     Asthma Neg Hx     Eczema Neg Hx     Immunodeficiency Neg Hx     Colon cancer Neg Hx     Cystic fibrosis Neg Hx     Ulcerative colitis Neg Hx     Stomach cancer Neg Hx     Esophageal cancer Neg Hx        Social History     Tobacco Use    Smoking  "status: Never Smoker    Smokeless tobacco: Never Used   Substance Use Topics    Alcohol use: Not Currently     Frequency: Never     Binge frequency: Never     Comment: social wine use in the past not current. last was 15 years ago    Drug use: No       OB History    Para Term  AB Living   0 0 0 0 0 0   SAB TAB Ectopic Multiple Live Births   0 0 0 0         Review of Symptoms:  GENERAL: Denies weight gain or weight loss. Feeling well overall.   SKIN: Denies rash or lesions.   HEAD: Denies head injury or headache.   NODES: Denies enlarged lymph nodes.   CHEST: Denies chest pain or shortness of breath.   CARDIOVASCULAR: Denies palpitations or left sided chest pain.   ABDOMEN: ++ abdominal pain, constipation, diarrhea, nausea, vomiting or rectal bleeding.   URINARY: No frequency, dysuria, hematuria, or burning on urination.  HEMATOLOGIC: No easy bruisability or excessive bleeding.   MUSCULOSKELETAL: Denies joint pain or swelling.     /80   Ht 5' 4" (1.626 m)   Wt 70.8 kg (156 lb 1.4 oz)     ASSESSMENT/PLAN:  Postmenopausal bleeding    Diverticulosis of both small and large intestine without bleeding    Thickened endometrium    Lower abdominal pain    Weight loss    Mild intermittent chronic asthma without complication      We discussed moving forward with hysteroscopy D&C. She is very against having surgery.     We discussed possible irritable bowel syndrome.  She is seeing GI and dietician next month.  She is also seeing urology next month.     30 minutes spent today with this patient. Greater than half spent in counseling today.       "

## 2019-08-08 ENCOUNTER — TELEPHONE (OUTPATIENT)
Dept: OBSTETRICS AND GYNECOLOGY | Facility: CLINIC | Age: 74
End: 2019-08-08

## 2019-08-08 ENCOUNTER — OFFICE VISIT (OUTPATIENT)
Dept: UROLOGY | Facility: CLINIC | Age: 74
End: 2019-08-08
Payer: MEDICARE

## 2019-08-08 VITALS
HEART RATE: 82 BPM | DIASTOLIC BLOOD PRESSURE: 74 MMHG | BODY MASS INDEX: 26.46 KG/M2 | WEIGHT: 155 LBS | HEIGHT: 64 IN | SYSTOLIC BLOOD PRESSURE: 124 MMHG

## 2019-08-08 DIAGNOSIS — N32.89 BLADDER WALL THICKENING: ICD-10-CM

## 2019-08-08 DIAGNOSIS — R33.9 INCOMPLETE BLADDER EMPTYING: ICD-10-CM

## 2019-08-08 DIAGNOSIS — R10.30 LOWER ABDOMINAL PAIN, UNSPECIFIED: Primary | ICD-10-CM

## 2019-08-08 PROCEDURE — 99213 OFFICE O/P EST LOW 20 MIN: CPT | Mod: PBBFAC | Performed by: NURSE PRACTITIONER

## 2019-08-08 PROCEDURE — 99215 PR OFFICE/OUTPT VISIT, EST, LEVL V, 40-54 MIN: ICD-10-PCS | Mod: S$PBB,,, | Performed by: NURSE PRACTITIONER

## 2019-08-08 PROCEDURE — 99999 PR PBB SHADOW E&M-EST. PATIENT-LVL III: ICD-10-PCS | Mod: PBBFAC,,, | Performed by: NURSE PRACTITIONER

## 2019-08-08 PROCEDURE — 99999 PR PBB SHADOW E&M-EST. PATIENT-LVL III: CPT | Mod: PBBFAC,,, | Performed by: NURSE PRACTITIONER

## 2019-08-08 PROCEDURE — 99215 OFFICE O/P EST HI 40 MIN: CPT | Mod: S$PBB,,, | Performed by: NURSE PRACTITIONER

## 2019-08-08 NOTE — TELEPHONE ENCOUNTER
Called pt to schedule her surgery. She states she hasnt made up her mind yet if she will proceed with surgery

## 2019-08-08 NOTE — LETTER
August 8, 2019      Columba Anderson MD  [address]  [facility] [address]           [facility]rology 4th Floor  [address]  [facility] [address]  Phone: [phone]          Patient: Margarita Dunne   MR Number: [id number]   YOB: 1945   Date of Visit: 8/8/2019       Dear Dr. Columba Anderson:    Thank you for referring Margarita Dunne to me for evaluation. Attached you will find relevant portions of my assessment and plan of care.    If you have questions, please do not hesitate to call me. I look forward to following Margarita Dunne along with you.    Sincerely,    Roger Avelar, DNP    Enclosure  CC:  No Recipients    If you would like to receive this communication electronically, please contact [email or web address] or [phone] to request more information on [facility] Link access.    For providers and/or their staff who would like to refer a patient to [facility], please contact us through our one-stop-shop provider referral line, [facility] , at [phone].    If you feel you have received this communication in error or would no longer like to receive these types of communications, please e-mail [email or web address]

## 2019-08-08 NOTE — PROGRESS NOTES
CHIEF COMPLAINT:    Ms. Dunne is a 74 y.o. female presenting for consultation at the request of Columba Anderson MD. Patient presents with generalized lower abd pain, and incomplete bladder emptying.    PRESENTING ILLNESS:    Margarita Dunne is a 74 y.o. female new patient to me (records of past medical, family and social history personally reviewed by me), w/ h/o constipation, diverticulosis, and postmenopausal bleeding (recommended hysteroscopy D&C - pt refused).    Today pt presents to clinic w/ spouse reporting generalized lower abd pain x2 yrs, has worsened over past 3 months. Evaluated by GYN 8/2/19, which they do not think abd pain is GYN related. Pt reports h/o incomplete bladder emptying since childhood teen yrs. Reports post void dribbling intermittently, denies RICCO/UUI/continuous incontinence. Denies frequency, urgency, GH, dysuria. BM usually regular, once/d, reports has to work at it, uses metamucil, probiotics, and stool softeners.    UA dip in clinic today revealed 50 blood, trace leuks.    PVR in clinic today: 39mL.    REVIEW OF SYSTEMS:    Margarita Dunne denies headache, blurred vision, fever, nausea, vomiting, chills, abdominal pain, pelvic pain, flank pain, bleeding per rectum, cough, SOB, recent loss of consciousness, recent mental status changes, seizures, dizziness, or upper or lower extremity weakness.    PATIENT HISTORY:    Past Medical History:   Diagnosis Date    Allergy     Angio-edema     Arthritis     Asthma     Cataract     Chest pain at rest     Diverticulosis     Fibromyalgia 7/8/2012    Glaucoma suspect, steroid responders     Hand joint pain 7/8/2012    Headache(784.0)     Migraine     Osteoporosis     Recurrent upper respiratory infection (URI)     Urticaria        Past Surgical History:   Procedure Laterality Date    dexa  2014    osteopenia       Family History   Problem Relation Age of Onset    Hypertension Mother     Diabetes Mother     Stroke Mother      Dementia Mother     Cancer Father         pancreas    Allergies Father     Allergic rhinitis Father     Macular degeneration Cousin     Allergic rhinitis Paternal Aunt     Allergies Paternal Aunt     Allergic rhinitis Paternal Uncle     Allergies Paternal Uncle     Glaucoma Neg Hx     Amblyopia Neg Hx     Blindness Neg Hx     Cataracts Neg Hx     Retinal detachment Neg Hx     Strabismus Neg Hx     Thyroid disease Neg Hx     Angioedema Neg Hx     Asthma Neg Hx     Eczema Neg Hx     Immunodeficiency Neg Hx     Colon cancer Neg Hx     Cystic fibrosis Neg Hx     Ulcerative colitis Neg Hx     Stomach cancer Neg Hx     Esophageal cancer Neg Hx        Social History     Socioeconomic History    Marital status:      Spouse name: Not on file    Number of children: 0    Years of education: Not on file    Highest education level: Not on file   Occupational History    Not on file   Social Needs    Financial resource strain: Not hard at all    Food insecurity:     Worry: Never true     Inability: Never true    Transportation needs:     Medical: No     Non-medical: No   Tobacco Use    Smoking status: Never Smoker    Smokeless tobacco: Never Used   Substance and Sexual Activity    Alcohol use: Not Currently     Frequency: Never     Binge frequency: Never     Comment: social wine use in the past not current. last was 15 years ago    Drug use: No    Sexual activity: Not on file   Lifestyle    Physical activity:     Days per week: 0 days     Minutes per session: 0 min    Stress: To some extent   Relationships    Social connections:     Talks on phone: More than three times a week     Gets together: Once a week     Attends Congregational service: Not on file     Active member of club or organization: Yes     Attends meetings of clubs or organizations: More than 4 times per year     Relationship status:    Other Topics Concern    Not on file   Social History Narrative    Not on file        Allergies:  Doxycycline; Corticosteroids (glucocorticoids); Pcn [penicillins]; and Sulfa (sulfonamide antibiotics)    Medications:    Current Outpatient Medications:     acetaminophen (TYLENOL) 325 MG tablet, Take 325 mg by mouth every 6 (six) hours as needed for Pain., Disp: , Rfl:     albuterol 90 mcg/actuation inhaler, Inhale 2 puffs into the lungs every 6 (six) hours as needed for Wheezing. Rescue, Disp: 1 Inhaler, Rfl: 11    budesonide-formoterol 80-4.5 mcg (SYMBICORT) 80-4.5 mcg/actuation HFAA, Inhale 2 puffs into the lungs 2 (two) times daily. Controller, Disp: 11 g, Rfl: 11    methylcellulose (CITRUCEL ORAL), Take by mouth 2 (two) times daily., Disp: , Rfl:     multivitamin (THERAGRAN) per tablet, Take 1 tablet by mouth once daily., Disp: , Rfl:     rizatriptan (MAXALT) 10 MG tablet, TAKE ONE TABLET BY MOUTH AT ONSET OF MIGRAINE, MAY REPEAT EVERY 2 HOURS. DO NOT EXCEED 3 TABLETS IN 24 HOURS., Disp: 10 tablet, Rfl: 11    valACYclovir (VALTREX) 500 MG tablet, TAKE 1 CAPLET BY MOUTH ONCE DAILY, Disp: 30 tablet, Rfl: 0    VOLTAREN 1 % Gel, APPLY 4 GRAMS TOPICALLY 4 TIMES DAILY AS DIRECTED, Disp: 2 Tube, Rfl: 6    rosuvastatin (CRESTOR) 5 MG tablet, Take 1 tablet (5 mg total) by mouth every evening., Disp: 30 tablet, Rfl: 5    PHYSICAL EXAMINATION:    The patient generally appears in good health, is appropriately interactive, and is in no apparent distress.     Eyes: anicteric sclerae, moist conjunctivae; no lid-lag; PERRLA     HENT: Atraumatic; oropharynx clear with moist mucous membranes and no mucosal ulcerations;normal hard and soft palate. No evidence of lymphadenopathy.    Neck: Trachea midline.  No thyromegaly.    Musculoskeletal: No abnormal gait.    Skin: No lesions.    Mental: Cooperative with normal affect.  Is oriented to time, place, and person.    Neuro: Grossly intact.    Chest: Normal inspiratory effort.   No accessory muscles.  No audible wheezes.  Respirations symmetric on  inspiration and expiration.    Heart: Regular rhythm.      Abdomen:  Soft, non-tender. No masses or organomegaly. Bladder is not palpable. No evidence of flank discomfort. No evidence of inguinal hernia.    Extremities: No clubbing, cyanosis, or edema.    LABS:    Lab Results   Component Value Date    CREATININE 0.8 07/25/2019    CREATININE 0.9 04/10/2019    CREATININE 0.8 08/06/2018    CREATININE 0.8 08/06/2018       Hemoglobin A1C   Date Value Ref Range Status   08/12/2016 5.9 4.5 - 6.2 % Final     Comment:     According to ADA guidelines, hemoglobin A1C <7.0% represents  optimal control in non-pregnant diabetic patients.  Different  metrics may apply to specific populations.   Standards of Medical Care in Diabetes - 2016.  For the purpose of screening for the presence of diabetes:  <5.7%     Consistent with the absence of diabetes  5.7-6.4%  Consistent with increasing risk for diabetes   (prediabetes)  >or=6.5%  Consistent with diabetes  Currently no consensus exists for use of hemoglobin A1C  for diagnosis of diabetes for children.       Lab Results   Component Value Date    LABURIN NO GROWTH AFTER 48 HOURS. 07/19/2007    LABURIN  10/12/2006     MULTIPLE ORGANISMS ISOLATED. NONE IN PREDOMINANCE REPEAT IF CLINICALLY NECESSARY.    LABURIN  07/26/2005     MULTIPLE ORGANISMS ISOLATED. NONE IN PREDOMINANCE REPEAT IF CLINICALLY NECESSARY.     Imaging:     CT abd pelvis (4/12/19):  CLINICAL HISTORY:  chronic lower abdominal pain; Lower abdominal pain, unspecified    TECHNIQUE:  5.0 mm axial CT images of the abdomen and pelvis were obtained via helical, multi detector CT technique.  No intravenous contrast material was administered.    COMPARISON:  None    FINDINGS:  Heart: No cardiomegaly or pericardial effusion.  There is scattered coronary artery calcification.    Lung Bases: Symmetrically expanded without evidence of focal consolidation or mass.  There are bandlike opacities consistent with subsegmental atelectasis.   No pneumothorax or pleural effusion.    Liver: Normal in size and attenuation without focal hepatic abnormality.    Gallbladder: Normal appearance without evidence for cholecystitis.    Bile Ducts: No intra or extrahepatic biliary ductal dilation.    Pancreas: No pancreatic mass lesion or peripancreatic inflammatory change.    Spleen: There is an accessory splenule.  There is a punctate calcified granuloma.    Adrenals: Unremarkable.    Genitourinary: Normal in size and location.  No focal renal abnormality, nephrolithiasis, or hydroureteronephrosis.  Bladder demonstrates smooth contours without bladder wall thickening.    Reproductive organs: Normal.    GI tract/Mesentery: Stomach is normal appearance.  Visualized loops of small and large bowel are normal in caliber without evidence for inflammation or obstruction.  There are scattered diverticula without evidence of acute diverticulitis.    Peritoneal Space: No abdominopelvic ascites or intraperitoneal free air.    Retroperitoneum: No significant adenopathy.    Abdominal wall: Unremarkable.    Vasculature: Abdominal aorta is normal in caliber with scattered calcific atherosclerosis.    Bones: Degenerative change with mild levoscoliosis.  No acute fracture or bony destructive process.      Impression       Scattered diverticula without evidence of acute diverticulitis.    Degenerative changes and scattered calcific atherosclerosis.    Electronically signed by resident: Shasha Schmitz  Date: 04/12/2019  Time: 11:11       IMPRESSION:    Lower abdominal pain, unspecified  -     POCT urine dipstick without microscope  -     POCT Bladder Scan  -     Urinalysis  -     Urine culture    Incomplete bladder emptying  -     POCT urine dipstick without microscope  -     POCT Bladder Scan  -     Urinalysis  -     Urine culture    Bladder wall thickening  -     POCT urine dipstick without microscope  -     POCT Bladder Scan  -     Urinalysis  -     Urine  culture        PLAN:    I spent 45 minutes with the patient of which more than half was spent in direct consultation with the patient in regards to our treatment and plan.      Discussed and reviewed abd pain, potential etiologies. Reviewed most recent abd pelvis CT findings. Recommend  exam w/ catheterized urine for sterile urine specimen and PVR - pt refused.  Recommend f/u w/ GI for further evaluation of lower abd pain.    Clean catch urine sent for micro and culture.    Recommend taking her time voiding.    Education sheets provided.     No follow-ups on file.    Pt expressed understanding and agree w/ plan.

## 2019-08-12 ENCOUNTER — TELEPHONE (OUTPATIENT)
Dept: UROLOGY | Facility: CLINIC | Age: 74
End: 2019-08-12

## 2019-08-12 ENCOUNTER — PATIENT MESSAGE (OUTPATIENT)
Dept: UROLOGY | Facility: CLINIC | Age: 74
End: 2019-08-12

## 2019-08-12 ENCOUNTER — PATIENT MESSAGE (OUTPATIENT)
Dept: INTERNAL MEDICINE | Facility: CLINIC | Age: 74
End: 2019-08-12

## 2019-08-12 DIAGNOSIS — R33.9 INCOMPLETE BLADDER EMPTYING: Primary | ICD-10-CM

## 2019-08-12 NOTE — TELEPHONE ENCOUNTER
Poke with pt., urine was inadvertently not sent to lab. She will provide a home collect specimen to the lab in [place].

## 2019-08-13 ENCOUNTER — TELEPHONE (OUTPATIENT)
Dept: UROLOGY | Facility: CLINIC | Age: 74
End: 2019-08-13

## 2019-08-13 NOTE — TELEPHONE ENCOUNTER
----- Message from Roger Avelar DNP sent at 8/13/2019  7:40 AM CDT -----  Regarding: pls set up urine cx for pt  pls set up urine cx for pt

## 2019-08-14 ENCOUNTER — LAB VISIT (OUTPATIENT)
Dept: LAB | Facility: HOSPITAL | Age: 74
End: 2019-08-14
Payer: MEDICARE

## 2019-08-14 DIAGNOSIS — R33.9 INCOMPLETE BLADDER EMPTYING: ICD-10-CM

## 2019-08-14 PROCEDURE — 87086 URINE CULTURE/COLONY COUNT: CPT

## 2019-08-15 LAB — BACTERIA UR CULT: NORMAL

## 2019-09-03 ENCOUNTER — OFFICE VISIT (OUTPATIENT)
Dept: GASTROENTEROLOGY | Facility: CLINIC | Age: 74
End: 2019-09-03
Payer: MEDICARE

## 2019-09-03 VITALS
DIASTOLIC BLOOD PRESSURE: 80 MMHG | WEIGHT: 149.25 LBS | SYSTOLIC BLOOD PRESSURE: 138 MMHG | BODY MASS INDEX: 25.48 KG/M2 | HEIGHT: 64 IN | HEART RATE: 64 BPM | RESPIRATION RATE: 18 BRPM

## 2019-09-03 DIAGNOSIS — K58.1 IRRITABLE BOWEL SYNDROME WITH CONSTIPATION: ICD-10-CM

## 2019-09-03 DIAGNOSIS — R10.32 ABDOMINAL PAIN, LLQ (LEFT LOWER QUADRANT): ICD-10-CM

## 2019-09-03 DIAGNOSIS — R10.31 ABDOMINAL PAIN, RLQ (RIGHT LOWER QUADRANT): Primary | ICD-10-CM

## 2019-09-03 PROCEDURE — 99999 PR PBB SHADOW E&M-EST. PATIENT-LVL III: ICD-10-PCS | Mod: PBBFAC,,, | Performed by: INTERNAL MEDICINE

## 2019-09-03 PROCEDURE — 99999 PR PBB SHADOW E&M-EST. PATIENT-LVL III: CPT | Mod: PBBFAC,,, | Performed by: INTERNAL MEDICINE

## 2019-09-03 PROCEDURE — 99213 OFFICE O/P EST LOW 20 MIN: CPT | Mod: PBBFAC,PO | Performed by: INTERNAL MEDICINE

## 2019-09-03 PROCEDURE — 99214 OFFICE O/P EST MOD 30 MIN: CPT | Mod: S$PBB,,, | Performed by: INTERNAL MEDICINE

## 2019-09-03 PROCEDURE — 99214 PR OFFICE/OUTPT VISIT, EST, LEVL IV, 30-39 MIN: ICD-10-PCS | Mod: S$PBB,,, | Performed by: INTERNAL MEDICINE

## 2019-09-03 RX ORDER — DICYCLOMINE HYDROCHLORIDE 10 MG/1
10 CAPSULE ORAL EVERY 6 HOURS PRN
Qty: 50 CAPSULE | Refills: 1 | Status: SHIPPED | OUTPATIENT
Start: 2019-09-03 | End: 2019-09-20

## 2019-09-03 NOTE — PROGRESS NOTES
Pt presents for evaluation lower abd pain. Pt describes bilat lower abd pain, initially intermittent when started approx 2 years ago, now essentially constant. Worse with urination and defecation. No vomiting. No diarrhea. Chronic constipation, taking fiber. Bowel movement every other day. No bleeding. Lost weight, on low FODMAP diet. No fever or jaundice. No prior C-scope. CT scan several months unremarkable (non-contrasted scan per pt request). Pt would like to try something for the pain/cramps, as she is not ready to pursue C-scope secondary to anxiety. Pt has had Urology and GYN evaluations.    REVIEW OF SYSTEMS:   Constitutional: Negative for fever, appetite change; Positive unexpected weight change.  HENT: Negative for sore throat and trouble swallowing.  Eyes: Negative for visual disturbance.  Respiratory: Negative for chest tightness, shortness of breath and wheezing.  Cardiovascular: Negative for chest pain.  Gastrointestinal:  as per HPI    PHYSICAL EXAMINATION:                                                        GENERAL:  Comfortable, in no acute distress.      SKIN: Non-jaundiced.                             HEENT EXAM:  Nonicteric.  No adenopathy.  Oropharynx is clear.               NECK:  Supple.                                                               LUNGS:  Clear.                                                               CARDIAC:  Regular rate and rhythm.  S1, S2.  No murmur.                      ABDOMEN:  Soft, positive bowel sounds, nontender.  No hepatosplenomegaly or masses.  No rebound or guarding.                                             EXTREMITIES:  No edema.       IMP: Lower abd pain (chronic) - although this may be IBS-C, exacerbated by life stressors/anxiety, the possibility of neoplasm remains, in view of weight loss and age (CT scan sub-optimal without constrast).     PLAN: I have discussed above with pt. She does NOT want to pursue colon evaluation at this time. She  would like to try anti-spasmotic for the pain/cramps first, and then re-consider C-scope. I suggested Librax (both anti-spasmotic and anti-anxiolytic effect), however pt anxious re: potential side effects. Pt agreeable to try bentyl (script sent). I also offered option of BE exam of colon, which pt declined at this time. I explained contrasted CT scan would provide more detailed images of abd, however pt anxious re: potential reaction. Pt instructed to up-date office within next month re: decision on colon exam.

## 2019-09-03 NOTE — LETTER
September 3, 2019      Columba Anderson MD  [address]  [facility] [address]           [facility]  [address] [facility] [address] [place] [address]  Phone: [phone]          Patient: Margarita Dunne   MR Number: [id number]   YOB: 1945   Date of Visit: 9/3/2019       Dear Dr. Columba Anderson:    Thank you for referring Margarita Dunne to me for evaluation. Attached you will find relevant portions of my assessment and plan of care.    If you have questions, please do not hesitate to call me. I look forward to following Margarita Dunne along with you.    Sincerely,    Filiberto Figueredo MD    Enclosure  CC:  No Recipients    If you would like to receive this communication electronically, please contact [email or web address] or [phone] to request more information on [employer] Link access.    For providers and/or their staff who would like to refer a patient to [facility], please contact us through our one-stop-shop provider referral line, [facility], at [phone].    If you feel you have received this communication in error or would no longer like to receive these types of communications, please e-mail [email or web address]

## 2019-09-04 ENCOUNTER — PATIENT MESSAGE (OUTPATIENT)
Dept: GASTROENTEROLOGY | Facility: CLINIC | Age: 74
End: 2019-09-04

## 2019-09-04 RX ORDER — RIZATRIPTAN BENZOATE 10 MG/1
TABLET ORAL
Qty: 10 TABLET | Refills: 11 | Status: SHIPPED | OUTPATIENT
Start: 2019-09-04 | End: 2019-09-20 | Stop reason: SDUPTHER

## 2019-09-04 NOTE — TELEPHONE ENCOUNTER
Pt does not want to take bentyl do to side effects & would like an alternative called in.  Please change

## 2019-09-07 ENCOUNTER — PATIENT MESSAGE (OUTPATIENT)
Dept: INTERNAL MEDICINE | Facility: CLINIC | Age: 74
End: 2019-09-07

## 2019-09-07 DIAGNOSIS — R10.9 ABDOMINAL CRAMPING: Primary | ICD-10-CM

## 2019-09-10 ENCOUNTER — PATIENT MESSAGE (OUTPATIENT)
Dept: INTERNAL MEDICINE | Facility: CLINIC | Age: 74
End: 2019-09-10

## 2019-09-11 ENCOUNTER — HOSPITAL ENCOUNTER (OUTPATIENT)
Dept: RADIOLOGY | Facility: HOSPITAL | Age: 74
Discharge: HOME | End: 2019-09-11
Attending: INTERNAL MEDICINE
Payer: MEDICARE

## 2019-09-11 ENCOUNTER — OFFICE VISIT (OUTPATIENT)
Dept: INTERNAL MEDICINE | Facility: CLINIC | Age: 74
End: 2019-09-11
Payer: MEDICARE

## 2019-09-11 VITALS
BODY MASS INDEX: 24.96 KG/M2 | SYSTOLIC BLOOD PRESSURE: 138 MMHG | WEIGHT: 146.19 LBS | DIASTOLIC BLOOD PRESSURE: 70 MMHG | HEART RATE: 111 BPM | HEIGHT: 64 IN | OXYGEN SATURATION: 97 %

## 2019-09-11 DIAGNOSIS — R10.84 GENERALIZED ABDOMINAL PAIN: ICD-10-CM

## 2019-09-11 LAB
CREAT SERPL-MCNC: 0.3 MG/DL (ref 0.5–1.4)
SAMPLE: ABNORMAL

## 2019-09-11 PROCEDURE — 99214 PR OFFICE/OUTPT VISIT, EST, LEVL IV, 30-39 MIN: ICD-10-PCS | Mod: S$PBB,,, | Performed by: INTERNAL MEDICINE

## 2019-09-11 PROCEDURE — 99214 OFFICE O/P EST MOD 30 MIN: CPT | Mod: S$PBB,,, | Performed by: INTERNAL MEDICINE

## 2019-09-11 PROCEDURE — 74177 CT ABD & PELVIS W/CONTRAST: CPT | Mod: 26,,, | Performed by: RADIOLOGY

## 2019-09-11 PROCEDURE — 25500020 PHARM REV CODE 255: Performed by: INTERNAL MEDICINE

## 2019-09-11 PROCEDURE — 99215 OFFICE O/P EST HI 40 MIN: CPT | Mod: PBBFAC,25 | Performed by: INTERNAL MEDICINE

## 2019-09-11 PROCEDURE — 74177 CT ABD & PELVIS W/CONTRAST: CPT | Mod: TC

## 2019-09-11 PROCEDURE — 74177 CT ABDOMEN PELVIS WITH CONTRAST: ICD-10-PCS | Mod: 26,,, | Performed by: RADIOLOGY

## 2019-09-11 PROCEDURE — 99999 PR PBB SHADOW E&M-EST. PATIENT-LVL V: ICD-10-PCS | Mod: PBBFAC,,, | Performed by: INTERNAL MEDICINE

## 2019-09-11 PROCEDURE — 99999 PR PBB SHADOW E&M-EST. PATIENT-LVL V: CPT | Mod: PBBFAC,,, | Performed by: INTERNAL MEDICINE

## 2019-09-11 RX ORDER — FLUOXETINE 10 MG/1
10 CAPSULE ORAL DAILY
Qty: 15 CAPSULE | Refills: 1 | Status: SHIPPED | OUTPATIENT
Start: 2019-09-11 | End: 2019-09-25

## 2019-09-11 RX ORDER — FLUOXETINE 10 MG/1
10 TABLET ORAL DAILY
Qty: 15 TABLET | Refills: 0 | Status: SHIPPED | OUTPATIENT
Start: 2019-09-11 | End: 2019-10-30 | Stop reason: SDUPTHER

## 2019-09-11 RX ADMIN — IOHEXOL 75 ML: 350 INJECTION, SOLUTION INTRAVENOUS at 12:09

## 2019-09-11 RX ADMIN — IOHEXOL 15 ML: 350 INJECTION, SOLUTION INTRAVENOUS at 10:09

## 2019-09-11 RX ADMIN — IOHEXOL 15 ML: 350 INJECTION, SOLUTION INTRAVENOUS at 11:09

## 2019-09-11 NOTE — PROGRESS NOTES
Subjective:      Patient ID: Margarita Dunne is a 74 y.o. female.    Chief Complaint: Abdominal Pain    HPI:  HPI   Patient comes in today in hopes for help with her abdominal pain. I have reviewed the GI medicine consult.  Patient declines colonoscopy.  She is willing to do CT with contrast.  She feels her symptoms are constipated related IBS.  She would like some help with this.  She states that she knows someone that was treated with Prozac and did very well.  I have told her that Prozac is not generally used for this condition but she would like to try it.  I also said that I would look further into medications.  Possibly we could make appointments every 2-3 weeks until her symptoms were better.  I had also suggested that she see Dr. Henderson and Gastroenterology.  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    Glaucoma suspect - Both Eyes    Nuclear sclerosis - Both Eyes    Chronic rhinitis    Eyelid myokymia - Right Eye    Herpes labialis    Visual field defect - Both Eyes    Penicillin allergy    Itching    Hyperlipidemia    Migraine with aura and without status migrainosus, not intractable    Osteopenia    Numbness and tingling of both legs below knees    Neck pain    Vitamin D deficiency    Memory loss    Pain management    Incomplete bladder emptying    Lower abdominal pain, unspecified    Bladder wall thickening     Past Medical History:   Diagnosis Date    Allergy     Angio-edema     Arthritis     Asthma     Cataract     Chest pain at rest     Diverticulosis     Fibromyalgia 7/8/2012    Glaucoma suspect, steroid responders     Hand joint pain 7/8/2012    Headache(784.0)     Migraine     Osteoporosis     Recurrent upper respiratory infection (URI)     Urticaria      Past Surgical History:   Procedure Laterality Date    dexa   "2014    osteopenia     Family History   Problem Relation Age of Onset    Hypertension Mother     Diabetes Mother     Stroke Mother     Dementia Mother     Cancer Father         pancreas    Allergies Father     Allergic rhinitis Father     Macular degeneration Cousin     Allergic rhinitis Paternal Aunt     Allergies Paternal Aunt     Allergic rhinitis Paternal Uncle     Allergies Paternal Uncle     Glaucoma Neg Hx     Amblyopia Neg Hx     Blindness Neg Hx     Cataracts Neg Hx     Retinal detachment Neg Hx     Strabismus Neg Hx     Thyroid disease Neg Hx     Angioedema Neg Hx     Asthma Neg Hx     Eczema Neg Hx     Immunodeficiency Neg Hx     Colon cancer Neg Hx     Cystic fibrosis Neg Hx     Ulcerative colitis Neg Hx     Stomach cancer Neg Hx     Esophageal cancer Neg Hx      Review of Systems   Constitutional: Negative for fever.   Gastrointestinal: Positive for abdominal pain, constipation and nausea. Negative for diarrhea and vomiting.   Genitourinary: Positive for frequency. Negative for dysuria and hematuria.   Musculoskeletal: Positive for arthralgias and myalgias.   Neurological: Positive for headaches.     Objective:     Vitals:    09/11/19 0911   BP: 138/70   Pulse: (!) 111   SpO2: 97%   Weight: 66.3 kg (146 lb 2.6 oz)   Height: 5' 4" (1.626 m)   PainSc:   6     Body mass index is 25.09 kg/m².  Physical Exam discussion only  Assessment:     1. Generalized abdominal pain      Plan:   Margarita was seen today for abdominal pain.    Diagnoses and all orders for this visit:    Generalized abdominal pain  -     CT Abdomen Pelvis With Contrast; Future  -     Creatinine, serum; Future    Other orders  -     FLUoxetine 10 MG capsule; Take 1 capsule (10 mg total) by mouth once daily.  -     FLUoxetine 10 MG Tab; Take 1 tablet (10 mg total) by mouth once daily.        Problem List Items Addressed This Visit     None      Visit Diagnoses     Generalized abdominal pain        Relevant Orders    " CT Abdomen Pelvis With Contrast    Creatinine, serum (Completed)        Orders Placed This Encounter   Procedures    CT Abdomen Pelvis With Contrast     Standing Status:   Future     Number of Occurrences:   1     Standing Expiration Date:   9/10/2020     Order Specific Question:   Is the patient allergic to iodine or contrast?     Answer:   No     Order Specific Question:   Is the patient on ANY Metformin medication such as Glucophage/Glucovance ?     Answer:   No     Order Specific Question:   Does the patient have any of the following risk factors?     Answer:   None    Creatinine, serum     Standing Status:   Future     Number of Occurrences:   1     Standing Expiration Date:   11/9/2020     Follow up in about 2 weeks (around 9/25/2019) for Follow up .     Medication List           Accurate as of 9/11/19 12:46 PM. If you have any questions, ask your nurse or doctor.               START taking these medications    * FLUoxetine 10 MG capsule  Take 1 capsule (10 mg total) by mouth once daily.  Started by:  Columba Anderson MD     * FLUoxetine 10 MG Tab  Take 1 tablet (10 mg total) by mouth once daily.  Started by:  Columba Anderson MD         * This list has 2 medication(s) that are the same as other medications prescribed for you. Read the directions carefully, and ask your doctor or other care provider to review them with you.            CONTINUE taking these medications    acetaminophen 325 MG tablet  Commonly known as:  TYLENOL     albuterol 90 mcg/actuation inhaler  Commonly known as:  PROVENTIL/VENTOLIN HFA  Inhale 2 puffs into the lungs every 6 (six) hours as needed for Wheezing. Rescue     budesonide-formoterol 80-4.5 mcg 80-4.5 mcg/actuation Hfaa  Commonly known as:  SYMBICORT  Inhale 2 puffs into the lungs 2 (two) times daily. Controller     CITRUCEL ORAL     dicyclomine 10 MG capsule  Commonly known as:  BENTYL  Take 1 capsule (10 mg total) by mouth every 6 (six) hours as needed.     multivitamin per  tablet  Commonly known as:  THERAGRAN     * rizatriptan 10 MG tablet  Commonly known as:  MAXALT  TAKE ONE TABLET BY MOUTH AT ONSET OF MIGRAINE, MAY REPEAT EVERY 2 HOURS. DO NOT EXCEED 3 TABLETS IN 24 HOURS.     * rizatriptan 10 MG tablet  Commonly known as:  MAXALT  TAKE 1 TABLET BY MOUTH AT ONSET OF MIGRAINE. MAY REPEAT EVERY 2 HOURS. DO NOT EXCEED 3 TABLETS IN 24 HOURS.     rosuvastatin 5 MG tablet  Commonly known as:  CRESTOR  Take 1 tablet (5 mg total) by mouth every evening.     valACYclovir 500 MG tablet  Commonly known as:  VALTREX  TAKE 1 CAPLET BY MOUTH ONCE DAILY     VOLTAREN 1 % Gel  Generic drug:  diclofenac sodium  APPLY 4 GRAMS TOPICALLY 4 TIMES DAILY AS DIRECTED         * This list has 2 medication(s) that are the same as other medications prescribed for you. Read the directions carefully, and ask your doctor or other care provider to review them with you.               Where to Get Your Medications      Information about where to get these medications is not yet available    Ask your nurse or doctor about these medications  · FLUoxetine 10 MG capsule  · FLUoxetine 10 MG Tab          home

## 2019-09-12 ENCOUNTER — TELEPHONE (OUTPATIENT)
Dept: INTERNAL MEDICINE | Facility: CLINIC | Age: 74
End: 2019-09-12

## 2019-09-12 ENCOUNTER — PATIENT MESSAGE (OUTPATIENT)
Dept: INTERNAL MEDICINE | Facility: CLINIC | Age: 74
End: 2019-09-12

## 2019-09-12 DIAGNOSIS — K57.92 DIVERTICULITIS: Primary | ICD-10-CM

## 2019-09-12 DIAGNOSIS — R93.5 ABNORMAL CT OF THE ABDOMEN: ICD-10-CM

## 2019-09-12 RX ORDER — METRONIDAZOLE 250 MG/1
250 TABLET ORAL 3 TIMES DAILY
Qty: 15 TABLET | Refills: 0 | Status: SHIPPED | OUTPATIENT
Start: 2019-09-12 | End: 2020-02-20

## 2019-09-12 RX ORDER — CIPROFLOXACIN 500 MG/1
500 TABLET ORAL 2 TIMES DAILY
Qty: 10 TABLET | Refills: 0 | Status: SHIPPED | OUTPATIENT
Start: 2019-09-12 | End: 2019-09-20

## 2019-09-12 NOTE — TELEPHONE ENCOUNTER
Discussed CT: treated diverticulitis, discussed gynonc appt.    Please make a Gyn Onc appt for the patient and call her with the appt on her cell. She is expecting the appt.

## 2019-09-13 ENCOUNTER — TELEPHONE (OUTPATIENT)
Dept: GASTROENTEROLOGY | Facility: CLINIC | Age: 74
End: 2019-09-13

## 2019-09-13 NOTE — TELEPHONE ENCOUNTER
Ma contact pt to schedule apt for abd cramping per the pt     Pt didn't answer left detail message to return call

## 2019-09-17 ENCOUNTER — NUTRITION (OUTPATIENT)
Dept: NUTRITION | Facility: CLINIC | Age: 74
End: 2019-09-17
Payer: MEDICARE

## 2019-09-17 ENCOUNTER — TELEPHONE (OUTPATIENT)
Dept: ADMINISTRATIVE | Facility: OTHER | Age: 74
End: 2019-09-17

## 2019-09-17 VITALS — WEIGHT: 146.63 LBS | HEIGHT: 64 IN | BODY MASS INDEX: 25.03 KG/M2

## 2019-09-17 DIAGNOSIS — R63.4 UNINTENTIONAL WEIGHT LOSS: Primary | ICD-10-CM

## 2019-09-17 DIAGNOSIS — Z71.3 DIETARY COUNSELING: ICD-10-CM

## 2019-09-17 DIAGNOSIS — R10.9 ABDOMINAL PAIN, UNSPECIFIED ABDOMINAL LOCATION: ICD-10-CM

## 2019-09-17 PROCEDURE — 99999 PR PBB SHADOW E&M-EST. PATIENT-LVL III: CPT | Mod: PBBFAC,,,

## 2019-09-17 PROCEDURE — 99213 OFFICE O/P EST LOW 20 MIN: CPT | Mod: PBBFAC

## 2019-09-17 PROCEDURE — 99999 PR PBB SHADOW E&M-EST. PATIENT-LVL III: ICD-10-PCS | Mod: PBBFAC,,,

## 2019-09-17 PROCEDURE — 97802 MEDICAL NUTRITION INDIV IN: CPT | Mod: PBBFAC | Performed by: DIETITIAN, REGISTERED

## 2019-09-17 NOTE — PROGRESS NOTES
"Referring Physician:Columba Anderson MD     Reason for visit:  Chief Complaint   Patient presents with    Abdominal Pain    Nutrition Counseling    Weight Loss      Initial Visit    :1945     Allergies Reviewed  Meds Reviewed    Anthropometrics  Weight:66.5 kg (146 lb 9.7 oz)  Height:5' 4" (1.626 m)  BMI:Body mass index is 25.16 kg/m².   IBW:   54.5 kg  +/-10%    Meds:  Outpatient Medications Prior to Visit   Medication Sig Dispense Refill    acetaminophen (TYLENOL) 325 MG tablet Take 325 mg by mouth every 6 (six) hours as needed for Pain.      albuterol 90 mcg/actuation inhaler Inhale 2 puffs into the lungs every 6 (six) hours as needed for Wheezing. Rescue 1 Inhaler 11    budesonide-formoterol 80-4.5 mcg (SYMBICORT) 80-4.5 mcg/actuation HFAA Inhale 2 puffs into the lungs 2 (two) times daily. Controller 11 g 11    ciprofloxacin HCl (CIPRO) 500 MG tablet Take 1 tablet (500 mg total) by mouth 2 (two) times daily. 10 tablet 0    dicyclomine (BENTYL) 10 MG capsule Take 1 capsule (10 mg total) by mouth every 6 (six) hours as needed. 50 capsule 1    FLUoxetine 10 MG capsule Take 1 capsule (10 mg total) by mouth once daily. 15 capsule 1    FLUoxetine 10 MG Tab Take 1 tablet (10 mg total) by mouth once daily. 15 tablet 0    methylcellulose (CITRUCEL ORAL) Take by mouth 2 (two) times daily.      metroNIDAZOLE (FLAGYL) 250 MG tablet Take 1 tablet (250 mg total) by mouth 3 (three) times daily. 15 tablet 0    multivitamin (THERAGRAN) per tablet Take 1 tablet by mouth once daily.      rizatriptan (MAXALT) 10 MG tablet TAKE ONE TABLET BY MOUTH AT ONSET OF MIGRAINE, MAY REPEAT EVERY 2 HOURS. DO NOT EXCEED 3 TABLETS IN 24 HOURS. 10 tablet 11    rizatriptan (MAXALT) 10 MG tablet TAKE 1 TABLET BY MOUTH AT ONSET OF MIGRAINE. MAY REPEAT EVERY 2 HOURS. DO NOT EXCEED 3 TABLETS IN 24 HOURS. 10 tablet 11    rosuvastatin (CRESTOR) 5 MG tablet Take 1 tablet (5 mg total) by mouth every evening. 30 tablet 5    " valACYclovir (VALTREX) 500 MG tablet TAKE 1 CAPLET BY MOUTH ONCE DAILY 30 tablet 0    VOLTAREN 1 % Gel APPLY 4 GRAMS TOPICALLY 4 TIMES DAILY AS DIRECTED 2 Tube 6     No facility-administered medications prior to visit.        Food/Drug Interactions Noted:  n/a    Vitamins/Supplements/Herbs:  n/a    Labs:   7/25/19 labs normal     Nutrition Prescription: 1995 Kcals/day( 30 kcal/kg to promote weight maintenance),    53-66 g protein( 0.8-1.0 g/kg)     Support System:   Pt and spouse present for today's encounter.  Spouse does the cooking when they eat at home; they eat out ~50% of their meals.  Pt states she only tolerates [facility] and [facility] at present.    Diet Hx:   Pt reports she has been following the FODMAP diet since April, and has lost approx 25 lbs in past 5 months.  Has abdominal pain and chronic constipation.  Reports she had a CT scan which shows possible cancer and she has an appt with oncology this week to discuss results.  Started on an antibiotic last week for diverticulitis and has been nauseated.  Has been eating primarily chicken, fish, eggs-no beef or pork and has been avoiding all dairy, all bread, all sugar.  States the FODMAP diet seemed to help with abdominal pain for awhile, but no longer seems to be helping.  We did discuss FODMAP is only meant to be a short-term diet regimen, with gradual reintroduction of restricted items.  If pt snacks:  6 low fat potato chips     Breakfast:   1 boiled egg or 1 scrambled egg mixed with brown rice.  Decaf coffee with rice milk.  Or bowl of rice chex cereal.  Lunch:   At [facility]:  Ate 1/2 of scrambled egg, hash brown, piece of grilled chicken meal.  Water.  Dinner:   The other half of lunch meal.  Water.    Current activity level and/or physical limitations:    Normal activity    Motivation to make changes/anticipated barriers and/or expected adherence:    Barrier may be pt's voiced fear of food/beverages causing greater abdominal  pain.    Nutrition-Focus Physical Findings:    Appears well nourished      Assessment:   Pt and spouse attentive.  Pt asked relevant questions about foods/beverages recommended and to avoid; use of lactose-free po supplements to increase calorie intake; healthy snacks.  All questions answered, and pt verbalized understanding of information.    Nutrition Diagnosis:   Food- and nutrition-related knowledge deficit RT lack of prior exposure to information AEB dx abdominal pain; unintentional weight loss      Recommendations:   [place], low fiber diet as tolerated.  Aim for 3 meals and 3 snacks daily to increase calorie/protein intake.      Strategies Implemented:  Stop FODMAP diet; increase po intake of tolerated foods    Consultation Time:30 minutes.  Communicated with referring healthcare provider:  Consult note available in pt's Epic chart per MD discretion  Follow Up:  Pt provided with dietitian contact number and advised to call with questions or make future appointment if further intervention needed.

## 2019-09-17 NOTE — PATIENT INSTRUCTIONS
[place], low fiber diet as tolerated.  Aim for 3 meals and 3 snacks daily to increase calorie/protein intake.

## 2019-09-18 ENCOUNTER — TELEPHONE (OUTPATIENT)
Dept: GYNECOLOGIC ONCOLOGY | Facility: CLINIC | Age: 74
End: 2019-09-18

## 2019-09-18 ENCOUNTER — PATIENT MESSAGE (OUTPATIENT)
Dept: OBSTETRICS AND GYNECOLOGY | Facility: CLINIC | Age: 74
End: 2019-09-18

## 2019-09-18 NOTE — TELEPHONE ENCOUNTER
----- Message from Leonie Salvador MD sent at 9/18/2019  1:09 PM CDT -----  Regarding: Gyn Onc appointment  Hi Everyone--  I received a message that this patient might prefer a female provider at the [facility] location. I am happy to see her here at [facility].     Dr. Alberto--unfortunately we do not currently have anyone that goes to [place] or [place].     I Included Dr. Vinson as well because I think she is on his schedule. But he only at the [facility] location.     Please do what the patient feels most comfortable with. Dr. Vinson and I are both happy to provide her with excellent care.     Thanks!  Leonie

## 2019-09-18 NOTE — TELEPHONE ENCOUNTER
----- Message from Leonie Salvador MD sent at 9/18/2019  1:09 PM CDT -----  Regarding: Gyn Onc appointment  Hi Everyone--  I received a message that this patient might prefer a female provider at the [facility] location. I am happy to see her here at [facility].     Dr. Alberto--unfortunately we do not currently have anyone that goes to [place] or [place].     I Included Dr. Vinson as well because I think she is on his schedule. But he only at the [facility] location.     Please do what the patient feels most comfortable with. Dr. Vinson and I are both happy to provide her with excellent care.     Thanks!  Leonie

## 2019-09-19 ENCOUNTER — TELEPHONE (OUTPATIENT)
Dept: GYNECOLOGIC ONCOLOGY | Facility: CLINIC | Age: 74
End: 2019-09-19

## 2019-09-19 NOTE — TELEPHONE ENCOUNTER
----- Message from Brenda Romero RN sent at 9/19/2019  3:22 PM CDT -----  Please move this patient to Friday 09/20 if possible or 8am on 9/23.  Dr. Vinson will be in surgery at [facility] on 9/23 for most of the day.

## 2019-09-20 ENCOUNTER — LAB VISIT (OUTPATIENT)
Dept: LAB | Facility: OTHER | Age: 74
End: 2019-09-20
Attending: OBSTETRICS & GYNECOLOGY
Payer: MEDICARE

## 2019-09-20 ENCOUNTER — HOSPITAL ENCOUNTER (OUTPATIENT)
Dept: RADIOLOGY | Facility: HOSPITAL | Age: 74
Discharge: HOME | End: 2019-09-20
Attending: OBSTETRICS & GYNECOLOGY
Payer: MEDICARE

## 2019-09-20 ENCOUNTER — INITIAL CONSULT (OUTPATIENT)
Dept: GYNECOLOGIC ONCOLOGY | Facility: CLINIC | Age: 74
End: 2019-09-20
Payer: MEDICARE

## 2019-09-20 VITALS
HEIGHT: 64 IN | WEIGHT: 145.31 LBS | DIASTOLIC BLOOD PRESSURE: 69 MMHG | BODY MASS INDEX: 24.81 KG/M2 | SYSTOLIC BLOOD PRESSURE: 118 MMHG | HEART RATE: 102 BPM

## 2019-09-20 DIAGNOSIS — C78.80 SECONDARY MALIGNANT NEOPLASM OF DIGESTIVE ORGAN: ICD-10-CM

## 2019-09-20 DIAGNOSIS — C56.9 MALIGNANT NEOPLASM OF OVARY, UNSPECIFIED LATERALITY: ICD-10-CM

## 2019-09-20 DIAGNOSIS — Z88.0 PENICILLIN ALLERGY: ICD-10-CM

## 2019-09-20 DIAGNOSIS — J45.20 MILD INTERMITTENT CHRONIC ASTHMA WITHOUT COMPLICATION: ICD-10-CM

## 2019-09-20 DIAGNOSIS — R19.00 PELVIC MASS: ICD-10-CM

## 2019-09-20 DIAGNOSIS — R19.00 PELVIC MASS: Primary | ICD-10-CM

## 2019-09-20 LAB
CANCER AG125 SERPL-ACNC: 75 U/ML (ref 0–30)
CANCER AG19-9 SERPL-ACNC: 8 U/ML (ref 2–40)
CEA SERPL-MCNC: 1.8 NG/ML (ref 0–5)

## 2019-09-20 PROCEDURE — 99999 PR PBB SHADOW E&M-EST. PATIENT-LVL III: ICD-10-PCS | Mod: PBBFAC,,, | Performed by: OBSTETRICS & GYNECOLOGY

## 2019-09-20 PROCEDURE — 86304 IMMUNOASSAY TUMOR CA 125: CPT

## 2019-09-20 PROCEDURE — 99205 OFFICE O/P NEW HI 60 MIN: CPT | Mod: S$PBB,,, | Performed by: OBSTETRICS & GYNECOLOGY

## 2019-09-20 PROCEDURE — 71270 CT THORAX DX C-/C+: CPT | Mod: TC

## 2019-09-20 PROCEDURE — 82378 CARCINOEMBRYONIC ANTIGEN: CPT

## 2019-09-20 PROCEDURE — 99999 PR PBB SHADOW E&M-EST. PATIENT-LVL III: CPT | Mod: PBBFAC,,, | Performed by: OBSTETRICS & GYNECOLOGY

## 2019-09-20 PROCEDURE — 36415 COLL VENOUS BLD VENIPUNCTURE: CPT

## 2019-09-20 PROCEDURE — 25500020 PHARM REV CODE 255: Performed by: OBSTETRICS & GYNECOLOGY

## 2019-09-20 PROCEDURE — 99205 PR OFFICE/OUTPT VISIT, NEW, LEVL V, 60-74 MIN: ICD-10-PCS | Mod: S$PBB,,, | Performed by: OBSTETRICS & GYNECOLOGY

## 2019-09-20 PROCEDURE — 71270 CT CHEST W WO CONTRAST: ICD-10-PCS | Mod: 26,,, | Performed by: RADIOLOGY

## 2019-09-20 PROCEDURE — 86301 IMMUNOASSAY TUMOR CA 19-9: CPT

## 2019-09-20 PROCEDURE — 99213 OFFICE O/P EST LOW 20 MIN: CPT | Mod: PBBFAC,25 | Performed by: OBSTETRICS & GYNECOLOGY

## 2019-09-20 PROCEDURE — 71270 CT THORAX DX C-/C+: CPT | Mod: 26,,, | Performed by: RADIOLOGY

## 2019-09-20 RX ADMIN — IOHEXOL 75 ML: 350 INJECTION, SOLUTION INTRAVENOUS at 04:09

## 2019-09-20 NOTE — PROGRESS NOTES
REFERRING PROVIDER  Columba Anderson MD     HISTORY OF PRESENT CONDITION  Chief Complaint   Patient presents with    Pelvic Mass        Margarita Dunne is a 74 y.o.  who presents in consultation for an opinion regarding omental stranding, pelvic masses, and persistent PMB. Last episode of VB was last week and characterized as spotting.  EMBX x2 most recently on 1/14/19 and was negative.  -VD.  Abdominal pain x6 that is progressively worsening.  Localized to lower abdomen and non-radiating.  Aggravated by defecation and urination.  UTI work-up was negative.  Refuses colonoscopy.  Seen by GI and Nutrition but pain persists.  Alleviated by 1/2 a Prozac.  +Constipation. Unintentional weight loss although is dieting.      7/25/19: Mammogram: BIRADS-1    Denies abnormal Pap smears.  Has never had a colonoscopy.    REVIEW OF SYSTEMS  All systems reviewed and negative except as noted in HPI.    OBJECTIVE   Vitals:    09/20/19 0951   BP: 118/69   Pulse: 102      Body mass index is 24.94 kg/m².      1. General: Well appearing, no apparent distress, alert and oriented.  2. Lymph: Neck symmetric without cervical or supraclavicular adenopathy or mass.  3. Lungs: Normal respiratory rate, no accessory muscle use.  4.  Psych: Normal affect.  5. Abdomen: Nondistended, soft, supra-pubic tenderness, no masses palpated, no ascites, no hepatosplenomegaly.  6. Skin: Warm, dry, no rashes or lesions.   7. Extremities: Bilateral lower extremities without edema or tenderness.  8. Genitourinary   Pelvic Examination including:                a. External genitalia are normal in appearance. No lesions noted.               b. Urethral meatus is normal size, location, and appearance.               c. Urethra is negative.               d. Bladder is nontender. No masses noted.               e. Vagina has normal mucosa with physiologic discharge. No lesions noted.              f. Cervix was normal and healthy              g. Uterus and adnexa  limited by CMT  9. Rectum: No mucosal involvement    ECOG status: 1    LABORATORY DATA  Lab data reviewed.    RADIOLOGICAL DATA  Radiology data reviewed.    PATHOLOGY DATA  Pathology data reviewed.    ASSESSMENT / PLAN     1. Pelvic mass    2. Mild intermittent chronic asthma without complication    3. Penicillin allergy         Convoluted picture.  History and CT A/P is not obvious for a mullerian adenocarcinoma, despite her PMB and omental stranding.  She's had 2 previous EMBX.  She's refused hysteroscopy, D&C due to anxiety of having surgery.  She's also refused a colonoscopy despite national recommendations and increased constipation.    -, CEA, CA 19-9  -CT Chest W/  -CT guided biopsy of the omentum, anterior mass, or posterior mass    If her work-up is inconclusive:    -Colonoscopy    I'll also consider:  -Endometrial sampling  -EGD  -RTC to discuss a diagnostic laparoscopy      PATIENT EDUCATION  Ready to learn, no apparent learning barriers were identified; learning preferences include listening.  Explained diagnosis and treatment plan; patient expressed understanding of the content.        Irvin Vinson

## 2019-09-20 NOTE — LETTER
September 20, 2019      Columba Anderson MD  [address]  [facility] [address]           [facility] [address] [facility] [address]  [address], [address]  [facility] [address]  Phone: [phone]  Fax: [phone]          Patient: Margarita Dunne   MR Number: [id number]   YOB: 1945   Date of Visit: 9/20/2019       Dear Dr. Columba Anderson:    Thank you for referring Margarita Dunne to me for evaluation. Attached you will find relevant portions of my assessment and plan of care.    If you have questions, please do not hesitate to call me. I look forward to following Margarita Dunne along with you.    Sincerely,    Irvin Vinson MD    Enclosure  CC:  No Recipients    If you would like to receive this communication electronically, please contact [email or web address] or [phone] to request more information on [employer] access.    For providers and/or their staff who would like to refer a patient to [facility], please contact us through our one-stop-shop provider referral line, [facility], at [phone].    If you feel you have received this communication in error or would no longer like to receive these types of communications, please e-mail [email or web address]

## 2019-09-23 ENCOUNTER — PATIENT MESSAGE (OUTPATIENT)
Dept: GYNECOLOGIC ONCOLOGY | Facility: CLINIC | Age: 74
End: 2019-09-23

## 2019-09-23 ENCOUNTER — TELEPHONE (OUTPATIENT)
Dept: GYNECOLOGIC ONCOLOGY | Facility: CLINIC | Age: 74
End: 2019-09-23

## 2019-09-23 DIAGNOSIS — R19.00 PELVIC MASS: Primary | ICD-10-CM

## 2019-09-24 ENCOUNTER — TELEPHONE (OUTPATIENT)
Dept: GYNECOLOGIC ONCOLOGY | Facility: CLINIC | Age: 74
End: 2019-09-24

## 2019-09-25 ENCOUNTER — OFFICE VISIT (OUTPATIENT)
Dept: INTERNAL MEDICINE | Facility: CLINIC | Age: 74
End: 2019-09-25
Payer: MEDICARE

## 2019-09-25 VITALS
OXYGEN SATURATION: 99 % | WEIGHT: 147.5 LBS | SYSTOLIC BLOOD PRESSURE: 124 MMHG | DIASTOLIC BLOOD PRESSURE: 60 MMHG | BODY MASS INDEX: 24.57 KG/M2 | HEIGHT: 65 IN | HEART RATE: 93 BPM

## 2019-09-25 DIAGNOSIS — K59.00 CONSTIPATION, UNSPECIFIED CONSTIPATION TYPE: ICD-10-CM

## 2019-09-25 DIAGNOSIS — R10.84 ABDOMINAL PAIN, GENERALIZED: ICD-10-CM

## 2019-09-25 DIAGNOSIS — R11.0 NAUSEA: ICD-10-CM

## 2019-09-25 DIAGNOSIS — G89.29 OTHER CHRONIC PAIN: ICD-10-CM

## 2019-09-25 DIAGNOSIS — R93.5 ABNORMAL CT OF THE ABDOMEN: Primary | ICD-10-CM

## 2019-09-25 PROCEDURE — 99214 PR OFFICE/OUTPT VISIT, EST, LEVL IV, 30-39 MIN: ICD-10-PCS | Mod: S$PBB,,, | Performed by: INTERNAL MEDICINE

## 2019-09-25 PROCEDURE — 99214 OFFICE O/P EST MOD 30 MIN: CPT | Mod: S$PBB,,, | Performed by: INTERNAL MEDICINE

## 2019-09-25 PROCEDURE — 99999 PR PBB SHADOW E&M-EST. PATIENT-LVL IV: ICD-10-PCS | Mod: PBBFAC,,, | Performed by: INTERNAL MEDICINE

## 2019-09-25 PROCEDURE — 99999 PR PBB SHADOW E&M-EST. PATIENT-LVL IV: CPT | Mod: PBBFAC,,, | Performed by: INTERNAL MEDICINE

## 2019-09-25 PROCEDURE — 99214 OFFICE O/P EST MOD 30 MIN: CPT | Mod: PBBFAC | Performed by: INTERNAL MEDICINE

## 2019-09-25 RX ORDER — FLUOXETINE 10 MG/1
10 TABLET ORAL DAILY
Qty: 30 TABLET | Refills: 1 | Status: SHIPPED | OUTPATIENT
Start: 2019-09-25 | End: 2020-01-08

## 2019-09-25 RX ORDER — ONDANSETRON 4 MG/1
4 TABLET, FILM COATED ORAL DAILY PRN
Qty: 30 TABLET | Refills: 0 | Status: SHIPPED | OUTPATIENT
Start: 2019-09-25 | End: 2019-10-30 | Stop reason: SDUPTHER

## 2019-09-25 NOTE — PROGRESS NOTES
Subjective:      Patient ID: Margarita Dunne is a 74 y.o. female.    Chief Complaint: Follow-up    HPI:  HPI   Margarita comes in and follow up for several issues all related to undiagnosed abdominal pain.    Patient stated she wished to try Prozac hoping that it may help her abdominal discomfort.  She is currently taking 1/2 of a 10 mg and is tolerating the medication.  We discussed increasing this to a full tablet.    She mentioned that no one is addressing her pain. We discussed this further.  She had wanted to try Prozac.  She may use acetaminophen..  She may also use Advil or Aleve if she tolerates this.  Regarding opioids she wants nothing addictive.  She has tried other medications for spasm given by the GI doctors.  Additionally she has had some nausea.  We discussed trial of Zofran.    With respect to the workup.  Both the GI doctor as well as the gyn oncologist has suggested that she complete the colonoscopy.  She had a CT scan with concerns over peritoneal nodules and a full CT scan was repeated by the gyn oncologist.  A needle biopsy has been suggested and patient is waiting for scheduling.  The patient wanted to consider several other consult is with respect to the abnormal CT scan.  She will do the needle biopsy .  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    Glaucoma suspect - Both Eyes    Nuclear sclerosis - Both Eyes    Chronic rhinitis    Eyelid myokymia - Right Eye    Herpes labialis    Visual field defect - Both Eyes    Penicillin allergy    Itching    Hyperlipidemia    Migraine with aura and without status migrainosus, not intractable    Osteopenia    Numbness and tingling of both legs below knees    Neck pain    Vitamin D deficiency    Memory loss    Pain management    Incomplete bladder emptying    Lower abdominal pain, unspecified    Bladder wall thickening     Past  "Medical History:   Diagnosis Date    Allergy     Angio-edema     Arthritis     Asthma     Cataract     Chest pain at rest     Diverticulosis     Fibromyalgia 7/8/2012    Glaucoma suspect, steroid responders     Hand joint pain 7/8/2012    Headache(784.0)     Migraine     Osteoporosis     Recurrent upper respiratory infection (URI)     Urticaria      Past Surgical History:   Procedure Laterality Date    dexa  2014    osteopenia     Family History   Problem Relation Age of Onset    Hypertension Mother     Diabetes Mother     Stroke Mother     Dementia Mother     Cancer Father         pancreas    Allergies Father     Allergic rhinitis Father     Macular degeneration Cousin     Allergic rhinitis Paternal Aunt     Allergies Paternal Aunt     Allergic rhinitis Paternal Uncle     Allergies Paternal Uncle     Glaucoma Neg Hx     Amblyopia Neg Hx     Blindness Neg Hx     Cataracts Neg Hx     Retinal detachment Neg Hx     Strabismus Neg Hx     Thyroid disease Neg Hx     Angioedema Neg Hx     Asthma Neg Hx     Eczema Neg Hx     Immunodeficiency Neg Hx     Colon cancer Neg Hx     Cystic fibrosis Neg Hx     Ulcerative colitis Neg Hx     Stomach cancer Neg Hx     Esophageal cancer Neg Hx      Review of Systems   Constitutional: Positive for fever.        Patient has lost a minimal amount of weight additionally   Gastrointestinal: Positive for abdominal pain, constipation, diarrhea and nausea. Negative for vomiting.   Genitourinary: Negative for dysuria, frequency and hematuria.   Musculoskeletal: Positive for arthralgias and myalgias.   Neurological: Positive for headaches.     Objective:     Vitals:    09/25/19 0855   BP: 124/60   Pulse: 93   SpO2: 99%   Weight: 66.9 kg (147 lb 7.8 oz)   Height: 5' 4.5" (1.638 m)   PainSc:   4   PainLoc: Abdomen     Body mass index is 24.93 kg/m².  Physical Exam   Constitutional: She is oriented to person, place, and time. She appears well-developed " and well-nourished. No distress.   Neck: Carotid bruit is not present. No thyromegaly present.   Cardiovascular: Normal rate, regular rhythm and normal heart sounds. PMI is not displaced.   Pulmonary/Chest: Effort normal and breath sounds normal. No respiratory distress.   Abdominal: Soft. Bowel sounds are normal. She exhibits no distension. There is no tenderness.   Musculoskeletal: She exhibits no edema.   Neurological: She is alert and oriented to person, place, and time.     Assessment:     1. Abnormal CT of the abdomen    2. Abdominal pain, generalized    3. Nausea    4. Constipation, unspecified constipation type    5. Other chronic pain      Plan:   Margarita was seen today for follow-up.    Diagnoses and all orders for this visit:    Abnormal CT of the abdomen  Comments:  Recommendations of gyn Oncology as well as GI Medicine to include needle biopsy which will be scheduled, patient is still considering colonoscopy    Abdominal pain, generalized  Comments:  Trial increasing prozac to whole tablet, Tylenol, Advil, Aleve.  Patient has tried anti spasmodic.    Nausea  Comments:  Trial of Zofran  Orders:  -     ondansetron (ZOFRAN) 4 MG tablet; Take 1 tablet (4 mg total) by mouth daily as needed for Nausea.    Constipation, unspecified constipation type  Comments:  Citrucel twice a day and stool softener twice a day    Other chronic pain  Comments:  Prozac should be increased to a whole tablet , Trial of tylenol    Other orders  -     FLUoxetine 10 MG Tab; Take 1 tablet (10 mg total) by mouth once daily.        Problem List Items Addressed This Visit     None      Visit Diagnoses     Abnormal CT of the abdomen    -  Primary    Recommendations of gyn Oncology as well as GI Medicine to include needle biopsy which will be scheduled, patient is still considering colonoscopy    Abdominal pain, generalized        Trial increasing prozac to whole tablet, Tylenol, Advil, Aleve.  Patient has tried anti spasmodic.    Nausea         Trial of Zofran    Relevant Medications    ondansetron (ZOFRAN) 4 MG tablet    Constipation, unspecified constipation type        Citrucel twice a day and stool softener twice a day    Other chronic pain        Prozac should be increased to a whole tablet , Trial of tylenol        No orders of the defined types were placed in this encounter.    Follow up in about 3 weeks (around 10/16/2019) for Follow up : 1/2 tablet.     Medication List           Accurate as of September 25, 2019 10:42 AM. If you have any questions, ask your nurse or doctor.               START taking these medications    ondansetron 4 MG tablet  Commonly known as:  ZOFRAN  Take 1 tablet (4 mg total) by mouth daily as needed for Nausea.  Started by:  Columba Anderson MD        CHANGE how you take these medications    * FLUoxetine 10 MG Tab  Take 1 tablet (10 mg total) by mouth once daily.  What changed:    · Another medication with the same name was added. Make sure you understand how and when to take each.  · Another medication with the same name was removed. Continue taking this medication, and follow the directions you see here.  Changed by:  Columba Anderson MD     * FLUoxetine 10 MG Tab  Take 1 tablet (10 mg total) by mouth once daily.  What changed:  You were already taking a medication with the same name, and this prescription was added. Make sure you understand how and when to take each.  Replaces:  FLUoxetine 10 MG capsule  Changed by:  Columba Anderson MD         * This list has 2 medication(s) that are the same as other medications prescribed for you. Read the directions carefully, and ask your doctor or other care provider to review them with you.            CONTINUE taking these medications    acetaminophen 325 MG tablet  Commonly known as:  TYLENOL     albuterol 90 mcg/actuation inhaler  Commonly known as:  PROVENTIL/VENTOLIN HFA  Inhale 2 puffs into the lungs every 6 (six) hours as needed for Wheezing. Rescue      budesonide-formoterol 80-4.5 mcg 80-4.5 mcg/actuation Hfaa  Commonly known as:  SYMBICORT  Inhale 2 puffs into the lungs 2 (two) times daily. Controller     CITRUCEL ORAL     metroNIDAZOLE 250 MG tablet  Commonly known as:  FLAGYL  Take 1 tablet (250 mg total) by mouth 3 (three) times daily.     multivitamin per tablet  Commonly known as:  THERAGRAN     rizatriptan 10 MG tablet  Commonly known as:  MAXALT  TAKE ONE TABLET BY MOUTH AT ONSET OF MIGRAINE, MAY REPEAT EVERY 2 HOURS. DO NOT EXCEED 3 TABLETS IN 24 HOURS.     rosuvastatin 5 MG tablet  Commonly known as:  CRESTOR  Take 1 tablet (5 mg total) by mouth every evening.     valACYclovir 500 MG tablet  Commonly known as:  VALTREX  TAKE 1 CAPLET BY MOUTH ONCE DAILY     VOLTAREN 1 % Gel  Generic drug:  diclofenac sodium  APPLY 4 GRAMS TOPICALLY 4 TIMES DAILY AS DIRECTED        STOP taking these medications    FLUoxetine 10 MG capsule  Replaced by:  FLUoxetine 10 MG Tab  You also have another medication with the same name that you need to continue taking as instructed.  Stopped by:  Columba Anderson MD           Where to Get Your Medications      These medications were sent to [facility] [place], [place] - [address]  [address] [address]    Phone:  [phone]   · FLUoxetine 10 MG Tab  · ondansetron 4 MG tablet

## 2019-09-27 DIAGNOSIS — J45.21 MILD INTERMITTENT CHRONIC ASTHMA WITH ACUTE EXACERBATION: ICD-10-CM

## 2019-09-27 RX ORDER — BUDESONIDE AND FORMOTEROL FUMARATE DIHYDRATE 80; 4.5 UG/1; UG/1
AEROSOL RESPIRATORY (INHALATION)
Qty: 1 INHALER | Refills: 11 | Status: SHIPPED | OUTPATIENT
Start: 2019-09-27 | End: 2019-12-05 | Stop reason: SDUPTHER

## 2019-10-01 NOTE — NURSING
Left message with pt regarding arrival time, advised to arrive at . Advised npo at midnight and hold asa and dm medication. Advised must have a ride to and from procedure.

## 2019-10-02 ENCOUNTER — HOSPITAL ENCOUNTER (OUTPATIENT)
Facility: HOSPITAL | Age: 74
Discharge: HOME | End: 2019-10-02
Attending: RADIOLOGY | Admitting: RADIOLOGY
Payer: MEDICARE

## 2019-10-02 VITALS
DIASTOLIC BLOOD PRESSURE: 68 MMHG | RESPIRATION RATE: 16 BRPM | TEMPERATURE: 98 F | HEART RATE: 71 BPM | SYSTOLIC BLOOD PRESSURE: 136 MMHG | BODY MASS INDEX: 24.41 KG/M2 | OXYGEN SATURATION: 97 % | WEIGHT: 143 LBS | HEIGHT: 64 IN

## 2019-10-02 DIAGNOSIS — R19.00 PELVIC MASS: ICD-10-CM

## 2019-10-02 LAB
ALBUMIN SERPL BCP-MCNC: 3.4 G/DL (ref 3.5–5.2)
ALP SERPL-CCNC: 82 U/L (ref 55–135)
ALT SERPL W/O P-5'-P-CCNC: 8 U/L (ref 10–44)
ANION GAP SERPL CALC-SCNC: 9 MMOL/L (ref 8–16)
APTT BLDCRRT: 31.8 SEC (ref 21–32)
AST SERPL-CCNC: 16 U/L (ref 10–40)
BASOPHILS # BLD AUTO: 0.04 K/UL (ref 0–0.2)
BASOPHILS NFR BLD: 0.6 % (ref 0–1.9)
BILIRUB SERPL-MCNC: 0.4 MG/DL (ref 0.1–1)
BUN SERPL-MCNC: 7 MG/DL (ref 8–23)
CALCIUM SERPL-MCNC: 9.7 MG/DL (ref 8.7–10.5)
CHLORIDE SERPL-SCNC: 102 MMOL/L (ref 95–110)
CO2 SERPL-SCNC: 26 MMOL/L (ref 23–29)
CREAT SERPL-MCNC: 0.7 MG/DL (ref 0.5–1.4)
DIFFERENTIAL METHOD: ABNORMAL
EOSINOPHIL # BLD AUTO: 0.1 K/UL (ref 0–0.5)
EOSINOPHIL NFR BLD: 1 % (ref 0–8)
ERYTHROCYTE [DISTWIDTH] IN BLOOD BY AUTOMATED COUNT: 13.7 % (ref 11.5–14.5)
EST. GFR  (AFRICAN AMERICAN): >60 ML/MIN/1.73 M^2
EST. GFR  (NON AFRICAN AMERICAN): >60 ML/MIN/1.73 M^2
GLUCOSE SERPL-MCNC: 91 MG/DL (ref 70–110)
HCT VFR BLD AUTO: 38.3 % (ref 37–48.5)
HGB BLD-MCNC: 12.5 G/DL (ref 12–16)
INR PPP: 1.1 (ref 0.8–1.2)
LYMPHOCYTES # BLD AUTO: 0.9 K/UL (ref 1–4.8)
LYMPHOCYTES NFR BLD: 13.8 % (ref 18–48)
MCH RBC QN AUTO: 27.9 PG (ref 27–31)
MCHC RBC AUTO-ENTMCNC: 32.6 G/DL (ref 32–36)
MCV RBC AUTO: 86 FL (ref 82–98)
MONOCYTES # BLD AUTO: 0.9 K/UL (ref 0.3–1)
MONOCYTES NFR BLD: 13.8 % (ref 4–15)
NEUTROPHILS # BLD AUTO: 4.4 K/UL (ref 1.8–7.7)
NEUTROPHILS NFR BLD: 70.8 % (ref 38–73)
PLATELET # BLD AUTO: 392 K/UL (ref 150–350)
PMV BLD AUTO: 9.6 FL (ref 9.2–12.9)
POTASSIUM SERPL-SCNC: 3.7 MMOL/L (ref 3.5–5.1)
PROT SERPL-MCNC: 7.5 G/DL (ref 6–8.4)
PROTHROMBIN TIME: 11.9 SEC (ref 9–12.5)
RBC # BLD AUTO: 4.48 M/UL (ref 4–5.4)
SODIUM SERPL-SCNC: 137 MMOL/L (ref 136–145)
WBC # BLD AUTO: 6.25 K/UL (ref 3.9–12.7)

## 2019-10-02 PROCEDURE — 88342 IMHCHEM/IMCYTCHM 1ST ANTB: CPT | Mod: 26,,, | Performed by: PATHOLOGY

## 2019-10-02 PROCEDURE — 85610 PROTHROMBIN TIME: CPT

## 2019-10-02 PROCEDURE — 88173 CYTOPATH EVAL FNA REPORT: CPT | Mod: 26,,, | Performed by: PATHOLOGY

## 2019-10-02 PROCEDURE — 80053 COMPREHEN METABOLIC PANEL: CPT

## 2019-10-02 PROCEDURE — 88341 TISSUE SPECIMEN TO PATHOLOGY: ICD-10-PCS | Mod: 26,,, | Performed by: PATHOLOGY

## 2019-10-02 PROCEDURE — 85025 COMPLETE CBC W/AUTO DIFF WBC: CPT

## 2019-10-02 PROCEDURE — 88305 TISSUE EXAM BY PATHOLOGIST: CPT | Performed by: PATHOLOGY

## 2019-10-02 PROCEDURE — 88173 TISSUE SPECIMEN TO PATHOLOGY: ICD-10-PCS | Mod: 26,,, | Performed by: PATHOLOGY

## 2019-10-02 PROCEDURE — 25000003 PHARM REV CODE 250: Performed by: RADIOLOGY

## 2019-10-02 PROCEDURE — 88305 TISSUE SPECIMEN TO PATHOLOGY: ICD-10-PCS | Mod: 26,,, | Performed by: PATHOLOGY

## 2019-10-02 PROCEDURE — 49180 BIOPSY ABDOMINAL MASS: CPT

## 2019-10-02 PROCEDURE — 88305 TISSUE EXAM BY PATHOLOGIST: CPT | Mod: 26,,, | Performed by: PATHOLOGY

## 2019-10-02 PROCEDURE — 88342 TISSUE SPECIMEN TO PATHOLOGY: ICD-10-PCS | Mod: 26,,, | Performed by: PATHOLOGY

## 2019-10-02 PROCEDURE — 88172 CYTP DX EVAL FNA 1ST EA SITE: CPT | Mod: 26,,, | Performed by: PATHOLOGY

## 2019-10-02 PROCEDURE — 88172 TISSUE SPECIMEN TO PATHOLOGY: ICD-10-PCS | Mod: 26,,, | Performed by: PATHOLOGY

## 2019-10-02 PROCEDURE — 85730 THROMBOPLASTIN TIME PARTIAL: CPT

## 2019-10-02 PROCEDURE — 63600175 PHARM REV CODE 636 W HCPCS: Performed by: RADIOLOGY

## 2019-10-02 PROCEDURE — 88341 IMHCHEM/IMCYTCHM EA ADD ANTB: CPT | Mod: 26,,, | Performed by: PATHOLOGY

## 2019-10-02 RX ORDER — LIDOCAINE HYDROCHLORIDE 10 MG/ML
INJECTION INFILTRATION; PERINEURAL CODE/TRAUMA/SEDATION MEDICATION
Status: COMPLETED | OUTPATIENT
Start: 2019-10-02 | End: 2019-10-02

## 2019-10-02 RX ORDER — FENTANYL CITRATE 50 UG/ML
INJECTION, SOLUTION INTRAMUSCULAR; INTRAVENOUS CODE/TRAUMA/SEDATION MEDICATION
Status: COMPLETED | OUTPATIENT
Start: 2019-10-02 | End: 2019-10-02

## 2019-10-02 RX ORDER — SODIUM CHLORIDE 9 MG/ML
INJECTION, SOLUTION INTRAVENOUS CONTINUOUS
Status: DISCONTINUED | OUTPATIENT
Start: 2019-10-02 | End: 2019-10-02 | Stop reason: HOSPADM

## 2019-10-02 RX ORDER — MIDAZOLAM HYDROCHLORIDE 1 MG/ML
INJECTION INTRAMUSCULAR; INTRAVENOUS CODE/TRAUMA/SEDATION MEDICATION
Status: COMPLETED | OUTPATIENT
Start: 2019-10-02 | End: 2019-10-02

## 2019-10-02 RX ADMIN — MIDAZOLAM HYDROCHLORIDE 1 MG: 1 INJECTION, SOLUTION INTRAMUSCULAR; INTRAVENOUS at 11:10

## 2019-10-02 RX ADMIN — FENTANYL CITRATE 50 MCG: 50 INJECTION, SOLUTION INTRAMUSCULAR; INTRAVENOUS at 11:10

## 2019-10-02 RX ADMIN — LIDOCAINE HYDROCHLORIDE 10 ML: 10 INJECTION, SOLUTION INFILTRATION; PERINEURAL at 11:10

## 2019-10-02 NOTE — DISCHARGE SUMMARY
Interventional Radiology Discharge Summary      Hospital Course: No complications    Admit Date: 10/2/2019  Discharge Date: 10/02/2019     Instructions Given to Patient: Yes  Diet: Resume prior diet  Activity: Activity as tolerated and no driving for today    Description of Condition on Discharge: Stable  Vital Signs (Most Recent): Temp: 98 °F (36.7 °C) (10/02/19 1220)  Pulse: 71 (10/02/19 1300)  Resp: 16 (10/02/19 1300)  BP: 136/68 (10/02/19 1300)  SpO2: 97 % (10/02/19 1300)    Discharge Disposition: Home    Discharge Diagnosis: Peritoneal nodules, suspected mets s/p bx today     Follow-up: With referring provider      Kashif Mclain MD  [facility]  Pager [phone]

## 2019-10-02 NOTE — H&P
Interventional Radiology Pre-Procedure History & Physical      Chief Complaint/Reason for Referral: Peritoneal nodules    History of Present Illness:  Margarita Dunne is a 74 y.o. female who presents with peritoneal nodules suspicious for met disease. Referred to IR for biopsy.    Past Medical History:   Diagnosis Date    Allergy     Angio-edema     Arthritis     Asthma     Cataract     Chest pain at rest     Diverticulosis     Fibromyalgia 7/8/2012    Glaucoma suspect, steroid responders     Hand joint pain 7/8/2012    Headache(784.0)     Migraine     Osteoporosis     Recurrent upper respiratory infection (URI)     Urticaria      Past Surgical History:   Procedure Laterality Date    dexa  2014    osteopenia       Allergies:   Review of patient's allergies indicates:   Allergen Reactions    Doxycycline Nausea And Vomiting    Corticosteroids (glucocorticoids)      Causes pt to have migraines for 2 weeks    Pcn [penicillins] Rash    Sulfa (sulfonamide antibiotics) Rash        Home Meds:   Prior to Admission medications    Medication Sig Start Date End Date Taking? Authorizing Provider   acetaminophen (TYLENOL) 325 MG tablet Take 325 mg by mouth every 6 (six) hours as needed for Pain.   Yes Historical Provider, MD   albuterol 90 mcg/actuation inhaler Inhale 2 puffs into the lungs every 6 (six) hours as needed for Wheezing. Rescue 9/4/18  Yes Ned Martinez MD   FLUoxetine 10 MG Tab Take 1 tablet (10 mg total) by mouth once daily. 9/11/19 9/10/20 Yes Columba Anderson MD   metroNIDAZOLE (FLAGYL) 250 MG tablet Take 1 tablet (250 mg total) by mouth 3 (three) times daily. 9/12/19  Yes Columba Anderson MD   ondansetron (ZOFRAN) 4 MG tablet Take 1 tablet (4 mg total) by mouth daily as needed for Nausea. 9/25/19  Yes Columba Anderson MD   rosuvastatin (CRESTOR) 5 MG tablet Take 1 tablet (5 mg total) by mouth every evening. 4/10/19  Yes Columba Anderson MD   SYMBICORT 80-4.5 mcg/actuation HFAA INHALE 2  PUFFS INTO LUNGS TWICE DAILY (CONTROLLER) 9/27/19  Yes Ned Martinez MD   valACYclovir (VALTREX) 500 MG tablet TAKE 1 CAPLET BY MOUTH ONCE DAILY 7/29/19  Yes Heather Salguero MD   VOLTAREN 1 % Gel APPLY 4 GRAMS TOPICALLY 4 TIMES DAILY AS DIRECTED 8/6/18  Yes Columba Anderson MD   FLUoxetine 10 MG Tab Take 1 tablet (10 mg total) by mouth once daily. 9/25/19 9/24/20  Columba Anderson MD   methylcellulose (CITRUCEL ORAL) Take by mouth 2 (two) times daily.    Historical Provider, MD   multivitamin (THERAGRAN) per tablet Take 1 tablet by mouth once daily.    Historical Provider, MD   rizatriptan (MAXALT) 10 MG tablet TAKE ONE TABLET BY MOUTH AT ONSET OF MIGRAINE, MAY REPEAT EVERY 2 HOURS. DO NOT EXCEED 3 TABLETS IN 24 HOURS. 7/5/19   Crys Delong MD       Anticoagulation/Antiplatelet Meds: no anticoagulation    Review of Systems:   Hematological: no known coagulopathies  Respiratory: no shortness of breath  Cardiovascular: no chest pain  Gastrointestinal: no abdominal pain  Genitourinary: no dysuria  Musculoskeletal: negative  Neurological: no TIA or stroke symptoms     Physical Exam:  Temp: 98.5 °F (36.9 °C) (10/02/19 0823)  Pulse: 65 (10/02/19 1000)  Resp: 11 (10/02/19 1000)  BP: (!) 190/86 (10/02/19 1000)  SpO2: 98 % (10/02/19 1000)    General: WNWD, NAD  HEENT: Normocephalic, sclera anicteric, oropharynx clear  Neck: Supple, no palpable lymphadenopathy  Heart: RRR  Lungs: Symmetric excursions, breathing unlabored  Abd: NTND, soft  Extremities: MAEW, no CCE  Neuro: Gross nonfocal    Laboratory:  Lab Results   Component Value Date    INR 1.1 10/02/2019       Lab Results   Component Value Date    WBC 6.25 10/02/2019    HGB 12.5 10/02/2019    HCT 38.3 10/02/2019    MCV 86 10/02/2019     (H) 10/02/2019      Lab Results   Component Value Date    GLU 91 10/02/2019     10/02/2019    K 3.7 10/02/2019     10/02/2019    CO2 26 10/02/2019    BUN 7 (L) 10/02/2019    CREATININE 0.7 10/02/2019     CALCIUM 9.7 10/02/2019    ALT 8 (L) 10/02/2019    AST 16 10/02/2019    ALBUMIN 3.4 (L) 10/02/2019    BILITOT 0.4 10/02/2019    BILIDIR 0.2 06/16/2008       Imaging:  CT AP 9/11/19 reviewed.    Assessment/Plan:  74 y.o. female with suspicious peritoneal nodules for bx today.    Sedation:  Sedation history: have not been any systemic reactions  ASA: 3 / Mallampati: 3  Sedation plan: Moderate (Versed, fentanyl)     Risks (including, but not limited to, pain, bleeding, infection, damage to nearby structures, failure to obtain sufficient tissue for a diagnosis, and the need for additional procedures), benefits, and alternatives were discussed with the patient. All questions were answered to the best of my abilities. The patient wishes to proceed with biopsy. Written informed consent was obtained.      Kashif Mclain MD  [facility]  Pager [phone]

## 2019-10-07 ENCOUNTER — PATIENT MESSAGE (OUTPATIENT)
Dept: GYNECOLOGIC ONCOLOGY | Facility: CLINIC | Age: 74
End: 2019-10-07

## 2019-10-08 ENCOUNTER — PATIENT OUTREACH (OUTPATIENT)
Dept: ADMINISTRATIVE | Facility: OTHER | Age: 74
End: 2019-10-08

## 2019-10-08 ENCOUNTER — OFFICE VISIT (OUTPATIENT)
Dept: INTERNAL MEDICINE | Facility: CLINIC | Age: 74
End: 2019-10-08
Payer: MEDICARE

## 2019-10-08 ENCOUNTER — TELEPHONE (OUTPATIENT)
Dept: GYNECOLOGIC ONCOLOGY | Facility: CLINIC | Age: 74
End: 2019-10-08

## 2019-10-08 VITALS
DIASTOLIC BLOOD PRESSURE: 62 MMHG | SYSTOLIC BLOOD PRESSURE: 122 MMHG | WEIGHT: 144.63 LBS | OXYGEN SATURATION: 98 % | BODY MASS INDEX: 24.69 KG/M2 | HEART RATE: 85 BPM | HEIGHT: 64 IN

## 2019-10-08 DIAGNOSIS — R10.84 ABDOMINAL PAIN, GENERALIZED: Primary | ICD-10-CM

## 2019-10-08 PROCEDURE — 99999 PR PBB SHADOW E&M-EST. PATIENT-LVL IV: CPT | Mod: PBBFAC,,, | Performed by: INTERNAL MEDICINE

## 2019-10-08 PROCEDURE — 99214 OFFICE O/P EST MOD 30 MIN: CPT | Mod: PBBFAC | Performed by: INTERNAL MEDICINE

## 2019-10-08 PROCEDURE — 99999 PR PBB SHADOW E&M-EST. PATIENT-LVL IV: ICD-10-PCS | Mod: PBBFAC,,, | Performed by: INTERNAL MEDICINE

## 2019-10-08 PROCEDURE — 99213 OFFICE O/P EST LOW 20 MIN: CPT | Mod: S$PBB,,, | Performed by: INTERNAL MEDICINE

## 2019-10-08 PROCEDURE — 99213 PR OFFICE/OUTPT VISIT, EST, LEVL III, 20-29 MIN: ICD-10-PCS | Mod: S$PBB,,, | Performed by: INTERNAL MEDICINE

## 2019-10-08 NOTE — PROGRESS NOTES
Subjective:      Patient ID: Margarita Dunne is a 74 y.o. female.    Chief Complaint: Follow-up    HPI:  SAUL Avila comes in today with her .  Pathology results are still not available.  She continues with abdominal pain and nausea.  As we discussed options for controlling this while waiting for her Path followed she reports she seems to have reasons that she cannot take a number of medications either from concerns about side effects or from actual side effects she has had.  This actually limits what we can do for her.  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    Glaucoma suspect - Both Eyes    Nuclear sclerosis - Both Eyes    Chronic rhinitis    Eyelid myokymia - Right Eye    Herpes labialis    Visual field defect - Both Eyes    Penicillin allergy    Itching    Hyperlipidemia    Migraine with aura and without status migrainosus, not intractable    Osteopenia    Numbness and tingling of both legs below knees    Neck pain    Vitamin D deficiency    Memory loss    Pain management    Incomplete bladder emptying    Lower abdominal pain, unspecified    Bladder wall thickening    Pelvic mass     Past Medical History:   Diagnosis Date    Allergy     Angio-edema     Arthritis     Asthma     Cataract     Chest pain at rest     Diverticulosis     Fibromyalgia 7/8/2012    Glaucoma suspect, steroid responders     Hand joint pain 7/8/2012    Headache(784.0)     Migraine     Osteoporosis     Recurrent upper respiratory infection (URI)     Urticaria      Past Surgical History:   Procedure Laterality Date    dexa  2014    osteopenia     Family History   Problem Relation Age of Onset    Hypertension Mother     Diabetes Mother     Stroke Mother     Dementia Mother     Cancer Father         pancreas    Allergies Father     Allergic rhinitis Father     Macular degeneration  "Cousin     Allergic rhinitis Paternal Aunt     Allergies Paternal Aunt     Allergic rhinitis Paternal Uncle     Allergies Paternal Uncle     Glaucoma Neg Hx     Amblyopia Neg Hx     Blindness Neg Hx     Cataracts Neg Hx     Retinal detachment Neg Hx     Strabismus Neg Hx     Thyroid disease Neg Hx     Angioedema Neg Hx     Asthma Neg Hx     Eczema Neg Hx     Immunodeficiency Neg Hx     Colon cancer Neg Hx     Cystic fibrosis Neg Hx     Ulcerative colitis Neg Hx     Stomach cancer Neg Hx     Esophageal cancer Neg Hx      Review of Systems   Constitutional: Negative for activity change and unexpected weight change.   HENT: Negative for hearing loss, rhinorrhea and trouble swallowing.    Eyes: Negative for discharge and visual disturbance.   Respiratory: Negative for chest tightness and wheezing.    Cardiovascular: Negative for chest pain and palpitations.   Gastrointestinal: Positive for constipation. Negative for blood in stool, diarrhea and vomiting.   Endocrine: Negative for polydipsia and polyuria.   Genitourinary: Negative for difficulty urinating, dysuria, hematuria and menstrual problem.   Musculoskeletal: Negative for arthralgias, joint swelling and neck pain.   Neurological: Positive for headaches. Negative for weakness.   Psychiatric/Behavioral: Negative for confusion and dysphoric mood.     Objective:     Vitals:    10/08/19 0916   BP: 122/62   Pulse: 85   SpO2: 98%   Weight: 65.6 kg (144 lb 10 oz)   Height: 5' 4" (1.626 m)   PainSc:   6   PainLoc: Abdomen     Body mass index is 24.82 kg/m².  Physical Exam   Constitutional: She is oriented to person, place, and time. She appears well-developed and well-nourished. No distress.   Neck: Carotid bruit is not present. No thyromegaly present.   Cardiovascular: Normal rate, regular rhythm and normal heart sounds. PMI is not displaced.   Pulmonary/Chest: Effort normal and breath sounds normal. No respiratory distress.   Abdominal: Soft. Bowel " sounds are normal. She exhibits no distension. There is no tenderness.   Musculoskeletal: She exhibits no edema.   Neurological: She is alert and oriented to person, place, and time.     Assessment:     1. Abdominal pain, generalized      Plan:   Margarita was seen today for follow-up.    Diagnoses and all orders for this visit:    Abdominal pain, generalized     Path report pending, trial of 1/2 of the Zofran prior to meals it to see if this improves her nausea    Problem List Items Addressed This Visit     None      Visit Diagnoses     Abdominal pain, generalized    -  Primary        No orders of the defined types were placed in this encounter.    Follow up in about 2 weeks (around 10/22/2019) for Follow up abdominal pain.     Medication List           Accurate as of October 8, 2019  6:11 PM. If you have any questions, ask your nurse or doctor.               CONTINUE taking these medications    acetaminophen 325 MG tablet  Commonly known as:  TYLENOL     albuterol 90 mcg/actuation inhaler  Commonly known as:  PROVENTIL/VENTOLIN HFA  Inhale 2 puffs into the lungs every 6 (six) hours as needed for Wheezing. Rescue     CITRUCEL ORAL     * FLUoxetine 10 MG Tab  Take 1 tablet (10 mg total) by mouth once daily.     * FLUoxetine 10 MG Tab  Take 1 tablet (10 mg total) by mouth once daily.     metroNIDAZOLE 250 MG tablet  Commonly known as:  FLAGYL  Take 1 tablet (250 mg total) by mouth 3 (three) times daily.     multivitamin per tablet  Commonly known as:  THERAGRAN     ondansetron 4 MG tablet  Commonly known as:  ZOFRAN  Take 1 tablet (4 mg total) by mouth daily as needed for Nausea.     rizatriptan 10 MG tablet  Commonly known as:  MAXALT  TAKE ONE TABLET BY MOUTH AT ONSET OF MIGRAINE, MAY REPEAT EVERY 2 HOURS. DO NOT EXCEED 3 TABLETS IN 24 HOURS.     rosuvastatin 5 MG tablet  Commonly known as:  CRESTOR  Take 1 tablet (5 mg total) by mouth every evening.     SYMBICORT 80-4.5 mcg/actuation Hfaa  Generic drug:   budesonide-formoterol 80-4.5 mcg  INHALE 2 PUFFS INTO LUNGS TWICE DAILY (CONTROLLER)     valACYclovir 500 MG tablet  Commonly known as:  VALTREX  TAKE 1 CAPLET BY MOUTH ONCE DAILY     VOLTAREN 1 % Gel  Generic drug:  diclofenac sodium  APPLY 4 GRAMS TOPICALLY 4 TIMES DAILY AS DIRECTED         * This list has 2 medication(s) that are the same as other medications prescribed for you. Read the directions carefully, and ask your doctor or other care provider to review them with you.

## 2019-10-10 ENCOUNTER — PATIENT MESSAGE (OUTPATIENT)
Dept: GYNECOLOGIC ONCOLOGY | Facility: CLINIC | Age: 74
End: 2019-10-10

## 2019-10-10 ENCOUNTER — TELEPHONE (OUTPATIENT)
Dept: GYNECOLOGIC ONCOLOGY | Facility: CLINIC | Age: 74
End: 2019-10-10

## 2019-10-10 NOTE — TELEPHONE ENCOUNTER
----- Message from Irvin Vinson MD sent at 10/10/2019  6:46 AM CDT -----  Good morning,    Can we schedule a return visit for her on Monday?  I will call her with Pathology results today.      Thank you.

## 2019-10-11 ENCOUNTER — OFFICE VISIT (OUTPATIENT)
Dept: OPTOMETRY | Facility: CLINIC | Age: 74
End: 2019-10-11
Payer: MEDICARE

## 2019-10-11 DIAGNOSIS — H52.203 ASTIGMATISM OF BOTH EYES, UNSPECIFIED TYPE: ICD-10-CM

## 2019-10-11 DIAGNOSIS — H25.13 NUCLEAR SCLEROSIS OF BOTH EYES: Primary | ICD-10-CM

## 2019-10-11 DIAGNOSIS — H52.4 PRESBYOPIA OF BOTH EYES: ICD-10-CM

## 2019-10-11 PROCEDURE — 92015 PR REFRACTION: ICD-10-PCS | Mod: ,,, | Performed by: OPTOMETRIST

## 2019-10-11 PROCEDURE — 99999 PR PBB SHADOW E&M-EST. PATIENT-LVL III: CPT | Mod: PBBFAC,,, | Performed by: OPTOMETRIST

## 2019-10-11 PROCEDURE — 92015 DETERMINE REFRACTIVE STATE: CPT | Mod: ,,, | Performed by: OPTOMETRIST

## 2019-10-11 PROCEDURE — 92012 PR EYE EXAM, EST PATIENT,INTERMED: ICD-10-PCS | Mod: S$PBB,,, | Performed by: OPTOMETRIST

## 2019-10-11 PROCEDURE — 99999 PR PBB SHADOW E&M-EST. PATIENT-LVL III: ICD-10-PCS | Mod: PBBFAC,,, | Performed by: OPTOMETRIST

## 2019-10-11 PROCEDURE — 92012 INTRM OPH EXAM EST PATIENT: CPT | Mod: S$PBB,,, | Performed by: OPTOMETRIST

## 2019-10-11 PROCEDURE — 99213 OFFICE O/P EST LOW 20 MIN: CPT | Mod: PBBFAC | Performed by: OPTOMETRIST

## 2019-10-11 NOTE — PROGRESS NOTES
"HPI     eye examination       Additional comments: Annual check of refraction.  Followed by Dr. Painting as low tension glaucoma suspect - no treatment.  Last visit with Dr. Painting on 06.03.2019.              Comments     Patient's age: 74 y.o. [place]  Occupation: retired   Approximate date of last eye examination:  10/09/2018  Name of last eye doctor seen:   City/State: [facility]  Wears glasses? NO  patient would like Rx for glasses      If yes, wears  Full-time or part-time?  [employer]   Wears CLs?:  n/o           Headaches?  Migraine headaches  Eye pain/discomfort?  no                                                                                    Flashes?  No  Floaters?  Yes, occasionally   Diplopia/Double vision?  No  Patient's Ocular History:         Any eye surgeries? No         Any eye injury?  No         Any treatment for eye disease?  Followed as Glaucoma Suspect by Dr. Painting.  No treatment for IOP control/reduction in either eye.   Family history of eye disease?  P-Aunt Glaucoma  Significant patient medical history:         1. Diabetes?  No       If yes, IDDM or NIDDM?  n/a   2. HBP?  No              3. Other (describe):  Joint pain   ! OTC eyedrops currently using:  Saline prn   ! Prescription eye meds currently using:  No   ! Any history of allergy/adverse reaction to any eye meds used   previously?  No   ! Any history of allergy/adverse reaction to eyedrops used during prior   eye exam(s)? No   ! Any history of allergy/adverse reaction to Novacaine or similar meds?   No   ! Any history of allergy/adverse reaction to Epinephrine or similar meds?   unsure    ! Patient okay with use of anesthetic eyedrops to check eye pressure?    Yes        ! Patient okay with use of eyedrops to dilate pupils today?  NO -   REQUESTS PUPILS NOT BE DILATED TODAY   !  Allergies/Medications/Medical History/Family History reviewed today?    Yes      PD =   66/63  Desired reading distance =  20"              "                                                                                                         Last edited by Garth Larsen, OD on 10/11/2019 10:03 AM. (History)            Assessment /Plan     For exam results, see Encounter Report.    1. Nuclear sclerosis of both eyes     2. Astigmatism of both eyes, unspecified type     3. Presbyopia of both eyes                 Bilateral nuclear sclerosis, with bilateral central axial punctate lens opacities.  Still no need for cataract surgery in either eye at this time, based on the best-corrected VA achieved with refraction today.      Astigmatic refractive error in each eye, with satisfactory (and essentially equal) best-corrected VA.     New spectacle lens Rx issued for use as desired.     Followed by Dr. Painting as glaucoma suspect.  Mrs. Dunne notes prior finding of visual field defect.  Note persistent visual field defect in the inferonasal quadrant of the left eye on confrontation visual field test.   Not currently under treatment for IOP control/reduction.  Seeing Dr. Painting on regular basis.     Recheck refraction in one year - or prior if notes any problems noted in the interim.

## 2019-10-11 NOTE — PATIENT INSTRUCTIONS
Bilateral nuclear sclerosis, with bilateral central axial punctate lens opacities.  Still no need for cataract surgery in either eye at this time, based on the best-corrected VA achieved with refraction today.      Astigmatic refractive error in each eye, with satisfactory (and essentially equal) best-corrected VA.     New spectacle lens Rx issued for use as desired.     Followed by Dr. Painting as glaucoma suspect.  Mrs. Dunne notes prior finding of visual field defect.  Note persistent visual field defect in the inferonasal quadrant of the left eye on confrontation visual field test.   Not currently under treatment for IOP control/reduction.  Seeing Dr. Painting on regular basis.     Recheck refraction in one year - or prior if notes any problems noted in the interim.

## 2019-10-11 NOTE — LETTER
October 11, 2019      Columba Anderson MD  [address]  [facility] [address]           [facility]  [address]  [facility] [address]  Phone: [phone]  Fax: [phone]          Patient: Margarita Dunne   MR Number: [id number]   YOB: 1945   Date of Visit: 10/11/2019       Dear Dr. Columba Anderson:    Thank you for referring Margarita Dunne to me for evaluation. Attached you will find relevant portions of my assessment and plan of care.    If you have questions, please do not hesitate to call me. I look forward to following Margarita Dunne along with you.    Sincerely,    Garth Larsen, OD    Enclosure  CC:  No Recipients    If you would like to receive this communication electronically, please contact [email or web address] or [phone] to request more information on [employer] access.    For providers and/or their staff who would like to refer a patient to [facility], please contact us through our one-stop-shop provider referral line, [facility], at [phone].    If you feel you have received this communication in error or would no longer like to receive these types of communications, please e-mail [email or web address]

## 2019-10-14 ENCOUNTER — OFFICE VISIT (OUTPATIENT)
Dept: GYNECOLOGIC ONCOLOGY | Facility: CLINIC | Age: 74
End: 2019-10-14
Payer: MEDICARE

## 2019-10-14 VITALS
WEIGHT: 142.88 LBS | DIASTOLIC BLOOD PRESSURE: 61 MMHG | SYSTOLIC BLOOD PRESSURE: 132 MMHG | HEIGHT: 64 IN | HEART RATE: 85 BPM | BODY MASS INDEX: 24.39 KG/M2

## 2019-10-14 DIAGNOSIS — K57.90 DIVERTICULOSIS: ICD-10-CM

## 2019-10-14 DIAGNOSIS — Z88.0 PENICILLIN ALLERGY: ICD-10-CM

## 2019-10-14 DIAGNOSIS — C56.9 MALIGNANT NEOPLASM OF OVARY, UNSPECIFIED LATERALITY: Primary | ICD-10-CM

## 2019-10-14 DIAGNOSIS — J45.20 MILD INTERMITTENT CHRONIC ASTHMA WITHOUT COMPLICATION: ICD-10-CM

## 2019-10-14 PROCEDURE — 99215 PR OFFICE/OUTPT VISIT, EST, LEVL V, 40-54 MIN: ICD-10-PCS | Mod: S$PBB,,, | Performed by: OBSTETRICS & GYNECOLOGY

## 2019-10-14 PROCEDURE — 99215 OFFICE O/P EST HI 40 MIN: CPT | Mod: S$PBB,,, | Performed by: OBSTETRICS & GYNECOLOGY

## 2019-10-14 PROCEDURE — 99213 OFFICE O/P EST LOW 20 MIN: CPT | Mod: PBBFAC | Performed by: OBSTETRICS & GYNECOLOGY

## 2019-10-14 PROCEDURE — 99999 PR PBB SHADOW E&M-EST. PATIENT-LVL III: CPT | Mod: PBBFAC,,, | Performed by: OBSTETRICS & GYNECOLOGY

## 2019-10-14 PROCEDURE — 99999 PR PBB SHADOW E&M-EST. PATIENT-LVL III: ICD-10-PCS | Mod: PBBFAC,,, | Performed by: OBSTETRICS & GYNECOLOGY

## 2019-10-14 NOTE — PROGRESS NOTES
REFERRING PROVIDER  No ref. provider found     HISTORY OF PRESENT CONDITION  CC: Likely ovarian cancer  Margarita Dunne is a 74 y.o. who presents in consultation at for an opinion regarding likely ovarian cancer.  She underwent a CT guided biopsy of her omentum on 10/4/19.  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  She is tolerating PO. +Flatus. +BM. -VB, VD, or recent changes in her stool or urinary habits.     REVIEW OF SYSTEMS  All systems reviewed and negative except as noted in HPI.    OBJECTIVE   Vitals:    10/14/19 0934   BP: 132/61   Pulse: 85      Body mass index is 24.52 kg/m².      1. General: Well appearing but nervous  2. Lymph: Neck symmetric without cervical or supraclavicular adenopathy or mass.  3. Lungs: Normal respiratory rate, no accessory muscle use.  4. Psych: Normal affect.  5. Skin: Warm, dry, no rashes or lesions.   6. Extremities: Bilateral lower extremities without edema or tenderness.    ECOG status: 1    LABORATORY DATA   75,  8, CEA 1.8, Hb 12.5, Cr 0.7, Albumin 3.4.     RADIOLOGICAL DATA  Radiology data reviewed.    PATHOLOGY DATA  Omental stranding and nodularity.  Thickening of the sigmoid colon.  Cul-de-sac nodule that isn't palpable on exam.    ASSESSMENT / PLAN     1. Malignant neoplasm of ovary, unspecified laterality    2. Mild intermittent chronic asthma without complication    3. Penicillin allergy       75,  8, CEA 1.8, Hb 12.5, Cr 0.7, Albumin 3.4.     I discussed with the patient the role of debulking surgery for ovarian cancer, and that I will plan to proceed with an exploratory laparotomy and debulking. This may include a total abdominal hysterectomy, bilateral salpingo-oophorectomy, omentectomy, diaphragm stripping, bowel resection, splenectomy, and any other necessary procedures to remove all visible disease. We discussed that a temporary or permanent colostomy or ileostomy may be necessary based on surgical factors.  The procedure and its risks and benefits were discussed in detail.    She would like to see a second opinion in [place], [place].  I encouraged her to call us when she is ready for surgery.  She'll need to come back to sign consents.      PATIENT EDUCATION  Ready to learn, no apparent learning barriers were identified; learning preferences include listening.  Explained diagnosis and treatment plan; patient expressed understanding of the content.    INFORMED CONSENT  Discussed the risks, benefits, and alternatives of the procedure and of possible blood transfusion.  Discussed the necessity of other members of the healthcare team participating in the procedure.  All questions answered and consent given.    ADMINISTRATIVE BILLING  This consultation lasted 45 minutes and 100% of it was spent in counseling.       Irvin Vinson

## 2019-10-15 ENCOUNTER — TELEPHONE (OUTPATIENT)
Dept: INTERNAL MEDICINE | Facility: CLINIC | Age: 74
End: 2019-10-15

## 2019-10-21 ENCOUNTER — PATIENT MESSAGE (OUTPATIENT)
Dept: GYNECOLOGIC ONCOLOGY | Facility: CLINIC | Age: 74
End: 2019-10-21

## 2019-10-24 ENCOUNTER — TELEPHONE (OUTPATIENT)
Dept: GYNECOLOGIC ONCOLOGY | Facility: CLINIC | Age: 74
End: 2019-10-24

## 2019-10-25 ENCOUNTER — OFFICE VISIT (OUTPATIENT)
Dept: GYNECOLOGIC ONCOLOGY | Facility: CLINIC | Age: 74
End: 2019-10-25
Payer: MEDICARE

## 2019-10-25 VITALS
BODY MASS INDEX: 24.24 KG/M2 | HEART RATE: 95 BPM | DIASTOLIC BLOOD PRESSURE: 60 MMHG | HEIGHT: 64 IN | SYSTOLIC BLOOD PRESSURE: 138 MMHG | WEIGHT: 142 LBS

## 2019-10-25 DIAGNOSIS — F41.9 ANXIETY: ICD-10-CM

## 2019-10-25 DIAGNOSIS — J45.20 MILD INTERMITTENT CHRONIC ASTHMA WITHOUT COMPLICATION: ICD-10-CM

## 2019-10-25 DIAGNOSIS — C56.9 MALIGNANT NEOPLASM OF OVARY, UNSPECIFIED LATERALITY: Primary | ICD-10-CM

## 2019-10-25 DIAGNOSIS — K57.90 DIVERTICULOSIS: ICD-10-CM

## 2019-10-25 DIAGNOSIS — R07.9 CHEST PAIN, UNSPECIFIED TYPE: ICD-10-CM

## 2019-10-25 PROCEDURE — 99215 OFFICE O/P EST HI 40 MIN: CPT | Mod: 57,S$PBB,, | Performed by: OBSTETRICS & GYNECOLOGY

## 2019-10-25 PROCEDURE — 99215 PR OFFICE/OUTPT VISIT, EST, LEVL V, 40-54 MIN: ICD-10-PCS | Mod: 57,S$PBB,, | Performed by: OBSTETRICS & GYNECOLOGY

## 2019-10-25 PROCEDURE — 99999 PR PBB SHADOW E&M-EST. PATIENT-LVL III: ICD-10-PCS | Mod: PBBFAC,,, | Performed by: OBSTETRICS & GYNECOLOGY

## 2019-10-25 PROCEDURE — 99999 PR PBB SHADOW E&M-EST. PATIENT-LVL III: CPT | Mod: PBBFAC,,, | Performed by: OBSTETRICS & GYNECOLOGY

## 2019-10-25 PROCEDURE — 99213 OFFICE O/P EST LOW 20 MIN: CPT | Mod: PBBFAC | Performed by: OBSTETRICS & GYNECOLOGY

## 2019-10-25 NOTE — PROGRESS NOTES
REFERRING PROVIDER  No ref. provider found     HISTORY OF PRESENT CONDITION  CC: Likely ovarian cancer  Margarita Dunne is a 74 y.o. who presents in consultation at for an opinion regarding likely ovarian cancer.  This is her 3rd consultation.  She denies any new symptoms.  She is tolerating p.o..  She has intermittent nausea that is relieved with antiemetics.  She has bowel movements which range in frequency from 1-3 days.  Most recently she was seen for 2nd opinion.  She underwent a pelvic exam which was within normal limits.  She denies any vaginal bleeding. She did undergo a 2nd biopsy.  The source is unknown.  The report is pending.      REVIEW OF SYSTEMS  All systems reviewed and negative except as noted in HPI.    OBJECTIVE   Vitals:    10/25/19 0939   BP: 138/60   Pulse: 95      Body mass index is 24.37 kg/m².      1. General: Well appearing but nervous  2. Lymph: Neck symmetric without cervical or supraclavicular adenopathy or mass.  3. Lungs: Normal respiratory rate, no accessory muscle use.   4. GI: soft, non-tender, non-distended, no masses  4. Psych: Normal affect.  5. Skin: Warm, dry, no rashes or lesions.   6. Extremities: Bilateral lower extremities without edema or tenderness.    ECOG status: 1    LABORATORY DATA   75,  8, CEA 1.8, Hb 12.5, Cr 0.7, Albumin 3.4.     RADIOLOGICAL DATA  Radiology data reviewed.    PATHOLOGY DATA  Omental stranding and nodularity.  Thickening of the sigmoid colon.  Cul-de-sac nodule that isn't palpable on exam.    ASSESSMENT / PLAN     No diagnosis found.   75,  8, CEA 1.8, Hb 12.5, Cr 0.7, Albumin 3.4.     This has been an extremely difficult process for the patient.  She has multiple concerns and questions.  This is the 3rd time that I have met with her and spent close to 1 hr discussing the cancer process and surgery.  She brought a list of questions which we went over in great detail.  Some of these questions would best be answered by Anesthesia.   She has concerns about a breathing tube and was alarmed that she would need to be intubated during surgery.  She also reports intermittent chest pain which she cannot characterize.  She thinks that a lot of it is related to anxiety.  She has had previous echos before which were within normal limits.  Her ECOG score is a 1 and she is ambulatory and asymptomatic.  I think she would benefit from a preoperative anesthesia consultation.  If further imaging and optimization is warranted we will consider neoadjuvant chemotherapy over primary debulking surgery.      I had previously planned for primary debulking surgery on October 31, 2019.  I had discussed the patient with my partner.  This is what I I had discussed with the patient at her last visit.  However the patient is not ready to proceed with surgery.  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  I offered her neoadjuvant chemotherapy due to the delays in proceeding with inferior that she will have a very difficult recovery period.  I offered to have the patient think about it and come back and see me next week.  If she still was not confident about proceeding with primary debulking surgery again I favor neoadjuvant chemotherapy.  The patient asked me to step out of the room and she discussed things with her .  When I returned she said that she would like to proceed with primary debulking surgery.  She wants her surgery to be on November 12th or the 14.  I told her that I have to see what my schedule looks like and discuss availability with my partners.  She also wishes to have her surgery at [facility].  We will do the best we can to accommodate her.  I do favor that surgery in [facility] may be in her best interest to the preoperative clearance in the ability to meet anesthesia before surgery.  Patient voiced understanding.  All questions were answered.    I discussed with  the patient the role of debulking surgery for ovarian cancer, and that I will plan to proceed with an exploratory laparotomy and debulking. This may include a total abdominal hysterectomy, bilateral salpingo-oophorectomy, omentectomy, diaphragm stripping, bowel resection, splenectomy, and any other necessary procedures to remove all visible disease. We discussed that a temporary or permanent colostomy or ileostomy may be necessary based on surgical factors. The procedure and its risks and benefits were discussed in detail.      PATIENT EDUCATION  Ready to learn, no apparent learning barriers were identified; learning preferences include listening.  Explained diagnosis and treatment plan; patient expressed understanding of the content.    INFORMED CONSENT  Discussed the risks, benefits, and alternatives of the procedure and of possible blood transfusion.  Discussed the necessity of other members of the healthcare team participating in the procedure.  All questions answered and consent given.    ADMINISTRATIVE BILLING  This consultation lasted 45 minutes and 100% of it was spent in counseling.       Irvin Vinson

## 2019-10-28 ENCOUNTER — PATIENT MESSAGE (OUTPATIENT)
Dept: GYNECOLOGIC ONCOLOGY | Facility: CLINIC | Age: 74
End: 2019-10-28

## 2019-10-29 ENCOUNTER — PATIENT MESSAGE (OUTPATIENT)
Dept: GYNECOLOGIC ONCOLOGY | Facility: CLINIC | Age: 74
End: 2019-10-29

## 2019-10-30 ENCOUNTER — OFFICE VISIT (OUTPATIENT)
Dept: INTERNAL MEDICINE | Facility: CLINIC | Age: 74
End: 2019-10-30
Payer: MEDICARE

## 2019-10-30 VITALS
WEIGHT: 143.06 LBS | HEIGHT: 64 IN | HEART RATE: 82 BPM | OXYGEN SATURATION: 97 % | DIASTOLIC BLOOD PRESSURE: 74 MMHG | BODY MASS INDEX: 24.42 KG/M2 | SYSTOLIC BLOOD PRESSURE: 128 MMHG

## 2019-10-30 DIAGNOSIS — R11.0 NAUSEA: ICD-10-CM

## 2019-10-30 DIAGNOSIS — F41.9 ANXIETY: Primary | ICD-10-CM

## 2019-10-30 DIAGNOSIS — D49.59 OVARIAN NEOPLASM: ICD-10-CM

## 2019-10-30 DIAGNOSIS — C54.1 MALIGNANT NEOPLASM OF ENDOMETRIUM: ICD-10-CM

## 2019-10-30 PROCEDURE — 99999 PR PBB SHADOW E&M-EST. PATIENT-LVL IV: CPT | Mod: PBBFAC,,, | Performed by: INTERNAL MEDICINE

## 2019-10-30 PROCEDURE — 99214 OFFICE O/P EST MOD 30 MIN: CPT | Mod: PBBFAC | Performed by: INTERNAL MEDICINE

## 2019-10-30 PROCEDURE — 99213 PR OFFICE/OUTPT VISIT, EST, LEVL III, 20-29 MIN: ICD-10-PCS | Mod: S$PBB,,, | Performed by: INTERNAL MEDICINE

## 2019-10-30 PROCEDURE — 99999 PR PBB SHADOW E&M-EST. PATIENT-LVL IV: ICD-10-PCS | Mod: PBBFAC,,, | Performed by: INTERNAL MEDICINE

## 2019-10-30 PROCEDURE — 99213 OFFICE O/P EST LOW 20 MIN: CPT | Mod: S$PBB,,, | Performed by: INTERNAL MEDICINE

## 2019-10-30 RX ORDER — ONDANSETRON 4 MG/1
4 TABLET, FILM COATED ORAL EVERY 6 HOURS PRN
Qty: 60 TABLET | Refills: 1 | Status: ON HOLD | OUTPATIENT
Start: 2019-10-30 | End: 2021-01-01

## 2019-10-30 RX ORDER — FLUOXETINE 10 MG/1
10 TABLET ORAL DAILY
Qty: 30 TABLET | Refills: 1 | Status: SHIPPED | OUTPATIENT
Start: 2019-10-30 | End: 2020-07-24

## 2019-10-31 ENCOUNTER — PATIENT MESSAGE (OUTPATIENT)
Dept: GYNECOLOGIC ONCOLOGY | Facility: CLINIC | Age: 74
End: 2019-10-31

## 2019-10-31 DIAGNOSIS — C56.9 MALIGNANT NEOPLASM OF OVARY, UNSPECIFIED LATERALITY: Primary | ICD-10-CM

## 2019-10-31 DIAGNOSIS — C56.9 MALIGNANT NEOPLASM OF OVARY: ICD-10-CM

## 2019-10-31 RX ORDER — FLUOXETINE 10 MG/1
TABLET ORAL
Qty: 15 TABLET | Refills: 0 | Status: SHIPPED | OUTPATIENT
Start: 2019-10-31 | End: 2020-07-24

## 2019-10-31 RX ORDER — GABAPENTIN 100 MG/1
300 CAPSULE ORAL ONCE
Status: CANCELLED | OUTPATIENT
Start: 2019-10-31 | End: 2019-10-31

## 2019-10-31 RX ORDER — LIDOCAINE HYDROCHLORIDE 10 MG/ML
1 INJECTION, SOLUTION EPIDURAL; INFILTRATION; INTRACAUDAL; PERINEURAL ONCE
Status: CANCELLED | OUTPATIENT
Start: 2019-10-31 | End: 2019-10-31

## 2019-10-31 RX ORDER — HEPARIN SODIUM 5000 [USP'U]/ML
5000 INJECTION, SOLUTION INTRAVENOUS; SUBCUTANEOUS ONCE
Status: CANCELLED | OUTPATIENT
Start: 2019-10-31 | End: 2019-10-31

## 2019-10-31 RX ORDER — TRANEXAMIC ACID 100 MG/ML
1000 INJECTION, SOLUTION INTRAVENOUS ONCE
Status: CANCELLED | OUTPATIENT
Start: 2019-10-31

## 2019-10-31 NOTE — PROGRESS NOTES
Subjective:      Patient ID: Margarita Dunne is a 74 y.o. female.    Chief Complaint: Follow-up    HPI:  HPI   Patient is here at my request.  We have discussed that prior to surgery she needs to keep her protein intake up as well as her calorie intake.  She feels that both the Prozac as well as the Zofran do give her some help.  Nothing completely takes the nausea away or allows her to eat as she had in the past.  She continues to lose weight.  I have encouraged her to use the Zofran before meals in actually could increase the dose.  She reminds me that she is sensitive to medications.    She also tells me that the uterine biopsy was positive for clear cell.  This is in addition to the pathology report here  .  FINAL PATHOLOGIC DIAGNOSIS  Peritoneal mass, core biopsy:  High-grade serous adenocarcinoma.  Comment: The biopsy reveals high-grade adenocarcinoma with marked nuclear pleomorphism. Tumor cells are  strongly positive for P53, P16 and CK7. Stains for ER, TTF-1, Wilms, CK20, CDX2 and Calretinin are negative.  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    Glaucoma suspect - Both Eyes    Nuclear sclerosis - Both Eyes    Chronic rhinitis    Eyelid myokymia - Right Eye    Herpes labialis    Visual field defect - Both Eyes    Penicillin allergy    Itching    Hyperlipidemia    Migraine with aura and without status migrainosus, not intractable    Osteopenia    Numbness and tingling of both legs below knees    Neck pain    Vitamin D deficiency    Memory loss    Pain management    Incomplete bladder emptying    Lower abdominal pain, unspecified    Bladder wall thickening    Pelvic mass    Malignant neoplasm of endometrium     Past  "Medical History:   Diagnosis Date    Allergy     Angio-edema     Arthritis     Asthma     Cataract     Chest pain at rest     Diverticulosis     Fibromyalgia 7/8/2012    Glaucoma suspect, steroid responders     Hand joint pain 7/8/2012    Headache(784.0)     Migraine     Osteoporosis     Recurrent upper respiratory infection (URI)     Urticaria      Past Surgical History:   Procedure Laterality Date    dexa  2014    osteopenia     Family History   Problem Relation Age of Onset    Hypertension Mother     Diabetes Mother     Stroke Mother     Dementia Mother     Cancer Father         pancreas    Allergies Father     Allergic rhinitis Father     Macular degeneration Cousin     Allergic rhinitis Paternal Aunt     Allergies Paternal Aunt     Allergic rhinitis Paternal Uncle     Allergies Paternal Uncle     Glaucoma Neg Hx     Amblyopia Neg Hx     Blindness Neg Hx     Cataracts Neg Hx     Retinal detachment Neg Hx     Strabismus Neg Hx     Thyroid disease Neg Hx     Angioedema Neg Hx     Asthma Neg Hx     Eczema Neg Hx     Immunodeficiency Neg Hx     Colon cancer Neg Hx     Cystic fibrosis Neg Hx     Ulcerative colitis Neg Hx     Stomach cancer Neg Hx     Esophageal cancer Neg Hx      Review of Systems:  Patient continues to lose weight  Objective:     Vitals:    10/30/19 1547   BP: 128/74   Pulse: 82   SpO2: 97%   Weight: 64.9 kg (143 lb 1.3 oz)   Height: 5' 4" (1.626 m)   PainSc:   6   PainLoc: Abdomen     Body mass index is 24.56 kg/m².  Physical Exam   Constitutional: She appears well-developed and well-nourished.   Neck: No JVD present. No thyromegaly present.   Cardiovascular: Normal rate, normal heart sounds and intact distal pulses.   Pulmonary/Chest: Effort normal and breath sounds normal. No respiratory distress.     Assessment:     1. Anxiety    2. Malignant neoplasm of endometrium    3. Nausea    4. Ovarian neoplasm      Plan:   Margarita was seen today for " follow-up.    Diagnoses and all orders for this visit:    Anxiety continue Prozac    Malignant neoplasm of endometrium:  Surgery scheduled    Nausea  Comments:  Trial of Zofran  Orders:  -     ondansetron (ZOFRAN) 4 MG tablet; Take 1 tablet (4 mg total) by mouth every 6 (six) hours as needed for Nausea.    Ovarian neoplasm:  Surgery scheduled    Other orders  -     FLUoxetine 10 MG Tab; Take 1 tablet (10 mg total) by mouth once daily.        Problem List Items Addressed This Visit     Malignant neoplasm of endometrium    Overview     Per the patient           Other Visit Diagnoses     Anxiety    -  Primary    Nausea        Trial of Zofran    Relevant Medications    ondansetron (ZOFRAN) 4 MG tablet    Ovarian neoplasm            No orders of the defined types were placed in this encounter.    No follow-ups on file.     Medication List           Accurate as of October 30, 2019  7:09 PM. If you have any questions, ask your nurse or doctor.               CHANGE how you take these medications    ondansetron 4 MG tablet  Commonly known as:  ZOFRAN  Take 1 tablet (4 mg total) by mouth every 6 (six) hours as needed for Nausea.  What changed:  when to take this  Changed by:  Columba [place] MD        CONTINUE taking these medications    acetaminophen 325 MG tablet  Commonly known as:  TYLENOL     albuterol 90 mcg/actuation inhaler  Commonly known as:  PROVENTIL/VENTOLIN HFA  Inhale 2 puffs into the lungs every 6 (six) hours as needed for Wheezing. Rescue     CITRUCEL ORAL     * FLUoxetine 10 MG Tab  Take 1 tablet (10 mg total) by mouth once daily.     * FLUoxetine 10 MG Tab  Take 1 tablet (10 mg total) by mouth once daily.     metroNIDAZOLE 250 MG tablet  Commonly known as:  FLAGYL  Take 1 tablet (250 mg total) by mouth 3 (three) times daily.     multivitamin per tablet  Commonly known as:  THERAGRAN     rizatriptan 10 MG tablet  Commonly known as:  MAXALT  TAKE ONE TABLET BY MOUTH AT ONSET OF MIGRAINE, MAY REPEAT EVERY 2  HOURS. DO NOT EXCEED 3 TABLETS IN 24 HOURS.     rosuvastatin 5 MG tablet  Commonly known as:  CRESTOR  Take 1 tablet (5 mg total) by mouth every evening.     SYMBICORT 80-4.5 mcg/actuation Hfaa  Generic drug:  budesonide-formoterol 80-4.5 mcg  INHALE 2 PUFFS INTO LUNGS TWICE DAILY (CONTROLLER)     valACYclovir 500 MG tablet  Commonly known as:  VALTREX  TAKE 1 CAPLET BY MOUTH ONCE DAILY     VOLTAREN 1 % Gel  Generic drug:  diclofenac sodium  APPLY 4 GRAMS TOPICALLY 4 TIMES DAILY AS DIRECTED         * This list has 2 medication(s) that are the same as other medications prescribed for you. Read the directions carefully, and ask your doctor or other care provider to review them with you.               Where to Get Your Medications      These medications were sent to [facility] - [place] - [address]  [address] [place] [address]    Phone:  [phone]   · ondansetron 4 MG tablet     Information about where to get these medications is not yet available    Ask your nurse or doctor about these medications  · FLUoxetine 10 MG Tab

## 2019-11-01 ENCOUNTER — TELEPHONE (OUTPATIENT)
Dept: GYNECOLOGIC ONCOLOGY | Facility: CLINIC | Age: 74
End: 2019-11-01

## 2019-11-05 ENCOUNTER — TELEPHONE (OUTPATIENT)
Dept: INTERNAL MEDICINE | Facility: CLINIC | Age: 74
End: 2019-11-05

## 2019-11-06 ENCOUNTER — TELEPHONE (OUTPATIENT)
Dept: GYNECOLOGIC ONCOLOGY | Facility: CLINIC | Age: 74
End: 2019-11-06

## 2019-11-06 NOTE — TELEPHONE ENCOUNTER
"RN returned patient's call in regards to further questions about upcoming surgery. Patient is requesting an additional visit with Dr. Vinson prior to 11/12 procedure.  Patient has had 4 prior office visits to discuss surgery. RN asked if any questions could be answered via telephone and patient replied "I need my  present for the questions I have."  RN will relay message to Dr. Vinson.      ----- Message from Erica Oseguera sent at 11/6/2019  9:53 AM CST -----  Contact: ASHOK MARROQUIN   Type: Patient Call Back    Who called:ASHOK MARROQUIN     What is the request in detail: Patient is requesting a call back. She would like to discuss her upcoming surgery on 11/12/19.     Can the clinic reply by [facility]? No    Best call back number: [phone]    Additional Information: N/A        "

## 2019-11-07 DIAGNOSIS — G43.109 MIGRAINE WITH AURA AND WITHOUT STATUS MIGRAINOSUS, NOT INTRACTABLE: ICD-10-CM

## 2019-11-07 RX ORDER — RIZATRIPTAN BENZOATE 10 MG/1
TABLET ORAL
Qty: 10 TABLET | Refills: 11 | Status: SHIPPED | OUTPATIENT
Start: 2019-11-07 | End: 2019-12-05 | Stop reason: SDUPTHER

## 2019-11-07 NOTE — TELEPHONE ENCOUNTER
----- Message from Estuardo Joel sent at 11/7/2019  2:34 PM CST -----  Contact: Patient  Type:  RX Refill Request    Who Called:  Patient  Refill or New Rx:  Refill  RX Name and Strength:  rizatriptan (MAXALT) 10 MG tablet  How is the patient currently taking it? (ex. 1XDay):  As ordered  Is this a 30 day or 90 day RX:  30  Preferred Pharmacy with phone number:    [facility] - [place] - [address]  [address]  [place] [address]  Phone: [phone] Fax: [phone]  Local or Mail Order:  Local  Ordering Provider:  Dr. Baljeet Linda Call Back Number:  [phone]  Additional Information:  Please be advised, patient is changing pharmacies

## 2019-11-08 ENCOUNTER — ANESTHESIA EVENT (OUTPATIENT)
Dept: SURGERY | Facility: HOSPITAL | Age: 74
DRG: 737 | End: 2019-11-08
Payer: MEDICARE

## 2019-11-08 ENCOUNTER — OFFICE VISIT (OUTPATIENT)
Dept: GYNECOLOGIC ONCOLOGY | Facility: CLINIC | Age: 74
End: 2019-11-08
Payer: MEDICARE

## 2019-11-08 ENCOUNTER — HOSPITAL ENCOUNTER (OUTPATIENT)
Dept: PREADMISSION TESTING | Facility: HOSPITAL | Age: 74
Discharge: HOME | End: 2019-11-08
Attending: ANESTHESIOLOGY
Payer: MEDICARE

## 2019-11-08 ENCOUNTER — PATIENT MESSAGE (OUTPATIENT)
Dept: SURGERY | Facility: HOSPITAL | Age: 74
End: 2019-11-08

## 2019-11-08 VITALS
TEMPERATURE: 98 F | BODY MASS INDEX: 24.07 KG/M2 | SYSTOLIC BLOOD PRESSURE: 140 MMHG | HEART RATE: 86 BPM | OXYGEN SATURATION: 99 % | WEIGHT: 141 LBS | RESPIRATION RATE: 16 BRPM | HEIGHT: 64 IN | DIASTOLIC BLOOD PRESSURE: 66 MMHG

## 2019-11-08 DIAGNOSIS — Z88.0 PENICILLIN ALLERGY: ICD-10-CM

## 2019-11-08 DIAGNOSIS — C56.9 MALIGNANT NEOPLASM OF OVARY, UNSPECIFIED LATERALITY: Primary | ICD-10-CM

## 2019-11-08 DIAGNOSIS — F41.9 ANXIETY: ICD-10-CM

## 2019-11-08 DIAGNOSIS — K57.90 DIVERTICULOSIS: ICD-10-CM

## 2019-11-08 PROCEDURE — 99999 PR PBB SHADOW E&M-EST. PATIENT-LVL II: CPT | Mod: PBBFAC,,, | Performed by: OBSTETRICS & GYNECOLOGY

## 2019-11-08 PROCEDURE — 99999 PR PBB SHADOW E&M-EST. PATIENT-LVL II: ICD-10-PCS | Mod: PBBFAC,,, | Performed by: OBSTETRICS & GYNECOLOGY

## 2019-11-08 PROCEDURE — 99212 OFFICE O/P EST SF 10 MIN: CPT | Mod: PBBFAC | Performed by: OBSTETRICS & GYNECOLOGY

## 2019-11-08 PROCEDURE — 99213 PR OFFICE/OUTPT VISIT, EST, LEVL III, 20-29 MIN: ICD-10-PCS | Mod: S$PBB,,, | Performed by: OBSTETRICS & GYNECOLOGY

## 2019-11-08 PROCEDURE — 99213 OFFICE O/P EST LOW 20 MIN: CPT | Mod: S$PBB,,, | Performed by: OBSTETRICS & GYNECOLOGY

## 2019-11-08 RX ORDER — LORAZEPAM 0.5 MG/1
0.5 TABLET ORAL EVERY 8 HOURS PRN
Qty: 20 TABLET | Refills: 0 | Status: SHIPPED | OUTPATIENT
Start: 2019-11-08 | End: 2020-05-11

## 2019-11-08 NOTE — ANESTHESIA PREPROCEDURE EVALUATION
[facility]  Anesthesia Pre-Operative Evaluation         Patient Name: Margarita Dunne  YOB: 1945  MRN: [id number]  [facility]: [id number]      Code Status: Prior   Date of Procedure: 11/12/2019  Procedure: Procedure(s) (LRB):  LAPAROTOMY, EXPLORATORY (N/A)  HYSTERECTOMY, TOTAL, ABDOMINAL (N/A)  SALPINGO-OOPHORECTOMY (Bilateral)  OMENTECTOMY (N/A)  DEBULKING, NEOPLASM (N/A)  EXCISION, INTESTINE (N/A)  CREATION, COLOSTOMY (N/A)  Anesthesia: General  Pre-Operative Diagnosis: <principal problem not specified>   Proceduralist/Surgeon(s) and Role:     * Irvin Vinson MD - Primary      SUBJECTIVE:     Margarita Dunne is a 74 y.o. female w/ a significant PMHx of fibromyalgia, osteoporosis, asthma (well-controlled on symbicort), angioedema, and peritoneal mass likely representing ovarian cancer. She also notes a history of intermittent chest soreness at rest, which she states has been worked up by cardiology with no abnormalities found.    Patient now presents for the above procedure(s). Pt appropriately NPO.     LDA:       Anesthesia Evaluation      Airway   Mallampati: I  TM distance: Normal, at least 6 cm  Neck ROM: Extension Decreased, Mild  Dental    (+) In tact    Pulmonary    (+) asthma,   Cardiovascular     Rate: Normal    Neuro/Psych    (+) neuromuscular disease, headaches,     GI/Hepatic/Renal      Endo/Other    Abdominal                   Relevant Problems   No relevant active problems      ALLERGIES:     Review of patient's allergies indicates:   Allergen Reactions    Doxycycline Nausea And Vomiting    Corticosteroids (glucocorticoids) Other (See Comments)     Causes pt to have migraines for 2 weeks    Pcn [penicillins] Rash    Sulfa (sulfonamide antibiotics) Rash     MEDICATIONS:     Prior to Admission medications    Medication Sig Start Date End Date Taking? Authorizing Provider   acetaminophen (TYLENOL) 325 MG tablet Take 325 mg by mouth every 6 (six) hours as needed for Pain.   Yes  Historical Provider, MD   FLUoxetine 10 MG Tab Take 1 tablet (10 mg total) by mouth once daily. 10/30/19 10/29/20 Yes Columba [place] MD   LORazepam (ATIVAN) 0.5 MG tablet Take 1 tablet (0.5 mg total) by mouth every 8 (eight) hours as needed for Anxiety. 11/8/19 11/15/19 Yes Irvin Vinson MD   methylcellulose (CITRUCEL ORAL) Take by mouth 2 (two) times daily.   Yes Historical Provider, MD   ondansetron (ZOFRAN) 4 MG tablet Take 1 tablet (4 mg total) by mouth every 6 (six) hours as needed for Nausea. 10/30/19  Yes Columba [place] MD   rizatriptan (MAXALT) 10 MG tablet TAKE ONE TABLET BY MOUTH AT ONSET OF MIGRAINE, MAY REPEAT EVERY 2 HOURS. DO NOT EXCEED 3 TABLETS IN 24 HOURS. 11/7/19  Yes Crys Delong MD   rosuvastatin (CRESTOR) 5 MG tablet Take 1 tablet (5 mg total) by mouth every evening. 4/10/19  Yes Columba [place] MD   SYMBICORT 80-4.5 mcg/actuation HFAA INHALE 2 PUFFS INTO LUNGS TWICE DAILY (CONTROLLER) 9/27/19  Yes Ned Martinez MD   VOLTAREN 1 % Gel APPLY 4 GRAMS TOPICALLY 4 TIMES DAILY AS DIRECTED 8/6/18  Yes Columba [place] MD   albuterol 90 mcg/actuation inhaler Inhale 2 puffs into the lungs every 6 (six) hours as needed for Wheezing. Rescue 9/4/18   Ned Martinez MD   FLUoxetine 10 MG Tab Take 1 tablet (10 mg total) by mouth once daily. 9/25/19 9/24/20  Columba [place] MD   FLUoxetine 10 MG Tab TAKE 1 TABLET BY MOUTH ONCE DAILY 10/31/19   Columba [place] MD   metroNIDAZOLE (FLAGYL) 250 MG tablet Take 1 tablet (250 mg total) by mouth 3 (three) times daily. 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    Glaucoma suspect - Both Eyes    Nuclear sclerosis - Both Eyes    Chronic  "rhinitis    Eyelid myokymia - Right Eye    Herpes labialis    Visual field defect - Both Eyes    Penicillin allergy    Itching    Hyperlipidemia    Migraine with aura and without status migrainosus, not intractable    Osteopenia    Numbness and tingling of both legs below knees    Neck pain    Vitamin D deficiency    Memory loss    Pain management    Incomplete bladder emptying    Lower abdominal pain, unspecified    Bladder wall thickening    Pelvic mass    Malignant neoplasm of endometrium    Malignant neoplasm of ovary      Past Medical History:   Diagnosis Date    Allergy     Angio-edema     Arthritis     Asthma     Cataract     Chest pain at rest     Diverticulosis     Fibromyalgia 7/8/2012    Glaucoma suspect, steroid responders     Hand joint pain 7/8/2012    Headache(784.0)     Migraine     Osteoporosis     Recurrent upper respiratory infection (URI)     Urticaria      Past Surgical History:   Procedure Laterality Date    dexa  2014    osteopenia     OBJECTIVE:   Last 3 sets of Vitals    Vitals - 1 value per visit 10/25/2019 10/30/2019 11/8/2019   SYSTOLIC 138 128 140   DIASTOLIC 60 74 66   PULSE 95 82 86   TEMPERATURE - - 98   RESPIRATIONS - - 16   SPO2 - 97 99   Weight (lb) 141.98 143.08 141   Weight (kg) 64.4 64.9 63.957   HEIGHT 5' 4" 5' 4" 5' 4"   BODY MASS INDEX 24.37 24.56 24.2   VISIT REPORT - - -   Pain Score  5 6 -   Some recent data might be hidden       Significant Labs:  Lab Results   Component Value Date    WBC 6.25 10/02/2019    HGB 12.5 10/02/2019    HCT 38.3 10/02/2019     (H) 10/02/2019    CHOL 152 07/25/2019    TRIG 82 07/25/2019    HDL 43 07/25/2019    ALT 8 (L) 10/02/2019    AST 16 10/02/2019     10/02/2019    K 3.7 10/02/2019     10/02/2019    CREATININE 0.7 10/02/2019    BUN 7 (L) 10/02/2019    CO2 26 10/02/2019    TSH 1.762 07/25/2019    INR 1.1 10/02/2019    HGBA1C 5.9 08/12/2016     No results found for: PREGTESTUR, PREGSERUM, " HCG, HCGQUANT   No LMP recorded. Patient is postmenopausal.    EKG:   Results for orders placed or performed in visit on 02/05/13   EKG 12-lead    Collection Time: 02/05/13  3:20 PM    Narrative    Test Reason : 786.50  Blood Pressure :  Vent. Rate : 080 BPM     Atrial Rate : 080 BPM     P-R Int : 210 ms          QRS Dur : 076 ms      QT Int : 382 ms       P-R-T Axes : 066 027 058 degrees     QTc Int : 440 ms    Sinus rhythm with 1st degree A-V block  Otherwise normal ECG  When compared with ECG of 21-AUG-2012 08:56,  LA interval has increased  Confirmed by OLAF DOMINGUEZ MD ([id number]) on 2/8/2013 8:43:38 AM    Referred By:             Overread By: OLAF DOMINGUEZ MD     Stress Test:  2/6/13    1 - Normal left ventricular function (EF 65%).     2 - Diastolic dysfunction.     3 - Trivial pericardial effusion.   No evidence of stress induced myocardial ischemia.       ASSESSMENT/PLAN:         Anesthesia Evaluation    I have reviewed the Patient Summary Reports.    I have reviewed the Nursing Notes.   I have reviewed the Medications.     Review of Systems  Anesthesia Hx:  No problems with previous Anesthesia  Neg history of prior surgery. Denies Family Hx of Anesthesia complications.    Social:  Non-Smoker    Hematology/Oncology:        Current/Recent Cancer.   Cardiovascular:  Cardiovascular Normal     Pulmonary:   Asthma    Neurological:   Neuromuscular Disease, Headaches        Physical Exam  General:  Well nourished    Airway/Jaw/Neck:  Airway Findings: Mouth Opening: Small, but > 3cm Tongue: Normal  General Airway Assessment: Adult  Mallampati: I  Improves to II with phonation.  TM Distance: Normal, at least 6 cm  Jaw/Neck Findings:  Neck ROM: Extension Decreased, Mild     Eyes/Ears/Nose:  EYES/EARS/NOSE FINDINGS: Normal   Dental:  Dental Findings: In tact   Chest/Lungs:  Chest/Lungs Findings: Clear to auscultation     Heart/Vascular:  Heart Findings: Rate: Normal  Rhythm: Regular Rhythm  Sounds: Normal  Heart murmur: negative  Vascular Findings: Normal    Abdomen:  Abdomen Findings: Normal    Musculoskeletal:  Musculoskeletal Findings: Normal   Skin:  Skin Findings: Normal    Mental Status:  Mental Status Findings:  Cooperative, Alert and Oriented, Anxious         Anesthesia Plan  Type of Anesthesia, risks & benefits discussed:  Anesthesia Type:  general  Patient's Preference:   Intra-op Monitoring Plan: standard ASA monitors  Intra-op Monitoring Plan Comments:   Post Op Pain Control Plan: multimodal analgesia and per primary service following discharge from PACU  Post Op Pain Control Plan Comments:   Induction:   IV  Beta Blocker:  Patient is not currently on a Beta-Blocker (No further documentation required).       Informed Consent: Patient understands risks and agrees with Anesthesia plan.  Questions answered. Anesthesia consent signed with patient.  ASA Score: 3     Day of Surgery Review of History & Physical:     H&P completed by Anesthesiologist.   Anesthesia Plan Notes: Chart reviewed, patient interviewed and examined.  The anesthetic plan was explained.  Risks, benefits, and alternatives were discussed. She has never had surgery and is very anxious about the procedure. I answered her questions at length and provided reassurance. Questions were answered and the consent was signed.        SUMA Arambula M.D.           Ready For Surgery From Anesthesia Perspective.

## 2019-11-08 NOTE — PROGRESS NOTES
REFERRING PROVIDER  No ref. provider found     REASON FOR CONSULT  Margarita Dunne  is a 74 y.o.  woman who presents for further pre-operative counseling.      HISTORY OF PRESENT ILLNESS    She reports N/V.  Today had emesis while brushing her teeth.  Attributes it to anxiety. Decreased appetite. +Flatus. +BM.  Ambulating.  She denies any changes in her abdominal pain.     REVIEW OF SYSTEMS  All systems reviewed and negative except as noted in HPI.    OBJECTIVE   There were no vitals filed for this visit.   There is no height or weight on file to calculate BMI.      1. General: Well appearing, no apparent distress, alert and oriented.  2. Lymph: Neck symmetric without cervical or supraclavicular adenopathy or mass.  3. Lungs: Normal respiratory rate, no accessory muscle use.  4. Psych: Normal affect.  5. Skin: Warm, dry, no rashes or lesions.     ECOG status: 1    LABORATORY DATA  Lab data reviewed.    RADIOLOGICAL DATA  Radiology data reviewed.    ASSESSMENT / PLAN     1. Malignant neoplasm of ovary, unspecified laterality    2. Anxiety    3. Diverticulosis    4. Penicillin allergy       Patient had several questions about Anesthesia, including intubation.  She also had several questions about catheters.  I answered them to the best of my ability.  She is scheduled to see Anesthesia today.  We further discussed the important of surgical intervention and achieving RD<1.  She is extremely anxious and I will prescribe her Ativan.  We discussed the importance of protein bulking before surgery.  Lastly, we discussed the risk of a bowel leak and stoma.  She voiced understanding.  All questions were answered.    PATIENT EDUCATION  Ready to learn, no apparent learning barriers were identified; learning preferences include listening. Explained diagnosis and treatment plan; patient expressed understanding of the content.    ADMINISTRATIVE BILLING  This consultation lasted 15 minutes and 100% it of was spent in counseling.        Irvin Vinson

## 2019-11-11 NOTE — PRE-PROCEDURE INSTRUCTIONS
PREOP INSTRUCTIONS:No solid food ,milk or milk products for 8 hours prior to procedure.Clear liquids are allowed up to 2 hours beforeprocedure.Clear liquids are:water,apple juice,pedialyte,gatorade,& jello.Patient instructed to follow surgeon's instructions if they differed from these.Shower instructions as well as directions to the 2nd floor [facility] Center were given.Patient encouraged to wear loose fitting,comfortable clothing .Medication instructions for pm prior to and am of procedure reviewed.Instructed patient to avoid taking vitamins,supplements,aspirin and ibuprofen the morning of surgery.Patient stated an understanding.    Patient denies any side effects or issues with anesthesia or sedation.Patient did report that she has never had general anesthesia in the past and is not aware of any familial problems with anesthesia.Patient stated that she has been experiencing frequent bouts of nausea lately and is expecting to have nausea associated with the anesthetic.Because of this she requests anti nausea patch in anticipation of post op nausea

## 2019-11-12 ENCOUNTER — HOSPITAL ENCOUNTER (INPATIENT)
Facility: HOSPITAL | Age: 74
LOS: 6 days | Discharge: HOME | DRG: 737 | End: 2019-11-18
Attending: OBSTETRICS & GYNECOLOGY | Admitting: OBSTETRICS & GYNECOLOGY
Payer: MEDICARE

## 2019-11-12 ENCOUNTER — ANESTHESIA (OUTPATIENT)
Dept: SURGERY | Facility: HOSPITAL | Age: 74
DRG: 737 | End: 2019-11-12
Payer: MEDICARE

## 2019-11-12 DIAGNOSIS — F32.A DEPRESSION, UNSPECIFIED DEPRESSION TYPE: ICD-10-CM

## 2019-11-12 DIAGNOSIS — F41.9 ANXIETY: ICD-10-CM

## 2019-11-12 DIAGNOSIS — Z90.710 S/P TAH-BSO (TOTAL ABDOMINAL HYSTERECTOMY AND BILATERAL SALPINGO-OOPHORECTOMY): Primary | ICD-10-CM

## 2019-11-12 DIAGNOSIS — C56.9 MALIGNANT NEOPLASM OF OVARY: ICD-10-CM

## 2019-11-12 DIAGNOSIS — R52 PAIN MANAGEMENT: ICD-10-CM

## 2019-11-12 DIAGNOSIS — Z90.722 S/P TAH-BSO (TOTAL ABDOMINAL HYSTERECTOMY AND BILATERAL SALPINGO-OOPHORECTOMY): Primary | ICD-10-CM

## 2019-11-12 DIAGNOSIS — C56.9 MALIGNANT NEOPLASM OF OVARY, UNSPECIFIED LATERALITY: ICD-10-CM

## 2019-11-12 DIAGNOSIS — Z90.79 S/P TAH-BSO (TOTAL ABDOMINAL HYSTERECTOMY AND BILATERAL SALPINGO-OOPHORECTOMY): Primary | ICD-10-CM

## 2019-11-12 DIAGNOSIS — M79.7 FIBROMYALGIA: ICD-10-CM

## 2019-11-12 PROBLEM — R19.00 PELVIC MASS: Status: RESOLVED | Noted: 2019-10-02 | Resolved: 2019-11-12

## 2019-11-12 LAB
ABO + RH BLD: NORMAL
BLD GP AB SCN CELLS X3 SERPL QL: NORMAL
GLUCOSE SERPL-MCNC: 127 MG/DL (ref 70–110)
HCO3 UR-SCNC: 21.9 MMOL/L (ref 24–28)
HCT VFR BLD CALC: 32 %PCV (ref 36–54)
PCO2 BLDA: 41.5 MMHG (ref 35–45)
PH SMN: 7.33 [PH] (ref 7.35–7.45)
PO2 BLDA: 72 MMHG (ref 40–60)
POC BE: -4 MMOL/L
POC IONIZED CALCIUM: 1.11 MMOL/L (ref 1.06–1.42)
POC SATURATED O2: 93 % (ref 95–100)
POC TCO2: 23 MMOL/L (ref 24–29)
POTASSIUM BLD-SCNC: 3.8 MMOL/L (ref 3.5–5.1)
SAMPLE: ABNORMAL
SODIUM BLD-SCNC: 136 MMOL/L (ref 136–145)

## 2019-11-12 PROCEDURE — 25000003 PHARM REV CODE 250: Performed by: STUDENT IN AN ORGANIZED HEALTH CARE EDUCATION/TRAINING PROGRAM

## 2019-11-12 PROCEDURE — 44955 PR APPENDECTOMY,W OTHR PROC: ICD-10-PCS | Mod: GC,,, | Performed by: OBSTETRICS & GYNECOLOGY

## 2019-11-12 PROCEDURE — D9220A PRA ANESTHESIA: Mod: CRNA,,, | Performed by: NURSE ANESTHETIST, CERTIFIED REGISTERED

## 2019-11-12 PROCEDURE — 88304 PR  SURG PATH,LEVEL III: ICD-10-PCS | Mod: 26,,, | Performed by: PATHOLOGY

## 2019-11-12 PROCEDURE — 71000033 HC RECOVERY, INTIAL HOUR: Performed by: OBSTETRICS & GYNECOLOGY

## 2019-11-12 PROCEDURE — 86850 RBC ANTIBODY SCREEN: CPT

## 2019-11-12 PROCEDURE — 94761 N-INVAS EAR/PLS OXIMETRY MLT: CPT

## 2019-11-12 PROCEDURE — 88305 TISSUE EXAM BY PATHOLOGIST: ICD-10-PCS | Mod: 26,,, | Performed by: PATHOLOGY

## 2019-11-12 PROCEDURE — 25000003 PHARM REV CODE 250: Performed by: NURSE ANESTHETIST, CERTIFIED REGISTERED

## 2019-11-12 PROCEDURE — 20600001 HC STEP DOWN PRIVATE ROOM

## 2019-11-12 PROCEDURE — 88307 TISSUE EXAM BY PATHOLOGIST: CPT | Mod: 59 | Performed by: PATHOLOGY

## 2019-11-12 PROCEDURE — D9220A PRA ANESTHESIA: ICD-10-PCS | Mod: CRNA,,, | Performed by: NURSE ANESTHETIST, CERTIFIED REGISTERED

## 2019-11-12 PROCEDURE — 58953 PR BIL SALP-OOPH W/OMENTECT,TAH,RAD DISSECT: ICD-10-PCS | Mod: 82,GC,, | Performed by: OBSTETRICS & GYNECOLOGY

## 2019-11-12 PROCEDURE — 88304 TISSUE EXAM BY PATHOLOGIST: CPT | Performed by: PATHOLOGY

## 2019-11-12 PROCEDURE — 25000003 PHARM REV CODE 250: Performed by: ANESTHESIOLOGY

## 2019-11-12 PROCEDURE — 88307 TISSUE EXAM BY PATHOLOGIST: CPT | Mod: 26,,, | Performed by: PATHOLOGY

## 2019-11-12 PROCEDURE — 63600175 PHARM REV CODE 636 W HCPCS: Performed by: STUDENT IN AN ORGANIZED HEALTH CARE EDUCATION/TRAINING PROGRAM

## 2019-11-12 PROCEDURE — 63600175 PHARM REV CODE 636 W HCPCS: Performed by: OBSTETRICS & GYNECOLOGY

## 2019-11-12 PROCEDURE — C9290 INJ, BUPIVACAINE LIPOSOME: HCPCS | Performed by: OBSTETRICS & GYNECOLOGY

## 2019-11-12 PROCEDURE — D9220A PRA ANESTHESIA: Mod: ANES,,, | Performed by: ANESTHESIOLOGY

## 2019-11-12 PROCEDURE — 25000003 PHARM REV CODE 250: Performed by: OBSTETRICS & GYNECOLOGY

## 2019-11-12 PROCEDURE — 27201423 OPTIME MED/SURG SUP & DEVICES STERILE SUPPLY: Performed by: OBSTETRICS & GYNECOLOGY

## 2019-11-12 PROCEDURE — 58953 PR BIL SALP-OOPH W/OMENTECT,TAH,RAD DISSECT: ICD-10-PCS | Mod: GC,,, | Performed by: OBSTETRICS & GYNECOLOGY

## 2019-11-12 PROCEDURE — 37000009 HC ANESTHESIA EA ADD 15 MINS: Performed by: OBSTETRICS & GYNECOLOGY

## 2019-11-12 PROCEDURE — 88304 TISSUE EXAM BY PATHOLOGIST: CPT | Mod: 26,,, | Performed by: PATHOLOGY

## 2019-11-12 PROCEDURE — 36000709 HC OR TIME LEV III EA ADD 15 MIN: Performed by: OBSTETRICS & GYNECOLOGY

## 2019-11-12 PROCEDURE — 44955 APPENDECTOMY ADD-ON: CPT | Mod: GC,,, | Performed by: OBSTETRICS & GYNECOLOGY

## 2019-11-12 PROCEDURE — 88305 TISSUE EXAM BY PATHOLOGIST: CPT | Mod: 26,,, | Performed by: PATHOLOGY

## 2019-11-12 PROCEDURE — 44955 PR APPENDECTOMY,W OTHR PROC: ICD-10-PCS | Mod: 82,GC,, | Performed by: OBSTETRICS & GYNECOLOGY

## 2019-11-12 PROCEDURE — 36000708 HC OR TIME LEV III 1ST 15 MIN: Performed by: OBSTETRICS & GYNECOLOGY

## 2019-11-12 PROCEDURE — 63600175 PHARM REV CODE 636 W HCPCS: Performed by: NURSE ANESTHETIST, CERTIFIED REGISTERED

## 2019-11-12 PROCEDURE — 58953 TAH RAD DISSECT FOR DEBULK: CPT | Mod: GC,,, | Performed by: OBSTETRICS & GYNECOLOGY

## 2019-11-12 PROCEDURE — 88305 TISSUE EXAM BY PATHOLOGIST: CPT | Performed by: PATHOLOGY

## 2019-11-12 PROCEDURE — 58953 TAH RAD DISSECT FOR DEBULK: CPT | Mod: 82,GC,, | Performed by: OBSTETRICS & GYNECOLOGY

## 2019-11-12 PROCEDURE — 63600175 PHARM REV CODE 636 W HCPCS: Performed by: ANESTHESIOLOGY

## 2019-11-12 PROCEDURE — D9220A PRA ANESTHESIA: ICD-10-PCS | Mod: ANES,,, | Performed by: ANESTHESIOLOGY

## 2019-11-12 PROCEDURE — 44955 APPENDECTOMY ADD-ON: CPT | Mod: 82,GC,, | Performed by: OBSTETRICS & GYNECOLOGY

## 2019-11-12 PROCEDURE — 88307 PR  SURG PATH,LEVEL V: ICD-10-PCS | Mod: 26,,, | Performed by: PATHOLOGY

## 2019-11-12 PROCEDURE — 37000008 HC ANESTHESIA 1ST 15 MINUTES: Performed by: OBSTETRICS & GYNECOLOGY

## 2019-11-12 PROCEDURE — S0030 INJECTION, METRONIDAZOLE: HCPCS | Performed by: ANESTHESIOLOGY

## 2019-11-12 RX ORDER — FENTANYL CITRATE 50 UG/ML
25 INJECTION, SOLUTION INTRAMUSCULAR; INTRAVENOUS EVERY 5 MIN PRN
Status: DISCONTINUED | OUTPATIENT
Start: 2019-11-12 | End: 2019-11-12 | Stop reason: HOSPADM

## 2019-11-12 RX ORDER — DIPHENHYDRAMINE HYDROCHLORIDE 50 MG/ML
25 INJECTION INTRAMUSCULAR; INTRAVENOUS EVERY 4 HOURS PRN
Status: CANCELLED | OUTPATIENT
Start: 2019-11-12

## 2019-11-12 RX ORDER — HEPARIN SODIUM 5000 [USP'U]/ML
INJECTION, SOLUTION INTRAVENOUS; SUBCUTANEOUS
Status: DISCONTINUED | OUTPATIENT
Start: 2019-11-12 | End: 2019-11-12

## 2019-11-12 RX ORDER — GABAPENTIN 300 MG/1
300 CAPSULE ORAL ONCE
Status: COMPLETED | OUTPATIENT
Start: 2019-11-12 | End: 2019-11-12

## 2019-11-12 RX ORDER — MUPIROCIN 20 MG/G
1 OINTMENT TOPICAL 2 TIMES DAILY
Status: CANCELLED | OUTPATIENT
Start: 2019-11-12 | End: 2019-11-17

## 2019-11-12 RX ORDER — ENOXAPARIN SODIUM 100 MG/ML
40 INJECTION SUBCUTANEOUS EVERY 24 HOURS
Status: CANCELLED | OUTPATIENT
Start: 2019-11-13

## 2019-11-12 RX ORDER — KETAMINE HCL IN 0.9 % NACL 50 MG/5 ML
SYRINGE (ML) INTRAVENOUS
Status: DISCONTINUED | OUTPATIENT
Start: 2019-11-12 | End: 2019-11-12

## 2019-11-12 RX ORDER — SODIUM CHLORIDE, SODIUM LACTATE, POTASSIUM CHLORIDE, CALCIUM CHLORIDE 600; 310; 30; 20 MG/100ML; MG/100ML; MG/100ML; MG/100ML
INJECTION, SOLUTION INTRAVENOUS CONTINUOUS
Status: DISCONTINUED | OUTPATIENT
Start: 2019-11-12 | End: 2019-11-14

## 2019-11-12 RX ORDER — PHENYLEPHRINE HYDROCHLORIDE 10 MG/ML
INJECTION INTRAVENOUS
Status: DISCONTINUED | OUTPATIENT
Start: 2019-11-12 | End: 2019-11-12

## 2019-11-12 RX ORDER — HYDROMORPHONE HYDROCHLORIDE 1 MG/ML
0.2 INJECTION, SOLUTION INTRAMUSCULAR; INTRAVENOUS; SUBCUTANEOUS EVERY 5 MIN PRN
Status: DISCONTINUED | OUTPATIENT
Start: 2019-11-12 | End: 2019-11-12 | Stop reason: HOSPADM

## 2019-11-12 RX ORDER — DIPHENHYDRAMINE HCL 25 MG
25 CAPSULE ORAL EVERY 4 HOURS PRN
Status: CANCELLED | OUTPATIENT
Start: 2019-11-12

## 2019-11-12 RX ORDER — LIDOCAINE HCL/PF 100 MG/5ML
SYRINGE (ML) INTRAVENOUS
Status: DISCONTINUED | OUTPATIENT
Start: 2019-11-12 | End: 2019-11-12

## 2019-11-12 RX ORDER — BISACODYL 10 MG
10 SUPPOSITORY, RECTAL RECTAL DAILY PRN
Status: CANCELLED | OUTPATIENT
Start: 2019-11-12

## 2019-11-12 RX ORDER — DEXAMETHASONE SODIUM PHOSPHATE 4 MG/ML
INJECTION, SOLUTION INTRA-ARTICULAR; INTRALESIONAL; INTRAMUSCULAR; INTRAVENOUS; SOFT TISSUE
Status: DISCONTINUED | OUTPATIENT
Start: 2019-11-12 | End: 2019-11-12

## 2019-11-12 RX ORDER — AMOXICILLIN 250 MG
1 CAPSULE ORAL 2 TIMES DAILY
Status: DISCONTINUED | OUTPATIENT
Start: 2019-11-12 | End: 2019-11-18 | Stop reason: HOSPADM

## 2019-11-12 RX ORDER — ROCURONIUM BROMIDE 10 MG/ML
INJECTION, SOLUTION INTRAVENOUS
Status: DISCONTINUED | OUTPATIENT
Start: 2019-11-12 | End: 2019-11-12

## 2019-11-12 RX ORDER — OXYCODONE AND ACETAMINOPHEN 10; 325 MG/1; MG/1
1 TABLET ORAL EVERY 4 HOURS PRN
Status: CANCELLED | OUTPATIENT
Start: 2019-11-12

## 2019-11-12 RX ORDER — OXYCODONE HYDROCHLORIDE 5 MG/1
5 TABLET ORAL EVERY 4 HOURS PRN
Status: DISCONTINUED | OUTPATIENT
Start: 2019-11-12 | End: 2019-11-18 | Stop reason: HOSPADM

## 2019-11-12 RX ORDER — FLUOXETINE 10 MG/1
10 CAPSULE ORAL DAILY
Status: DISCONTINUED | OUTPATIENT
Start: 2019-11-13 | End: 2019-11-18 | Stop reason: HOSPADM

## 2019-11-12 RX ORDER — CEFAZOLIN SODIUM 1 G/3ML
INJECTION, POWDER, FOR SOLUTION INTRAMUSCULAR; INTRAVENOUS
Status: DISCONTINUED | OUTPATIENT
Start: 2019-11-12 | End: 2019-11-12

## 2019-11-12 RX ORDER — FENTANYL CITRATE 50 UG/ML
INJECTION, SOLUTION INTRAMUSCULAR; INTRAVENOUS
Status: DISCONTINUED | OUTPATIENT
Start: 2019-11-12 | End: 2019-11-12

## 2019-11-12 RX ORDER — KETOROLAC TROMETHAMINE 30 MG/ML
30 INJECTION, SOLUTION INTRAMUSCULAR; INTRAVENOUS EVERY 6 HOURS
Status: CANCELLED | OUTPATIENT
Start: 2019-11-12 | End: 2019-11-13

## 2019-11-12 RX ORDER — SODIUM CHLORIDE 9 MG/ML
INJECTION, SOLUTION INTRAVENOUS CONTINUOUS PRN
Status: DISCONTINUED | OUTPATIENT
Start: 2019-11-12 | End: 2019-11-12

## 2019-11-12 RX ORDER — METRONIDAZOLE 500 MG/100ML
INJECTION, SOLUTION INTRAVENOUS
Status: DISCONTINUED | OUTPATIENT
Start: 2019-11-12 | End: 2019-11-12

## 2019-11-12 RX ORDER — PROPOFOL 10 MG/ML
VIAL (ML) INTRAVENOUS
Status: DISCONTINUED | OUTPATIENT
Start: 2019-11-12 | End: 2019-11-12

## 2019-11-12 RX ORDER — ONDANSETRON 2 MG/ML
8 INJECTION INTRAMUSCULAR; INTRAVENOUS EVERY 6 HOURS PRN
Status: DISCONTINUED | OUTPATIENT
Start: 2019-11-12 | End: 2019-11-18 | Stop reason: HOSPADM

## 2019-11-12 RX ORDER — ONDANSETRON 8 MG/1
8 TABLET, ORALLY DISINTEGRATING ORAL EVERY 8 HOURS PRN
Status: CANCELLED | OUTPATIENT
Start: 2019-11-12

## 2019-11-12 RX ORDER — ROSUVASTATIN CALCIUM 5 MG/1
5 TABLET, COATED ORAL NIGHTLY
Status: DISCONTINUED | OUTPATIENT
Start: 2019-11-12 | End: 2019-11-18 | Stop reason: HOSPADM

## 2019-11-12 RX ORDER — OXYCODONE AND ACETAMINOPHEN 5; 325 MG/1; MG/1
1 TABLET ORAL EVERY 4 HOURS PRN
Status: CANCELLED | OUTPATIENT
Start: 2019-11-12

## 2019-11-12 RX ORDER — ONDANSETRON 2 MG/ML
INJECTION INTRAMUSCULAR; INTRAVENOUS
Status: DISCONTINUED | OUTPATIENT
Start: 2019-11-12 | End: 2019-11-12

## 2019-11-12 RX ORDER — SCOLOPAMINE TRANSDERMAL SYSTEM 1 MG/1
1 PATCH, EXTENDED RELEASE TRANSDERMAL
Status: DISCONTINUED | OUTPATIENT
Start: 2019-11-12 | End: 2019-11-12 | Stop reason: HOSPADM

## 2019-11-12 RX ORDER — LIDOCAINE HYDROCHLORIDE 10 MG/ML
1 INJECTION, SOLUTION EPIDURAL; INFILTRATION; INTRACAUDAL; PERINEURAL ONCE
Status: DISCONTINUED | OUTPATIENT
Start: 2019-11-12 | End: 2019-11-12

## 2019-11-12 RX ORDER — ALBUTEROL SULFATE 90 UG/1
2 AEROSOL, METERED RESPIRATORY (INHALATION) EVERY 6 HOURS PRN
Status: CANCELLED | OUTPATIENT
Start: 2019-11-12

## 2019-11-12 RX ORDER — KETOROLAC TROMETHAMINE 30 MG/ML
15 INJECTION, SOLUTION INTRAMUSCULAR; INTRAVENOUS EVERY 6 HOURS
Status: DISCONTINUED | OUTPATIENT
Start: 2019-11-12 | End: 2019-11-14

## 2019-11-12 RX ORDER — IBUPROFEN 200 MG
600 TABLET ORAL EVERY 6 HOURS
Status: CANCELLED | OUTPATIENT
Start: 2019-11-13

## 2019-11-12 RX ORDER — LORAZEPAM 0.5 MG/1
0.5 TABLET ORAL EVERY 8 HOURS PRN
Status: DISCONTINUED | OUTPATIENT
Start: 2019-11-12 | End: 2019-11-18 | Stop reason: HOSPADM

## 2019-11-12 RX ORDER — ALBUTEROL SULFATE 90 UG/1
2 AEROSOL, METERED RESPIRATORY (INHALATION) EVERY 6 HOURS PRN
Status: DISCONTINUED | OUTPATIENT
Start: 2019-11-12 | End: 2019-11-18 | Stop reason: HOSPADM

## 2019-11-12 RX ORDER — HEPARIN SODIUM 5000 [USP'U]/ML
5000 INJECTION, SOLUTION INTRAVENOUS; SUBCUTANEOUS EVERY 8 HOURS
Status: COMPLETED | OUTPATIENT
Start: 2019-11-12 | End: 2019-11-14

## 2019-11-12 RX ORDER — ACETAMINOPHEN 500 MG
1000 TABLET ORAL EVERY 6 HOURS
Status: DISCONTINUED | OUTPATIENT
Start: 2019-11-12 | End: 2019-11-18 | Stop reason: HOSPADM

## 2019-11-12 RX ORDER — FLUTICASONE FUROATE AND VILANTEROL 100; 25 UG/1; UG/1
1 POWDER RESPIRATORY (INHALATION) DAILY
Status: CANCELLED | OUTPATIENT
Start: 2019-11-12

## 2019-11-12 RX ORDER — ONDANSETRON 2 MG/ML
4 INJECTION INTRAMUSCULAR; INTRAVENOUS DAILY PRN
Status: COMPLETED | OUTPATIENT
Start: 2019-11-12 | End: 2019-11-12

## 2019-11-12 RX ADMIN — KETOROLAC TROMETHAMINE 15 MG: 30 INJECTION, SOLUTION INTRAMUSCULAR; INTRAVENOUS at 11:11

## 2019-11-12 RX ADMIN — SODIUM CHLORIDE, SODIUM LACTATE, POTASSIUM CHLORIDE, AND CALCIUM CHLORIDE: .6; .31; .03; .02 INJECTION, SOLUTION INTRAVENOUS at 04:11

## 2019-11-12 RX ADMIN — PROPOFOL 200 MG: 10 INJECTION, EMULSION INTRAVENOUS at 10:11

## 2019-11-12 RX ADMIN — ROCURONIUM BROMIDE 50 MG: 10 INJECTION, SOLUTION INTRAVENOUS at 10:11

## 2019-11-12 RX ADMIN — FENTANYL CITRATE 50 MCG: 50 INJECTION, SOLUTION INTRAMUSCULAR; INTRAVENOUS at 11:11

## 2019-11-12 RX ADMIN — SUGAMMADEX 250 MG: 100 INJECTION, SOLUTION INTRAVENOUS at 03:11

## 2019-11-12 RX ADMIN — PHENYLEPHRINE HYDROCHLORIDE 100 MCG: 10 INJECTION INTRAVENOUS at 01:11

## 2019-11-12 RX ADMIN — FENTANYL CITRATE 25 MCG: 50 INJECTION, SOLUTION INTRAMUSCULAR; INTRAVENOUS at 11:11

## 2019-11-12 RX ADMIN — HEPARIN SODIUM 5000 UNITS: 5000 INJECTION, SOLUTION INTRAVENOUS; SUBCUTANEOUS at 10:11

## 2019-11-12 RX ADMIN — FENTANYL CITRATE 25 MCG: 50 INJECTION, SOLUTION INTRAMUSCULAR; INTRAVENOUS at 12:11

## 2019-11-12 RX ADMIN — SODIUM CHLORIDE, SODIUM GLUCONATE, SODIUM ACETATE, POTASSIUM CHLORIDE, MAGNESIUM CHLORIDE, SODIUM PHOSPHATE, DIBASIC, AND POTASSIUM PHOSPHATE: .53; .5; .37; .037; .03; .012; .00082 INJECTION, SOLUTION INTRAVENOUS at 10:11

## 2019-11-12 RX ADMIN — HEPARIN SODIUM 5000 UNITS: 5000 INJECTION, SOLUTION INTRAVENOUS; SUBCUTANEOUS at 09:11

## 2019-11-12 RX ADMIN — GABAPENTIN 300 MG: 300 CAPSULE ORAL at 08:11

## 2019-11-12 RX ADMIN — CEFAZOLIN 2 G: 330 INJECTION, POWDER, FOR SOLUTION INTRAMUSCULAR; INTRAVENOUS at 10:11

## 2019-11-12 RX ADMIN — ONDANSETRON 4 MG: 2 INJECTION INTRAMUSCULAR; INTRAVENOUS at 02:11

## 2019-11-12 RX ADMIN — SCOPALAMINE 1 PATCH: 1 PATCH, EXTENDED RELEASE TRANSDERMAL at 10:11

## 2019-11-12 RX ADMIN — LIDOCAINE HYDROCHLORIDE 100 MG: 20 INJECTION, SOLUTION INTRAVENOUS at 10:11

## 2019-11-12 RX ADMIN — OXYCODONE HYDROCHLORIDE 5 MG: 5 TABLET ORAL at 04:11

## 2019-11-12 RX ADMIN — FENTANYL CITRATE 25 MCG: 50 INJECTION, SOLUTION INTRAMUSCULAR; INTRAVENOUS at 10:11

## 2019-11-12 RX ADMIN — ONDANSETRON 4 MG: 2 INJECTION INTRAMUSCULAR; INTRAVENOUS at 05:11

## 2019-11-12 RX ADMIN — CEFAZOLIN 2 G: 330 INJECTION, POWDER, FOR SOLUTION INTRAMUSCULAR; INTRAVENOUS at 01:11

## 2019-11-12 RX ADMIN — DEXAMETHASONE SODIUM PHOSPHATE 8 MG: 4 INJECTION, SOLUTION INTRAMUSCULAR; INTRAVENOUS at 11:11

## 2019-11-12 RX ADMIN — ROCURONIUM BROMIDE 20 MG: 10 INJECTION, SOLUTION INTRAVENOUS at 01:11

## 2019-11-12 RX ADMIN — ROSUVASTATIN CALCIUM 5 MG: 5 TABLET, COATED ORAL at 09:11

## 2019-11-12 RX ADMIN — FENTANYL CITRATE 25 MCG: 50 INJECTION, SOLUTION INTRAMUSCULAR; INTRAVENOUS at 01:11

## 2019-11-12 RX ADMIN — METRONIDAZOLE 500 MG: 500 SOLUTION INTRAVENOUS at 10:11

## 2019-11-12 RX ADMIN — ROCURONIUM BROMIDE 20 MG: 10 INJECTION, SOLUTION INTRAVENOUS at 12:11

## 2019-11-12 RX ADMIN — OXYCODONE HYDROCHLORIDE 5 MG: 5 TABLET ORAL at 09:11

## 2019-11-12 RX ADMIN — SCOPALAMINE 1 PATCH: 1 PATCH, EXTENDED RELEASE TRANSDERMAL at 08:11

## 2019-11-12 RX ADMIN — KETOROLAC TROMETHAMINE 15 MG: 30 INJECTION, SOLUTION INTRAMUSCULAR; INTRAVENOUS at 05:11

## 2019-11-12 RX ADMIN — ROCURONIUM BROMIDE 10 MG: 10 INJECTION, SOLUTION INTRAVENOUS at 11:11

## 2019-11-12 NOTE — OPERATIVE NOTE ADDENDUM
Certification of Assistant at Surgery       Surgery Date: 11/12/2019     Participating Surgeons:  Surgeon(s) and Role:     * Irvin Vinson MD - Primary     * Dilan Judd MD - Assisting     * Garry Rebolledo MD - Resident - Assisting     * Guillermina Brannon MD - Resident - Assisting    Procedures:  Procedure(s) (LRB):  LAPAROTOMY, EXPLORATORY (N/A)  HYSTERECTOMY, TOTAL, ABDOMINAL (N/A)  SALPINGO-OOPHORECTOMY (Bilateral)  OMENTECTOMY (N/A)  DEBULKING, NEOPLASM (N/A)  EXCISION, INTESTINE (N/A)  CREATION, COLOSTOMY (N/A)    Assistant Surgeon's Certification of Necessity:  I understand that section 1842 (b) (6) (d) of the Social Security Act generally prohibits Medicare Part B reasonable charge payment for the services of assistants at surgery in teaching hospitals when qualified residents are available to furnish such services. I certify that the services for which payment is claimed were medically necessary, and that no qualified resident was available to perform the services. I further understand that these services are subject to post-payment review by the Medicare carrier.      Dilan Judd MD    11/12/2019  2:37 PM

## 2019-11-12 NOTE — OP NOTE
[facility]    Procedure(s) (LRB):  LAPAROTOMY, EXPLORATORY (N/A)  HYSTERECTOMY, TOTAL, ABDOMINAL (N/A)  SALPINGO-OOPHORECTOMY (Bilateral)  OMENTECTOMY (N/A)  DEBULKING, NEOPLASM (N/A)  MOBILIZATION, SPLENIC FLEXURE  APPENDECTOMY  EXCISION, TISSUE, PERITONEUM    DATE OF SURGERY  11/12/2019     Surgeon(s) and Role:  Primary: Irvin Vinson MD  Assisting: Dilan Judd MD  Resident - Assisting: Guillermina Brannon MD; MD Dr. Dutch Hutson' assistance was needed due to the difficulty of the case and absence of an appropriate assistant.    ANESTHESIA TYPE  General     PRE-OPERATIVE DIAGNOSIS  Malignant neoplasm of ovary, unspecified laterality [C56.9]    POST-OPERATIVE DIAGNOSIS  Post-Op Diagnosis Codes:     * Malignant neoplasm of ovary, unspecified laterality [C56.9]      FINDINGS:  Very minimum disease involving the right diaphragm.  Normal left diaphragm.  Normal portal triad.  Omental caking.  Disease throughout the abdominal pelvic peritoneum.  Most of her peritoneal disease was actually in the pelvis.  She had a thick rind of disease on the bladder peritoneum.  This was stripped without interrupting the bladder adventitia or mucosa.  She also had a thick rind of disease in the left and right pelvic sidewall and para colic gutters.  No lymphadenopathy.  Plastered appendix to the right pericolic gutter.  Bilateral adnexal masses. Normal uterus.  Her cul-de-sac was initially obliterated but after lysis of adhesions normal anatomy was restored.  She had some bleeding in her left infundibular pelvic ligament that was controlled with 3 clips. She also had some serosal defects throughout her ascending colon which were reapproximated with silk sutures.    PATTERN OF DISEASE SPREAD:  Lower abdomen    Abdominal Peritoneum     Initial: >1  Residual: 0/micro   Bowel Mesentery      Initial: >1  Residual: 0/micro   Bowel Serosa       Initial: 0/micro  Residual: 0/micro   Right Diaphragm       Initial: <1  Residual: <1   Left Diaphragm      Initial: 0/micro  Residual: 0/micro   Gyn Organs, Pelvic Colon & Peritoneum   Initial: >1  Residual:<1  Para-aortic lymph nodes    Initial: 0/micro  Residual: 0/micro  Pelvic lymph nodes     Initial: 0/micro  Residual: 0/micro  Liver Parenchyma      Initial: 0/micro  Residual: 0/micro   Liver Surface       Initial: 0/micro  Residual: 0/micro   Omentum       Initial: >1  Residual: 0/micro   Spleen Parenchyma      Initial: 0/micro  Residual: 0/micro   Spleen Surface/Hilum     Initial: 0/micro  Residual: 0/micro     Overall Residual Disease (Single Largest Lesion in Greatest Dimension): 1 mm     Ascites: Yes, 300 cc    Carcinomatosis: No    Procedure in Detail: The patient was prepped and draped in the supine position. A low midline incision was made and carried down to the peritoneum. The abdomen was entered. The abdomen and pelvis were explored and revealed the operative findings above. The cecum was identified and examined to the ligament of Treitz without abnormalities. The patient was placed in steep Trendelenburg, a Book kim retractor was inserted, and the small bowel was packed into the upper abdomen. The right retroperitoneum was entered, the ureter was identified, and the right fallopian tube was dissected free from the ovary using cautery. The utero-ovarian ligament was clamped, cut, and ligated.  A right pelvic lymphadenectomy was not indicated.  Peritoneum with disease was removed off the sidewall all the way to the bladder using cautery.  The ureter was lysed and the uterine artery was isolated, clamped, cut, and ligated.  The ureter was further freed off the peritoneum to remove tumor all the way down to the uterosacral ligament.  The identical procedure was performed on the left side. The bladder was reflected from the uterus 2 cm past the external os with sharp dissection.  An en bloc resection was not necessary.  Vaginotomy was performed with  cautery. The uterus, cervix, tubes, and ovaries were removed via the laparotomy and then submitted to Pathology for analysis.  The rectovaginal space was entered sharply.  The rectum was bluntly dissected off the vagina.  The vagina was then closed in a double layered fashion using an 0 Vicryl suture.  The distal uterine pedicles and parametrium were suture tied to the vaginal cuff.  There was no remaining disease on her pelvic peritoneum.  The incision was extended and attention was then turned to the omentum.  It was identified and then dissected from the transverse colon. The omentum was then removed in the usual fashion using the LigaSure device  The gastroepiploic arcade was taken.  A para-aortic lymphadenectomy was not indicated.  The abdomen and pelvis were then irrigated with warm normal saline, and hemostasis was assured.  Further disease was dissected off the abdominal peritoneum, particularly the para colic spaces, usiig cautery.  There was some disease on the appendix and it was removed in usual fashion.  Further disease was removed from the large bowel mesentery.  A very small mesenteric defect was closed with 2-0 Vicryl suture.  The abdomen was irrigated with 3 liters of water.  The bowel was run and small serosal defects in the ascending colon were repproximated with 3-0 Silk sutures.  Hemostasis was satisfactory.  The fascia was closed with looped 0 PDS in a running fashion, and the skin was closed with 3-0 Monocryl in a subcuticular fashion. The patient was transferred to recovery in stable condition.  Sponge, salt, needle, and instrument count was correct.      UNUSUAL CIRCUMSTANCES  Yes. Other pathologies, tumors, malformations (genetic, traumatic, surgical) that directly interfered with the procedure.    CONDITION  chronic    POST-OP COMPLICATION  No    DIABETIC  No    SPECIMENS  Specimens (From admission, onward)     Start     Ordered    11/12/19 1521  Specimen to Pathology, Surgery Gynecology  and Obstetrics  Once     Question Answer Comment   Procedure Type: Gynecology and Obstetrics    Specimen Class: Known or suspected malignancy        11/12/19 1523                 DRAINS       Urethral Catheter 11/12/19 1050 Non-latex;Straight-tip 16 Fr. (Active)        ESTIMATED BLOOD LOSS  600 mL           IMPLANTS  * No implants in log *

## 2019-11-12 NOTE — INTERVAL H&P NOTE
The patient has been examined and the H&P has been reviewed:    I concur with the findings and no changes have occurred since H&P was written.    Anesthesia/Surgery risks, benefits and alternative options discussed and understood by patient/family.          Active Hospital Problems    Diagnosis  POA    Malignant neoplasm of ovary [C56.9]  Yes      Resolved Hospital Problems   No resolved problems to display.

## 2019-11-12 NOTE — TRANSFER OF CARE
"Anesthesia Transfer of Care Note    Patient: Margarita Dunne    Procedure(s) Performed: Procedure(s) (LRB):  LAPAROTOMY, EXPLORATORY (N/A)  HYSTERECTOMY, TOTAL, ABDOMINAL (N/A)  SALPINGO-OOPHORECTOMY (Bilateral)  OMENTECTOMY (N/A)  DEBULKING, NEOPLASM (N/A)  MOBILIZATION, SPLENIC FLEXURE  APPENDECTOMY  EXCISION, TISSUE, PERITONEUM    Patient location: PACU    Anesthesia Type: general    Transport from OR: Transported from OR on 2-3 L/min O2 by NC with adequate spontaneous ventilation    Post pain: adequate analgesia    Post assessment: no apparent anesthetic complications and tolerated procedure well    Post vital signs: stable    Level of consciousness: awake    Nausea/Vomiting: no nausea/vomiting    Complications: none    Transfer of care protocol was followed      Last vitals:   Visit Vitals  BP (!) 116/56 (BP Location: Right arm, Patient Position: Lying)   Pulse 93   Temp 36.2 °C (97.2 °F) (Temporal)   Resp 20   Ht 5' 4" (1.626 m)   Wt 62.6 kg (138 lb)   SpO2 100%   Breastfeeding? No   BMI 23.69 kg/m²     "

## 2019-11-13 LAB
ANION GAP SERPL CALC-SCNC: 14 MMOL/L (ref 8–16)
BASOPHILS # BLD AUTO: 0.03 K/UL (ref 0–0.2)
BASOPHILS NFR BLD: 0.2 % (ref 0–1.9)
BUN SERPL-MCNC: 13 MG/DL (ref 8–23)
CALCIUM SERPL-MCNC: 8 MG/DL (ref 8.7–10.5)
CHLORIDE SERPL-SCNC: 104 MMOL/L (ref 95–110)
CO2 SERPL-SCNC: 16 MMOL/L (ref 23–29)
CREAT SERPL-MCNC: 0.8 MG/DL (ref 0.5–1.4)
DIFFERENTIAL METHOD: ABNORMAL
EOSINOPHIL # BLD AUTO: 0 K/UL (ref 0–0.5)
EOSINOPHIL NFR BLD: 0 % (ref 0–8)
ERYTHROCYTE [DISTWIDTH] IN BLOOD BY AUTOMATED COUNT: 14.3 % (ref 11.5–14.5)
EST. GFR  (AFRICAN AMERICAN): >60 ML/MIN/1.73 M^2
EST. GFR  (NON AFRICAN AMERICAN): >60 ML/MIN/1.73 M^2
GLUCOSE SERPL-MCNC: 161 MG/DL (ref 70–110)
HCT VFR BLD AUTO: 36.7 % (ref 37–48.5)
HGB BLD-MCNC: 11.8 G/DL (ref 12–16)
IMM GRANULOCYTES # BLD AUTO: 0.09 K/UL (ref 0–0.04)
IMM GRANULOCYTES NFR BLD AUTO: 0.5 % (ref 0–0.5)
LYMPHOCYTES # BLD AUTO: 0.6 K/UL (ref 1–4.8)
LYMPHOCYTES NFR BLD: 3.5 % (ref 18–48)
MCH RBC QN AUTO: 27.4 PG (ref 27–31)
MCHC RBC AUTO-ENTMCNC: 32.2 G/DL (ref 32–36)
MCV RBC AUTO: 85 FL (ref 82–98)
MONOCYTES # BLD AUTO: 1.7 K/UL (ref 0.3–1)
MONOCYTES NFR BLD: 9.7 % (ref 4–15)
NEUTROPHILS # BLD AUTO: 15.1 K/UL (ref 1.8–7.7)
NEUTROPHILS NFR BLD: 86.1 % (ref 38–73)
NRBC BLD-RTO: 0 /100 WBC
PLATELET # BLD AUTO: 511 K/UL (ref 150–350)
PMV BLD AUTO: 9.2 FL (ref 9.2–12.9)
POTASSIUM SERPL-SCNC: 4.2 MMOL/L (ref 3.5–5.1)
RBC # BLD AUTO: 4.31 M/UL (ref 4–5.4)
SODIUM SERPL-SCNC: 134 MMOL/L (ref 136–145)
WBC # BLD AUTO: 17.48 K/UL (ref 3.9–12.7)

## 2019-11-13 PROCEDURE — 25000003 PHARM REV CODE 250: Performed by: STUDENT IN AN ORGANIZED HEALTH CARE EDUCATION/TRAINING PROGRAM

## 2019-11-13 PROCEDURE — 99233 PR SUBSEQUENT HOSPITAL CARE,LEVL III: ICD-10-PCS | Mod: GC,,, | Performed by: OBSTETRICS & GYNECOLOGY

## 2019-11-13 PROCEDURE — 85025 COMPLETE CBC W/AUTO DIFF WBC: CPT

## 2019-11-13 PROCEDURE — 20600001 HC STEP DOWN PRIVATE ROOM

## 2019-11-13 PROCEDURE — 99233 SBSQ HOSP IP/OBS HIGH 50: CPT | Mod: GC,,, | Performed by: OBSTETRICS & GYNECOLOGY

## 2019-11-13 PROCEDURE — 63600175 PHARM REV CODE 636 W HCPCS: Performed by: STUDENT IN AN ORGANIZED HEALTH CARE EDUCATION/TRAINING PROGRAM

## 2019-11-13 PROCEDURE — 36415 COLL VENOUS BLD VENIPUNCTURE: CPT

## 2019-11-13 PROCEDURE — 94761 N-INVAS EAR/PLS OXIMETRY MLT: CPT

## 2019-11-13 PROCEDURE — 94799 UNLISTED PULMONARY SVC/PX: CPT

## 2019-11-13 PROCEDURE — 80048 BASIC METABOLIC PNL TOTAL CA: CPT

## 2019-11-13 RX ADMIN — KETOROLAC TROMETHAMINE 15 MG: 30 INJECTION, SOLUTION INTRAMUSCULAR; INTRAVENOUS at 11:11

## 2019-11-13 RX ADMIN — HEPARIN SODIUM 5000 UNITS: 5000 INJECTION, SOLUTION INTRAVENOUS; SUBCUTANEOUS at 09:11

## 2019-11-13 RX ADMIN — ONDANSETRON 8 MG: 2 INJECTION INTRAMUSCULAR; INTRAVENOUS at 03:11

## 2019-11-13 RX ADMIN — SODIUM CHLORIDE, SODIUM LACTATE, POTASSIUM CHLORIDE, AND CALCIUM CHLORIDE: .6; .31; .03; .02 INJECTION, SOLUTION INTRAVENOUS at 07:11

## 2019-11-13 RX ADMIN — OXYCODONE HYDROCHLORIDE 5 MG: 5 TABLET ORAL at 09:11

## 2019-11-13 RX ADMIN — PROMETHAZINE HYDROCHLORIDE 6.25 MG: 25 INJECTION INTRAMUSCULAR; INTRAVENOUS at 07:11

## 2019-11-13 RX ADMIN — FLUOXETINE 10 MG: 10 CAPSULE ORAL at 09:11

## 2019-11-13 RX ADMIN — ROSUVASTATIN CALCIUM 5 MG: 5 TABLET, COATED ORAL at 09:11

## 2019-11-13 RX ADMIN — ACETAMINOPHEN 1000 MG: 500 TABLET ORAL at 11:11

## 2019-11-13 RX ADMIN — ACETAMINOPHEN 1000 MG: 500 TABLET ORAL at 05:11

## 2019-11-13 RX ADMIN — SENNOSIDES AND DOCUSATE SODIUM 1 TABLET: 8.6; 5 TABLET ORAL at 09:11

## 2019-11-13 RX ADMIN — HEPARIN SODIUM 5000 UNITS: 5000 INJECTION, SOLUTION INTRAVENOUS; SUBCUTANEOUS at 01:11

## 2019-11-13 RX ADMIN — KETOROLAC TROMETHAMINE 15 MG: 30 INJECTION, SOLUTION INTRAMUSCULAR; INTRAVENOUS at 05:11

## 2019-11-13 RX ADMIN — SODIUM CHLORIDE 500 ML: 0.9 INJECTION, SOLUTION INTRAVENOUS at 07:11

## 2019-11-13 RX ADMIN — ONDANSETRON 8 MG: 2 INJECTION INTRAMUSCULAR; INTRAVENOUS at 09:11

## 2019-11-13 RX ADMIN — HEPARIN SODIUM 5000 UNITS: 5000 INJECTION, SOLUTION INTRAVENOUS; SUBCUTANEOUS at 06:11

## 2019-11-13 RX ADMIN — KETOROLAC TROMETHAMINE 15 MG: 30 INJECTION, SOLUTION INTRAMUSCULAR; INTRAVENOUS at 06:11

## 2019-11-13 NOTE — HOSPITAL COURSE
11/13/2019 - POD 1 s/p REZA/BSO/omentectomy/debulking. Patient doing well this AM. Has not had regular diet, tolerating water well. Has not ambulated. Love in place. Required prn oxy IR 5mg x2 overnight. UOP 0.3 mg/kg/hr overnight, sCr 0.8 this AM. No emesis, mild nausea. Otherwise doing well.   11/14/2019 - Patient doing well. Voiding more frequently overnight, tolerating regular diet, no N/V, ambulating to bathroom without assistance. Patient has not passed flatus and is having occasional belching. No other complaints.   11/15/2019 - POD 3. Patient with regular belching, not passing gas, mildly distended abdomen, and nausea without emesis. Tolerating liquids and small amount of regular diet. Ambulating and voiding frequently. Pain controlled. No other complaints.   11/16/2019 - POD 4. Stable. Patient with regular belching, not passing gas, mildly distended abdomen, and nausea without emesis. Tolerating liquids and small amount of regular diet. Ambulating and voiding frequently. Pain controlled. No other complaints  11/17/2019 - Emesis x1 yesterday and immediately felt better. Began passing flatus yesterday. Reports hunger and thirst. Denies n/v overnight and this AM. No other complaints.  11/18/2019 - No emesis yesterday. Reports intermittent nausea yesterday, controlled with antiemetics. Mild nausea this morning. Tolerating liquids. Passing flatus, belching decreasing. Tolerated regular diet. Stable for d/c

## 2019-11-13 NOTE — PROGRESS NOTES
[facility]  Gynecologic Oncology  Progress Note      Patient Name: Margarita Dunne  MRN: [id number]  Admission Date: 11/12/2019  Hospital Length of Stay: 1 days  Attending Provider: Irvin Vinson MD  Primary Care Provider: Columba Anderson MD  Principal Problem: Malignant neoplasm of ovary    Follow-up For: Procedure(s) (LRB):  LAPAROTOMY, EXPLORATORY (N/A)  HYSTERECTOMY, TOTAL, ABDOMINAL (N/A)  SALPINGO-OOPHORECTOMY (Bilateral)  OMENTECTOMY (N/A)  DEBULKING, NEOPLASM (N/A)  MOBILIZATION, SPLENIC FLEXURE  APPENDECTOMY  EXCISION, TISSUE, PERITONEUM  Post-Operative Day: 1 Day Post-Op  Subjective:      History of Present Illness:  Patient with known ovarian cancer presents for scheduled PDS    Hospital Course:  11/13/2019 - POD 1 s/p REZA/BSO/omentectomy/debulking. Patient doing well this AM. Has not had regular diet, tolerating water well. Has not ambulated. Love in place. Required prn oxy IR 5mg x2 overnight. UOP 0.3 mg/kg/hr overnight, sCr 0.8 this AM. No emesis, mild nausea. Otherwise doing well.     Interval History: POD 1 s/p REZA/BSO/omentectomy/debulking. Patient doing well this AM. Has not had regular diet, tolerating water well. Has not ambulated. Love in place. Required prn oxy IR 5mg x2 overnight. UOP 0.3 mg/kg/hr overnight, sCr 0.8 this AM. No emesis, mild nausea. Otherwise doing well.     Scheduled Meds:   acetaminophen  1,000 mg Oral Q6H    FLUoxetine  10 mg Oral Daily    heparin (porcine)  5,000 Units Subcutaneous Q8H    ketorolac  15 mg Intravenous Q6H    rosuvastatin  5 mg Oral QHS    senna-docusate 8.6-50 mg  1 tablet Oral BID     Continuous Infusions:   lactated ringers 40 mL/hr at 11/12/19 1614     PRN Meds:albuterol, LORazepam, ondansetron, oxyCODONE, promethazine (PHENERGAN) IVPB    Review of patient's allergies indicates:   Allergen Reactions    Doxycycline Nausea And Vomiting    Corticosteroids (glucocorticoids) Other (See Comments)     Causes pt to have migraines for 2  weeks    Pcn [penicillins] Rash    Sulfa (sulfonamide antibiotics) Rash       Objective:     Vital Signs (Most Recent):  Temp: 97.8 °F (36.6 °C) (11/13/19 0451)  Pulse: 103 (11/13/19 0451)  Resp: 17 (11/13/19 0451)  BP: (!) 105/57 (11/13/19 0451)  SpO2: 96 % (11/13/19 0451) Vital Signs (24h Range):  Temp:  [97.2 °F (36.2 °C)-98 °F (36.7 °C)] 97.8 °F (36.6 °C)  Pulse:  [] 103  Resp:  [16-21] 17  SpO2:  [95 %-100 %] 96 %  BP: ()/(50-63) 105/57     Weight: 62.6 kg (138 lb)  Body mass index is 23.69 kg/m².    Intake/Output - Last 3 Shifts       11/11 0700 - 11/12 0659 11/12 0700 - 11/13 0659    P.O.  120    I.V. (mL/kg)  3570.7 (57)    Total Intake(mL/kg)  3690.7 (59)    Urine (mL/kg/hr)  830    Drains  300    Stool  0    Blood  600    Total Output  1730    Net  +1960.7          Stool Occurrence  0 x             Physical Exam:   Constitutional: She appears well-developed and well-nourished. No distress.    HENT:   Head: Normocephalic and atraumatic.      Cardiovascular: Normal rate and regular rhythm.   Pulse Score: 2+   Pulmonary/Chest: Effort normal and breath sounds normal. No respiratory distress. She has no wheezes.        Abdominal: Soft. Bowel sounds are normal. She exhibits abdominal incision (large vertical midline, bandage with small area dried blood). She exhibits no distension. There is tenderness (appropriate for post op). There is no guarding.                 Neurological: She is alert.    Skin: Skin is warm and dry. She is not diaphoretic.    Psychiatric: She has a normal mood and affect.       Lines/Drains/Airways     Peripheral Intravenous Line                 Peripheral IV - Single Lumen 11/12/19 0842 18 G Left Forearm less than 1 day                Laboratory:  Recent Lab Results       11/13/19  0005   11/12/19  1327   11/12/19  0909        Anion Gap 14         Baso # 0.03         Basophil% 0.2         BUN, Bld 13         Calcium 8.0         Chloride 104         CO2 16         Creatinine  0.8         Differential Method Automated         eGFR if  >60.0         eGFR if non  >60.0  Comment:  Calculation used to obtain the estimated glomerular filtration  rate (eGFR) is the CKD-EPI equation.            Eos # 0.0         Eosinophil% 0.0         Glucose 161         Gran # (ANC) 15.1         Gran% 86.1         Group & Rh     O POS     Hematocrit 36.7         Hemoglobin 11.8         Immature Grans (Abs) 0.09  Comment:  Mild elevation in immature granulocytes is non specific and   can be seen in a variety of conditions including stress response,   acute inflammation, trauma and pregnancy. Correlation with other   laboratory and clinical findings is essential.           Immature Granulocytes 0.5         INDIRECT RUSSELL     NEG     Lymph # 0.6         Lymph% 3.5         MCH 27.4         MCHC 32.2         MCV 85         Mono # 1.7         Mono% 9.7         MPV 9.2         nRBC 0         Platelets 511         POC BE   -4       POC Glucose   127       POC HCO3   21.9       POC Hematocrit   32       POC Ionized Calcium   1.11       POC PCO2   41.5       POC PH   7.330       POC PO2   72       POC Potassium   3.8       POC SATURATED O2   93       POC Sodium   136       POC TCO2   23       Potassium 4.2         RBC 4.31         RDW 14.3         Sample   VENOUS       Sodium 134         WBC 17.48               Diagnostic Results:  Labs: Reviewed    Assessment/Plan:     S/P REZA-BSO (total abdominal hysterectomy and bilateral salpingo-oophorectomy), debulking, omentecotmy, BAYLEE  - Oral pain meds  - AM labs pending reassuring  - Love removed, passive voiding trial  - Will bolus 500cc IVF now given low UOP overnight  - Regular diet  - Encourage ambulation  - Heparin 5000 q8  - IS at bedside      VTE Risk Mitigation (From admission, onward)         Ordered     heparin (porcine) injection 5,000 Units  Every 8 hours      11/1945     IP VTE HIGH RISK PATIENT  Once      11/12/19 8793                 Was galeano catheter removed? Yes    Garry Rebolledo MD  Gynecologic Oncology  [facility]

## 2019-11-13 NOTE — PLAN OF CARE
POD#1 s/p xlap/REZA/BSO/OMX/neoplasm debulking/splenic flexure mobilization/appendectomy/peritonum tissue excision secondary to malignant neoplasm of the ovary. Routine post-op care in place. Vital signs are stable. Patient is currently afebrile. O2 sats WNL on room air. Labs stable. IVFs: lactated ringers at 40 mL/hr IV continuous. Love catheter discontinued. Diet advanced to adult regular diet as tolerated. Pain control: Toradol 15 mg IV every 6 hours scheduled, Tylenol 1000 mg PO every 6 hours scheduled, oxycodone IR 5 mg every 4 hours PRN breakthrough pain. Heparin 5000 units SQ every 8 hours for DVT prophylaxis, MD plans to transition to Lovenox for discharge. SITA discussed the patient with the team. No discharge needs identified at this time. MD did request CM setup Onc Psych appointment outpatient. SITA requested an ambulatory referral to Onc Psych. SITA to message the Onc Psych clinic for the outpatient appt. Expected discharge this Friday. SITA to continue to follow with the team.    Zora Bullock, RN, BSN, CM [facility]  Nurse - Med Onc/Gyn Onc

## 2019-11-13 NOTE — PROGRESS NOTES
Writer notified Dr. Vinson that patient has not urinated very much today since her galeano catheter was discontinued at 0600. Dr. Vinson stated he had no new orders. Will continue to monitor the patient.

## 2019-11-13 NOTE — NURSING TRANSFER
Nursing Transfer Note      11/12/2019     Transfer to: 1059    Transfer via: Bed    Transfer with: IV Pump    Transported by: RN x2    Medicines sent: N/A    Chart send with patient: Yes    Notified: spouse; Report called to JES Berry    Patient reassessed at: 1800

## 2019-11-13 NOTE — PLAN OF CARE
Plan of care reviewed with patient who verbalized understanding. AAOx4. VSS on room air. Regular diet. Pt is complaining of some bouts of nausea. PRN Zofran given for nausea. Love patent and intact; draining clear, yellow urine. Call light within reach. Bed in the lowest position. Patient mostly slept in between care. No acute events this shift. WCTM.

## 2019-11-13 NOTE — SUBJECTIVE & OBJECTIVE
Interval History: POD 1 s/p REZA/BSO/omentectomy/debulking. Patient doing well this AM. Has not had regular diet, tolerating water well. Has not ambulated. Love in place. Required prn oxy IR 5mg x2 overnight. UOP 0.3 mg/kg/hr overnight, sCr 0.8 this AM. No emesis, mild nausea. Otherwise doing well.     Scheduled Meds:   acetaminophen  1,000 mg Oral Q6H    FLUoxetine  10 mg Oral Daily    heparin (porcine)  5,000 Units Subcutaneous Q8H    ketorolac  15 mg Intravenous Q6H    rosuvastatin  5 mg Oral QHS    senna-docusate 8.6-50 mg  1 tablet Oral BID     Continuous Infusions:   lactated ringers 40 mL/hr at 11/12/19 1614     PRN Meds:albuterol, LORazepam, ondansetron, oxyCODONE, promethazine (PHENERGAN) IVPB    Review of patient's allergies indicates:   Allergen Reactions    Doxycycline Nausea And Vomiting    Corticosteroids (glucocorticoids) Other (See Comments)     Causes pt to have migraines for 2 weeks    Pcn [penicillins] Rash    Sulfa (sulfonamide antibiotics) Rash       Objective:     Vital Signs (Most Recent):  Temp: 97.8 °F (36.6 °C) (11/13/19 0451)  Pulse: 103 (11/13/19 0451)  Resp: 17 (11/13/19 0451)  BP: (!) 105/57 (11/13/19 0451)  SpO2: 96 % (11/13/19 0451) Vital Signs (24h Range):  Temp:  [97.2 °F (36.2 °C)-98 °F (36.7 °C)] 97.8 °F (36.6 °C)  Pulse:  [] 103  Resp:  [16-21] 17  SpO2:  [95 %-100 %] 96 %  BP: ()/(50-63) 105/57     Weight: 62.6 kg (138 lb)  Body mass index is 23.69 kg/m².    Intake/Output - Last 3 Shifts       11/11 0700 - 11/12 0659 11/12 0700 - 11/13 0659    P.O.  120    I.V. (mL/kg)  3570.7 (57)    Total Intake(mL/kg)  3690.7 (59)    Urine (mL/kg/hr)  830    Drains  300    Stool  0    Blood  600    Total Output  1730    Net  +1960.7          Stool Occurrence  0 x             Physical Exam:   Constitutional: She appears well-developed and well-nourished. No distress.    HENT:   Head: Normocephalic and atraumatic.      Cardiovascular: Normal rate and regular rhythm.    Pulse Score: 2+   Pulmonary/Chest: Effort normal and breath sounds normal. No respiratory distress. She has no wheezes.        Abdominal: Soft. Bowel sounds are normal. She exhibits abdominal incision (large vertical midline, bandage with small area dried blood). She exhibits no distension. There is tenderness (appropriate for post op). There is no guarding.                 Neurological: She is alert.    Skin: Skin is warm and dry. She is not diaphoretic.    Psychiatric: She has a normal mood and affect.       Lines/Drains/Airways     Peripheral Intravenous Line                 Peripheral IV - Single Lumen 11/12/19 0842 18 G Left Forearm less than 1 day                Laboratory:  Recent Lab Results       11/13/19  0005   11/12/19  1327   11/12/19  0909        Anion Gap 14         Baso # 0.03         Basophil% 0.2         BUN, Bld 13         Calcium 8.0         Chloride 104         CO2 16         Creatinine 0.8         Differential Method Automated         eGFR if  >60.0         eGFR if non  >60.0  Comment:  Calculation used to obtain the estimated glomerular filtration  rate (eGFR) is the CKD-EPI equation.            Eos # 0.0         Eosinophil% 0.0         Glucose 161         Gran # (ANC) 15.1         Gran% 86.1         Group & Rh     O POS     Hematocrit 36.7         Hemoglobin 11.8         Immature Grans (Abs) 0.09  Comment:  Mild elevation in immature granulocytes is non specific and   can be seen in a variety of conditions including stress response,   acute inflammation, trauma and pregnancy. Correlation with other   laboratory and clinical findings is essential.           Immature Granulocytes 0.5         INDIRECT RUSSELL     NEG     Lymph # 0.6         Lymph% 3.5         MCH 27.4         MCHC 32.2         MCV 85         Mono # 1.7         Mono% 9.7         MPV 9.2         nRBC 0         Platelets 511         POC BE   -4       POC Glucose   127       POC HCO3   21.9       POC  Hematocrit   32       POC Ionized Calcium   1.11       POC PCO2   41.5       POC PH   7.330       POC PO2   72       POC Potassium   3.8       POC SATURATED O2   93       POC Sodium   136       POC TCO2   23       Potassium 4.2         RBC 4.31         RDW 14.3         Sample   VENOUS       Sodium 134         WBC 17.48               Diagnostic Results:  Labs: Reviewed

## 2019-11-13 NOTE — ASSESSMENT & PLAN NOTE
- Oral pain meds  - AM labs pending reassuring  - Love removed, passive voiding trial  - Will bolus 500cc IVF now given low UOP overnight  - Regular diet  - Encourage ambulation  - Heparin 5000 q8  - IS at bedside

## 2019-11-13 NOTE — ANESTHESIA POSTPROCEDURE EVALUATION
Anesthesia Post Evaluation    Patient: Margarita Dunne    Procedure(s) Performed: Procedure(s) (LRB):  LAPAROTOMY, EXPLORATORY (N/A)  HYSTERECTOMY, TOTAL, ABDOMINAL (N/A)  SALPINGO-OOPHORECTOMY (Bilateral)  OMENTECTOMY (N/A)  DEBULKING, NEOPLASM (N/A)  MOBILIZATION, SPLENIC FLEXURE  APPENDECTOMY  EXCISION, TISSUE, PERITONEUM      Patient location during evaluation: PACU  Patient participation: Yes- Able to Participate  Level of consciousness: awake and alert and oriented  Post-procedure vital signs: reviewed and stable  Pain management: adequate  Airway patency: patent  PONV status at discharge: No PONV  Anesthetic complications: no      Cardiovascular status: blood pressure returned to baseline and hemodynamically stable  Respiratory status: unassisted and spontaneous ventilation  Hydration status: euvolemic  Follow-up not needed.          Vitals Value Taken Time   /60 11/12/2019  6:35 PM   Temp 36.2 °C (97.2 °F) 11/12/2019  6:35 PM   Pulse 84 11/12/2019  6:35 PM   Resp 16 11/12/2019  6:35 PM   SpO2 97 % 11/12/2019  6:35 PM         Event Time     Out of Recovery 16:45:00          Pain/Scott Score: Pain Rating Prior to Med Admin: 7 (11/12/2019  5:57 PM)  Scott Score: 10 (11/12/2019  6:00 PM)

## 2019-11-13 NOTE — PLAN OF CARE
Patient is alert and oriented. Able to make needs known to staff. No c/o pain or discomfort noted. No s/s of respiratory/cardiac distress noted. Abdominal midline incision dressing is clean, dry, and intact. PIV is patent and flushes well. LR runs continuously at 40 mll/hr. Patient remains on a regular diet and is tolerating it well. VS stable. No issues or concerns at this time. Continue to monitor.

## 2019-11-14 PROBLEM — F41.9 ANXIETY: Status: ACTIVE | Noted: 2019-11-14

## 2019-11-14 PROBLEM — F32.A DEPRESSION: Status: ACTIVE | Noted: 2019-11-14

## 2019-11-14 PROCEDURE — 99233 SBSQ HOSP IP/OBS HIGH 50: CPT | Mod: GC,,, | Performed by: OBSTETRICS & GYNECOLOGY

## 2019-11-14 PROCEDURE — 90791 PR PSYCHIATRIC DIAGNOSTIC EVALUATION: ICD-10-PCS | Mod: ,,, | Performed by: PSYCHOLOGIST

## 2019-11-14 PROCEDURE — 63600175 PHARM REV CODE 636 W HCPCS: Performed by: STUDENT IN AN ORGANIZED HEALTH CARE EDUCATION/TRAINING PROGRAM

## 2019-11-14 PROCEDURE — 25000003 PHARM REV CODE 250: Performed by: STUDENT IN AN ORGANIZED HEALTH CARE EDUCATION/TRAINING PROGRAM

## 2019-11-14 PROCEDURE — 90791 PSYCH DIAGNOSTIC EVALUATION: CPT | Mod: ,,, | Performed by: PSYCHOLOGIST

## 2019-11-14 PROCEDURE — 63600175 PHARM REV CODE 636 W HCPCS: Performed by: OBSTETRICS & GYNECOLOGY

## 2019-11-14 PROCEDURE — 20600001 HC STEP DOWN PRIVATE ROOM

## 2019-11-14 PROCEDURE — 99233 PR SUBSEQUENT HOSPITAL CARE,LEVL III: ICD-10-PCS | Mod: GC,,, | Performed by: OBSTETRICS & GYNECOLOGY

## 2019-11-14 RX ORDER — ADHESIVE BANDAGE
30 BANDAGE TOPICAL DAILY
Status: DISCONTINUED | OUTPATIENT
Start: 2019-11-14 | End: 2019-11-18 | Stop reason: HOSPADM

## 2019-11-14 RX ORDER — ENOXAPARIN SODIUM 100 MG/ML
40 INJECTION SUBCUTANEOUS EVERY 24 HOURS
Status: DISCONTINUED | OUTPATIENT
Start: 2019-11-15 | End: 2019-11-18 | Stop reason: HOSPADM

## 2019-11-14 RX ORDER — SIMETHICONE 80 MG
1 TABLET,CHEWABLE ORAL 3 TIMES DAILY PRN
Status: DISCONTINUED | OUTPATIENT
Start: 2019-11-14 | End: 2019-11-15

## 2019-11-14 RX ORDER — ONDANSETRON 4 MG/1
4 TABLET, FILM COATED ORAL EVERY 8 HOURS PRN
Qty: 30 TABLET | Refills: 0 | Status: SHIPPED | OUTPATIENT
Start: 2019-11-14 | End: 2019-12-05

## 2019-11-14 RX ORDER — ENOXAPARIN SODIUM 100 MG/ML
40 INJECTION SUBCUTANEOUS DAILY
Qty: 11.2 ML | Refills: 0 | Status: SHIPPED | OUTPATIENT
Start: 2019-11-14 | End: 2019-12-16

## 2019-11-14 RX ORDER — FAMOTIDINE 20 MG/1
20 TABLET, FILM COATED ORAL 2 TIMES DAILY
Status: DISCONTINUED | OUTPATIENT
Start: 2019-11-14 | End: 2019-11-18 | Stop reason: HOSPADM

## 2019-11-14 RX ORDER — SODIUM CITRATE AND CITRIC ACID MONOHYDRATE 334; 500 MG/5ML; MG/5ML
30 SOLUTION ORAL ONCE
Status: DISCONTINUED | OUTPATIENT
Start: 2019-11-14 | End: 2019-11-18 | Stop reason: HOSPADM

## 2019-11-14 RX ORDER — IBUPROFEN 600 MG/1
600 TABLET ORAL
Status: DISCONTINUED | OUTPATIENT
Start: 2019-11-14 | End: 2019-11-18 | Stop reason: HOSPADM

## 2019-11-14 RX ADMIN — HEPARIN SODIUM 5000 UNITS: 5000 INJECTION, SOLUTION INTRAVENOUS; SUBCUTANEOUS at 09:11

## 2019-11-14 RX ADMIN — ONDANSETRON 8 MG: 2 INJECTION INTRAMUSCULAR; INTRAVENOUS at 04:11

## 2019-11-14 RX ADMIN — IBUPROFEN 600 MG: 600 TABLET, FILM COATED ORAL at 09:11

## 2019-11-14 RX ADMIN — KETOROLAC TROMETHAMINE 15 MG: 30 INJECTION, SOLUTION INTRAMUSCULAR; INTRAVENOUS at 12:11

## 2019-11-14 RX ADMIN — SIMETHICONE CHEW TAB 80 MG 80 MG: 80 TABLET ORAL at 06:11

## 2019-11-14 RX ADMIN — FAMOTIDINE 20 MG: 20 TABLET ORAL at 08:11

## 2019-11-14 RX ADMIN — FAMOTIDINE 20 MG: 20 TABLET ORAL at 09:11

## 2019-11-14 RX ADMIN — OXYCODONE HYDROCHLORIDE 5 MG: 5 TABLET ORAL at 08:11

## 2019-11-14 RX ADMIN — ACETAMINOPHEN 1000 MG: 500 TABLET ORAL at 12:11

## 2019-11-14 RX ADMIN — ROSUVASTATIN CALCIUM 5 MG: 5 TABLET, COATED ORAL at 09:11

## 2019-11-14 RX ADMIN — FLUOXETINE 10 MG: 10 CAPSULE ORAL at 08:11

## 2019-11-14 RX ADMIN — IBUPROFEN 600 MG: 600 TABLET, FILM COATED ORAL at 04:11

## 2019-11-14 RX ADMIN — KETOROLAC TROMETHAMINE 15 MG: 30 INJECTION, SOLUTION INTRAMUSCULAR; INTRAVENOUS at 05:11

## 2019-11-14 RX ADMIN — SENNOSIDES AND DOCUSATE SODIUM 1 TABLET: 8.6; 5 TABLET ORAL at 08:11

## 2019-11-14 RX ADMIN — HEPARIN SODIUM 5000 UNITS: 5000 INJECTION, SOLUTION INTRAVENOUS; SUBCUTANEOUS at 03:11

## 2019-11-14 RX ADMIN — HEPARIN SODIUM 5000 UNITS: 5000 INJECTION, SOLUTION INTRAVENOUS; SUBCUTANEOUS at 05:11

## 2019-11-14 NOTE — ASSESSMENT & PLAN NOTE
- Oral pain meds  - post op labs reassuring  - Continue monitoring I/Os  - Regular diet  - Encourage ambulation  - Heparin 5000 q8  - IS at bedside

## 2019-11-14 NOTE — PLAN OF CARE
SITA sent a message to Dr. Maynard's clinic and requested a two week follow-up with Onc Psych outpatient.    Zora Bullock, RN, BSN, CM [facility]  Nurse - Med Onc/Gyn Onc

## 2019-11-14 NOTE — CONSULTS
[facility]  Psychology  Consult Note    Diagnostic Interview - CPT 69692    Patient Name: Margarita Dunne  MRN: [id number]   Patient Class: IP- Inpatient  Admission Date: 11/12/2019  Hospital Length of Stay: 2 days  Attending Physician: Irvin Vinson MD  Primary Care Provider: Columba Anderson MD    Inpatient consult to Hematology/Oncology Psychology  Consult performed by: Tamiko Maynard, PhD  Consult ordered by: Garry Vyas MD  Reason for consult: Depresssion/Anxiety  Assessment/Recommendations: Continue with psychotropic medications, follow-up with PCP for management of Prozac; If feeling increased anxiety ask for prescribed Lorazepam PRN; schedule follow-up appt with Oncology/Psycholgoy as needed. Utilize coping skills.         History of Present Illness:   Patient reported having increased anxiety and sadness prior to her current admission. She stated that she had never had a major surgery and was fearful of the process. She sought counseling and medication management. S/P REZA/BSO/omentectomy/debulking.   Pain: 6    Symptoms:   · Mood: fatigue and sad mood  · Anxiety: excessive anxiety/worry and restlessness/keyed up  · Substance Abuse: denied  · Cognitive Functioning: Reported Cognitive Fuzziness following anesthesia  · Health Behaviors: noncontributory     Psychiatric History: psychotropic management by PCP and has participated in counseling/psychotherapy on an outpatient basis in the past    Past Medical History:   Diagnosis Date    Allergy     Angio-edema     Anxiety     Arthritis     Asthma     Cancer     Cataract     Chest pain at rest     Depression     Diverticulosis     Fibromyalgia 7/8/2012    Glaucoma suspect, steroid responders     Hand joint pain 7/8/2012    Headache(784.0)     Hx of psychiatric care     Migraine     Osteoporosis     Psychiatric problem     Recurrent upper respiratory infection (URI)     Sleep difficulties     Therapy     Urticaria         Family History of Psychiatric Illness: not known    Social History (marriage, employment, etc.): Patient currently lives with her . Her husbandd has Multiple Myeloma but is coping well with his disease and treatment status. She has no children and had social support from extended family. She is a retired  and has a history of effectively utilizing coping skills.     Substance Use:   Alcohol: none   Drugs: none   Tobacco: none   Caffeine: Nonsignificant    Current Medications and Drug Reactions (include OTC, herbal):   See medication list. Patient was prescribed Prozac prior to current admission and plans to continue taking as prescribed and will follow up with PCP for psychotropic medication management.    Psychotherapeutics (From admission, onward)    Start     Stop Route Frequency Ordered    11/13/19 0900  FLUoxetine capsule 10 mg      -- Oral Daily 11/12/19 1609    11/12/19 1609  LORazepam tablet 0.5 mg      -- Oral Every 8 hours PRN 11/12/19 1609          Strengths and Liabilities: Strength: Patient accepts guidance/feedback, Strength: Patient is expressive/articulate., Strength: Patient is intelligent., Strength: Patient is motivated for change., Strength: Patient has positive support network., Strength: Patient has reasonable judgment., Strength: Patient is stable.    Current Evaluation:     Mental Status Exam:  General Appearance:  lying in bed   Speech: normal tone, normal rate, normal pitch, normal volume      Level of Cooperation: cooperative      Thought Processes: normal and logical   Mood: anxious      Thought Content: normal, no suicidality, no homicidality, delusions, or paranoia   Affect: congruent and appropriate   Orientation: Oriented x3   Memory: recent >  intact, remote >  intact   Attention Span & Concentration: intact   Fund of General Knowledge: intact and appropriate to age and level of education   Abstract Reasoning: intact   Judgment & Insight: good     Language   intact         Margarita Dunne has adjusted to illness with slight difficulty primarily through active coping strategies, passive coping strategies and focus on family. She has engaged in appropriate information gathering.  The patient has good family/friend support.  Her support system is coping well with the diagnosis/treatment/prognosis. Illness-related psychosocial stressors include spouse with Multiple Myeloma.  The patient has a good partnership with her [facility] oncology treatment team. The patient reports the following barriers to cancer care:none.       Assessment - Diagnosis - Goals:       ICD-10-CM ICD-9-CM   1. S/P REZA-BSO (total abdominal hysterectomy and bilateral salpingo-oophorectomy), debulking, omentecotmy, BAYLEE Z90.710 V88.01    Z90.722 V45.77    Z90.79    2. Malignant neoplasm of ovary, unspecified laterality C56.9 183.0   3. Malignant neoplasm of ovary C56.9 183.0   4. Fibromyalgia M79.7 729.1   5. Pain management R52 780.96   6. Depression, unspecified depression type F32.9 311   7. Anxiety F41.9 300.00       Plan: individual psychotherapy as needed;  and medication management by physician    Summary and Recommendations  Margarita Dunne is a 74 y.o. female referred by Irvin Vinson MD for psychological evaluation and treatment.   Mood protective strategies during cancer treatment were discussed.  The patient is aware of resources available should  her needs change in the future..    Length of Service (minutes): 45    Tamiko Maynard, PhD  Psychology  [facility]

## 2019-11-14 NOTE — PROGRESS NOTES
Admit Assessment    Patient Identification  Margarita Dunne   :  1945  Admit Date:  2019  Attending Provider:  Irvin Vinson MD              Referral:   Pt was admitted to  with a diagnosis of Malignant neoplasm of ovary, and was admitted this hospital stay due to Malignant neoplasm of ovary, unspecified laterality [C56.9]  Malignant neoplasm of ovary [C56.9]  Malignant neoplasm of ovary [C56.9].   is involved was referred to the Social Work Department via routine referral.  Patient presents as a 74 y.o. year old  female.    Persons interviewed: patient    Living Situation:  Pt. States that she resides at home with her spouse. She states that she has been very independent with all adl's prior to admission. She states that her  is fairly healthy, he has been diagnosed with multiple myeloma but that he has done well with treatment and is independent and can help if needed.     Resides at [address] [facility] [address] [address] [place] [address], phone: [phone] (home).      (RETIRED) Functional Status Prior  Ambulation Prior: 0-->independent  Transferrin-->independent  Toiletin-->independent    Current or Past Agencies and Description of Services/Supplies    DME  Agency Name: none  Agency Phone Number: n/a  Equipment Currently Used at Home: none    Home Health  Agency Name: none  Agency Phone Number: n/a  Services: n/a    IV Infusion  Agency Name: none  Agency Phone Number: n/a    Nutrition: oral    Outpatient Pharmacy:     [facility] - [place], [place] - [address]   [facility] [address]  Phone: [phone] Fax: [phone]    [facility] - [place] - [address]  [address]  [place] [address]  Phone: [phone] Fax: [phone]      Patient Preference of agencies include: none noted    Patient/Caregiver informed of right to choose providers or agencies.  Patient provides  "permission to release any necessary information to [facility] and to Non[facility] agencies as needed to facilitate patient care, treatment planning, and patient discharge planning.  Written and verbal resources provided.      Coping: pt. Appears to be having difficulty with new diagnosis. She states that she has never been ill before and that this all "seems surreal". Pt. States that she has a hard time with acknowledging that she needs help at this time. Pt. Reports good support from spouse and close friends. She also reports that her brother will be coming to help her after dc.  Provided support to patient.          Adjustment to Diagnosis and Treatment: appropriate given new diagnosis      Emotional/Behavioral/Cognitive Issues: none noted            History/Current Symptoms of Anxiety/Depression: Yes (related to new dx of cancer)  History/Current Substance Use:   Social History     Tobacco Use    Smoking status: Never Smoker    Smokeless tobacco: Never Used   Substance and Sexual Activity    Alcohol use: Not Currently     Frequency: Never     Binge frequency: Never     Comment: social wine use in the past not current. last was 15 years ago    Drug use: No    Sexual activity: Not on file       Indications of Abuse/Neglect: No:   Abuse Screen (yes response referral indicated)  Feels Unsafe at Home or Work/School: no  Feels Threatened by Someone: no    Financial:  Payor/Plan Subscr  Sex Relation Sub. Ins. ID Effective Group Num   1. MEDICARE - ME* ASHOK MARROQUIN 1945 Female  [id number]                                    [address]   2. BLUE CROSS * ASHOK MARROQUIN CRESENCIO 1945 Female  [id number] 3/1/15 [id number]                                   [address] [address]                            Other identified concerns/needs: none noted    Plan: to return home with spouse    Interventions/Referrals: TBD  Patient/caregiver engaged in treatment planning process.     providing psychosocial and " supportive counseling, resources, education, assistance and discharge planning as appropriate.  Patient/caregiver state understanding of  available resources,  following, remains available. Provided pt. With teri'er phone # and encouraged her to call if needed. Will follow.

## 2019-11-14 NOTE — SUBJECTIVE & OBJECTIVE
Pain: 6    Symptoms:   · Mood: fatigue and sad mood  · Anxiety: excessive anxiety/worry and restlessness/keyed up  · Substance Abuse: denied  · Cognitive Functioning: Reported Cognitive Fuzziness following anesthesia  · Health Behaviors: noncontributory     Psychiatric History: psychotropic management by PCP and has participated in counseling/psychotherapy on an outpatient basis in the past    Past Medical History:   Diagnosis Date    Allergy     Angio-edema     Anxiety     Arthritis     Asthma     Cancer     Cataract     Chest pain at rest     Depression     Diverticulosis     Fibromyalgia 7/8/2012    Glaucoma suspect, steroid responders     Hand joint pain 7/8/2012    Headache(784.0)     Hx of psychiatric care     Migraine     Osteoporosis     Psychiatric problem     Recurrent upper respiratory infection (URI)     Sleep difficulties     Therapy     Urticaria        Family History of Psychiatric Illness: not known    Social History (marriage, employment, etc.): Patient currently lives with her . Her husbandd has Multiple Myeloma but is coping well with his disease and treatment status. She has no children and had social support from extended family. She is a retired  and has a history of effectively utilizing coping skills.     Substance Use:   Alcohol: none   Drugs: none   Tobacco: none   Caffeine: Nonsignificant    Current Medications and Drug Reactions (include OTC, herbal):   See medication list. Patient was prescribed Prozac prior to current admission and plans to continue taking as prescribed and will follow up with PCP for psychotropic medication management.    Psychotherapeutics (From admission, onward)    Start     Stop Route Frequency Ordered    11/13/19 0900  FLUoxetine capsule 10 mg      -- Oral Daily 11/12/19 1609    11/12/19 1609  LORazepam tablet 0.5 mg      -- Oral Every 8 hours PRN 11/12/19 1609          Strengths and Liabilities: Strength: Patient accepts  guidance/feedback, Strength: Patient is expressive/articulate., Strength: Patient is intelligent., Strength: Patient is motivated for change., Strength: Patient has positive support network., Strength: Patient has reasonable judgment., Strength: Patient is stable.    Current Evaluation:     Mental Status Exam:  General Appearance:  lying in bed   Speech: normal tone, normal rate, normal pitch, normal volume      Level of Cooperation: cooperative      Thought Processes: normal and logical   Mood: anxious      Thought Content: normal, no suicidality, no homicidality, delusions, or paranoia   Affect: congruent and appropriate   Orientation: Oriented x3   Memory: recent >  intact, remote >  intact   Attention Span & Concentration: intact   Fund of General Knowledge: intact and appropriate to age and level of education   Abstract Reasoning: intact   Judgment & Insight: good     Language  intact

## 2019-11-14 NOTE — SUBJECTIVE & OBJECTIVE
Interval History: Patient doing well. Voiding more frequently overnight, tolerating regular diet, no N/V, ambulating to bathroom without assistance. Patient has not passed flatus and is having occasional belching. No other complaints.     Scheduled Meds:   acetaminophen  1,000 mg Oral Q6H    FLUoxetine  10 mg Oral Daily    heparin (porcine)  5,000 Units Subcutaneous Q8H    ketorolac  15 mg Intravenous Q6H    rosuvastatin  5 mg Oral QHS    senna-docusate 8.6-50 mg  1 tablet Oral BID     Continuous Infusions:   lactated ringers 40 mL/hr at 11/13/19 1903     PRN Meds:albuterol, LORazepam, ondansetron, oxyCODONE, promethazine (PHENERGAN) IVPB    Review of patient's allergies indicates:   Allergen Reactions    Doxycycline Nausea And Vomiting    Corticosteroids (glucocorticoids) Other (See Comments)     Causes pt to have migraines for 2 weeks    Pcn [penicillins] Rash    Sulfa (sulfonamide antibiotics) Rash       Objective:     Vital Signs (Most Recent):  Temp: 97.5 °F (36.4 °C) (11/14/19 0442)  Pulse: 93 (11/14/19 0442)  Resp: 18 (11/13/19 1933)  BP: 128/60 (11/14/19 0442)  SpO2: 96 % (11/14/19 0442) Vital Signs (24h Range):  Temp:  [97.1 °F (36.2 °C)-98.6 °F (37 °C)] 97.5 °F (36.4 °C)  Pulse:  [] 93  Resp:  [15-18] 18  SpO2:  [94 %-97 %] 96 %  BP: (112-130)/(55-73) 128/60     Weight: 62.6 kg (138 lb)  Body mass index is 23.69 kg/m².    Intake/Output - Last 3 Shifts       11/12 0700 - 11/13 0659 11/13 0700 - 11/14 0659    P.O. 120 1080    I.V. (mL/kg) 3570.7 (57)     IV Piggyback  550    Total Intake(mL/kg) 3690.7 (59) 1630 (26)    Urine (mL/kg/hr) 830 175 (0.1)    Drains 300     Stool 0     Blood 600     Total Output 1730 175    Net +1960.7 +1455          Urine Occurrence  1 x    Stool Occurrence 0 x              Physical Exam:   Constitutional: She appears well-developed and well-nourished. No distress.    HENT:   Head: Normocephalic and atraumatic.      Cardiovascular: Normal rate and regular rhythm.    Pulse Score: 2+   Pulmonary/Chest: Effort normal and breath sounds normal. No respiratory distress. She has no wheezes.        Abdominal: Soft. Bowel sounds are normal. She exhibits distension (mildly distended) and abdominal incision (large vertical midline, steri strips in place, small amt dried old blood under strips). There is tenderness (appropriate for post op). There is no guarding.                 Neurological: She is alert.    Skin: Skin is warm and dry. She is not diaphoretic.    Psychiatric: She has a normal mood and affect.       Lines/Drains/Airways     Peripheral Intravenous Line                 Peripheral IV - Single Lumen 11/12/19 0842 18 G Left Forearm 1 day                Laboratory:  Recent Lab Results     None          Diagnostic Results:  Labs: Reviewed

## 2019-11-14 NOTE — PLAN OF CARE
Plan of care reviewed with patient who verbalized understanding. AAOx4. VSS on room air. Patient tolerating diet. PRN Zofran given for nausea. PRN Oxycodone given for pain. Patient is up to the toilet with standby assist. Call light within reach. Bed in the lowest position. Patient mostly slept in between care. No acute events this shift. WCTM.

## 2019-11-14 NOTE — PROGRESS NOTES
[facility]  Gynecologic Oncology  Progress Note      Patient Name: Margarita Dunne  MRN: [id number]  Admission Date: 11/12/2019  Hospital Length of Stay: 2 days  Attending Provider: Irvin Vinson MD  Primary Care Provider: Columba Anderson MD  Principal Problem: Malignant neoplasm of ovary    Follow-up For: Procedure(s) (LRB):  LAPAROTOMY, EXPLORATORY (N/A)  HYSTERECTOMY, TOTAL, ABDOMINAL (N/A)  SALPINGO-OOPHORECTOMY (Bilateral)  OMENTECTOMY (N/A)  DEBULKING, NEOPLASM (N/A)  MOBILIZATION, SPLENIC FLEXURE  APPENDECTOMY  EXCISION, TISSUE, PERITONEUM  Post-Operative Day: 2 Days Post-Op  Subjective:      History of Present Illness:  Patient with known ovarian cancer presents for scheduled PDS    Hospital Course:  11/13/2019 - POD 1 s/p REZA/BSO/omentectomy/debulking. Patient doing well this AM. Has not had regular diet, tolerating water well. Has not ambulated. Love in place. Required prn oxy IR 5mg x2 overnight. UOP 0.3 mg/kg/hr overnight, sCr 0.8 this AM. No emesis, mild nausea. Otherwise doing well.   11/14/2019 - Patient doing well. Voiding more frequently overnight, tolerating regular diet, no N/V, ambulating to bathroom without assistance. Patient has not passed flatus and is having occasional belching. No other complaints.     Interval History: Patient doing well. Voiding more frequently overnight, tolerating regular diet, no N/V, ambulating to bathroom without assistance. Patient has not passed flatus and is having occasional belching. No other complaints.     Scheduled Meds:   acetaminophen  1,000 mg Oral Q6H    FLUoxetine  10 mg Oral Daily    heparin (porcine)  5,000 Units Subcutaneous Q8H    ketorolac  15 mg Intravenous Q6H    rosuvastatin  5 mg Oral QHS    senna-docusate 8.6-50 mg  1 tablet Oral BID     Continuous Infusions:   lactated ringers 40 mL/hr at 11/13/19 1903     PRN Meds:albuterol, LORazepam, ondansetron, oxyCODONE, promethazine (PHENERGAN) IVPB    Review of patient's allergies  indicates:   Allergen Reactions    Doxycycline Nausea And Vomiting    Corticosteroids (glucocorticoids) Other (See Comments)     Causes pt to have migraines for 2 weeks    Pcn [penicillins] Rash    Sulfa (sulfonamide antibiotics) Rash       Objective:     Vital Signs (Most Recent):  Temp: 97.5 °F (36.4 °C) (11/14/19 0442)  Pulse: 93 (11/14/19 0442)  Resp: 18 (11/13/19 1933)  BP: 128/60 (11/14/19 0442)  SpO2: 96 % (11/14/19 0442) Vital Signs (24h Range):  Temp:  [97.1 °F (36.2 °C)-98.6 °F (37 °C)] 97.5 °F (36.4 °C)  Pulse:  [] 93  Resp:  [15-18] 18  SpO2:  [94 %-97 %] 96 %  BP: (112-130)/(55-73) 128/60     Weight: 62.6 kg (138 lb)  Body mass index is 23.69 kg/m².    Intake/Output - Last 3 Shifts       11/12 0700 - 11/13 0659 11/13 0700 - 11/14 0659    P.O. 120 1080    I.V. (mL/kg) 3570.7 (57)     IV Piggyback  550    Total Intake(mL/kg) 3690.7 (59) 1630 (26)    Urine (mL/kg/hr) 830 175 (0.1)    Drains 300     Stool 0     Blood 600     Total Output 1730 175    Net +1960.7 +1455          Urine Occurrence  1 x    Stool Occurrence 0 x              Physical Exam:   Constitutional: She appears well-developed and well-nourished. No distress.    HENT:   Head: Normocephalic and atraumatic.      Cardiovascular: Normal rate and regular rhythm.   Pulse Score: 2+   Pulmonary/Chest: Effort normal and breath sounds normal. No respiratory distress. She has no wheezes.        Abdominal: Soft. Bowel sounds are normal. She exhibits distension (mildly distended) and abdominal incision (large vertical midline, steri strips in place, small amt dried old blood under strips). There is tenderness (appropriate for post op). There is no guarding.                 Neurological: She is alert.    Skin: Skin is warm and dry. She is not diaphoretic.    Psychiatric: She has a normal mood and affect.       Lines/Drains/Airways     Peripheral Intravenous Line                 Peripheral IV - Single Lumen 11/12/19 0842 18 G Left Forearm 1 day                 Laboratory:  Recent Lab Results     None          Diagnostic Results:  Labs: Reviewed    Assessment/Plan:     S/P REZA-BSO (total abdominal hysterectomy and bilateral salpingo-oophorectomy), debulking, omentecotmy, BAYLEE  - Oral pain meds  - post op labs reassuring  - Continue monitoring I/Os  - Regular diet  - Encourage ambulation  - Heparin 5000 q8  - IS at bedside      VTE Risk Mitigation (From admission, onward)         Ordered     heparin (porcine) injection 5,000 Units  Every 8 hours      11/1943     IP VTE HIGH RISK PATIENT  Once      11/12/19 1609                Was galeano catheter removed? Yes    Garry Rebolledo MD  Gynecologic Oncology  [facility]

## 2019-11-15 PROBLEM — E44.0 MODERATE MALNUTRITION: Status: ACTIVE | Noted: 2019-11-15

## 2019-11-15 PROBLEM — R14.0 ABDOMINAL DISTENSION: Status: ACTIVE | Noted: 2019-11-15

## 2019-11-15 PROCEDURE — 99233 SBSQ HOSP IP/OBS HIGH 50: CPT | Mod: GC,,, | Performed by: OBSTETRICS & GYNECOLOGY

## 2019-11-15 PROCEDURE — 63600175 PHARM REV CODE 636 W HCPCS: Performed by: STUDENT IN AN ORGANIZED HEALTH CARE EDUCATION/TRAINING PROGRAM

## 2019-11-15 PROCEDURE — 20600001 HC STEP DOWN PRIVATE ROOM

## 2019-11-15 PROCEDURE — 25000003 PHARM REV CODE 250: Performed by: STUDENT IN AN ORGANIZED HEALTH CARE EDUCATION/TRAINING PROGRAM

## 2019-11-15 PROCEDURE — 97802 MEDICAL NUTRITION INDIV IN: CPT

## 2019-11-15 PROCEDURE — 25000003 PHARM REV CODE 250: Performed by: OBSTETRICS & GYNECOLOGY

## 2019-11-15 PROCEDURE — 99233 PR SUBSEQUENT HOSPITAL CARE,LEVL III: ICD-10-PCS | Mod: GC,,, | Performed by: OBSTETRICS & GYNECOLOGY

## 2019-11-15 RX ORDER — OXYCODONE HYDROCHLORIDE 5 MG/1
5 TABLET ORAL EVERY 4 HOURS PRN
Qty: 30 TABLET | Refills: 0 | Status: SHIPPED | OUTPATIENT
Start: 2019-11-15 | End: 2020-07-24

## 2019-11-15 RX ORDER — IBUPROFEN 600 MG/1
600 TABLET ORAL EVERY 6 HOURS
Qty: 30 TABLET | Refills: 1 | Status: SHIPPED | OUTPATIENT
Start: 2019-11-15 | End: 2020-07-24

## 2019-11-15 RX ORDER — SIMETHICONE 80 MG
1 TABLET,CHEWABLE ORAL 4 TIMES DAILY PRN
Status: DISCONTINUED | OUTPATIENT
Start: 2019-11-15 | End: 2019-11-18 | Stop reason: HOSPADM

## 2019-11-15 RX ADMIN — ONDANSETRON 8 MG: 2 INJECTION INTRAMUSCULAR; INTRAVENOUS at 11:11

## 2019-11-15 RX ADMIN — ACETAMINOPHEN 1000 MG: 500 TABLET ORAL at 01:11

## 2019-11-15 RX ADMIN — ONDANSETRON 8 MG: 2 INJECTION INTRAMUSCULAR; INTRAVENOUS at 12:11

## 2019-11-15 RX ADMIN — SIMETHICONE CHEW TAB 80 MG 80 MG: 80 TABLET ORAL at 08:11

## 2019-11-15 RX ADMIN — PROMETHAZINE HYDROCHLORIDE 6.25 MG: 25 INJECTION INTRAMUSCULAR; INTRAVENOUS at 07:11

## 2019-11-15 RX ADMIN — ONDANSETRON 8 MG: 2 INJECTION INTRAMUSCULAR; INTRAVENOUS at 04:11

## 2019-11-15 RX ADMIN — ENOXAPARIN SODIUM 40 MG: 100 INJECTION SUBCUTANEOUS at 04:11

## 2019-11-15 RX ADMIN — SIMETHICONE CHEW TAB 80 MG 80 MG: 80 TABLET ORAL at 05:11

## 2019-11-15 RX ADMIN — SIMETHICONE CHEW TAB 80 MG 80 MG: 80 TABLET ORAL at 01:11

## 2019-11-15 RX ADMIN — ACETAMINOPHEN 1000 MG: 500 TABLET ORAL at 05:11

## 2019-11-15 RX ADMIN — SENNOSIDES AND DOCUSATE SODIUM 1 TABLET: 8.6; 5 TABLET ORAL at 08:11

## 2019-11-15 RX ADMIN — FAMOTIDINE 20 MG: 20 TABLET ORAL at 08:11

## 2019-11-15 RX ADMIN — ONDANSETRON 8 MG: 2 INJECTION INTRAMUSCULAR; INTRAVENOUS at 06:11

## 2019-11-15 RX ADMIN — FLUOXETINE 10 MG: 10 CAPSULE ORAL at 08:11

## 2019-11-15 RX ADMIN — IBUPROFEN 600 MG: 600 TABLET, FILM COATED ORAL at 10:11

## 2019-11-15 RX ADMIN — ACETAMINOPHEN 1000 MG: 500 TABLET ORAL at 06:11

## 2019-11-15 RX ADMIN — PROMETHAZINE HYDROCHLORIDE 6.25 MG: 25 INJECTION INTRAMUSCULAR; INTRAVENOUS at 08:11

## 2019-11-15 NOTE — SUBJECTIVE & OBJECTIVE
Interval History: POD 3. Patient with regular belching, not passing gas, mildly distended abdomen, and nausea without emesis. Tolerating liquids and small amount of regular diet. Ambulating and voiding frequently. Pain controlled. No other complaints.     Scheduled Meds:   acetaminophen  1,000 mg Oral Q6H    enoxaparin  40 mg Subcutaneous Daily    famotidine  20 mg Oral BID    FLUoxetine  10 mg Oral Daily    ibuprofen  600 mg Oral Q6H    magnesium hydroxide 400 mg/5 ml  30 mL Oral Daily    rosuvastatin  5 mg Oral QHS    senna-docusate 8.6-50 mg  1 tablet Oral BID    sodium citrate-citric acid 500-334 mg/5 ml  30 mL Oral Once     Continuous Infusions:    PRN Meds:albuterol, LORazepam, ondansetron, oxyCODONE, promethazine (PHENERGAN) IVPB, simethicone    Review of patient's allergies indicates:   Allergen Reactions    Doxycycline Nausea And Vomiting    Corticosteroids (glucocorticoids) Other (See Comments)     Causes pt to have migraines for 2 weeks    Pcn [penicillins] Rash    Sulfa (sulfonamide antibiotics) Rash       Objective:     Vital Signs (Most Recent):  Temp: 98.2 °F (36.8 °C) (11/15/19 0525)  Pulse: 92 (11/15/19 0525)  Resp: 18 (11/15/19 0525)  BP: (!) 148/63 (11/15/19 0525)  SpO2: 95 % (11/15/19 0525) Vital Signs (24h Range):  Temp:  [97.6 °F (36.4 °C)-98.3 °F (36.8 °C)] 98.2 °F (36.8 °C)  Pulse:  [84-97] 92  Resp:  [18] 18  SpO2:  [95 %-97 %] 95 %  BP: (119-148)/(54-71) 148/63     Weight: 62.6 kg (138 lb)  Body mass index is 23.69 kg/m².    Intake/Output - Last 3 Shifts       11/13 0700 - 11/14 0659 11/14 0700 - 11/15 0659    P.O. 1080 240    IV Piggyback 550     Total Intake(mL/kg) 1630 (26) 240 (3.8)    Urine (mL/kg/hr) 175 (0.1) 2300 (1.5)    Stool  0    Total Output 175 2300    Net +1455 -2060          Urine Occurrence 1 x     Stool Occurrence  0 x             Physical Exam:   Constitutional: She appears well-developed and well-nourished. No distress.    HENT:   Head: Normocephalic and  atraumatic.      Cardiovascular: Normal rate and regular rhythm.   Pulse Score: 2+   Pulmonary/Chest: Effort normal and breath sounds normal. No respiratory distress. She has no wheezes.        Abdominal: Soft. Bowel sounds are normal. She exhibits distension (mildly distended) and abdominal incision (large vertical midline, steri strips in place, small amt dried old blood under strips). There is tenderness (appropriate for post op). There is no guarding.                 Neurological: She is alert.    Skin: Skin is warm and dry. She is not diaphoretic.    Psychiatric: She has a normal mood and affect.       Lines/Drains/Airways     Peripheral Intravenous Line                 Peripheral IV - Single Lumen 11/12/19 0842 18 G Left Forearm 2 days                Laboratory:  Recent Lab Results     None          Diagnostic Results:  Labs: Reviewed

## 2019-11-15 NOTE — ASSESSMENT & PLAN NOTE
Malnutrition in the context of Chronic Illness/Injury    Related to (etiology):  Inadequate Oral Intake; Cancer; Increased Energy Needs    Signs and Symptoms (as evidenced by):  Energy Intake: <75% of estimated energy requirement for 6 months per pt   Body Fat Depletion: moderate depletion of triceps   Muscle Mass Depletion: mild and moderate depletion of temples and clavicle region   Weight Loss: 19.9% x 1 year     Interventions:  Collaboration of nutrition care with other providers    Nutrition Diagnosis Status:  New

## 2019-11-15 NOTE — PLAN OF CARE
Problem: Oral Intake Inadequate  Goal: Improved Oral Intake  Outcome: Ongoing, Progressing     Problem: Malnutrition  Goal: Improved Nutritional Intake  Outcome: Ongoing, Progressing     Recommendations    Pt meets moderate malnutrition criteria.     1. Continue diet as tolerated.    - Encourage po intake.     2. Add Boost Plus (chocolate) with meals to aid in caloric intake.     3. RD following.     Goals: consume >50% of all meals of RD fllow-up  Nutrition Goal Status: new

## 2019-11-15 NOTE — PROGRESS NOTES
[facility]  Gynecologic Oncology  Progress Note      Patient Name: Margarita Dunne  MRN: [id number]  Admission Date: 11/12/2019  Hospital Length of Stay: 3 days  Attending Provider: Irvin Vinson MD  Primary Care Provider: Columba Anderson MD  Principal Problem: Malignant neoplasm of ovary    Follow-up For: Procedure(s) (LRB):  LAPAROTOMY, EXPLORATORY (N/A)  HYSTERECTOMY, TOTAL, ABDOMINAL (N/A)  SALPINGO-OOPHORECTOMY (Bilateral)  OMENTECTOMY (N/A)  DEBULKING, NEOPLASM (N/A)  MOBILIZATION, SPLENIC FLEXURE  APPENDECTOMY  EXCISION, TISSUE, PERITONEUM  Post-Operative Day: 3 Days Post-Op  Subjective:      History of Present Illness:  Patient with known ovarian cancer presents for scheduled PDS    Hospital Course:  11/13/2019 - POD 1 s/p REZA/BSO/omentectomy/debulking. Patient doing well this AM. Has not had regular diet, tolerating water well. Has not ambulated. Love in place. Required prn oxy IR 5mg x2 overnight. UOP 0.3 mg/kg/hr overnight, sCr 0.8 this AM. No emesis, mild nausea. Otherwise doing well.   11/14/2019 - Patient doing well. Voiding more frequently overnight, tolerating regular diet, no N/V, ambulating to bathroom without assistance. Patient has not passed flatus and is having occasional belching. No other complaints.   11/15/2019 - POD 3. Patient with regular belching, not passing gas, mildly distended abdomen, and nausea without emesis. Tolerating liquids and small amount of regular diet. Ambulating and voiding frequently. Pain controlled. No other complaints.     Interval History: POD 3. Patient with regular belching, not passing gas, mildly distended abdomen, and nausea without emesis. Tolerating liquids and small amount of regular diet. Ambulating and voiding frequently. Pain controlled. No other complaints.     Scheduled Meds:   acetaminophen  1,000 mg Oral Q6H    enoxaparin  40 mg Subcutaneous Daily    famotidine  20 mg Oral BID    FLUoxetine  10 mg Oral Daily    ibuprofen  600 mg  Oral Q6H    magnesium hydroxide 400 mg/5 ml  30 mL Oral Daily    rosuvastatin  5 mg Oral QHS    senna-docusate 8.6-50 mg  1 tablet Oral BID    sodium citrate-citric acid 500-334 mg/5 ml  30 mL Oral Once     Continuous Infusions:    PRN Meds:albuterol, LORazepam, ondansetron, oxyCODONE, promethazine (PHENERGAN) IVPB, simethicone    Review of patient's allergies indicates:   Allergen Reactions    Doxycycline Nausea And Vomiting    Corticosteroids (glucocorticoids) Other (See Comments)     Causes pt to have migraines for 2 weeks    Pcn [penicillins] Rash    Sulfa (sulfonamide antibiotics) Rash       Objective:     Vital Signs (Most Recent):  Temp: 98.2 °F (36.8 °C) (11/15/19 0525)  Pulse: 92 (11/15/19 0525)  Resp: 18 (11/15/19 0525)  BP: (!) 148/63 (11/15/19 0525)  SpO2: 95 % (11/15/19 0525) Vital Signs (24h Range):  Temp:  [97.6 °F (36.4 °C)-98.3 °F (36.8 °C)] 98.2 °F (36.8 °C)  Pulse:  [84-97] 92  Resp:  [18] 18  SpO2:  [95 %-97 %] 95 %  BP: (119-148)/(54-71) 148/63     Weight: 62.6 kg (138 lb)  Body mass index is 23.69 kg/m².    Intake/Output - Last 3 Shifts       11/13 0700 - 11/14 0659 11/14 0700 - 11/15 0659    P.O. 1080 240    IV Piggyback 550     Total Intake(mL/kg) 1630 (26) 240 (3.8)    Urine (mL/kg/hr) 175 (0.1) 2300 (1.5)    Stool  0    Total Output 175 2300    Net +1455 -2060          Urine Occurrence 1 x     Stool Occurrence  0 x             Physical Exam:   Constitutional: She appears well-developed and well-nourished. No distress.    HENT:   Head: Normocephalic and atraumatic.      Cardiovascular: Normal rate and regular rhythm.   Pulse Score: 2+   Pulmonary/Chest: Effort normal and breath sounds normal. No respiratory distress. She has no wheezes.        Abdominal: Soft. Bowel sounds are normal. She exhibits distension (mildly distended) and abdominal incision (large vertical midline, steri strips in place, small amt dried old blood under strips). There is tenderness (appropriate for post op).  There is no guarding.                 Neurological: She is alert.    Skin: Skin is warm and dry. She is not diaphoretic.    Psychiatric: She has a normal mood and affect.       Lines/Drains/Airways     Peripheral Intravenous Line                 Peripheral IV - Single Lumen 11/12/19 0842 18 G Left Forearm 2 days                Laboratory:  Recent Lab Results     None          Diagnostic Results:  Labs: Reviewed    Assessment/Plan:     Abdominal distension  - Concern for ileus  - Restart low flow fluids  - Symptom management    S/P REZA-BSO (total abdominal hysterectomy and bilateral salpingo-oophorectomy), debulking, omentecotmy, BAYLEE  - Oral pain meds  - post op labs reassuring  - Continue monitoring I/Os  - Regular diet  - Encourage ambulation  - Heparin 5000 q8  - IS at bedside      VTE Risk Mitigation (From admission, onward)         Ordered     enoxaparin injection 40 mg  Daily      11/14/19 0837     IP VTE HIGH RISK PATIENT  Once      11/12/19 1609                Was galeano catheter removed? Yes    Garry Rebolledo MD  Gynecologic Oncology  [facility]

## 2019-11-15 NOTE — NURSING
Pt complaining of heartburn this shift after eating . Prn medications administered with some relief. Instructed pt to stay with clear liquids tonight and try again tomorrow to eat. md notified of pt complaints of heartburn and orders were obtained for prns  wctm

## 2019-11-15 NOTE — CONSULTS
"  [facility]  Adult Nutrition  Consult Note    SUMMARY     Recommendations    Pt meets moderate malnutrition criteria.     1. Continue diet as tolerated.    - Encourage po intake.     2. Add Boost Plus (chocolate) with meals to aid in caloric intake.     3. RD following.     Goals: consume >50% of all meals of RD fllow-up  Nutrition Goal Status: new  Communication of RD Recs: (POC)    Reason for Assessment    Reason For Assessment: consult  Diagnosis: cancer diagnosis/related complications(Malignant neoplasm of ovary)  Interdisciplinary Rounds: did not attend  General Information Comments:  POD3 REZA-BSO, debulking, omentecotmy, BAYLEE, now with concern for ileus. Pt resting in bed with  at bedside. Pt reports poor appetite, but did eat ~50% of lunch today. Agreed to try Boost Plus with meals to aid in caloric intake. C/o nausea and constipation. Pt reports decreased appetite x 6 months with wt loss. NFPE completed. Pt with mild-moderate muscle/fat wasting. Pt meets moderate malnutrition criteria at this time.  Nutrition Discharge Planning: adequate po intake    Nutrition Risk Screen    Nutrition Risk Screen: no indicators present    Nutrition/Diet History    Spiritual, Cultural Beliefs, Alevism Practices, Values that Affect Care: no  Factors Affecting Nutritional Intake: decreased appetite, nausea/vomiting    Anthropometrics    Temp: 97.5 °F (36.4 °C)  Height Method: Stated  Height: 5' 4" (162.6 cm)  Height (inches): 64 in  Weight Method: Bed Scale  Weight: 62.6 kg (138 lb)  Weight (lb): 138 lb  Ideal Body Weight (IBW), Female: 120 lb  % Ideal Body Weight, Female (lb): 115 lb  BMI (Calculated): 23.7  BMI Grade: 18.5-24.9 - normal  Weight Loss: unintentional  Usual Body Weight (UBW), k.18 kg  % Usual Body Weight: 80.23  % Weight Change From Usual Weight: -19.93 %     Lab/Procedures/Meds    Pertinent Labs Reviewed: reviewed  Pertinent Labs Comments: Na 134, glucose 161  Pertinent " Medications Reviewed: reviewed  Pertinent Medications Comments: acetaminophen, famotidine, ibuprofen, statin, docusate    Estimated/Assessed Needs    Weight Used For Calorie Calculations: 62.6 kg (138 lb 0.1 oz)  Energy Calorie Requirements (kcal): 4243-0269 kcal/day  Energy Need Method: Kcal/kg(25-30)  Protein Requirements: 63-75 gm/day(1.0-1.2 gm/kg)  Weight Used For Protein Calculations: 62.6 kg (138 lb 0.1 oz)  Fluid Requirements (mL): 1 mL/kcal or per MD     RDA Method (mL): 1565     Nutrition Prescription Ordered    Current Diet Order: Regular    Evaluation of Received Nutrient/Fluid Intake    I/O: -2L x 24 hrs, +1.35L since admit  Tolerance: tolerating  % Intake of Estimated Energy Needs: 25 - 50 %  % Meal Intake: 25 - 50 %    Nutrition Risk    Level of Risk/Frequency of Follow-up: (f/u 1 x wk)     Assessment and Plan    Moderate malnutrition  Malnutrition in the context of Chronic Illness/Injury    Related to (etiology):  Inadequate Oral Intake; Cancer; Increased Energy Needs    Signs and Symptoms (as evidenced by):  Energy Intake: <75% of estimated energy requirement for 6 months per pt   Body Fat Depletion: moderate depletion of triceps   Muscle Mass Depletion: mild and moderate depletion of temples and clavicle region   Weight Loss: 19.9% x 1 year     Interventions:  Collaboration of nutrition care with other providers    Nutrition Diagnosis Status:  New             Monitor and Evaluation    Food and Nutrient Intake: energy intake, food and beverage intake  Food and Nutrient Adminstration: diet order  Physical Activity and Function: nutrition-related ADLs and IADLs  Anthropometric Measurements: weight, weight change, body mass index  Biochemical Data, Medical Tests and Procedures: electrolyte and renal panel, gastrointestinal profile, glucose/endocrine profile, inflammatory profile, lipid profile  Nutrition-Focused Physical Findings: overall appearance     Malnutrition Assessment  Malnutrition Type:  chronic illness          Weight Loss (Malnutrition): (19.9% x 1 year)  Energy Intake (Malnutrition): less than 75% for greater than or equal to 3 months   Upper Arm Region (Subcutaneous Fat Loss): moderate depletion   Latter-day Region (Muscle Loss): mild depletion  Clavicle Bone Region (Muscle Loss): moderate depletion  Clavicle and Acromion Bone Region (Muscle Loss): moderate depletion  Anterior Thigh Region (Muscle Loss): (KATY)  Posterior Calf Region (Muscle Loss): (KATY)                 Nutrition Follow-Up    RD Follow-up?: Yes

## 2019-11-16 PROCEDURE — 63600175 PHARM REV CODE 636 W HCPCS: Performed by: STUDENT IN AN ORGANIZED HEALTH CARE EDUCATION/TRAINING PROGRAM

## 2019-11-16 PROCEDURE — 99024 POSTOP FOLLOW-UP VISIT: CPT | Mod: POP,,, | Performed by: OBSTETRICS & GYNECOLOGY

## 2019-11-16 PROCEDURE — 25000003 PHARM REV CODE 250: Performed by: STUDENT IN AN ORGANIZED HEALTH CARE EDUCATION/TRAINING PROGRAM

## 2019-11-16 PROCEDURE — 99024 PR POST-OP FOLLOW-UP VISIT: ICD-10-PCS | Mod: POP,,, | Performed by: OBSTETRICS & GYNECOLOGY

## 2019-11-16 PROCEDURE — 25000003 PHARM REV CODE 250: Performed by: OBSTETRICS & GYNECOLOGY

## 2019-11-16 PROCEDURE — 20600001 HC STEP DOWN PRIVATE ROOM

## 2019-11-16 RX ORDER — SODIUM CHLORIDE 9 MG/ML
INJECTION, SOLUTION INTRAVENOUS CONTINUOUS
Status: DISCONTINUED | OUTPATIENT
Start: 2019-11-16 | End: 2019-11-18 | Stop reason: HOSPADM

## 2019-11-16 RX ORDER — METOCLOPRAMIDE HYDROCHLORIDE 5 MG/ML
10 INJECTION INTRAMUSCULAR; INTRAVENOUS EVERY 6 HOURS
Status: DISCONTINUED | OUTPATIENT
Start: 2019-11-16 | End: 2019-11-16

## 2019-11-16 RX ORDER — METOCLOPRAMIDE HYDROCHLORIDE 5 MG/ML
10 INJECTION INTRAMUSCULAR; INTRAVENOUS EVERY 6 HOURS PRN
Status: DISCONTINUED | OUTPATIENT
Start: 2019-11-16 | End: 2019-11-18 | Stop reason: HOSPADM

## 2019-11-16 RX ADMIN — PROMETHAZINE HYDROCHLORIDE 6.25 MG: 25 INJECTION INTRAMUSCULAR; INTRAVENOUS at 01:11

## 2019-11-16 RX ADMIN — ROSUVASTATIN CALCIUM 5 MG: 5 TABLET, COATED ORAL at 09:11

## 2019-11-16 RX ADMIN — ENOXAPARIN SODIUM 40 MG: 100 INJECTION SUBCUTANEOUS at 05:11

## 2019-11-16 RX ADMIN — PROMETHAZINE HYDROCHLORIDE 6.25 MG: 25 INJECTION INTRAMUSCULAR; INTRAVENOUS at 09:11

## 2019-11-16 RX ADMIN — SODIUM CHLORIDE: 0.9 INJECTION, SOLUTION INTRAVENOUS at 09:11

## 2019-11-16 RX ADMIN — MAGNESIUM HYDROXIDE 2400 MG: 400 SUSPENSION ORAL at 09:11

## 2019-11-16 RX ADMIN — FLUOXETINE 10 MG: 10 CAPSULE ORAL at 09:11

## 2019-11-16 RX ADMIN — METOCLOPRAMIDE HYDROCHLORIDE 10 MG: 10 INJECTION, SOLUTION INTRAMUSCULAR; INTRAVENOUS at 11:11

## 2019-11-16 RX ADMIN — FAMOTIDINE 20 MG: 20 TABLET ORAL at 09:11

## 2019-11-16 RX ADMIN — SENNOSIDES AND DOCUSATE SODIUM 1 TABLET: 8.6; 5 TABLET ORAL at 09:11

## 2019-11-16 RX ADMIN — SIMETHICONE CHEW TAB 80 MG 80 MG: 80 TABLET ORAL at 05:11

## 2019-11-16 RX ADMIN — ONDANSETRON 8 MG: 2 INJECTION INTRAMUSCULAR; INTRAVENOUS at 08:11

## 2019-11-16 RX ADMIN — PROMETHAZINE HYDROCHLORIDE 6.25 MG: 25 INJECTION INTRAMUSCULAR; INTRAVENOUS at 05:11

## 2019-11-16 NOTE — PROGRESS NOTES
Pt experienced increased nausea/belching this morning with cramping to abdomen area. PRN phenergan and simethicone given; mild relief obtained.

## 2019-11-16 NOTE — PLAN OF CARE
POC reviewed w/ pt, verbalized understanding. Pt AAOx4. VSS on RA. Pt ambulates to bathroom independently with adequate UPO. Pt denies passing flatus. Pt is not tolerating regular diet. Pt complains of persistent nausea with frequent belching. Treated with PRN Zofran, Phenergan, and simethicone; little relief obtain. Pt slept between care. Frequent rounds made for pt safety. Call light in reach and bed in lowest position. WCTM

## 2019-11-16 NOTE — SUBJECTIVE & OBJECTIVE
Interval History: POD 4. Stable from yesterday: Patient with regular belching, not passing gas, mildly distended abdomen, and nausea without emesis. Tolerating liquids and small amount of regular diet. Ambulating and voiding frequently. Pain controlled. No other complaints.     Scheduled Meds:   acetaminophen  1,000 mg Oral Q6H    enoxaparin  40 mg Subcutaneous Daily    famotidine  20 mg Oral BID    FLUoxetine  10 mg Oral Daily    ibuprofen  600 mg Oral Q6H    magnesium hydroxide 400 mg/5 ml  30 mL Oral Daily    rosuvastatin  5 mg Oral QHS    senna-docusate 8.6-50 mg  1 tablet Oral BID    sodium citrate-citric acid 500-334 mg/5 ml  30 mL Oral Once     Continuous Infusions:    PRN Meds:albuterol, LORazepam, ondansetron, oxyCODONE, promethazine (PHENERGAN) IVPB, simethicone    Review of patient's allergies indicates:   Allergen Reactions    Doxycycline Nausea And Vomiting    Corticosteroids (glucocorticoids) Other (See Comments)     Causes pt to have migraines for 2 weeks    Pcn [penicillins] Rash    Sulfa (sulfonamide antibiotics) Rash       Objective:     Vital Signs (Most Recent):  Temp: 98 °F (36.7 °C) (11/16/19 0513)  Pulse: 104 (11/16/19 0513)  Resp: 20 (11/16/19 0513)  BP: (!) 149/72 (11/16/19 0513)  SpO2: 98 % (11/16/19 0513) Vital Signs (24h Range):  Temp:  [97.9 °F (36.6 °C)-98.8 °F (37.1 °C)] 98 °F (36.7 °C)  Pulse:  [] 104  Resp:  [16-20] 20  SpO2:  [96 %-98 %] 98 %  BP: (139-149)/(65-87) 149/72     Weight: 62.6 kg (138 lb)  Body mass index is 23.69 kg/m².    Intake/Output - Last 3 Shifts       11/14 0700 - 11/15 0659 11/15 0700 - 11/16 0659 11/16 0700 - 11/17 0659    P.O. 240 200     IV Piggyback  150     Total Intake(mL/kg) 240 (3.8) 350 (5.6)     Urine (mL/kg/hr) 2300 (1.5) 2500 (1.7)     Stool 0      Total Output 2300 2500     Net -2060 -2150            Stool Occurrence 0 x               Physical Exam:   Constitutional: She appears well-developed and well-nourished. No distress.     HENT:   Head: Normocephalic and atraumatic.      Cardiovascular: Normal rate and regular rhythm.   Pulse Score: 2+   Pulmonary/Chest: Effort normal and breath sounds normal. No respiratory distress. She has no wheezes.        Abdominal: Soft. Bowel sounds are normal. She exhibits distension (mildly distended) and abdominal incision (large vertical midline, steri strips in place, small amt dried old blood under strips). There is tenderness (appropriate for post op). There is no guarding.                 Neurological: She is alert.    Skin: Skin is warm and dry. She is not diaphoretic.    Psychiatric: She has a normal mood and affect.       Lines/Drains/Airways     Peripheral Intravenous Line                 Peripheral IV - Single Lumen 11/12/19 0842 18 G Left Forearm 3 days                Laboratory:  Recent Lab Results     None          Diagnostic Results:  Labs: Reviewed

## 2019-11-17 PROBLEM — R14.0 ABDOMINAL DISTENSION: Status: RESOLVED | Noted: 2019-11-15 | Resolved: 2019-11-17

## 2019-11-17 PROCEDURE — 25000003 PHARM REV CODE 250: Performed by: STUDENT IN AN ORGANIZED HEALTH CARE EDUCATION/TRAINING PROGRAM

## 2019-11-17 PROCEDURE — 63600175 PHARM REV CODE 636 W HCPCS: Performed by: STUDENT IN AN ORGANIZED HEALTH CARE EDUCATION/TRAINING PROGRAM

## 2019-11-17 PROCEDURE — 20600001 HC STEP DOWN PRIVATE ROOM

## 2019-11-17 PROCEDURE — 99024 PR POST-OP FOLLOW-UP VISIT: ICD-10-PCS | Mod: POP,,, | Performed by: OBSTETRICS & GYNECOLOGY

## 2019-11-17 PROCEDURE — 99024 POSTOP FOLLOW-UP VISIT: CPT | Mod: POP,,, | Performed by: OBSTETRICS & GYNECOLOGY

## 2019-11-17 RX ORDER — POLYETHYLENE GLYCOL 3350 17 G/17G
17 POWDER, FOR SOLUTION ORAL DAILY
Status: DISCONTINUED | OUTPATIENT
Start: 2019-11-17 | End: 2019-11-18 | Stop reason: HOSPADM

## 2019-11-17 RX ADMIN — SENNOSIDES AND DOCUSATE SODIUM 1 TABLET: 8.6; 5 TABLET ORAL at 09:11

## 2019-11-17 RX ADMIN — FAMOTIDINE 20 MG: 20 TABLET ORAL at 09:11

## 2019-11-17 RX ADMIN — METOCLOPRAMIDE HYDROCHLORIDE 10 MG: 10 INJECTION, SOLUTION INTRAMUSCULAR; INTRAVENOUS at 12:11

## 2019-11-17 RX ADMIN — ACETAMINOPHEN 1000 MG: 500 TABLET ORAL at 11:11

## 2019-11-17 RX ADMIN — SODIUM CHLORIDE: 0.9 INJECTION, SOLUTION INTRAVENOUS at 11:11

## 2019-11-17 RX ADMIN — PROMETHAZINE HYDROCHLORIDE 6.25 MG: 25 INJECTION INTRAMUSCULAR; INTRAVENOUS at 07:11

## 2019-11-17 RX ADMIN — ROSUVASTATIN CALCIUM 5 MG: 5 TABLET, COATED ORAL at 09:11

## 2019-11-17 RX ADMIN — IBUPROFEN 600 MG: 600 TABLET, FILM COATED ORAL at 09:11

## 2019-11-17 RX ADMIN — ONDANSETRON 8 MG: 2 INJECTION INTRAMUSCULAR; INTRAVENOUS at 09:11

## 2019-11-17 RX ADMIN — ONDANSETRON 8 MG: 2 INJECTION INTRAMUSCULAR; INTRAVENOUS at 11:11

## 2019-11-17 RX ADMIN — FLUOXETINE 10 MG: 10 CAPSULE ORAL at 09:11

## 2019-11-17 RX ADMIN — ENOXAPARIN SODIUM 40 MG: 100 INJECTION SUBCUTANEOUS at 05:11

## 2019-11-17 RX ADMIN — ACETAMINOPHEN 1000 MG: 500 TABLET ORAL at 05:11

## 2019-11-17 RX ADMIN — POLYETHYLENE GLYCOL 3350 17 G: 17 POWDER, FOR SOLUTION ORAL at 09:11

## 2019-11-17 NOTE — PLAN OF CARE
POC reviewed with pt, verbalized understanding. AAOx4. BP elevated d/t pain, pt refused pain meds d/t nausea. Throughout day pt states nausea is lessening and becoming more manageable. Nausea managed with PRN meds. Belching is lessening. Passing some gas. Pt up walking halls 3x, ambulating in room and up in chair.     Tolerating full liq, ambulating to toilet with adequate UOP. At end of shift complaining of abd pain, meds given. SCDs on while in bed. Call light within reach, WCTM.

## 2019-11-17 NOTE — PLAN OF CARE
POC reviewed with pt. Pt verbalized understanding. Pt AAOX'4. VSS on RA. Pt ambulates to bathroom independently with adequate urinary output. Pt denies passing flatus. Pt is not tolerating regular diet. Pt complains of persistent nausea with frequent belching. Treated with PRN Zofran, Phenergan, and simethicone. No acute events.Bed in lowest position with call light within reach. TM

## 2019-11-17 NOTE — NURSING
Pt up in chair for most of day. Ambulated halls x3. States feeling much better. Increased bilateral leg edema, legs elevated, SCDs placed.

## 2019-11-17 NOTE — PROGRESS NOTES
[facility]  Gynecologic Oncology  Progress Note      Patient Name: Margarita Dunne  MRN: [id number]  Admission Date: 11/12/2019  Hospital Length of Stay: 5 days  Attending Provider: Irvin Vinson MD  Primary Care Provider: Columba Anderson MD  Principal Problem: Malignant neoplasm of ovary    Follow-up For: Procedure(s) (LRB):  LAPAROTOMY, EXPLORATORY (N/A)  HYSTERECTOMY, TOTAL, ABDOMINAL (N/A)  SALPINGO-OOPHORECTOMY (Bilateral)  OMENTECTOMY (N/A)  DEBULKING, NEOPLASM (N/A)  MOBILIZATION, SPLENIC FLEXURE  APPENDECTOMY  EXCISION, TISSUE, PERITONEUM  Post-Operative Day: 5 Days Post-Op  Subjective:      History of Present Illness:  Patient with known ovarian cancer presents for scheduled PDS    Hospital Course:  11/13/2019 - POD 1 s/p REZA/BSO/omentectomy/debulking. Patient doing well this AM. Has not had regular diet, tolerating water well. Has not ambulated. Love in place. Required prn oxy IR 5mg x2 overnight. UOP 0.3 mg/kg/hr overnight, sCr 0.8 this AM. No emesis, mild nausea. Otherwise doing well.   11/14/2019 - Patient doing well. Voiding more frequently overnight, tolerating regular diet, no N/V, ambulating to bathroom without assistance. Patient has not passed flatus and is having occasional belching. No other complaints.   11/15/2019 - POD 3. Patient with regular belching, not passing gas, mildly distended abdomen, and nausea without emesis. Tolerating liquids and small amount of regular diet. Ambulating and voiding frequently. Pain controlled. No other complaints.   11/16/2019 - POD 4. Stable. Patient with regular belching, not passing gas, mildly distended abdomen, and nausea without emesis. Tolerating liquids and small amount of regular diet. Ambulating and voiding frequently. Pain controlled. No other complaints  11/17/2019 - Emesis x1 yesterday and immediately felt better. Began passing flatus yesterday. Reports hunger and thirst. Denies n/v overnight and this AM. No other  complaints.    Interval History: Emesis x1 yesterday and immediately felt better. Began passing flatus yesterday. Reports hunger and thirst. Denies n/v overnight and this AM. No other complaints.    Scheduled Meds:   acetaminophen  1,000 mg Oral Q6H    enoxaparin  40 mg Subcutaneous Daily    famotidine  20 mg Oral BID    FLUoxetine  10 mg Oral Daily    ibuprofen  600 mg Oral Q6H    magnesium hydroxide 400 mg/5 ml  30 mL Oral Daily    rosuvastatin  5 mg Oral QHS    senna-docusate 8.6-50 mg  1 tablet Oral BID    sodium citrate-citric acid 500-334 mg/5 ml  30 mL Oral Once     Continuous Infusions:   sodium chloride 0.9% 75 mL/hr at 11/16/19 2135     PRN Meds:albuterol, LORazepam, metoclopramide HCl, ondansetron, oxyCODONE, promethazine (PHENERGAN) IVPB, simethicone    Review of patient's allergies indicates:   Allergen Reactions    Doxycycline Nausea And Vomiting    Corticosteroids (glucocorticoids) Other (See Comments)     Causes pt to have migraines for 2 weeks    Pcn [penicillins] Rash    Sulfa (sulfonamide antibiotics) Rash       Objective:     Vital Signs (Most Recent):  Temp: 97.9 °F (36.6 °C) (11/17/19 0741)  Pulse: 86 (11/17/19 0741)  Resp: 16 (11/17/19 0741)  BP: (!) 141/71 (11/17/19 0741)  SpO2: 95 % (11/17/19 0741) Vital Signs (24h Range):  Temp:  [97.5 °F (36.4 °C)-99 °F (37.2 °C)] 97.9 °F (36.6 °C)  Pulse:  [] 86  Resp:  [14-20] 16  SpO2:  [95 %-99 %] 95 %  BP: (136-173)/(61-80) 141/71     Weight: 62.6 kg (138 lb)  Body mass index is 23.69 kg/m².    Intake/Output - Last 3 Shifts       11/15 0700 - 11/16 0659 11/16 0700 - 11/17 0659 11/17 0700 - 11/18 0659    P.O. 200      IV Piggyback 150      Total Intake(mL/kg) 350 (5.6)      Urine (mL/kg/hr) 2500 (1.7) 1150 (0.8)     Emesis/NG output  0     Stool  0     Total Output 2500 1150     Net -2150 -1150            Urine Occurrence  2 x     Stool Occurrence  0 x     Emesis Occurrence  2 x              Physical Exam:   Constitutional: She  appears well-developed and well-nourished. No distress.    HENT:   Head: Normocephalic and atraumatic.      Cardiovascular: Normal rate and regular rhythm.   Pulse Score: 2+   Pulmonary/Chest: Effort normal and breath sounds normal. No respiratory distress. She has no wheezes.        Abdominal: Soft. Bowel sounds are normal. She exhibits abdominal incision (large vertical midline, steri strips in place, small amt dried old blood under strips). She exhibits no distension (mildly distended). There is tenderness (appropriate for post op). There is no guarding.                 Neurological: She is alert.    Skin: Skin is warm and dry. She is not diaphoretic.    Psychiatric: She has a normal mood and affect.       Lines/Drains/Airways     Peripheral Intravenous Line                 Peripheral IV - Single Lumen 11/12/19 0842 18 G Left Forearm 4 days                Laboratory:  Recent Lab Results     None          Diagnostic Results:  Labs: Reviewed    Assessment/Plan:     Abdominal distension  - ileus resolving  - restart full liquids  - d/c fluids    S/P REZA-BSO (total abdominal hysterectomy and bilateral salpingo-oophorectomy), debulking, omentecotmy, BAYLEE  - Oral pain meds  - post op labs reassuring  - Continue monitoring I/Os  - Regular diet  - Encourage ambulation  - Heparin 5000 q8  - IS at bedside      VTE Risk Mitigation (From admission, onward)         Ordered     enoxaparin injection 40 mg  Daily      11/14/19 0837     IP VTE HIGH RISK PATIENT  Once      11/12/19 1609                Was galeano catheter removed? Yes    Garry Rebolledo MD  Gynecologic Oncology  [facility]

## 2019-11-17 NOTE — SUBJECTIVE & OBJECTIVE
Interval History: Emesis x1 yesterday and immediately felt better. Began passing flatus yesterday. Reports hunger and thirst. Denies n/v overnight and this AM. No other complaints.    Scheduled Meds:   acetaminophen  1,000 mg Oral Q6H    enoxaparin  40 mg Subcutaneous Daily    famotidine  20 mg Oral BID    FLUoxetine  10 mg Oral Daily    ibuprofen  600 mg Oral Q6H    magnesium hydroxide 400 mg/5 ml  30 mL Oral Daily    rosuvastatin  5 mg Oral QHS    senna-docusate 8.6-50 mg  1 tablet Oral BID    sodium citrate-citric acid 500-334 mg/5 ml  30 mL Oral Once     Continuous Infusions:   sodium chloride 0.9% 75 mL/hr at 11/16/19 2135     PRN Meds:albuterol, LORazepam, metoclopramide HCl, ondansetron, oxyCODONE, promethazine (PHENERGAN) IVPB, simethicone    Review of patient's allergies indicates:   Allergen Reactions    Doxycycline Nausea And Vomiting    Corticosteroids (glucocorticoids) Other (See Comments)     Causes pt to have migraines for 2 weeks    Pcn [penicillins] Rash    Sulfa (sulfonamide antibiotics) Rash       Objective:     Vital Signs (Most Recent):  Temp: 97.9 °F (36.6 °C) (11/17/19 0741)  Pulse: 86 (11/17/19 0741)  Resp: 16 (11/17/19 0741)  BP: (!) 141/71 (11/17/19 0741)  SpO2: 95 % (11/17/19 0741) Vital Signs (24h Range):  Temp:  [97.5 °F (36.4 °C)-99 °F (37.2 °C)] 97.9 °F (36.6 °C)  Pulse:  [] 86  Resp:  [14-20] 16  SpO2:  [95 %-99 %] 95 %  BP: (136-173)/(61-80) 141/71     Weight: 62.6 kg (138 lb)  Body mass index is 23.69 kg/m².    Intake/Output - Last 3 Shifts       11/15 0700 - 11/16 0659 11/16 0700 - 11/17 0659 11/17 0700 - 11/18 0659    P.O. 200      IV Piggyback 150      Total Intake(mL/kg) 350 (5.6)      Urine (mL/kg/hr) 2500 (1.7) 1150 (0.8)     Emesis/NG output  0     Stool  0     Total Output 2500 1150     Net -2150 -1150            Urine Occurrence  2 x     Stool Occurrence  0 x     Emesis Occurrence  2 x              Physical Exam:   Constitutional: She appears  well-developed and well-nourished. No distress.    HENT:   Head: Normocephalic and atraumatic.      Cardiovascular: Normal rate and regular rhythm.   Pulse Score: 2+   Pulmonary/Chest: Effort normal and breath sounds normal. No respiratory distress. She has no wheezes.        Abdominal: Soft. Bowel sounds are normal. She exhibits abdominal incision (large vertical midline, steri strips in place, small amt dried old blood under strips). She exhibits no distension (mildly distended). There is tenderness (appropriate for post op). There is no guarding.                 Neurological: She is alert.    Skin: Skin is warm and dry. She is not diaphoretic.    Psychiatric: She has a normal mood and affect.       Lines/Drains/Airways     Peripheral Intravenous Line                 Peripheral IV - Single Lumen 11/12/19 0842 18 G Left Forearm 4 days                Laboratory:  Recent Lab Results     None          Diagnostic Results:  Labs: Reviewed

## 2019-11-18 VITALS
TEMPERATURE: 98 F | HEIGHT: 64 IN | SYSTOLIC BLOOD PRESSURE: 147 MMHG | WEIGHT: 138 LBS | DIASTOLIC BLOOD PRESSURE: 65 MMHG | BODY MASS INDEX: 23.56 KG/M2 | OXYGEN SATURATION: 95 % | RESPIRATION RATE: 16 BRPM | HEART RATE: 99 BPM

## 2019-11-18 PROCEDURE — 99233 PR SUBSEQUENT HOSPITAL CARE,LEVL III: ICD-10-PCS | Mod: GC,,, | Performed by: OBSTETRICS & GYNECOLOGY

## 2019-11-18 PROCEDURE — 25000003 PHARM REV CODE 250: Performed by: STUDENT IN AN ORGANIZED HEALTH CARE EDUCATION/TRAINING PROGRAM

## 2019-11-18 PROCEDURE — 99233 SBSQ HOSP IP/OBS HIGH 50: CPT | Mod: GC,,, | Performed by: OBSTETRICS & GYNECOLOGY

## 2019-11-18 PROCEDURE — 63600175 PHARM REV CODE 636 W HCPCS: Performed by: STUDENT IN AN ORGANIZED HEALTH CARE EDUCATION/TRAINING PROGRAM

## 2019-11-18 RX ORDER — ONDANSETRON 8 MG/1
8 TABLET, ORALLY DISINTEGRATING ORAL ONCE
Qty: 20 TABLET | Refills: 0 | Status: ON HOLD | OUTPATIENT
Start: 2019-11-18 | End: 2019-12-11 | Stop reason: HOSPADM

## 2019-11-18 RX ADMIN — PROMETHAZINE HYDROCHLORIDE 6.25 MG: 25 INJECTION INTRAMUSCULAR; INTRAVENOUS at 11:11

## 2019-11-18 RX ADMIN — FLUOXETINE 10 MG: 10 CAPSULE ORAL at 08:11

## 2019-11-18 RX ADMIN — METOCLOPRAMIDE HYDROCHLORIDE 10 MG: 10 INJECTION, SOLUTION INTRAMUSCULAR; INTRAVENOUS at 03:11

## 2019-11-18 RX ADMIN — FAMOTIDINE 20 MG: 20 TABLET ORAL at 08:11

## 2019-11-18 RX ADMIN — SENNOSIDES AND DOCUSATE SODIUM 1 TABLET: 8.6; 5 TABLET ORAL at 08:11

## 2019-11-18 RX ADMIN — IBUPROFEN 600 MG: 600 TABLET, FILM COATED ORAL at 11:11

## 2019-11-18 RX ADMIN — ACETAMINOPHEN 1000 MG: 500 TABLET ORAL at 11:11

## 2019-11-18 RX ADMIN — SODIUM CHLORIDE: 0.9 INJECTION, SOLUTION INTRAVENOUS at 12:11

## 2019-11-18 RX ADMIN — POLYETHYLENE GLYCOL 3350 17 G: 17 POWDER, FOR SOLUTION ORAL at 08:11

## 2019-11-18 NOTE — PLAN OF CARE
POD#6 s/p xlap/REZA/BSO/OMX/neoplasm debulking/splenic flexure mobilization/appendectomy/peritonum tissue excision secondary to malignant neoplasm of the ovary. Post-op ileus resolved. Plans for the patient to discharge home today. Lovenox to be delivered to the patient's bedside prior to discharge. No discharge needs identified. Discharge and follow-up instructions to be completed by the bedside nurse.    CM sent a message to Dr. Vinson's clinic and requested a post-op appointment.     11/18/19 1324   Final Note   Assessment Type Final Discharge Note   Anticipated Discharge Disposition Home   What phone number can be called within the next 1-3 days to see how you are doing after discharge?   ([phone])   Hospital Follow Up  Appt(s) scheduled? Yes   Discharge plans and expectations educations in teach back method with documentation complete? Yes  (per the bedside nurse)      Problem:  RIGHT SHOULDER    Is Patient currently treated by: UNKNOWN    Referred by:  ALEJO TY ER:  [facility] Date:  12/26/17    Date of Injury:  11/15/2017 Type: UNKNOWN    Work Related: Yes    Previously seen for this problem before? Yes  · Where:  [facility]  · Previous Records:  YES    Previous X-Rays:  [facility]    Has patient been made aware to bring films?  No    Contact Phone Number:  [phone]

## 2019-11-18 NOTE — DISCHARGE SUMMARY
[facility]  Gynecologic Oncology  Discharge Summary    Patient Name: Margarita Dunne  MRN: [id number]  Admission Date: 11/12/2019  Hospital Length of Stay: 6 days  Discharge Date and Time:  11/18/2019 1:13 PM  Attending Physician: Irvin Vinson MD   Discharging Provider: Juany Hoffman MD  Primary Care Provider: Columba Anderson MD    HPI:   Patient with known ovarian cancer presents for scheduled PDS    Hospital Course:  11/13/2019 - POD 1 s/p REZA/BSO/omentectomy/debulking. Patient doing well this AM. Has not had regular diet, tolerating water well. Has not ambulated. Love in place. Required prn oxy IR 5mg x2 overnight. UOP 0.3 mg/kg/hr overnight, sCr 0.8 this AM. No emesis, mild nausea. Otherwise doing well.   11/14/2019 - Patient doing well. Voiding more frequently overnight, tolerating regular diet, no N/V, ambulating to bathroom without assistance. Patient has not passed flatus and is having occasional belching. No other complaints.   11/15/2019 - POD 3. Patient with regular belching, not passing gas, mildly distended abdomen, and nausea without emesis. Tolerating liquids and small amount of regular diet. Ambulating and voiding frequently. Pain controlled. No other complaints.   11/16/2019 - POD 4. Stable. Patient with regular belching, not passing gas, mildly distended abdomen, and nausea without emesis. Tolerating liquids and small amount of regular diet. Ambulating and voiding frequently. Pain controlled. No other complaints  11/17/2019 - Emesis x1 yesterday and immediately felt better. Began passing flatus yesterday. Reports hunger and thirst. Denies n/v overnight and this AM. No other complaints.  11/18/2019 - No emesis yesterday. Reports intermittent nausea yesterday, controlled with antiemetics. Mild nausea this morning. Tolerating liquids. Passing flatus, belching decreasing. Tolerated regular diet. Stable for d/c    Procedure(s) (LRB):  LAPAROTOMY, EXPLORATORY (N/A)  HYSTERECTOMY,  TOTAL, ABDOMINAL (N/A)  SALPINGO-OOPHORECTOMY (Bilateral)  OMENTECTOMY (N/A)  DEBULKING, NEOPLASM (N/A)  MOBILIZATION, SPLENIC FLEXURE  APPENDECTOMY  EXCISION, TISSUE, PERITONEUM     Consults (From admission, onward)        Status Ordering Provider     Inpatient consult to Hematology/Oncology Psychology  Once     Provider:  (Not yet assigned)    Completed EBENEZER BARILLAS     Inpatient consult to Oncology Social Work  Once     Provider:  (Not yet assigned)    Acknowledged EBENEZER BARILLAS     Inpatient consult to Registered Dietitian/Nutritionist  Once     Provider:  (Not yet assigned)    Completed XENIA LINDA            Pending Diagnostic Studies:     Procedure Component Value Units Date/Time    Specimen to Pathology, Surgery Gynecology and Obstetrics [[id number]] Collected:  11/12/19 1523    Order Status:  Sent Lab Status:  In process Updated:  11/13/19 1211        Final Active Diagnoses:    Diagnosis Date Noted POA    PRINCIPAL PROBLEM:  Malignant neoplasm of ovary [C56.9] 11/12/2019 Yes    Moderate malnutrition [E44.0] 11/15/2019 Yes    Anxiety [F41.9] 11/14/2019 Yes    Depression [F32.9] 11/14/2019 Yes    S/P REZA-BSO (total abdominal hysterectomy and bilateral salpingo-oophorectomy), debulking, omentecotmy, BAYLEE [Z90.710, Z90.722, Z90.79] 11/12/2019 Not Applicable      Problems Resolved During this Admission:    Diagnosis Date Noted Date Resolved POA    Abdominal distension [R14.0] 11/15/2019 11/17/2019 No        Does this patient meet criteria for extended DVT prophylaxis? Yes, patient was prescribed Lovenox      Discharged Condition: good    Disposition: Home or Self Care    Follow Up:  Follow-up Information     Irvin Vinson MD In 3 weeks.    Specialty:  Gynecologic Oncology  Why:  Follow-Up Appointment as scheduled by the clinic  Contact information:  [address] [id number] [phone]                 Patient Instructions:      Ambulatory Referral to  Hematology/Oncology/Psychology   Referral Priority: Routine Referral Type: Consultation   Referral Reason: Specialty Services Required   Requested Specialty: Psychology   Number of Visits Requested: 1     Other restrictions (specify):   Order Comments: Nothing in vagina for 9 weeks (no intercourse, douching, tampons, etc.)     Notify your health care provider if you experience any of the following:   Order Comments: Soaking through 1 pad/hour for greater than 2 hours     Notify your health care provider if you experience any of the following:  increased confusion or weakness     Notify your health care provider if you experience any of the following:  persistent dizziness, light-headedness, or visual disturbances     Notify your health care provider if you experience any of the following:  severe persistent headache     Notify your health care provider if you experience any of the following:  difficulty breathing or increased cough     Notify your health care provider if you experience any of the following:  severe uncontrolled pain     Notify your health care provider if you experience any of the following:  persistent nausea and vomiting or diarrhea     Notify your health care provider if you experience any of the following:  temperature >100.4     Activity as tolerated     Medications:  Reconciled Home Medications:      Medication List      START taking these medications    enoxaparin 40 mg/0.4 mL Syrg  Commonly known as:  LOVENOX  Inject 0.4 mLs (40 mg total) into the skin once daily.     ibuprofen 600 MG tablet  Commonly known as:  ADVIL,MOTRIN  Take 1 tablet (600 mg total) by mouth every 6 (six) hours.     ondansetron 8 MG Tbdl  Commonly known as:  ZOFRAN-ODT  Take 1 tablet (8 mg total) by mouth once. for 1 dose     oxyCODONE 5 MG immediate release tablet  Commonly known as:  ROXICODONE  Take 1 tablet (5 mg total) by mouth every 4 (four) hours as needed.        CHANGE how you take these medications    *  ondansetron 4 MG tablet  Commonly known as:  ZOFRAN  Take 1 tablet (4 mg total) by mouth every 6 (six) hours as needed for Nausea.  What changed:  Another medication with the same name was added. Make sure you understand how and when to take each.     * ondansetron 4 MG tablet  Commonly known as:  ZOFRAN  Take 1 tablet (4 mg total) by mouth every 8 (eight) hours as needed for Nausea.  What changed:  You were already taking a medication with the same name, and this prescription was added. Make sure you understand how and when to take each.         * This list has 2 medication(s) that are the same as other medications prescribed for you. Read the directions carefully, and ask your doctor or other care provider to review them with you.            CONTINUE taking these medications    acetaminophen 325 MG tablet  Commonly known as:  TYLENOL  Take 325 mg by mouth every 6 (six) hours as needed for Pain.     albuterol 90 mcg/actuation inhaler  Commonly known as:  PROVENTIL/VENTOLIN HFA  Inhale 2 puffs into the lungs every 6 (six) hours as needed for Wheezing. Rescue     CITRUCEL ORAL  Take by mouth 2 (two) times daily.     * FLUoxetine 10 MG Tab  Take 1 tablet (10 mg total) by mouth once daily.     * FLUoxetine 10 MG Tab  Take 1 tablet (10 mg total) by mouth once daily.     * FLUoxetine 10 MG Tab  TAKE 1 TABLET BY MOUTH ONCE DAILY     LORazepam 0.5 MG tablet  Commonly known as:  Ativan  Take 1 tablet (0.5 mg total) by mouth every 8 (eight) hours as needed for Anxiety.     metroNIDAZOLE 250 MG tablet  Commonly known as:  FLAGYL  Take 1 tablet (250 mg total) by mouth 3 (three) times daily.     rizatriptan 10 MG tablet  Commonly known as:  MAXALT  TAKE ONE TABLET BY MOUTH AT ONSET OF MIGRAINE, MAY REPEAT EVERY 2 HOURS. DO NOT EXCEED 3 TABLETS IN 24 HOURS.     rosuvastatin 5 MG tablet  Commonly known as:  CRESTOR  Take 1 tablet (5 mg total) by mouth every evening.     Symbicort 80-4.5 mcg/actuation Hfaa  Generic drug:   budesonide-formoterol 80-4.5 mcg  INHALE 2 PUFFS INTO LUNGS TWICE DAILY (CONTROLLER)     valACYclovir 500 MG tablet  Commonly known as:  VALTREX  TAKE 1 CAPLET BY MOUTH ONCE DAILY     Voltaren 1 % Gel  Generic drug:  diclofenac sodium  APPLY 4 GRAMS TOPICALLY 4 TIMES DAILY AS DIRECTED         * This list has 3 medication(s) that are the same as other medications prescribed for you. Read the directions carefully, and ask your doctor or other care provider to review them with you.                Juany Hoffman MD  Gynecologic Oncology  [facility]

## 2019-11-18 NOTE — SUBJECTIVE & OBJECTIVE
Interval History: No emesis yesterday. Reports intermittent nausea yesterday, controlled with antiemetics. Mild nausea this morning. Tolerating liquids. Passing flatus, belching decreasing.    Scheduled Meds:   acetaminophen  1,000 mg Oral Q6H    enoxaparin  40 mg Subcutaneous Daily    famotidine  20 mg Oral BID    FLUoxetine  10 mg Oral Daily    ibuprofen  600 mg Oral Q6H    magnesium hydroxide 400 mg/5 ml  30 mL Oral Daily    polyethylene glycol  17 g Oral Daily    rosuvastatin  5 mg Oral QHS    senna-docusate 8.6-50 mg  1 tablet Oral BID    sodium citrate-citric acid 500-334 mg/5 ml  30 mL Oral Once     Continuous Infusions:   sodium chloride 0.9% 75 mL/hr at 11/17/19 2356     PRN Meds:albuterol, LORazepam, metoclopramide HCl, ondansetron, oxyCODONE, promethazine (PHENERGAN) IVPB, simethicone    Review of patient's allergies indicates:   Allergen Reactions    Doxycycline Nausea And Vomiting    Corticosteroids (glucocorticoids) Other (See Comments)     Causes pt to have migraines for 2 weeks    Pcn [penicillins] Rash    Sulfa (sulfonamide antibiotics) Rash       Objective:     Vital Signs (Most Recent):  Temp: 97.8 °F (36.6 °C) (11/18/19 0428)  Pulse: 85 (11/18/19 0428)  Resp: 18 (11/18/19 0428)  BP: (!) 151/72 (11/18/19 0428)  SpO2: 96 % (11/18/19 0428) Vital Signs (24h Range):  Temp:  [96.8 °F (36 °C)-97.9 °F (36.6 °C)] 97.8 °F (36.6 °C)  Pulse:  [82-97] 85  Resp:  [16-18] 18  SpO2:  [95 %-98 %] 96 %  BP: (140-176)/(63-77) 151/72     Weight: 62.6 kg (138 lb)  Body mass index is 23.69 kg/m².    Intake/Output - Last 3 Shifts       11/16 0700 - 11/17 0659 11/17 0700 - 11/18 0659    I.V. (mL/kg)  3151.3 (50.3)    IV Piggyback  150    Total Intake(mL/kg)  3301.3 (52.7)    Urine (mL/kg/hr) 1150 (0.8) 1000 (0.7)    Emesis/NG output 0     Stool 0 0    Total Output 1150 1000    Net -1150 +2301.3          Urine Occurrence 2 x 550 x    Stool Occurrence 0 x 0 x    Emesis Occurrence 2 x              Physical  Exam:   Constitutional: She appears well-developed and well-nourished. No distress.    HENT:   Head: Normocephalic and atraumatic.      Cardiovascular: Normal rate and regular rhythm.   Pulse Score: 2+   Pulmonary/Chest: Effort normal and breath sounds normal. No respiratory distress. She has no wheezes.        Abdominal: Soft. Bowel sounds are normal. She exhibits abdominal incision (large vertical midline, steri strips in place, small amt dried old blood under strips). She exhibits no distension (mildly distended). There is tenderness (appropriate for post op). There is no guarding.                 Neurological: She is alert.    Skin: Skin is warm and dry. She is not diaphoretic.    Psychiatric: She has a normal mood and affect.       Lines/Drains/Airways     Peripheral Intravenous Line                 Peripheral IV - Single Lumen 11/12/19 0842 18 G Left Forearm 5 days                Laboratory:  Recent Lab Results     None

## 2019-11-18 NOTE — PLAN OF CARE
Future Appointments   Date Time Provider Department Center   12/11/2019  9:30 AM Irvin Vinson MD [facility] GYN ONC [facility]     Zora Bullock, RN, BSN, CM  [facility]  Nurse - Med Onc/Gyn Onc

## 2019-11-18 NOTE — PROGRESS NOTES
[facility]  Gynecologic Oncology  Progress Note      Patient Name: Margarita Dunne  MRN: [id number]  Admission Date: 11/12/2019  Hospital Length of Stay: 6 days  Attending Provider: Irvin Vinson MD  Primary Care Provider: Columba Anderson MD  Principal Problem: Malignant neoplasm of ovary    Follow-up For: Procedure(s) (LRB):  LAPAROTOMY, EXPLORATORY (N/A)  HYSTERECTOMY, TOTAL, ABDOMINAL (N/A)  SALPINGO-OOPHORECTOMY (Bilateral)  OMENTECTOMY (N/A)  DEBULKING, NEOPLASM (N/A)  MOBILIZATION, SPLENIC FLEXURE  APPENDECTOMY  EXCISION, TISSUE, PERITONEUM  Post-Operative Day: 6 Days Post-Op  Subjective:      History of Present Illness:  Patient with known ovarian cancer presents for scheduled PDS    Hospital Course:  11/13/2019 - POD 1 s/p REZA/BSO/omentectomy/debulking. Patient doing well this AM. Has not had regular diet, tolerating water well. Has not ambulated. Love in place. Required prn oxy IR 5mg x2 overnight. UOP 0.3 mg/kg/hr overnight, sCr 0.8 this AM. No emesis, mild nausea. Otherwise doing well.   11/14/2019 - Patient doing well. Voiding more frequently overnight, tolerating regular diet, no N/V, ambulating to bathroom without assistance. Patient has not passed flatus and is having occasional belching. No other complaints.   11/15/2019 - POD 3. Patient with regular belching, not passing gas, mildly distended abdomen, and nausea without emesis. Tolerating liquids and small amount of regular diet. Ambulating and voiding frequently. Pain controlled. No other complaints.   11/16/2019 - POD 4. Stable. Patient with regular belching, not passing gas, mildly distended abdomen, and nausea without emesis. Tolerating liquids and small amount of regular diet. Ambulating and voiding frequently. Pain controlled. No other complaints  11/17/2019 - Emesis x1 yesterday and immediately felt better. Began passing flatus yesterday. Reports hunger and thirst. Denies n/v overnight and this AM. No other  complaints.  11/18/2019 - No emesis yesterday. Reports intermittent nausea yesterday, controlled with antiemetics. Mild nausea this morning. Tolerating liquids. Passing flatus, belching decreasing.    Interval History: No emesis yesterday. Reports intermittent nausea yesterday, controlled with antiemetics. Mild nausea this morning. Tolerating liquids. Passing flatus, belching decreasing.    Scheduled Meds:   acetaminophen  1,000 mg Oral Q6H    enoxaparin  40 mg Subcutaneous Daily    famotidine  20 mg Oral BID    FLUoxetine  10 mg Oral Daily    ibuprofen  600 mg Oral Q6H    magnesium hydroxide 400 mg/5 ml  30 mL Oral Daily    polyethylene glycol  17 g Oral Daily    rosuvastatin  5 mg Oral QHS    senna-docusate 8.6-50 mg  1 tablet Oral BID    sodium citrate-citric acid 500-334 mg/5 ml  30 mL Oral Once     Continuous Infusions:   sodium chloride 0.9% 75 mL/hr at 11/17/19 2356     PRN Meds:albuterol, LORazepam, metoclopramide HCl, ondansetron, oxyCODONE, promethazine (PHENERGAN) IVPB, simethicone    Review of patient's allergies indicates:   Allergen Reactions    Doxycycline Nausea And Vomiting    Corticosteroids (glucocorticoids) Other (See Comments)     Causes pt to have migraines for 2 weeks    Pcn [penicillins] Rash    Sulfa (sulfonamide antibiotics) Rash       Objective:     Vital Signs (Most Recent):  Temp: 97.8 °F (36.6 °C) (11/18/19 0428)  Pulse: 85 (11/18/19 0428)  Resp: 18 (11/18/19 0428)  BP: (!) 151/72 (11/18/19 0428)  SpO2: 96 % (11/18/19 0428) Vital Signs (24h Range):  Temp:  [96.8 °F (36 °C)-97.9 °F (36.6 °C)] 97.8 °F (36.6 °C)  Pulse:  [82-97] 85  Resp:  [16-18] 18  SpO2:  [95 %-98 %] 96 %  BP: (140-176)/(63-77) 151/72     Weight: 62.6 kg (138 lb)  Body mass index is 23.69 kg/m².    Intake/Output - Last 3 Shifts       11/16 0700 - 11/17 0659 11/17 0700 - 11/18 0659    I.V. (mL/kg)  3151.3 (50.3)    IV Piggyback  150    Total Intake(mL/kg)  3301.3 (52.7)    Urine (mL/kg/hr) 1150 (0.8) 1000  (0.7)    Emesis/NG output 0     Stool 0 0    Total Output 1150 1000    Net -1150 +2301.3          Urine Occurrence 2 x 550 x    Stool Occurrence 0 x 0 x    Emesis Occurrence 2 x              Physical Exam:   Constitutional: She appears well-developed and well-nourished. No distress.    HENT:   Head: Normocephalic and atraumatic.      Cardiovascular: Normal rate and regular rhythm.   Pulse Score: 2+   Pulmonary/Chest: Effort normal and breath sounds normal. No respiratory distress. She has no wheezes.        Abdominal: Soft. Bowel sounds are normal. She exhibits abdominal incision (large vertical midline, steri strips in place, small amt dried old blood under strips). She exhibits no distension (mildly distended). There is tenderness (appropriate for post op). There is no guarding.                 Neurological: She is alert.    Skin: Skin is warm and dry. She is not diaphoretic.    Psychiatric: She has a normal mood and affect.       Lines/Drains/Airways     Peripheral Intravenous Line                 Peripheral IV - Single Lumen 11/12/19 0842 18 G Left Forearm 5 days                Laboratory:  Recent Lab Results     None            Assessment/Plan:     Abdominal distension  - ileus resolving  - tolerating liquids  - no emesis overnight    S/P REZA-BSO (total abdominal hysterectomy and bilateral salpingo-oophorectomy), debulking, omentecotmy, BAYLEE  - Oral pain meds  - post op labs reassuring  - Continue monitoring I/Os  - Encourage ambulation  - Heparin 5000 q8  - IS at bedside      VTE Risk Mitigation (From admission, onward)         Ordered     enoxaparin injection 40 mg  Daily      11/14/19 0837     IP VTE HIGH RISK PATIENT  Once      11/12/19 1609                Juany Hoffman MD  Gynecologic Oncology  [facility]

## 2019-11-18 NOTE — PHYSICIAN QUERY
PT Name: Margarita Dunne  MR #: [id number]    Physician Query Form - Nutrition Clarification     CDS/: CLIFFORD Ingram,RNC-MNN       Contact information:[email or web address]     This form is a permanent document in the medical record.     Query Date: 2019    By submitting this query, we are merely seeking further clarification of documentation.. Please utilize your independent clinical judgment when addressing the question(s) below.    The Medical record contains the following:   Indicators  Supporting Clinical Findings Location in Medical Record   X % of Estimated Energy Intake over a time frame from p.o., TF, or TPN % Intake of Estimated Energy Needs: 25 - 50 %  % Meal Intake: 25 - 50 %    Energy Intake: <75% of estimated energy requirement for 6 months per pt  Nutrition Consult note 11/15@217pm   X Weight Status over a time frame Weight Loss: unintentional  Usual Body Weight (UBW), k.18 kg  % Usual Body Weight: 80.23  % Weight Change From Usual Weight: -19.93 % Nutrition Consult note 11/15@217pm   X Subcutaneous Fat and/or Muscle Loss Upper Arm Region (Subcutaneous Fat Loss): moderate depletion   [place]abian[place] Region (Muscle Loss): mild depletion  Clavicle Bone Region (Muscle Loss): moderate depletion  Clavicle and Acromion Bone Region (Muscle Loss): moderate depletion Nutrition Consult note 11/15@217pm    Fluid Accumulation or Edema      Reduced  Strength     X Wt / BMI / Usual Body Weight Weight Method: Bed Scale  Weight: 62.6 kg (138 lb)  Weight (lb): 138 lb  Ideal Body Weight (IBW), Female: 120 lb  % Ideal Body Weight, Female (lb): 115 lb  BMI (Calculated): 23.7  BMI Grade: 18.5-24.9 - normal Nutrition Consult note 11/15@217pm    Delayed Wound Healing / Failure to Thrive     X Acute or Chronic Illness Diagnosis: cancer diagnosis/related complications(Malignant neoplasm of ovary)    POD3 REZA-BSO, debulking, omentecotmy, BAYLEE, now with concern for ileus Nutrition Consult note  11/15@217pm    Medication     X Treatment 1. Continue diet as tolerated.   - Encourage po intake.      2. Add Boost Plus (chocolate) with meals to aid in caloric intake.      3. RD following Nutrition Consult note 11/15@217pm   X Other Pt meets moderate malnutrition criteria Nutrition Consult note 11/15@217pm     AND / ASPEN Clinical Characteristics (October 2011)  A minimum of two characteristics is recommended for diagnosing either moderate or severe malnutrition   Mild Malnutrition Moderate Malnutrition Severe Malnutrition   Energy Intake from p.o., TF or TPN. < 75% intake of estimated energy needs for less than 7 days < 75% intake of estimated energy needs for greater than 7 days < 50% intake of estimated energy needs for > 5 days   Weight Loss 1-2% in 1 month  5% in 3 months  7.5% in 6 months  10% in 1 year 1-2 % in 1 week  5% in 1 month  7.5% in 3 months  10% in 6 months  20% in 1 year > 2% in 1 week  > 5% in 1 month  > 7.5% in 3 months  > 10% in 6 months  > 20% in 1 year   Physical Findings     None *Mild subcutaneous fat and/or muscle loss  *Mild fluid accumulation  *Stage II decubitus  *Surgical wound or non-healing wound *Mod/severe subcutaneous fat and/or muscle loss  *Mod/severe fluid accumulation  *Stage III or IV decubitus  *Non-healing surgical wound     Provider, please specify diagnosis or diagnoses associated with above clinical findings.    [ X ] Moderate Protein-Calorie Malnutrition   [  ] Other Nutritional Diagnosis (please specify):    [  ] Other:    [  ] Clinically Undetermined       Please document in your progress notes daily for the duration of treatment until resolved and include in your discharge summary.

## 2019-11-18 NOTE — PLAN OF CARE
POC reviewed with PT, stated understanding, AOX4, VSS, PT ABD MID IX, WDL, steri strips intact. PIV infusing per order. Tolerated full liq diet this shift, PRN antiemetics given around the clock to decrease/prevent N/V to allow for pt to take sched meds, PT still C/O nausea and refuses some meds, specifically tylenol and ibuprofen.  AMB IND to BR to void, adequate amounts. No adverse events this shift, bed low, call light in reach ,will cont to manage POC.

## 2019-11-18 NOTE — ASSESSMENT & PLAN NOTE
- Oral pain meds  - post op labs reassuring  - Continue monitoring I/Os  - Encourage ambulation  - Heparin 5000 q8  - IS at bedside

## 2019-11-18 NOTE — ASSESSMENT & PLAN NOTE
- Oral pain meds  - post op labs reassuring  - meeting all post op milestones, stable for d/c  - will d/c with lovenox

## 2019-11-19 ENCOUNTER — PATIENT MESSAGE (OUTPATIENT)
Dept: GYNECOLOGIC ONCOLOGY | Facility: CLINIC | Age: 74
End: 2019-11-19

## 2019-11-20 ENCOUNTER — PATIENT MESSAGE (OUTPATIENT)
Dept: GYNECOLOGIC ONCOLOGY | Facility: CLINIC | Age: 74
End: 2019-11-20

## 2019-11-25 ENCOUNTER — PATIENT MESSAGE (OUTPATIENT)
Dept: GYNECOLOGIC ONCOLOGY | Facility: CLINIC | Age: 74
End: 2019-11-25

## 2019-11-25 ENCOUNTER — HOSPITAL ENCOUNTER (OUTPATIENT)
Dept: RADIOLOGY | Facility: OTHER | Age: 74
Discharge: HOME | End: 2019-11-25
Attending: OBSTETRICS & GYNECOLOGY
Payer: MEDICARE

## 2019-11-25 ENCOUNTER — OFFICE VISIT (OUTPATIENT)
Dept: GYNECOLOGIC ONCOLOGY | Facility: CLINIC | Age: 74
End: 2019-11-25
Payer: MEDICARE

## 2019-11-25 VITALS
DIASTOLIC BLOOD PRESSURE: 60 MMHG | BODY MASS INDEX: 25.23 KG/M2 | SYSTOLIC BLOOD PRESSURE: 125 MMHG | WEIGHT: 147 LBS | HEART RATE: 87 BPM

## 2019-11-25 DIAGNOSIS — R60.0 LOCALIZED EDEMA: ICD-10-CM

## 2019-11-25 DIAGNOSIS — E44.0 MODERATE MALNUTRITION: ICD-10-CM

## 2019-11-25 DIAGNOSIS — C56.9 MALIGNANT NEOPLASM OF OVARY, UNSPECIFIED LATERALITY: Primary | ICD-10-CM

## 2019-11-25 DIAGNOSIS — F41.9 ANXIETY: ICD-10-CM

## 2019-11-25 DIAGNOSIS — C56.9 MALIGNANT NEOPLASM OF OVARY, UNSPECIFIED LATERALITY: ICD-10-CM

## 2019-11-25 PROBLEM — Z90.79 S/P TAH-BSO (TOTAL ABDOMINAL HYSTERECTOMY AND BILATERAL SALPINGO-OOPHORECTOMY): Status: RESOLVED | Noted: 2019-11-12 | Resolved: 2019-11-25

## 2019-11-25 PROBLEM — R10.30 LOWER ABDOMINAL PAIN, UNSPECIFIED: Status: RESOLVED | Noted: 2019-08-08 | Resolved: 2019-11-25

## 2019-11-25 PROBLEM — Z90.722 S/P TAH-BSO (TOTAL ABDOMINAL HYSTERECTOMY AND BILATERAL SALPINGO-OOPHORECTOMY): Status: RESOLVED | Noted: 2019-11-12 | Resolved: 2019-11-25

## 2019-11-25 PROBLEM — C54.1 MALIGNANT NEOPLASM OF ENDOMETRIUM: Status: RESOLVED | Noted: 2019-10-30 | Resolved: 2019-11-25

## 2019-11-25 PROBLEM — Z90.710 S/P TAH-BSO (TOTAL ABDOMINAL HYSTERECTOMY AND BILATERAL SALPINGO-OOPHORECTOMY): Status: RESOLVED | Noted: 2019-11-12 | Resolved: 2019-11-25

## 2019-11-25 PROCEDURE — 1125F PR PAIN SEVERITY QUANTIFIED, PAIN PRESENT: ICD-10-PCS | Mod: ,,, | Performed by: OBSTETRICS & GYNECOLOGY

## 2019-11-25 PROCEDURE — 99999 PR PBB SHADOW E&M-EST. PATIENT-LVL III: CPT | Mod: PBBFAC,,, | Performed by: OBSTETRICS & GYNECOLOGY

## 2019-11-25 PROCEDURE — 99215 PR OFFICE/OUTPT VISIT, EST, LEVL V, 40-54 MIN: ICD-10-PCS | Mod: 24,S$PBB,, | Performed by: OBSTETRICS & GYNECOLOGY

## 2019-11-25 PROCEDURE — 1159F PR MEDICATION LIST DOCUMENTED IN MEDICAL RECORD: ICD-10-PCS | Mod: ,,, | Performed by: OBSTETRICS & GYNECOLOGY

## 2019-11-25 PROCEDURE — 99213 OFFICE O/P EST LOW 20 MIN: CPT | Mod: PBBFAC,25 | Performed by: OBSTETRICS & GYNECOLOGY

## 2019-11-25 PROCEDURE — 93970 EXTREMITY STUDY: CPT | Mod: TC

## 2019-11-25 PROCEDURE — 1159F MED LIST DOCD IN RCRD: CPT | Mod: ,,, | Performed by: OBSTETRICS & GYNECOLOGY

## 2019-11-25 PROCEDURE — 1125F AMNT PAIN NOTED PAIN PRSNT: CPT | Mod: ,,, | Performed by: OBSTETRICS & GYNECOLOGY

## 2019-11-25 PROCEDURE — 93970 US LOWER EXTREMITY VEINS BILATERAL: ICD-10-PCS | Mod: 26,,, | Performed by: RADIOLOGY

## 2019-11-25 PROCEDURE — 93970 EXTREMITY STUDY: CPT | Mod: 26,,, | Performed by: RADIOLOGY

## 2019-11-25 PROCEDURE — 99999 PR PBB SHADOW E&M-EST. PATIENT-LVL III: ICD-10-PCS | Mod: PBBFAC,,, | Performed by: OBSTETRICS & GYNECOLOGY

## 2019-11-25 PROCEDURE — 99215 OFFICE O/P EST HI 40 MIN: CPT | Mod: 24,S$PBB,, | Performed by: OBSTETRICS & GYNECOLOGY

## 2019-11-25 NOTE — PROGRESS NOTES
REFERRING PROVIDER  No ref. provider found     REASON FOR CONSULT  Margarita Dunne  is a 74 y.o.  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    HISTORY OF PRESENT ILLNESS    Please refer below for the patient's tumor history.  The interval history is as follows:  Tolerating p.o..  Has persistent nausea in the morning.  Relieved with the eating and/or Zofran.  Is passing flatus and bowel movements.  Denies any vaginal bleeding, changes in her urinary patterns, changes in her stool.  No fevers, chills, or night sweats.  No signs of infection around her incision sites.       Malignant neoplasm of ovary    9/11/2019 Imaging Significant Findings     CT C/A/P: Irregularity of the uterus in this patient with thickened endometrium identified on prior ultrasound with small, nonspecific soft tissue nodules in the pelvis and abdomen with surrounding streaking in the greater omentum as described above.          9/20/2019 Tumor Markers      = 75      11/12/2019 Surgery     LAPAROTOMY, EXPLORATORY  HYSTERECTOMY, TOTAL, ABDOMINAL   SALPINGO-OOPHORECTOMY (Bilateral)  OMENTECTOMY   APPENDECTOMY  EXCISION, TISSUE, PERITONEUM  RD 1 mm on the R diaphragm and sigmoid colon        12/3/2019 -  Chemotherapy     Treatment Summary   Plan Name: OP GYN PACLITAXEL CARBOPLATIN (AUC 6) Q3W  Treatment Goal: Curative  Status: Active  Start Date: 12/3/2019 (Planned)  End Date: 3/17/2020 (Planned)  Provider: Irvin Vinson MD  Chemotherapy: CARBOplatin (PARAPLATIN) in sodium chloride 0.9% 250 mL chemo infusion, , Intravenous, Clinic/HOD 1 time, 0 of 6 cycles  PACLitaxel (TAXOL) 175 mg/m2 = 294 mg in sodium chloride 0.9% 500 mL chemo infusion, 175 mg/m2, Intravenous, Clinic/HOD 1 time, 0 of 6 cycles          REVIEW OF SYSTEMS  All systems reviewed and negative except as noted in HPI.    OBJECTIVE   Vitals:    11/25/19 0851   BP: 125/60   Pulse: 87      Body mass index is 25.23 kg/m².      1. General: Well appearing, no apparent  distress, alert and oriented.  2. Lymph: Neck symmetric without cervical or supraclavicular adenopathy or mass.  3. Lungs: Normal respiratory rate, no accessory muscle use.  4. Cardiac: Normal rate  5. Psych: Normal affect.  6. Abdomen:  non-distended, soft, non-tender, are no masses, no ascites, no hepatosplenomegaly; incision is C/D/I  7. Skin: Warm, dry, no rashes or lesions.   8. Extremities: Bilateral lower extremities without edema or tenderness.  9. Genitourinary               Pelvic Examination including:                a. External genitalia are normal in appearance. No lesions noted.               b. Urethral meatus is normal size, location, and appearance.               c. Urethra is negative.               d. Bladder is nontender. No masses noted.               e. Vagina has normal mucosa with physiologic discharge. No lesions noted. Vaginal cuff is intact.              f. Uterus absent              g. Adnexa absent    ECOG status: 1    LABORATORY DATA  Lab data reviewed.    RADIOLOGICAL DATA  Radiology data reviewed.    ASSESSMENT / PLAN     1. Malignant neoplasm of ovary, unspecified laterality    2. Localized edema     3. Anxiety    4. Moderate malnutrition       -Lower extremity dopplers to rule out DVT.  Low suspicion.  Continue with prophylaxis.  TEDs and elevation.  -Compazine and ativan for anti-emetics PRN.  -RTC on Friday for chemotherapy class  -Schedule carboplatin/paclitaxel q21 days at [facility].  -Discussed FIRST clinical trial and the patient will consider enrolling.  -Migraines with steroids.  Will monitor after first cycle.  Discuss with pharmacy if she's unable to tolerate it.    PATIENT EDUCATION  Ready to learn, no apparent learning barriers were identified; learning preferences include listening. Explained diagnosis and treatment plan; patient expressed understanding of the content.    ADMINISTRATIVE BILLING  This consultation lasted 50 minutes and greater than 50% of was  spent in counseling.       Irvin Vinson

## 2019-11-27 ENCOUNTER — TELEPHONE (OUTPATIENT)
Dept: GYNECOLOGIC ONCOLOGY | Facility: CLINIC | Age: 74
End: 2019-11-27

## 2019-11-27 NOTE — TELEPHONE ENCOUNTER
Advised pt chemo approved. Pt states she cannot  start next week as her  has Pneumonia. Advised pt will have Brenda call next week for status check. Message sent to Dr. Vinson and Brenda.      [place]

## 2019-11-29 ENCOUNTER — TELEPHONE (OUTPATIENT)
Dept: GYNECOLOGIC ONCOLOGY | Facility: CLINIC | Age: 74
End: 2019-11-29

## 2019-11-29 DIAGNOSIS — C56.9 MALIGNANT NEOPLASM OF OVARY: ICD-10-CM

## 2019-11-29 DIAGNOSIS — G51.4 EYELID MYOKYMIA: Primary | ICD-10-CM

## 2019-12-02 ENCOUNTER — TELEPHONE (OUTPATIENT)
Dept: GYNECOLOGIC ONCOLOGY | Facility: CLINIC | Age: 74
End: 2019-12-02

## 2019-12-02 NOTE — TELEPHONE ENCOUNTER
"RN confirmed upcoming lab and chemo infusion appointments. Patient made aware of subsequent pre-chemo visits will continue at [facility] despite infusions at [facility]. Pt verbalized understanding. Patient reports "cold like" symptoms x 7 days. Encouraged patient to make appointment with PCP. Verbalized understanding.   "

## 2019-12-05 ENCOUNTER — OFFICE VISIT (OUTPATIENT)
Dept: URGENT CARE | Facility: CLINIC | Age: 74
End: 2019-12-05
Payer: MEDICARE

## 2019-12-05 ENCOUNTER — PATIENT MESSAGE (OUTPATIENT)
Dept: GYNECOLOGIC ONCOLOGY | Facility: CLINIC | Age: 74
End: 2019-12-05

## 2019-12-05 VITALS
RESPIRATION RATE: 18 BRPM | OXYGEN SATURATION: 94 % | BODY MASS INDEX: 22.88 KG/M2 | HEART RATE: 91 BPM | WEIGHT: 134 LBS | SYSTOLIC BLOOD PRESSURE: 135 MMHG | TEMPERATURE: 98 F | HEIGHT: 64 IN | DIASTOLIC BLOOD PRESSURE: 90 MMHG

## 2019-12-05 DIAGNOSIS — G43.109 MIGRAINE WITH AURA AND WITHOUT STATUS MIGRAINOSUS, NOT INTRACTABLE: ICD-10-CM

## 2019-12-05 DIAGNOSIS — J45.20 MILD INTERMITTENT CHRONIC ASTHMA WITHOUT COMPLICATION: ICD-10-CM

## 2019-12-05 DIAGNOSIS — B02.9 HERPES ZOSTER WITHOUT COMPLICATION: ICD-10-CM

## 2019-12-05 DIAGNOSIS — J45.21 MILD INTERMITTENT CHRONIC ASTHMA WITH ACUTE EXACERBATION: ICD-10-CM

## 2019-12-05 DIAGNOSIS — R11.0 NAUSEA: Primary | ICD-10-CM

## 2019-12-05 DIAGNOSIS — M79.2 NEURALGIA: ICD-10-CM

## 2019-12-05 DIAGNOSIS — R05.9 COUGH: ICD-10-CM

## 2019-12-05 PROCEDURE — 99215 PR OFFICE/OUTPT VISIT, EST, LEVL V, 40-54 MIN: ICD-10-PCS | Mod: S$GLB,,, | Performed by: NURSE PRACTITIONER

## 2019-12-05 PROCEDURE — 99215 OFFICE O/P EST HI 40 MIN: CPT | Mod: S$GLB,,, | Performed by: NURSE PRACTITIONER

## 2019-12-05 RX ORDER — RIZATRIPTAN BENZOATE 10 MG/1
TABLET ORAL
Qty: 10 TABLET | Refills: 11 | Status: SHIPPED | OUTPATIENT
Start: 2019-12-05 | End: 2020-07-31 | Stop reason: SDUPTHER

## 2019-12-05 RX ORDER — CODEINE PHOSPHATE AND GUAIFENESIN 10; 100 MG/5ML; MG/5ML
5 SOLUTION ORAL 3 TIMES DAILY PRN
Qty: 110 ML | Refills: 0 | Status: ON HOLD | OUTPATIENT
Start: 2019-12-05 | End: 2019-12-11 | Stop reason: HOSPADM

## 2019-12-05 RX ORDER — ALBUTEROL SULFATE 90 UG/1
2 AEROSOL, METERED RESPIRATORY (INHALATION) EVERY 6 HOURS PRN
Qty: 1 INHALER | Refills: 11 | Status: SHIPPED | OUTPATIENT
Start: 2019-12-05 | End: 2021-01-01 | Stop reason: SDUPTHER

## 2019-12-05 RX ORDER — PROCHLORPERAZINE MALEATE 5 MG
5 TABLET ORAL EVERY 6 HOURS PRN
Qty: 30 TABLET | Refills: 0 | Status: ON HOLD | OUTPATIENT
Start: 2019-12-05 | End: 2019-12-11 | Stop reason: HOSPADM

## 2019-12-05 RX ORDER — VALACYCLOVIR HYDROCHLORIDE 1 G/1
1000 TABLET, FILM COATED ORAL 3 TIMES DAILY
Qty: 21 TABLET | Refills: 0 | Status: ON HOLD | OUTPATIENT
Start: 2019-12-05 | End: 2019-12-11 | Stop reason: HOSPADM

## 2019-12-05 RX ORDER — BUDESONIDE AND FORMOTEROL FUMARATE DIHYDRATE 80; 4.5 UG/1; UG/1
2 AEROSOL RESPIRATORY (INHALATION) DAILY
Qty: 1 INHALER | Refills: 11 | Status: SHIPPED | OUTPATIENT
Start: 2019-12-05 | End: 2020-08-06 | Stop reason: SDUPTHER

## 2019-12-05 NOTE — PATIENT INSTRUCTIONS
Follow-up with oncologist for nausea control prior to start of chemo.  Start Valtrex for shingles.  Use refilled medications as directed by primary care physician follow-up with primary care physician for further instructions.  He has cherry Tussin for cough and pain secondary to shingles at night to sleep  Shingles (Herpes Zoster)     Talk to your healthcare provider about the shingles vaccine.     Shingles is also called herpes zoster. It is a painful skin rash caused by the herpes zoster virus. This is the same virus that causes chickenpox. After a person has chickenpox, the virus remains inactive in the nerve cells. Years later, the virus can become active again and travel to the skin. Most people have shingles only once, but it is possible to have it more than once.  What are the risk factors for shingles?  Anyone who has ever had chickenpox can develop shingles. But your risk is greater if you:  · Are 50 years of age or older  · Have an illness that weakens your immune system, such as HIV/AIDS  · Have cancer, especially Hodgkin disease or lymphoma  · Take medicines that weaken your immune system  What are the symptoms of shingles?  · The first sign of shingles is usually pain, burning, tingling, or itching on one part of your face or body. You may also feel as if you have the flu, with fever and chills.  · A red rash with small blisters appears within a few days. The rash may appear as follows:   ¨ The blisters can occur anywhere, but theyre most common on the back, chest, or abdomen.  ¨ They usually appear on only one side of the body, spreading along the nerve pathway where the virus was inactive.   ¨ The rash can also form around an eye, along one side of the face or neck, or in the mouth.  ¨ In a few people, usually those with weakened immune systems, shingles appear on more than one part of the body at once.  · After a few days, the blisters become dry and form a crust. The crust falls off in days to  weeks. The blisters generally do not leave scars.  How is shingles treated?  For most people, shingles heals on its own in a few weeks. But treatment is recommended to help relieve pain, speed healing, and reduce the risk of complications. Antiviral medicines are prescribed within the first 72 hours of the appearance of the rash. To lessen symptoms:  · Apply ice packs (wrapped in a thin towel) or cool compresses, or soak in a cool bath.  · Use calamine lotion to calm itchy skin.  · Ask your healthcare provider about over-the-counter pain relievers. If your pain is severe, your healthcare provider may prescribe stronger pain medicines.  What are the complications of shingles?  Shingles often goes away with no lasting effects. But some people have serious problems long after the blisters have healed:  · Postherpetic neuralgia. This is the most common complication. It is severe nerve pain at the place where the rash used to be. It can last for months, or even years after you have had shingles. Medicines can be prescribed to help relieve the pain and improve quality of life.  · Bacterial infection. Shingles blisters may become infected with bacteria. Antibiotic medicine is used to treat the infection.  · Eye problems. A person with shingles on the face should see his or her healthcare provider right away. Shingles can cause serious problems with vision, and even blindness.  Very rarely shingles can also lead to pneumonia, hearing problems, brain inflammation, or even death.   When to seek medical care  Contact your healthcare provider if you experience any of the following:  · Symptoms that dont go away with treatment  · A rash or blisters near your eye  · Increased drainage, fever, or rash after treatment, or severe pain that doesnt go away   How can shingles be prevented?  You can only get shingles if you have had chicken pox in the past. Those who have never had chickenpox can get the virus from you. Although  instead of developing shingles, the person may get chickenpox. Until your blisters form scabs, avoid contact with others, especially the following:  · Pregnant women who have never had chickenpox or the vaccine  · Infants who were born early (prematurely) or who had low weight at birth  · People with weak immune system (for example, people receiving chemotherapy for cancer, people who have had organ transplants, or people with HIV infections)     The shingles vaccine  If youre 60 years of age or older, ask your healthcare provider if you should receive the shingles vaccine. The vaccine makes it less likely that you will develop shingles. If you do develop shingles, your symptoms will likely be milder than if you hadnt been vaccinated. Note: Although the vaccine is licensed for people 50 years of age or older, the CDC does not recommend the vaccine for those who are 50 to 59 years old.   Date Last Reviewed: 10/1/2016  © 3932-9761 [employer]. [address] [place] [address], [place] [address]. All rights reserved. This information is not intended as a substitute for professional medical care. Always follow your healthcare professional's instructions.

## 2019-12-05 NOTE — PROGRESS NOTES
"Subjective:       Patient ID: Margarita Dunne is a 74 y.o. female.    Vitals:  height is 5' 4" (1.626 m) and weight is 60.8 kg (134 lb). Her temperature is 98.2 °F (36.8 °C). Her blood pressure is 135/90 (abnormal) and her pulse is 91. Her respiration is 18 and oxygen saturation is 94% (abnormal).     Chief Complaint: Sinus Problem    Ambulatory with multiple complaints.  Patient states she started with a rash to her left neck it is very painful rash it started 2 days ago.  Patient also complains about sinus congestion and cough.  She has a difficulty sleeping at night due to pain from the neck and the coughing.  Patient also complains of nausea for several months she was given Zofran by her primary care doctor and is not working.  She recently had surgery for newly diagnosed ovarian cancer and is starting chemotherapy next week.  States that the nausea could be related to the ovarian cancer or the appendicitis that was also found at the time of her CT scan in addition patient is requesting refill on her Symbicort albuterol and her triptan migraine medication because she was unable to get in with her primary care doctor    Sinus Problem   This is a new problem. Episode onset: 3 WEEKS. The problem has been gradually worsening since onset. There has been no fever. Her pain is at a severity of 3/10. The pain is mild. Associated symptoms include congestion and coughing. Pertinent negatives include no chills, diaphoresis, ear pain, shortness of breath, sinus pressure or sore throat. Past treatments include oral decongestants. The treatment provided no relief.       Constitution: Negative for chills, sweating, fatigue and fever.   HENT: Positive for congestion. Negative for ear pain, sinus pain, sinus pressure, sore throat and voice change.    Neck: Negative for painful lymph nodes.   Eyes: Negative for eye redness.   Respiratory: Positive for cough and sputum production. Negative for chest tightness, bloody sputum, COPD, " shortness of breath, stridor, wheezing and asthma.    Gastrointestinal: Negative for nausea and vomiting.   Musculoskeletal: Negative for muscle ache.   Skin: Negative for rash.   Allergic/Immunologic: Negative for seasonal allergies and asthma.   Hematologic/Lymphatic: Negative for swollen lymph nodes.       Objective:      Physical Exam   Constitutional: She is oriented to person, place, and time. She appears well-developed. She is cooperative.  Non-toxic appearance. She appears ill. No distress.   HENT:   Head: Normocephalic and atraumatic.   Right Ear: Hearing, tympanic membrane, external ear and ear canal normal.   Left Ear: Hearing, tympanic membrane, external ear and ear canal normal.   Nose: Nose normal. No mucosal edema, rhinorrhea or nasal deformity. No epistaxis. Right sinus exhibits no maxillary sinus tenderness and no frontal sinus tenderness. Left sinus exhibits no maxillary sinus tenderness and no frontal sinus tenderness.   Mouth/Throat: Uvula is midline, oropharynx is clear and moist and mucous membranes are normal. No trismus in the jaw. Normal dentition. No uvula swelling. No posterior oropharyngeal erythema.   Eyes: Conjunctivae and lids are normal. Right eye exhibits no discharge. Left eye exhibits no discharge. No scleral icterus.   Neck: Trachea normal, normal range of motion, full passive range of motion without pain and phonation normal. Neck supple.       Cardiovascular: Normal rate, regular rhythm, normal heart sounds, intact distal pulses and normal pulses.   Pulmonary/Chest: Effort normal and breath sounds normal. No respiratory distress.   Abdominal: Soft. Normal appearance and bowel sounds are normal. She exhibits no distension, no pulsatile midline mass and no mass. There is no tenderness.   Musculoskeletal: Normal range of motion. She exhibits no edema or deformity.   Neurological: She is alert and oriented to person, place, and time. She exhibits normal muscle tone. Coordination  normal.   Skin: Skin is warm, dry, intact, not diaphoretic and not pale.   Psychiatric: She has a normal mood and affect. Her speech is normal and behavior is normal. Judgment and thought content normal. Cognition and memory are normal.   Nursing note and vitals reviewed.        Assessment:       1. Nausea    2. Mild intermittent chronic asthma without complication    3. Mild intermittent chronic asthma with acute exacerbation    4. Migraine with aura and without status migrainosus, not intractable    5. Herpes zoster without complication        Plan:         Nausea  -     prochlorperazine (COMPAZINE) 5 MG tablet; Take 1 tablet (5 mg total) by mouth every 6 (six) hours as needed for Nausea.  Dispense: 30 tablet; Refill: 0    Mild intermittent chronic asthma without complication  -     albuterol (PROVENTIL/VENTOLIN HFA) 90 mcg/actuation inhaler; Inhale 2 puffs into the lungs every 6 (six) hours as needed for Wheezing. Rescue  Dispense: 1 Inhaler; Refill: 11    Mild intermittent chronic asthma with acute exacerbation  -     budesonide-formoterol 80-4.5 mcg (SYMBICORT) 80-4.5 mcg/actuation HFAA; Inhale 2 puffs into the lungs once daily. Controller  Dispense: 1 Inhaler; Refill: 11    Migraine with aura and without status migrainosus, not intractable  -     rizatriptan (MAXALT) 10 MG tablet; TAKE ONE TABLET BY MOUTH AT ONSET OF MIGRAINE, MAY REPEAT EVERY 2 HOURS. DO NOT EXCEED 3 TABLETS IN 24 HOURS.  Dispense: 10 tablet; Refill: 11    Herpes zoster without complication  -     valACYclovir (VALTREX) 1000 MG tablet; Take 1 tablet (1,000 mg total) by mouth 3 (three) times daily. for 7 days  Dispense: 21 tablet; Refill: 0      Patient Instructions     Follow-up with oncologist for nausea control prior to start of chemo.  Start Valtrex for shingles.  Use refilled medications as directed by primary care physician follow-up with primary care physician for further instructions  Shingles (Herpes Zoster)     Talk to your  healthcare provider about the shingles vaccine.     Shingles is also called herpes zoster. It is a painful skin rash caused by the herpes zoster virus. This is the same virus that causes chickenpox. After a person has chickenpox, the virus remains inactive in the nerve cells. Years later, the virus can become active again and travel to the skin. Most people have shingles only once, but it is possible to have it more than once.  What are the risk factors for shingles?  Anyone who has ever had chickenpox can develop shingles. But your risk is greater if you:  · Are 50 years of age or older  · Have an illness that weakens your immune system, such as HIV/AIDS  · Have cancer, especially Hodgkin disease or lymphoma  · Take medicines that weaken your immune system  What are the symptoms of shingles?  · The first sign of shingles is usually pain, burning, tingling, or itching on one part of your face or body. You may also feel as if you have the flu, with fever and chills.  · A red rash with small blisters appears within a few days. The rash may appear as follows:   ¨ The blisters can occur anywhere, but theyre most common on the back, chest, or abdomen.  ¨ They usually appear on only one side of the body, spreading along the nerve pathway where the virus was inactive.   ¨ The rash can also form around an eye, along one side of the face or neck, or in the mouth.  ¨ In a few people, usually those with weakened immune systems, shingles appear on more than one part of the body at once.  · After a few days, the blisters become dry and form a crust. The crust falls off in days to weeks. The blisters generally do not leave scars.  How is shingles treated?  For most people, shingles heals on its own in a few weeks. But treatment is recommended to help relieve pain, speed healing, and reduce the risk of complications. Antiviral medicines are prescribed within the first 72 hours of the appearance of the rash. To lessen  symptoms:  · Apply ice packs (wrapped in a thin towel) or cool compresses, or soak in a cool bath.  · Use calamine lotion to calm itchy skin.  · Ask your healthcare provider about over-the-counter pain relievers. If your pain is severe, your healthcare provider may prescribe stronger pain medicines.  What are the complications of shingles?  Shingles often goes away with no lasting effects. But some people have serious problems long after the blisters have healed:  · Postherpetic neuralgia. This is the most common complication. It is severe nerve pain at the place where the rash used to be. It can last for months, or even years after you have had shingles. Medicines can be prescribed to help relieve the pain and improve quality of life.  · Bacterial infection. Shingles blisters may become infected with bacteria. Antibiotic medicine is used to treat the infection.  · Eye problems. A person with shingles on the face should see his or her healthcare provider right away. Shingles can cause serious problems with vision, and even blindness.  Very rarely shingles can also lead to pneumonia, hearing problems, brain inflammation, or even death.   When to seek medical care  Contact your healthcare provider if you experience any of the following:  · Symptoms that dont go away with treatment  · A rash or blisters near your eye  · Increased drainage, fever, or rash after treatment, or severe pain that doesnt go away   How can shingles be prevented?  You can only get shingles if you have had chicken pox in the past. Those who have never had chickenpox can get the virus from you. Although instead of developing shingles, the person may get chickenpox. Until your blisters form scabs, avoid contact with others, especially the following:  · Pregnant women who have never had chickenpox or the vaccine  · Infants who were born early (prematurely) or who had low weight at birth  · People with weak immune system (for example, people  receiving chemotherapy for cancer, people who have had organ transplants, or people with HIV infections)     The shingles vaccine  If youre 60 years of age or older, ask your healthcare provider if you should receive the shingles vaccine. The vaccine makes it less likely that you will develop shingles. If you do develop shingles, your symptoms will likely be milder than if you hadnt been vaccinated. Note: Although the vaccine is licensed for people 50 years of age or older, the CDC does not recommend the vaccine for those who are 50 to 59 years old.   Date Last Reviewed: 10/1/2016  © 7755-9842 [employer]. [address] [place] [address], [place] [address]. All rights reserved. This information is not intended as a substitute for professional medical care. Always follow your healthcare professional's instructions.

## 2019-12-06 ENCOUNTER — DOCUMENTATION ONLY (OUTPATIENT)
Dept: GYNECOLOGIC ONCOLOGY | Facility: CLINIC | Age: 74
End: 2019-12-06

## 2019-12-06 ENCOUNTER — PATIENT OUTREACH (OUTPATIENT)
Dept: ADMINISTRATIVE | Facility: HOSPITAL | Age: 74
End: 2019-12-06

## 2019-12-06 ENCOUNTER — PATIENT MESSAGE (OUTPATIENT)
Dept: GYNECOLOGIC ONCOLOGY | Facility: CLINIC | Age: 74
End: 2019-12-06

## 2019-12-06 ENCOUNTER — TELEPHONE (OUTPATIENT)
Dept: GYNECOLOGIC ONCOLOGY | Facility: CLINIC | Age: 74
End: 2019-12-06

## 2019-12-06 DIAGNOSIS — E87.6 HYPOKALEMIA: Primary | ICD-10-CM

## 2019-12-06 RX ORDER — POTASSIUM CHLORIDE 20 MEQ/1
20 TABLET, EXTENDED RELEASE ORAL 3 TIMES DAILY
Qty: 30 TABLET | Refills: 11 | Status: SHIPPED | OUTPATIENT
Start: 2019-12-06 | End: 2020-06-03

## 2019-12-08 ENCOUNTER — HOSPITAL ENCOUNTER (INPATIENT)
Facility: HOSPITAL | Age: 74
LOS: 3 days | Discharge: HOME | DRG: 871 | End: 2019-12-11
Attending: EMERGENCY MEDICINE | Admitting: FAMILY MEDICINE
Payer: MEDICARE

## 2019-12-08 ENCOUNTER — PATIENT MESSAGE (OUTPATIENT)
Dept: GYNECOLOGIC ONCOLOGY | Facility: CLINIC | Age: 74
End: 2019-12-08

## 2019-12-08 DIAGNOSIS — Z51.5 PALLIATIVE CARE ENCOUNTER: ICD-10-CM

## 2019-12-08 DIAGNOSIS — J18.9 SEPSIS DUE TO PNEUMONIA: ICD-10-CM

## 2019-12-08 DIAGNOSIS — Z71.89 GOALS OF CARE, COUNSELING/DISCUSSION: ICD-10-CM

## 2019-12-08 DIAGNOSIS — M79.7 FIBROMYALGIA: Chronic | ICD-10-CM

## 2019-12-08 DIAGNOSIS — J18.9 PNEUMONIA OF LEFT LOWER LOBE DUE TO INFECTIOUS ORGANISM: Primary | ICD-10-CM

## 2019-12-08 DIAGNOSIS — Z71.89 COUNSELING REGARDING ADVANCE CARE PLANNING AND GOALS OF CARE: ICD-10-CM

## 2019-12-08 DIAGNOSIS — B02.9 HERPES ZOSTER WITHOUT COMPLICATION: ICD-10-CM

## 2019-12-08 DIAGNOSIS — A41.9 SEPSIS DUE TO PNEUMONIA: ICD-10-CM

## 2019-12-08 DIAGNOSIS — R41.82 AMS (ALTERED MENTAL STATUS): ICD-10-CM

## 2019-12-08 PROBLEM — R33.9 URINARY RETENTION: Status: ACTIVE | Noted: 2019-12-08

## 2019-12-08 PROBLEM — G93.40 ACUTE ENCEPHALOPATHY: Status: ACTIVE | Noted: 2019-12-08

## 2019-12-08 PROBLEM — E87.6 HYPOKALEMIA: Status: ACTIVE | Noted: 2019-12-08

## 2019-12-08 LAB
ALBUMIN SERPL BCP-MCNC: 3.4 G/DL (ref 3.5–5.2)
ALLENS TEST: ABNORMAL
ALP SERPL-CCNC: 77 U/L (ref 55–135)
ALT SERPL W/O P-5'-P-CCNC: 8 U/L (ref 10–44)
AMMONIA PLAS-SCNC: 31 UMOL/L (ref 10–50)
AMPHET+METHAMPHET UR QL: NEGATIVE
ANION GAP SERPL CALC-SCNC: 15 MMOL/L (ref 8–16)
AST SERPL-CCNC: 18 U/L (ref 10–40)
BARBITURATES UR QL SCN>200 NG/ML: NEGATIVE
BASOPHILS # BLD AUTO: 0.04 K/UL (ref 0–0.2)
BASOPHILS NFR BLD: 0.5 % (ref 0–1.9)
BENZODIAZ UR QL SCN>200 NG/ML: NEGATIVE
BILIRUB SERPL-MCNC: 0.7 MG/DL (ref 0.1–1)
BILIRUB UR QL STRIP: NEGATIVE
BUN SERPL-MCNC: 8 MG/DL (ref 8–23)
BZE UR QL SCN: NEGATIVE
CALCIUM SERPL-MCNC: 9.2 MG/DL (ref 8.7–10.5)
CANNABINOIDS UR QL SCN: NEGATIVE
CHLORIDE SERPL-SCNC: 101 MMOL/L (ref 95–110)
CLARITY UR: CLEAR
CO2 SERPL-SCNC: 22 MMOL/L (ref 23–29)
COLOR UR: YELLOW
CREAT SERPL-MCNC: 0.8 MG/DL (ref 0.5–1.4)
CREAT UR-MCNC: 33.2 MG/DL (ref 15–325)
DELSYS: ABNORMAL
DIFFERENTIAL METHOD: ABNORMAL
EOSINOPHIL # BLD AUTO: 0 K/UL (ref 0–0.5)
EOSINOPHIL NFR BLD: 0.1 % (ref 0–8)
ERYTHROCYTE [DISTWIDTH] IN BLOOD BY AUTOMATED COUNT: 17.6 % (ref 11.5–14.5)
EST. GFR  (AFRICAN AMERICAN): >60 ML/MIN/1.73 M^2
EST. GFR  (NON AFRICAN AMERICAN): >60 ML/MIN/1.73 M^2
GLUCOSE SERPL-MCNC: 125 MG/DL (ref 70–110)
GLUCOSE UR QL STRIP: NEGATIVE
HCO3 UR-SCNC: 28.3 MMOL/L (ref 24–28)
HCT VFR BLD AUTO: 31.2 % (ref 37–48.5)
HGB BLD-MCNC: 10.1 G/DL (ref 12–16)
HGB UR QL STRIP: ABNORMAL
INFLUENZA A, MOLECULAR: NEGATIVE
INFLUENZA B, MOLECULAR: NEGATIVE
KETONES UR QL STRIP: ABNORMAL
LACTATE SERPL-SCNC: 0.9 MMOL/L (ref 0.5–2.2)
LACTATE SERPL-SCNC: 2.2 MMOL/L (ref 0.5–2.2)
LACTATE SERPL-SCNC: 3.1 MMOL/L (ref 0.5–2.2)
LEUKOCYTE ESTERASE UR QL STRIP: NEGATIVE
LIPASE SERPL-CCNC: 24 U/L (ref 4–60)
LYMPHOCYTES # BLD AUTO: 0.7 K/UL (ref 1–4.8)
LYMPHOCYTES NFR BLD: 8.7 % (ref 18–48)
MAGNESIUM SERPL-MCNC: 1.6 MG/DL (ref 1.6–2.6)
MCH RBC QN AUTO: 26.8 PG (ref 27–31)
MCHC RBC AUTO-ENTMCNC: 32.4 G/DL (ref 32–36)
MCV RBC AUTO: 83 FL (ref 82–98)
METHADONE UR QL SCN>300 NG/ML: NEGATIVE
MONOCYTES # BLD AUTO: 0.9 K/UL (ref 0.3–1)
MONOCYTES NFR BLD: 11 % (ref 4–15)
NEUTROPHILS # BLD AUTO: 6.4 K/UL (ref 1.8–7.7)
NEUTROPHILS NFR BLD: 79.7 % (ref 38–73)
NITRITE UR QL STRIP: NEGATIVE
OPIATES UR QL SCN: NORMAL
PCO2 BLDA: 28.4 MMHG (ref 35–45)
PCP UR QL SCN>25 NG/ML: NEGATIVE
PH SMN: 7.61 [PH] (ref 7.35–7.45)
PH UR STRIP: >8 [PH] (ref 5–8)
PLATELET # BLD AUTO: 416 K/UL (ref 150–350)
PMV BLD AUTO: 9.5 FL (ref 9.2–12.9)
PO2 BLDA: 15 MMHG (ref 40–60)
POC BE: 7 MMOL/L
POC SATURATED O2: 30 % (ref 95–100)
POC TCO2: 29 MMOL/L (ref 24–29)
POCT GLUCOSE: 124 MG/DL (ref 70–110)
POTASSIUM SERPL-SCNC: 3.1 MMOL/L (ref 3.5–5.1)
PROT SERPL-MCNC: 7 G/DL (ref 6–8.4)
PROT UR QL STRIP: ABNORMAL
RBC # BLD AUTO: 3.77 M/UL (ref 4–5.4)
SAMPLE: ABNORMAL
SITE: ABNORMAL
SODIUM SERPL-SCNC: 138 MMOL/L (ref 136–145)
SP GR UR STRIP: 1.02 (ref 1–1.03)
SPECIMEN SOURCE: NORMAL
TOXICOLOGY INFORMATION: NORMAL
TSH SERPL DL<=0.005 MIU/L-ACNC: 0.74 UIU/ML (ref 0.4–4)
URN SPEC COLLECT METH UR: ABNORMAL
UROBILINOGEN UR STRIP-ACNC: NEGATIVE EU/DL
WBC # BLD AUTO: 8.07 K/UL (ref 3.9–12.7)

## 2019-12-08 PROCEDURE — 94761 N-INVAS EAR/PLS OXIMETRY MLT: CPT

## 2019-12-08 PROCEDURE — 99285 EMERGENCY DEPT VISIT HI MDM: CPT | Mod: 25

## 2019-12-08 PROCEDURE — 84443 ASSAY THYROID STIM HORMONE: CPT

## 2019-12-08 PROCEDURE — 82140 ASSAY OF AMMONIA: CPT

## 2019-12-08 PROCEDURE — 85025 COMPLETE CBC W/AUTO DIFF WBC: CPT

## 2019-12-08 PROCEDURE — 96361 HYDRATE IV INFUSION ADD-ON: CPT

## 2019-12-08 PROCEDURE — 83690 ASSAY OF LIPASE: CPT

## 2019-12-08 PROCEDURE — 80053 COMPREHEN METABOLIC PANEL: CPT

## 2019-12-08 PROCEDURE — 83605 ASSAY OF LACTIC ACID: CPT | Mod: 91

## 2019-12-08 PROCEDURE — 96365 THER/PROPH/DIAG IV INF INIT: CPT

## 2019-12-08 PROCEDURE — 82803 BLOOD GASES ANY COMBINATION: CPT

## 2019-12-08 PROCEDURE — 96366 THER/PROPH/DIAG IV INF ADDON: CPT | Performed by: EMERGENCY MEDICINE

## 2019-12-08 PROCEDURE — 82962 GLUCOSE BLOOD TEST: CPT

## 2019-12-08 PROCEDURE — 93005 ELECTROCARDIOGRAM TRACING: CPT

## 2019-12-08 PROCEDURE — 87040 BLOOD CULTURE FOR BACTERIA: CPT | Mod: 59

## 2019-12-08 PROCEDURE — 96375 TX/PRO/DX INJ NEW DRUG ADDON: CPT | Performed by: EMERGENCY MEDICINE

## 2019-12-08 PROCEDURE — 81003 URINALYSIS AUTO W/O SCOPE: CPT

## 2019-12-08 PROCEDURE — 87502 INFLUENZA DNA AMP PROBE: CPT

## 2019-12-08 PROCEDURE — 63600175 PHARM REV CODE 636 W HCPCS: Performed by: NURSE PRACTITIONER

## 2019-12-08 PROCEDURE — 25000003 PHARM REV CODE 250: Performed by: EMERGENCY MEDICINE

## 2019-12-08 PROCEDURE — 80307 DRUG TEST PRSMV CHEM ANLYZR: CPT

## 2019-12-08 PROCEDURE — 36415 COLL VENOUS BLD VENIPUNCTURE: CPT

## 2019-12-08 PROCEDURE — 63600175 PHARM REV CODE 636 W HCPCS: Performed by: EMERGENCY MEDICINE

## 2019-12-08 PROCEDURE — 11000001 HC ACUTE MED/SURG PRIVATE ROOM

## 2019-12-08 PROCEDURE — P9612 CATHETERIZE FOR URINE SPEC: HCPCS

## 2019-12-08 PROCEDURE — 25500020 PHARM REV CODE 255: Performed by: EMERGENCY MEDICINE

## 2019-12-08 PROCEDURE — 83735 ASSAY OF MAGNESIUM: CPT

## 2019-12-08 RX ORDER — ACETAMINOPHEN 650 MG/1
650 SUPPOSITORY RECTAL
Status: DISPENSED | OUTPATIENT
Start: 2019-12-08 | End: 2019-12-09

## 2019-12-08 RX ORDER — VANCOMYCIN HCL IN 5 % DEXTROSE 1G/250ML
1000 PLASTIC BAG, INJECTION (ML) INTRAVENOUS
Status: DISCONTINUED | OUTPATIENT
Start: 2019-12-09 | End: 2019-12-10

## 2019-12-08 RX ORDER — HALOPERIDOL 5 MG/ML
5 INJECTION INTRAMUSCULAR EVERY 6 HOURS PRN
Status: DISCONTINUED | OUTPATIENT
Start: 2019-12-09 | End: 2019-12-11 | Stop reason: HOSPADM

## 2019-12-08 RX ORDER — ACETAMINOPHEN 650 MG/1
650 SUPPOSITORY RECTAL
Status: COMPLETED | OUTPATIENT
Start: 2019-12-08 | End: 2019-12-08

## 2019-12-08 RX ORDER — SODIUM CHLORIDE 0.9 % (FLUSH) 0.9 %
10 SYRINGE (ML) INJECTION
Status: DISCONTINUED | OUTPATIENT
Start: 2019-12-08 | End: 2019-12-11 | Stop reason: HOSPADM

## 2019-12-08 RX ORDER — CEFEPIME HYDROCHLORIDE 2 G/50ML
2 INJECTION, SOLUTION INTRAVENOUS
Status: COMPLETED | OUTPATIENT
Start: 2019-12-08 | End: 2019-12-08

## 2019-12-08 RX ORDER — ACETAMINOPHEN 325 MG/1
650 TABLET ORAL
Status: DISCONTINUED | OUTPATIENT
Start: 2019-12-08 | End: 2019-12-08

## 2019-12-08 RX ORDER — ACETAMINOPHEN 325 MG/1
650 TABLET ORAL
Status: COMPLETED | OUTPATIENT
Start: 2019-12-08 | End: 2019-12-08

## 2019-12-08 RX ORDER — ENOXAPARIN SODIUM 100 MG/ML
40 INJECTION SUBCUTANEOUS EVERY 12 HOURS
Status: DISCONTINUED | OUTPATIENT
Start: 2019-12-08 | End: 2019-12-10

## 2019-12-08 RX ORDER — VANCOMYCIN HCL IN 5 % DEXTROSE 1G/250ML
1000 PLASTIC BAG, INJECTION (ML) INTRAVENOUS
Status: DISCONTINUED | OUTPATIENT
Start: 2019-12-08 | End: 2019-12-08

## 2019-12-08 RX ORDER — GLUCAGON 1 MG
1 KIT INJECTION
Status: DISCONTINUED | OUTPATIENT
Start: 2019-12-08 | End: 2019-12-11 | Stop reason: HOSPADM

## 2019-12-08 RX ORDER — AMOXICILLIN 250 MG
1 CAPSULE ORAL 2 TIMES DAILY
Status: DISCONTINUED | OUTPATIENT
Start: 2019-12-08 | End: 2019-12-11 | Stop reason: HOSPADM

## 2019-12-08 RX ORDER — POTASSIUM CHLORIDE 20 MEQ/1
40 TABLET, EXTENDED RELEASE ORAL ONCE
Status: DISCONTINUED | OUTPATIENT
Start: 2019-12-08 | End: 2019-12-09

## 2019-12-08 RX ORDER — OSELTAMIVIR PHOSPHATE 75 MG/1
75 CAPSULE ORAL
Status: COMPLETED | OUTPATIENT
Start: 2019-12-08 | End: 2019-12-08

## 2019-12-08 RX ORDER — ONDANSETRON 2 MG/ML
4 INJECTION INTRAMUSCULAR; INTRAVENOUS EVERY 6 HOURS PRN
Status: DISCONTINUED | OUTPATIENT
Start: 2019-12-08 | End: 2019-12-11 | Stop reason: HOSPADM

## 2019-12-08 RX ORDER — ACETAMINOPHEN 325 MG/1
650 TABLET ORAL EVERY 4 HOURS PRN
Status: DISCONTINUED | OUTPATIENT
Start: 2019-12-08 | End: 2019-12-11 | Stop reason: HOSPADM

## 2019-12-08 RX ORDER — ONDANSETRON 4 MG/1
4 TABLET, ORALLY DISINTEGRATING ORAL EVERY 6 HOURS PRN
Status: DISCONTINUED | OUTPATIENT
Start: 2019-12-08 | End: 2019-12-11 | Stop reason: HOSPADM

## 2019-12-08 RX ADMIN — CEFEPIME HYDROCHLORIDE 2 G: 2 INJECTION, SOLUTION INTRAVENOUS at 01:12

## 2019-12-08 RX ADMIN — LORAZEPAM 1 MG: 2 INJECTION INTRAMUSCULAR; INTRAVENOUS at 03:12

## 2019-12-08 RX ADMIN — HALOPERIDOL LACTATE 5 MG: 5 INJECTION, SOLUTION INTRAMUSCULAR at 11:12

## 2019-12-08 RX ADMIN — SODIUM CHLORIDE 1000 ML: 0.9 INJECTION, SOLUTION INTRAVENOUS at 12:12

## 2019-12-08 RX ADMIN — ENOXAPARIN SODIUM 40 MG: 100 INJECTION SUBCUTANEOUS at 09:12

## 2019-12-08 RX ADMIN — IOHEXOL 100 ML: 350 INJECTION, SOLUTION INTRAVENOUS at 01:12

## 2019-12-08 RX ADMIN — OSELTAMIVIR PHOSPHATE 75 MG: 75 CAPSULE ORAL at 01:12

## 2019-12-08 RX ADMIN — LORAZEPAM 1 MG: 2 INJECTION INTRAMUSCULAR; INTRAVENOUS at 02:12

## 2019-12-08 RX ADMIN — ACETAMINOPHEN 650 MG: 650 SUPPOSITORY RECTAL at 01:12

## 2019-12-08 RX ADMIN — ACETAMINOPHEN 650 MG: 325 TABLET ORAL at 01:12

## 2019-12-08 RX ADMIN — VANCOMYCIN HYDROCHLORIDE 1750 MG: 1 INJECTION, POWDER, LYOPHILIZED, FOR SOLUTION INTRAVENOUS at 01:12

## 2019-12-08 RX ADMIN — LORAZEPAM 1 MG: 2 INJECTION INTRAMUSCULAR; INTRAVENOUS at 07:12

## 2019-12-08 NOTE — ASSESSMENT & PLAN NOTE
Pneumonia  Acute encephalopathy    Encephalopathy is related to infection and immunocompromised stated. Noted on the CT with pneumonia. WBC---8.07. BC pending. Will cont Vancomycin and cefepime. She will need a sitter at the bedside. Ativan ordered prn for pain.

## 2019-12-08 NOTE — ED PROVIDER NOTES
Encounter Date: 12/8/2019    SCRIBE #1 NOTE: I, Rebecca Howe, am scribing for, and in the presence of,  Dr. Dos Santos. I have scribed the entire note.       History     Chief Complaint   Patient presents with    Altered Mental Status     Pt brought to ER for Altered Mental Status. Pt has shingles to left side of face and left neck.      Pt is a 74 y.o. female w/h/o fibromyalgia, diverticulitis, depression and psychiatric care, ovarian cancer, who presents for altered mental status since 0400. Patient's  reports she has shingles and is currently on multiple medications for it. Patient has not started chemotherapy for ovarian cancer due to she is waiting to be cleared for shingles.     Chart review shows pt saw primary care provider a few days ago for cough at which time she was prescribed compazine as well as cheratussin with codeine.     The history is provided by the spouse. The history is limited by the condition of the patient.     Review of patient's allergies indicates:   Allergen Reactions    Doxycycline Nausea And Vomiting    Corticosteroids (glucocorticoids) Other (See Comments)     Causes pt to have migraines for 2 weeks    Pcn [penicillins] Rash    Sulfa (sulfonamide antibiotics) Rash     Past Medical History:   Diagnosis Date    Allergy     Angio-edema     Anxiety     Arthritis     Asthma     Cancer     Cataract     Chest pain at rest     Depression     Diverticulosis     Fibromyalgia 7/8/2012    Glaucoma suspect, steroid responders     Hand joint pain 7/8/2012    Headache(784.0)     Hx of psychiatric care     Migraine     Osteoporosis     Psychiatric problem     Recurrent upper respiratory infection (URI)     Sleep difficulties     Therapy     Urticaria      Past Surgical History:   Procedure Laterality Date    APPENDECTOMY  11/12/2019    Procedure: APPENDECTOMY;  Surgeon: Irvin Vinson MD;  Location: [facility] OR [address] [place] [address];  Service: OB/GYN;;    BIOPSY      DEBULKING OF TUMOR N/A  11/12/2019    Procedure: DEBULKING, NEOPLASM;  Surgeon: Irvin Vinson MD;  Location: [facility] OR 2ND FLR;  Service: OB/GYN;  Laterality: N/A;    dexa  2014    osteopenia    MOBILIZATION OF SPLENIC FLEXURE  11/12/2019    Procedure: MOBILIZATION, SPLENIC FLEXURE;  Surgeon: Irvin Vinson MD;  Location: [facility] OR 2ND FLR;  Service: OB/GYN;;    OMENTECTOMY N/A 11/12/2019    Procedure: OMENTECTOMY;  Surgeon: Irvin Vinson MD;  Location: [facility] OR 2ND FLR;  Service: OB/GYN;  Laterality: N/A;    RESECTION OF PERITONEAL TISSUE  11/12/2019    Procedure: EXCISION, TISSUE, PERITONEUM;  Surgeon: Irvin Vinson MD;  Location: [facility] OR 2ND FLR;  Service: OB/GYN;;    SALPINGOOPHORECTOMY Bilateral 11/12/2019    Procedure: SALPINGO-OOPHORECTOMY;  Surgeon: Irvin Vinson MD;  Location: [facility] OR 2ND FLR;  Service: OB/GYN;  Laterality: Bilateral;    TOTAL ABDOMINAL HYSTERECTOMY N/A 11/12/2019    Procedure: HYSTERECTOMY, TOTAL, ABDOMINAL;  Surgeon: Irvin Vinson MD;  Location: [facility] OR 2ND FLR;  Service: OB/GYN;  Laterality: N/A;     Family History   Problem Relation Age of Onset    Hypertension Mother     Diabetes Mother     Stroke Mother     Dementia Mother     Cancer Father         pancreas    Allergies Father     Allergic rhinitis Father     Macular degeneration Cousin     Allergic rhinitis Paternal Aunt     Allergies Paternal Aunt     Allergic rhinitis Paternal Uncle     Allergies Paternal Uncle     Glaucoma Neg Hx     Amblyopia Neg Hx     Blindness Neg Hx     Cataracts Neg Hx     Retinal detachment Neg Hx     Strabismus Neg Hx     Thyroid disease Neg Hx     Angioedema Neg Hx     Asthma Neg Hx     Eczema Neg Hx     Immunodeficiency Neg Hx     Colon cancer Neg Hx     Cystic fibrosis Neg Hx     Ulcerative colitis Neg Hx     Stomach cancer Neg Hx     Esophageal cancer Neg Hx      Social History     Tobacco Use    Smoking status: Never Smoker    Smokeless tobacco: Never Used   Substance Use Topics    Alcohol use:  Not Currently     Frequency: Never     Binge frequency: Never     Comment: social wine use in the past not current. last was 15 years ago    Drug use: No     Review of Systems   Unable to perform ROS: Mental status change       Physical Exam     Initial Vitals [12/08/19 1151]   BP Pulse Resp Temp SpO2   -- -- -- (!) 101 °F (38.3 °C) --      MAP       --         Physical Exam  Appearance: Agitated. Mild acute distress.  HEENT: Normocephalic. Atraumatic. No conjunctival injection. EOMI. PERRL. Oropharynx clear.    Neck: Neck supple.    Chest: Non-tender. Slight rales at bases bilaterally, but good air movement.  No wheezes.  No rhonchi.  Cardiovascular: Tachycardic, Regular rhythm. No murmurs. No gallops. No rubs. +2 radial/DP/DT pulses bilaterally. +1 BLE edema to mid calf, but no ttp.  Abdomen: Soft. Not distended. Nontender. No guarding. No rebound. No Masses  : no CVA TTP  Musculoskeletal: Good range of motion all joints. No deformities.    Neurologic: Alert. Equal strength in upper and lower extremities bilaterally. Normal sensation. No facial droop. Normal speech. Neg Kernigs and Brudzinski.   Psych: Agitated  Integumentary: No rashes seen.  Good turgor.  No abrasions.  No ecchymoses.    ED Course   Procedures  Labs Reviewed   CBC W/ AUTO DIFFERENTIAL - Abnormal; Notable for the following components:       Result Value    RBC 3.77 (*)     Hemoglobin 10.1 (*)     Hematocrit 31.2 (*)     Mean Corpuscular Hemoglobin 26.8 (*)     RDW 17.6 (*)     Platelets 416 (*)     Lymph # 0.7 (*)     Gran% 79.7 (*)     Lymph% 8.7 (*)     All other components within normal limits   COMPREHENSIVE METABOLIC PANEL - Abnormal; Notable for the following components:    Potassium 3.1 (*)     CO2 22 (*)     Glucose 125 (*)     Albumin 3.4 (*)     ALT 8 (*)     All other components within normal limits   LACTIC ACID, PLASMA - Abnormal; Notable for the following components:    Lactate (Lactic Acid) 3.1 (*)     All other components  within normal limits   POCT GLUCOSE - Abnormal; Notable for the following components:    POCT Glucose 124 (*)     All other components within normal limits   ISTAT PROCEDURE - Abnormal; Notable for the following components:    POC PH 7.607 (*)     POC PCO2 28.4 (*)     POC PO2 15 (*)     POC HCO3 28.3 (*)     POC SATURATED O2 30 (*)     All other components within normal limits   INFLUENZA A & B BY MOLECULAR   CULTURE, BLOOD   CULTURE, BLOOD   LIPASE   MAGNESIUM   TSH   AMMONIA   DRUG SCREEN PANEL, URINE EMERGENCY   URINALYSIS, REFLEX TO URINE CULTURE   LACTIC ACID, PLASMA   URINALYSIS, REFLEX TO URINE CULTURE          X-Rays:   Independently Interpreted Readings:   Other Readings:  Reviewed by myself, read by radiology.     Imaging Results           CTA Chest Non-Coronary (PE Study) (Final result)  Result time 12/08/19 14:04:38    Final result by Vance Knox MD (12/08/19 14:04:38)                 Impression:      1. No central PE or pulmonary infarction.  Cannot exclude pulmonary emboli in the segmental or distal subsegmental pulmonary arterial branches to each lung secondary to technical factors and limitations above.  Further evaluation/follow-up as warranted.  2. Small bilateral pleural effusions.  3. Left lower lobe interval cluster of small nodules which can be seen with localized the broncho pneumonia from infectious or inflammatory etiology or aspiration.  Follow-up as warranted.  4. Cardiomegaly and small pericardial effusion without convincing evidence of failure.  5. Upper abdominal small volume scattered ascites.  6. Additional findings as above.  This report was flagged in Epic as abnormal.      Electronically signed by: Vance Knox MD  Date:    12/08/2019  Time:    14:04             Narrative:    EXAMINATION:  CTA CHEST NON CORONARY    CLINICAL HISTORY:  Chest pain, acute, PE suspected, high pretest prob;    TECHNIQUE:  Low dose axial images, sagittal and coronal reformations were obtained from  the thoracic inlet to the lung bases following the IV administration of 100 mL of Omnipaque 350.  Contrast timing was optimized to evaluate the pulmonary arteries.  MIP images were performed.    COMPARISON:  Chest radiograph earlier same day and CT thorax 09/20/2019    FINDINGS:  Beam hardening with streak artifact from overlying monitoring leads and contrast bolus within the SVC and adjacent upper extremities somewhat limits evaluation.  There is also respiratory motion.    Suboptimal timing of contrast bolus in which the opacification is similar in the pulmonary arterial, venous and systemic circulations.  No central PE, saddle embolus or pulmonary infarction.  Heterogeneous opacification of multiple segmental and subsegmental pulmonary arterial branches to both lungs, presumably related to technical factors and limitations above noting pulmonary emboli not excluded in these vessels.    Pulmonary trunk is within normal limits.  Pulmonary arteries distribute normally.  There are 4 pulmonary veins.    Heart is mildly enlarged noting small volume nonspecific pericardial fluid.  No cardiac thrombus seen.  Left-sided 3 vessel arch.  Minimal scattered calcific atherosclerosis of the thoracic aorta without aneurysm or dissection.    Esophagus is normal in course and caliber.  No mediastinal, hilar or axillary lymphadenopathy noting calcified lymph nodes at the right hilum likely sequela of prior granulomatous disease.    Trachea is midline and patent.  Proximal airways are patent.  Right upper lobe subcentimeter calcified granuloma.  Small volume layering pleural effusions bilaterally with mild amount of overlying compressive atelectasis, right greater than left.  Minimal biapical pleuroparenchymal scarring.  Few scattered linear opacities consistent with subsegmental scarring versus atelectasis.  Cluster of small nodules within the posterior aspect of the left lower lobe, new from prior CT.  No large consolidation or  pneumothorax.    Structures at the base of the neck are within normal limits.  Extrathoracic soft tissues are within normal limits.  Imaged upper abdomen shows scattered small volume ascites.  Small hiatal hernia.    Osseous structures appear stable without acute or destructive process seen.                               CT Head Without Contrast (Final result)  Result time 12/08/19 13:53:49    Final result by Tc Sommer MD (12/08/19 13:53:49)                 Impression:      1. No acute intracranial findings  2. Findings in keeping with acute sinusitis as above.      Electronically signed by: Tc Sommer MD  Date:    12/08/2019  Time:    13:53             Narrative:    EXAMINATION:  CT HEAD WITHOUT CONTRAST    CLINICAL HISTORY:  Confusion/delirium, altered LOC, unexplained;    TECHNIQUE:  Low dose axial CT images obtained throughout the head without intravenous contrast. Sagittal and coronal reconstructions were performed.    COMPARISON:  09/26/2013    FINDINGS:  Intracranial compartment:    Ventricles and sulci are normal in size for age without evidence of hydrocephalus. No extra-axial blood or fluid collections.    There's some patchy nonspecific low-density changes seen throughout the supratentorial white matter, most likely representing sequela of chronic microvascular ischemic disease.  The appearance is unchanged from prior.  No parenchymal mass, hemorrhage, edema or major vascular distribution infarct.    Skull/extracranial contents (limited evaluation): No fracture. Mucosal thickening noted throughout the bilateral ethmoid sinuses as well as the left sphenoid and visualized bilateral maxillary sinuses.  Air-fluid levels noted in the left sphenoid and right maxillary sinuses, in keeping with acute sinusitis.                               X-Ray Chest AP Portable (Final result)  Result time 12/08/19 12:51:18    Final result by Vance Knox MD (12/08/19 12:51:18)                 Impression:       Interval small bilateral pleural effusions, left greater than right, with underlying left basilar atelectasis/infiltrate not excluded.      Electronically signed by: Vance Knox MD  Date:    12/08/2019  Time:    12:51             Narrative:    EXAMINATION:  XR CHEST AP PORTABLE    CLINICAL HISTORY:  AMS;    TECHNIQUE:  Single frontal view of the chest was performed.    COMPARISON:  Chest radiograph 12/28/2011    FINDINGS:  Monitoring leads overlie the chest.  Cardiomediastinal silhouette is midline and within normal limits.  Chronic mild nonspecific elevation of the right hemidiaphragm similar to prior.  Interval blunting of the left greater than right costophrenic angles suggesting small pleural effusions.  The lungs are otherwise well expanded without focal consolidation.  No acute osseous process seen.  PA and lateral views can be obtained.                              Medical Decision Making:   Clinical Tests:   Lab Tests: Ordered and Reviewed  Radiological Study: Ordered and Reviewed  Medical Tests: Ordered and Reviewed  Sepsis Perfusion Assessment: I attest, a sepsis perfusion exam was performed within 6 hours of Septic Shock presentation, following fluid resuscitation.                   ED Course as of Dec 08 1519   Sun Dec 08, 2019   1501 EKG. Rate of 111. Sins Tachycardia. Non specific T wave abnormality.    [AF]      ED Course User Index  [AF] Rebecca Howe                Clinical Impression:     1. AMS (altered mental status)      75 yo female w/h/o ovarian cancer presents for AMS, cough. Found to be febrile, tachycardic, but otherwise VSS. +rales at bilateral bases and BLE edema +1 to mid-calf, but no ttp. No JVD. Abdomen benign. No CVA TTP. EKG WNL. Gave APAP, IVF 30 mL/kg, cefepime, vanc, tamiflu. Labs w/LA 3.1. CXR w/b/l pleural effusions. CTH and CTPA without acute abnormality on my read. Discussed case with [facility] hospitalist who will admit. Repeat LA pending at time of admission.       IDr.  Dolores Dos Santos, personally performed the services described in this documentation. All medical record entries made by the scribe were at my direction and in my presence.  I have reviewed the chart and agree that the record reflects my personal performance and is accurate and complete. Dolores Dos Santos MD.  2:11 PM 12/08/2019                   Dolores Dos Santos MD  12/08/19 1415       Dolores Dos Santos MD  12/08/19 1891

## 2019-12-08 NOTE — SUBJECTIVE & OBJECTIVE
Past Medical History:   Diagnosis Date    Allergy     Angio-edema     Anxiety     Arthritis     Asthma     Cancer     Cataract     Chest pain at rest     Depression     Diverticulosis     Fibromyalgia 7/8/2012    Glaucoma suspect, steroid responders     Hand joint pain 7/8/2012    Headache(784.0)     Hx of psychiatric care     Migraine     Osteoporosis     Psychiatric problem     Recurrent upper respiratory infection (URI)     Sleep difficulties     Therapy     Urticaria        Past Surgical History:   Procedure Laterality Date    APPENDECTOMY  11/12/2019    Procedure: APPENDECTOMY;  Surgeon: Irvin Vinson MD;  [facility];  Service: OB/GYN;;    BIOPSY      DEBULKING OF TUMOR N/A 11/12/2019    Procedure: DEBULKING, NEOPLASM;  Surgeon: Irvin Vinson MD;  [facility];  Service: OB/GYN;  Laterality: N/A;    dexa  2014    osteopenia    MOBILIZATION OF SPLENIC FLEXURE  11/12/2019    Procedure: MOBILIZATION, SPLENIC FLEXURE;  Surgeon: Irvin Vinson MD;  [facility];  Service: OB/GYN;;    OMENTECTOMY N/A 11/12/2019    Procedure: OMENTECTOMY;  Surgeon: Irvin Vinson MD;  [facility];  Service: OB/GYN;  Laterality: N/A;    RESECTION OF PERITONEAL TISSUE  11/12/2019    Procedure: EXCISION, TISSUE, PERITONEUM;  Surgeon: Irvin Vinson MD;  [facility];  Service: OB/GYN;;    SALPINGOOPHORECTOMY Bilateral 11/12/2019    Procedure: SALPINGO-OOPHORECTOMY;  Surgeon: Irvin Vinson MD;  [facility];  Service: OB/GYN;  Laterality: Bilateral;    TOTAL ABDOMINAL HYSTERECTOMY N/A 11/12/2019    Procedure: HYSTERECTOMY, TOTAL, ABDOMINAL;  Surgeon: Irvin Vinson MD;  [facility];  Service: OB/GYN;  Laterality: N/A;       Review of patient's allergies indicates:   Allergen Reactions    Doxycycline Nausea And Vomiting    Corticosteroids (glucocorticoids) Other (See Comments)     Causes pt to have migraines for 2  weeks    Pcn [penicillins] Rash    Sulfa (sulfonamide antibiotics) Rash       No current facility-administered medications on file prior to encounter.      Current Outpatient Medications on File Prior to Encounter   Medication Sig    acetaminophen (TYLENOL) 325 MG tablet Take 325 mg by mouth every 6 (six) hours as needed for Pain.    albuterol (PROVENTIL/VENTOLIN HFA) 90 mcg/actuation inhaler Inhale 2 puffs into the lungs every 6 (six) hours as needed for Wheezing. Rescue    budesonide-formoterol 80-4.5 mcg (SYMBICORT) 80-4.5 mcg/actuation HFAA Inhale 2 puffs into the lungs once daily. Controller    enoxaparin (LOVENOX) 40 mg/0.4 mL Syrg Inject 0.4 mLs (40 mg total) into the skin once daily.    FLUoxetine 10 MG Tab Take 1 tablet (10 mg total) by mouth once daily.    FLUoxetine 10 MG Tab Take 1 tablet (10 mg total) by mouth once daily.    FLUoxetine 10 MG Tab TAKE 1 TABLET BY MOUTH ONCE DAILY    guaifenesin-codeine 100-10 mg/5 ml (CHERATUSSIN AC)  mg/5 mL syrup Take 5 mLs by mouth 3 (three) times daily as needed.    ibuprofen (ADVIL,MOTRIN) 600 MG tablet Take 1 tablet (600 mg total) by mouth every 6 (six) hours.    LORazepam (ATIVAN) 0.5 MG tablet Take 1 tablet (0.5 mg total) by mouth every 8 (eight) hours as needed for Anxiety.    methylcellulose (CITRUCEL ORAL) Take by mouth 2 (two) times daily.    metroNIDAZOLE (FLAGYL) 250 MG tablet Take 1 tablet (250 mg total) by mouth 3 (three) times daily.    ondansetron (ZOFRAN) 4 MG tablet Take 1 tablet (4 mg total) by mouth every 6 (six) hours as needed for Nausea.    ondansetron (ZOFRAN-ODT) 8 MG TbDL Dissolve 1 tablet (8 mg total) by mouth once for 1 dose    oxyCODONE (ROXICODONE) 5 MG immediate release tablet Take 1 tablet (5 mg total) by mouth every 4 (four) hours as needed.    potassium chloride SA (K-DUR,KLOR-CON) 20 MEQ tablet Take 1 tablet (20 mEq total) by mouth 3 (three) times daily.    prochlorperazine (COMPAZINE) 5 MG tablet Take 1  tablet (5 mg total) by mouth every 6 (six) hours as needed for Nausea.    rizatriptan (MAXALT) 10 MG tablet TAKE ONE TABLET BY MOUTH AT ONSET OF MIGRAINE, MAY REPEAT EVERY 2 HOURS. DO NOT EXCEED 3 TABLETS IN 24 HOURS.    rosuvastatin (CRESTOR) 5 MG tablet Take 1 tablet (5 mg total) by mouth every evening.    valACYclovir (VALTREX) 1000 MG tablet Take 1 tablet (1,000 mg total) by mouth 3 (three) times daily. for 7 days    valACYclovir (VALTREX) 500 MG tablet TAKE 1 CAPLET BY MOUTH ONCE DAILY    VOLTAREN 1 % Gel APPLY 4 GRAMS TOPICALLY 4 TIMES DAILY AS DIRECTED     Family History     Problem Relation (Age of Onset)    Allergic rhinitis Father, Paternal Aunt, Paternal Uncle    Allergies Father, Paternal Aunt, Paternal Uncle    Cancer Father    Dementia Mother    Diabetes Mother    Hypertension Mother    Macular degeneration Cousin    Stroke Mother        Tobacco Use    Smoking status: Never Smoker    Smokeless tobacco: Never Used   Substance and Sexual Activity    Alcohol use: Not Currently     Frequency: Never     Binge frequency: Never     Comment: social wine use in the past not current. last was 15 years ago    Drug use: No    Sexual activity: Not on file     Review of Systems   Unable to perform ROS: Acuity of condition     Objective:     Vital Signs (Most Recent):  Temp: 98.8 °F (37.1 °C) (12/08/19 1600)  Pulse: 110 (12/08/19 1600)  Resp: 18 (12/08/19 1600)  BP: 138/73 (12/08/19 1600)  SpO2: (!) 94 % (12/08/19 1600) Vital Signs (24h Range):  Temp:  [98.8 °F (37.1 °C)-102 °F (38.9 °C)] 98.8 °F (37.1 °C)  Pulse:  [110-124] 110  Resp:  [18-47] 18  SpO2:  [94 %-99 %] 94 %  BP: (138-183)/(73-97) 138/73     Weight: 59 kg (130 lb)  Body mass index is 22.31 kg/m².    Physical Exam   Constitutional: She has a sickly appearance. She appears ill.   HENT:   Head: Normocephalic.   Eyes: Pupils are equal, round, and reactive to light.   Neck: Normal range of motion.   Cardiovascular: Intact distal pulses.   No murmur  heard.  Pulmonary/Chest: Effort normal. No respiratory distress. She has no wheezes.   Abdominal: Soft. Bowel sounds are normal. She exhibits no distension. There is no tenderness. There is no guarding.   Musculoskeletal: She exhibits edema (edema noted to the bilateral ankles). She exhibits no tenderness.   Neurological: She is disoriented.   Skin: Skin is warm and dry. Capillary refill takes 2 to 3 seconds.   Psychiatric:   This patient is very agitated and combative.    Nursing note and vitals reviewed.        CRANIAL NERVES     CN III, IV, VI   Pupils are equal, round, and reactive to light.       Significant Labs:   CBC:   Recent Labs   Lab 12/08/19  1213   WBC 8.07   HGB 10.1*   HCT 31.2*   *     CMP:   Recent Labs   Lab 12/08/19  1213      K 3.1*      CO2 22*   *   BUN 8   CREATININE 0.8   CALCIUM 9.2   PROT 7.0   ALBUMIN 3.4*   BILITOT 0.7   ALKPHOS 77   AST 18   ALT 8*   ANIONGAP 15   EGFRNONAA >60     Lactic Acid:   Recent Labs   Lab 12/08/19  1215 12/08/19  1508   LACTATE 3.1* 2.2     Lipase:   Recent Labs   Lab 12/08/19  1213   LIPASE 24     Magnesium:   Recent Labs   Lab 12/08/19  1213   MG 1.6     TSH:   Recent Labs   Lab 12/08/19  1213   TSH 0.739     Urine Culture: No results for input(s): LABURIN in the last 48 hours.  Urine Studies:   Recent Labs   Lab 12/08/19  1410   COLORU Yellow   APPEARANCEUA Clear   PHUR >8.0*   SPECGRAV 1.020   PROTEINUA Trace*   GLUCUA Negative   KETONESU 1+*   BILIRUBINUA Negative   OCCULTUA Trace*   NITRITE Negative   UROBILINOGEN Negative   LEUKOCYTESUR Negative       Significant Imaging: I have reviewed all pertinent imaging results/findings within the past 24 hours.

## 2019-12-08 NOTE — HPI
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 She lives in [place], [place]. She is . Her primary care physician is Dr. Columba Anderson. Her OBGYN is Dr. Irvin Vinson.    She was seen at [facility] on 12/5/19 for left neck shingles and upper respiratory infection. She was prescribed valacyclovir, codeine-guaifenesin, and prochlorperazine, and her budesonide-formoterol was refilled.    Labs on 12/619 showed hypokalemia with potassium of 2.7 mmol/L.   She presented to [facility] on 12/8/19 with delirium and agitation that her  reported started in the morning. She had no new complaints. In the emergency department, labs showed less severe hypokalemia (potassium 3.1). Lactate was elevated at 3.1 mmol/L. Head CT showed acute sinusitis. Chest CTA showed small bilateral pleural effusions, small pericardial effusion, small volume abdominal ascites, and small left lower lobe nodules. She was admitted to [facility].

## 2019-12-08 NOTE — ED TRIAGE NOTES
"Pt brought to ER by , states she began acting irrational and strange since 4am.  She keeps saying "help me, help me".   states she was fine last night but woke him up at 4am with irrational behavior.    "

## 2019-12-08 NOTE — PROGRESS NOTES
Pharmacokinetic Initial Assessment: IV Vancomycin    Assessment/Plan:    Initiate intravenous vancomycin with loading dose of 1750 mg once followed by a maintenance dose of vancomycin 1000 mg IV every 24 hours  Desired empiric serum trough concentration is 10 to 20 mcg/mL  Draw vancomycin trough level 30 min prior to third dose on 12-10-19 at approximately 1300   Pharmacy will continue to follow and monitor vancomycin.      Please contact pharmacy at extension [phone] with any questions regarding this assessment.     Thank you for the consult,   Damian Galany       Patient brief summary:  Margarita Dunne is a 74 y.o. female initiated on antimicrobial therapy with IV Vancomycin for treatment of suspected lower respiratory infection    Drug Allergies:   Review of patient's allergies indicates:   Allergen Reactions    Doxycycline Nausea And Vomiting    Corticosteroids (glucocorticoids) Other (See Comments)     Causes pt to have migraines for 2 weeks    Pcn [penicillins] Rash    Sulfa (sulfonamide antibiotics) Rash       Actual Body Weight:   59 kg    Renal Function:   Estimated Creatinine Clearance: 51.7 mL/min (based on SCr of 0.8 mg/dL).,     Dialysis Method (if applicable):  N/A    CBC (last 72 hours):  Recent Labs   Lab Result Units 12/06/19  0953 12/08/19  1213   WBC K/uL 4.80 8.07   Hemoglobin g/dL 10.7* 10.1*   Hematocrit % 33.4* 31.2*   Platelets K/uL 405* 416*   Gran% %  --  79.7*   Lymph% %  --  8.7*   Mono% %  --  11.0   Eosinophil% %  --  0.1   Basophil% %  --  0.5   Differential Method   --  Automated       Metabolic Panel (last 72 hours):  Recent Labs   Lab Result Units 12/06/19  0953 12/08/19  1213 12/08/19  1410   Sodium mmol/L 141 138  --    Potassium mmol/L 2.7* 3.1*  --    Chloride mmol/L 100 101  --    CO2 mmol/L 29 22*  --    Glucose mg/dL 126* 125*  --    Glucose, UA   --   --  Negative   BUN, Bld mg/dL 7* 8  --    Creatinine mg/dL 0.8 0.8  --    Albumin g/dL 3.1* 3.4*  --    Total Bilirubin mg/dL 0.5  0.7  --    Alkaline Phosphatase U/L 76 77  --    AST U/L 16 18  --    ALT U/L 8* 8*  --    Magnesium mg/dL  --  1.6  --        Drug levels (last 3 results):  No results for input(s): VANCOMYCINRA, VANCOMYCINPE, VANCOMYCINTR in the last 72 hours.    Microbiologic Results:  Microbiology Results (last 7 days)       Procedure Component Value Units Date/Time    Influenza A & B by Molecular [[id number]] Collected:  12/08/19 1218    Order Status:  Completed Specimen:  Nasopharyngeal Swab Updated:  12/08/19 1307     Influenza A, Molecular Negative     Influenza B, Molecular Negative     Flu A & B Source Nasal swab    Blood culture #2 **CANNOT BE ORDERED STAT** [[id number]] Collected:  12/08/19 1212    Order Status:  Sent Specimen:  Blood from Peripheral, Forearm, Right Updated:  12/08/19 1233    Blood culture #1 **CANNOT BE ORDERED STAT** [[id number]] Collected:  12/08/19 1212    Order Status:  Sent Specimen:  Blood from Peripheral, Forearm, Left Updated:  12/08/19 1232

## 2019-12-08 NOTE — PLAN OF CARE
VN cued into room.  Pt resting comfortably in bed with call light and personal belongings within reach.  NADN.   at bedside.   Reviewed admission questions with .   stated that the patient has be noncompliant with home medications.  He wasn't sure what she had been taking or not taking.  No needs or complaints at this time.  Instructed  to use call light as needed.  VN will continue to follow and be available as needed.

## 2019-12-08 NOTE — NURSING
Pt arrived onto floor.  Report received from JES Alvarado. Appeared to be sleeping and very drowsy. VS taken. Spouse at bedside and oriented to the room.  NORBERTO in use; Safety maintained. Contact precautions initiated.

## 2019-12-09 PROBLEM — R18.8 ASCITES: Status: ACTIVE | Noted: 2019-12-09

## 2019-12-09 PROBLEM — Z71.89 GOALS OF CARE, COUNSELING/DISCUSSION: Status: ACTIVE | Noted: 2019-12-09

## 2019-12-09 PROBLEM — Z71.89 COUNSELING REGARDING ADVANCE CARE PLANNING AND GOALS OF CARE: Status: ACTIVE | Noted: 2019-12-09

## 2019-12-09 PROBLEM — J01.90 ACUTE SINUSITIS: Status: ACTIVE | Noted: 2019-12-08

## 2019-12-09 PROBLEM — Z51.5 PALLIATIVE CARE ENCOUNTER: Status: ACTIVE | Noted: 2019-12-09

## 2019-12-09 LAB
AMMONIA PLAS-SCNC: 17 UMOL/L (ref 10–50)
ANION GAP SERPL CALC-SCNC: 10 MMOL/L (ref 8–16)
BASOPHILS # BLD AUTO: 0.03 K/UL (ref 0–0.2)
BASOPHILS NFR BLD: 0.5 % (ref 0–1.9)
BUN SERPL-MCNC: 6 MG/DL (ref 8–23)
CALCIUM SERPL-MCNC: 8.7 MG/DL (ref 8.7–10.5)
CHLORIDE SERPL-SCNC: 103 MMOL/L (ref 95–110)
CO2 SERPL-SCNC: 26 MMOL/L (ref 23–29)
CREAT SERPL-MCNC: 0.7 MG/DL (ref 0.5–1.4)
DIFFERENTIAL METHOD: ABNORMAL
EOSINOPHIL # BLD AUTO: 0.1 K/UL (ref 0–0.5)
EOSINOPHIL NFR BLD: 1.6 % (ref 0–8)
ERYTHROCYTE [DISTWIDTH] IN BLOOD BY AUTOMATED COUNT: 18.1 % (ref 11.5–14.5)
EST. GFR  (AFRICAN AMERICAN): >60 ML/MIN/1.73 M^2
EST. GFR  (NON AFRICAN AMERICAN): >60 ML/MIN/1.73 M^2
GLUCOSE SERPL-MCNC: 88 MG/DL (ref 70–110)
HCT VFR BLD AUTO: 29.8 % (ref 37–48.5)
HGB BLD-MCNC: 9.5 G/DL (ref 12–16)
LYMPHOCYTES # BLD AUTO: 1.2 K/UL (ref 1–4.8)
LYMPHOCYTES NFR BLD: 19.9 % (ref 18–48)
MAGNESIUM SERPL-MCNC: 1.7 MG/DL (ref 1.6–2.6)
MCH RBC QN AUTO: 26.7 PG (ref 27–31)
MCHC RBC AUTO-ENTMCNC: 31.9 G/DL (ref 32–36)
MCV RBC AUTO: 84 FL (ref 82–98)
MONOCYTES # BLD AUTO: 1 K/UL (ref 0.3–1)
MONOCYTES NFR BLD: 16.3 % (ref 4–15)
NEUTROPHILS # BLD AUTO: 3.8 K/UL (ref 1.8–7.7)
NEUTROPHILS NFR BLD: 61.7 % (ref 38–73)
PLATELET # BLD AUTO: 375 K/UL (ref 150–350)
PMV BLD AUTO: 9.2 FL (ref 9.2–12.9)
POCT GLUCOSE: 67 MG/DL (ref 70–110)
POTASSIUM SERPL-SCNC: 2.6 MMOL/L (ref 3.5–5.1)
POTASSIUM SERPL-SCNC: 2.8 MMOL/L (ref 3.5–5.1)
RBC # BLD AUTO: 3.56 M/UL (ref 4–5.4)
SODIUM SERPL-SCNC: 139 MMOL/L (ref 136–145)
WBC # BLD AUTO: 6.13 K/UL (ref 3.9–12.7)

## 2019-12-09 PROCEDURE — 63600175 PHARM REV CODE 636 W HCPCS: Performed by: NURSE PRACTITIONER

## 2019-12-09 PROCEDURE — 36415 COLL VENOUS BLD VENIPUNCTURE: CPT

## 2019-12-09 PROCEDURE — 63600175 PHARM REV CODE 636 W HCPCS: Performed by: FAMILY MEDICINE

## 2019-12-09 PROCEDURE — 25000003 PHARM REV CODE 250: Performed by: NURSE PRACTITIONER

## 2019-12-09 PROCEDURE — 82140 ASSAY OF AMMONIA: CPT

## 2019-12-09 PROCEDURE — 80048 BASIC METABOLIC PNL TOTAL CA: CPT

## 2019-12-09 PROCEDURE — 94761 N-INVAS EAR/PLS OXIMETRY MLT: CPT

## 2019-12-09 PROCEDURE — 11000001 HC ACUTE MED/SURG PRIVATE ROOM

## 2019-12-09 PROCEDURE — 85025 COMPLETE CBC W/AUTO DIFF WBC: CPT

## 2019-12-09 PROCEDURE — 84132 ASSAY OF SERUM POTASSIUM: CPT

## 2019-12-09 PROCEDURE — 83735 ASSAY OF MAGNESIUM: CPT

## 2019-12-09 RX ORDER — MAGNESIUM SULFATE HEPTAHYDRATE 40 MG/ML
2 INJECTION, SOLUTION INTRAVENOUS ONCE
Status: COMPLETED | OUTPATIENT
Start: 2019-12-09 | End: 2019-12-09

## 2019-12-09 RX ORDER — POTASSIUM CHLORIDE 20 MEQ/1
40 TABLET, EXTENDED RELEASE ORAL 2 TIMES DAILY
Status: DISCONTINUED | OUTPATIENT
Start: 2019-12-09 | End: 2019-12-11 | Stop reason: HOSPADM

## 2019-12-09 RX ORDER — POTASSIUM CHLORIDE 7.45 MG/ML
10 INJECTION INTRAVENOUS
Status: COMPLETED | OUTPATIENT
Start: 2019-12-09 | End: 2019-12-09

## 2019-12-09 RX ADMIN — CEFEPIME HYDROCHLORIDE 2 G: 2 INJECTION, POWDER, FOR SOLUTION INTRAVENOUS at 01:12

## 2019-12-09 RX ADMIN — ENOXAPARIN SODIUM 40 MG: 100 INJECTION SUBCUTANEOUS at 09:12

## 2019-12-09 RX ADMIN — LORAZEPAM 1 MG: 2 INJECTION INTRAMUSCULAR; INTRAVENOUS at 11:12

## 2019-12-09 RX ADMIN — POTASSIUM CHLORIDE 10 MEQ: 7.46 INJECTION, SOLUTION INTRAVENOUS at 09:12

## 2019-12-09 RX ADMIN — VANCOMYCIN HYDROCHLORIDE 1000 MG: 1 INJECTION, POWDER, LYOPHILIZED, FOR SOLUTION INTRAVENOUS at 03:12

## 2019-12-09 RX ADMIN — SENNOSIDES AND DOCUSATE SODIUM 1 TABLET: 8.6; 5 TABLET ORAL at 09:12

## 2019-12-09 RX ADMIN — HALOPERIDOL LACTATE 5 MG: 5 INJECTION, SOLUTION INTRAMUSCULAR at 10:12

## 2019-12-09 RX ADMIN — MAGNESIUM SULFATE IN WATER 2 G: 40 INJECTION, SOLUTION INTRAVENOUS at 09:12

## 2019-12-09 RX ADMIN — LORAZEPAM 1 MG: 2 INJECTION INTRAMUSCULAR; INTRAVENOUS at 02:12

## 2019-12-09 RX ADMIN — POTASSIUM CHLORIDE 10 MEQ: 7.46 INJECTION, SOLUTION INTRAVENOUS at 11:12

## 2019-12-09 RX ADMIN — POTASSIUM CHLORIDE 40 MEQ: 1500 TABLET, EXTENDED RELEASE ORAL at 09:12

## 2019-12-09 RX ADMIN — POTASSIUM CHLORIDE 10 MEQ: 7.46 INJECTION, SOLUTION INTRAVENOUS at 12:12

## 2019-12-09 RX ADMIN — POTASSIUM CHLORIDE 10 MEQ: 7.46 INJECTION, SOLUTION INTRAVENOUS at 10:12

## 2019-12-09 NOTE — ASSESSMENT & PLAN NOTE
Pneumonia  Acute encephalopathy    Encephalopathy is related to infection and immunocompromised stated. Noted on the CT with pneumonia. WBC---8.07>6.13. BC NGTD. Will cont Vancomycin and cefepime. She will need a sitter at the bedside. Ativan ordered prn for pain. Restraints to the bilateral wrist have been removed. She required restraints overnight.

## 2019-12-09 NOTE — HOSPITAL COURSE
She was given IV fluids, IV potassium, IV cefepime, IV vancomycin, and IV lorazepam. Valacyclovir was not continued in the hospital for reasons known only to the care team that admitted her. She remained agitated and required soft wrist restraints. Agitation resolved. Sepsis resolved. Care was assumed by me on 12/10/19. I switched IV antibiotics to oral cefpodoxime. She was medically ready for discharge but I consulted Physical and Occupational Therapy to evaluate her discharge needs. They noted that she was back to her functional baseline. Her  reported that her shingles were looking worse since being off the valacyclovir. She was discharged home on 12/11/19 and prescribed cefdinir for 4 more days. I represcribed valacyclovir.

## 2019-12-09 NOTE — H&P
[facility] Hospital Medicine  History & Physical    Patient Name: Margarita Dunne  MRN: [id number]  Admission Date: 12/8/2019  Attending Physician: Vika Crocker*   Primary Care Provider: Columba Anderson MD         Patient information was obtained from spouse/SO, past medical records and ER records.     Subjective:     Principal Problem:Sepsis    Chief Complaint:   Chief Complaint   Patient presents with    Altered Mental Status     Pt brought to ER for Altered Mental Status. Pt has shingles to left side of face and left neck.         HPI: Ms. Margarita Dunne is a 73 y/o female who lives in [place], [place] at her residence with her family. Her PCP is Dr. Columba Anderson. Her oncologist Dr. Irvin Vinson. She has a past medical history of arthritis, asthma, cancer, allergy, angio-edema, anxiety, cataract, chest pain at rest, depression, diverticulosis, fibromyalgia, glaucoma suspect, hand and joint pain. Headache, hx of psychiatric care, migraine, osteoporosis, psychiatric problem, recurrent upper respiratory infection, sleep difficulties, and urticaria. She has a past surgical history of an appendectomy, debulking of tumor, mobilization of splenic flexure, omentectomy, resection of peritoneal tissue, bilateral salpingoophorectomy, and total abd hysterectomy. She does not smoke cigarettes, drink alcohol, or use illicit drugs.      She presents to [facility] with complaints of an altered mental status that began this morning per the patients . The  states the patient was her usual self until this morning where he noticed she has been very confused and agitated all day. The patients  states she was diagnosed with Ovarian CA, unable to start chemo just yet.  The patient was diagnosed with shingles 2 days ago. Per the  she has had no injuries or trauma, no complaints of chest pain, palpitations, fever, chills, vomiting, diarrhea, blood in the stool, or  hematuria.    The ED workup notes a stable hemoglobin, hypokalemia, albumin 3.4, ALT 8, lactate 3.1>2.2, UDS negative, blood cultures pending, CT of the head shows no acute abnormalities, CTA of the chest shows no PE but bilateral pleural effusions. Left lower lobe interval cluster of small nodules which can be seen with localized the broncho pneumonia from infectious or inflammatory etiology or aspiration. Upper abdominal small volume scattered ascites. She will be admitted to the [facility] service for further care.       Past Medical History:   Diagnosis Date    Allergy     Angio-edema     Anxiety     Arthritis     Asthma     Cancer     Cataract     Chest pain at rest     Depression     Diverticulosis     Fibromyalgia 7/8/2012    Glaucoma suspect, steroid responders     Hand joint pain 7/8/2012    Headache(784.0)     Hx of psychiatric care     Migraine     Osteoporosis     Psychiatric problem     Recurrent upper respiratory infection (URI)     Sleep difficulties     Therapy     Urticaria        Past Surgical History:   Procedure Laterality Date    APPENDECTOMY  11/12/2019    Procedure: APPENDECTOMY;  Surgeon: Irvin Vinson MD;  [facility];  Service: OB/GYN;;    BIOPSY      DEBULKING OF TUMOR N/A 11/12/2019    Procedure: DEBULKING, NEOPLASM;  Surgeon: Irvin Vinson MD;  [facility];  Service: OB/GYN;  Laterality: N/A;    dexa  2014    osteopenia    MOBILIZATION OF SPLENIC FLEXURE  11/12/2019    Procedure: MOBILIZATION, SPLENIC FLEXURE;  Surgeon: Irvin Vinson MD;  [facility];  Service: OB/GYN;;    OMENTECTOMY N/A 11/12/2019    Procedure: OMENTECTOMY;  Surgeon: Irvin Vinson MD;  [facility];  Service: OB/GYN;  Laterality: N/A;    RESECTION OF PERITONEAL TISSUE  11/12/2019    Procedure: EXCISION, TISSUE, PERITONEUM;  Surgeon: Irvin Vinson MD;  [facility];  Service: OB/GYN;;    SALPINGOOPHORECTOMY  Bilateral 11/12/2019    Procedure: SALPINGO-OOPHORECTOMY;  Surgeon: Irvin Vinson MD;  Location: [facility] OR 2ND FLR;  Service: OB/GYN;  Laterality: Bilateral;    TOTAL ABDOMINAL HYSTERECTOMY N/A 11/12/2019    Procedure: HYSTERECTOMY, TOTAL, ABDOMINAL;  Surgeon: Irvin Vinson MD;  Location: [facility] OR [facility]ND FLR;  Service: OB/GYN;  Laterality: N/A;       Review of patient's allergies indicates:   Allergen Reactions    Doxycycline Nausea And Vomiting    Corticosteroids (glucocorticoids) Other (See Comments)     Causes pt to have migraines for 2 weeks    Pcn [penicillins] Rash    Sulfa (sulfonamide antibiotics) Rash       No current facility-administered medications on file prior to encounter.      Current Outpatient Medications on File Prior to Encounter   Medication Sig    acetaminophen (TYLENOL) 325 MG tablet Take 325 mg by mouth every 6 (six) hours as needed for Pain.    albuterol (PROVENTIL/VENTOLIN HFA) 90 mcg/actuation inhaler Inhale 2 puffs into the lungs every 6 (six) hours as needed for Wheezing. Rescue    budesonide-formoterol 80-4.5 mcg (SYMBICORT) 80-4.5 mcg/actuation HFAA Inhale 2 puffs into the lungs once daily. Controller    enoxaparin (LOVENOX) 40 mg/0.4 mL Syrg Inject 0.4 mLs (40 mg total) into the skin once daily.    FLUoxetine 10 MG Tab Take 1 tablet (10 mg total) by mouth once daily.    FLUoxetine 10 MG Tab Take 1 tablet (10 mg total) by mouth once daily.    FLUoxetine 10 MG Tab TAKE 1 TABLET BY MOUTH ONCE DAILY    guaifenesin-codeine 100-10 mg/5 ml (CHERATUSSIN AC)  mg/5 mL syrup Take 5 mLs by mouth 3 (three) times daily as needed.    ibuprofen (ADVIL,MOTRIN) 600 MG tablet Take 1 tablet (600 mg total) by mouth every 6 (six) hours.    LORazepam (ATIVAN) 0.5 MG tablet Take 1 tablet (0.5 mg total) by mouth every 8 (eight) hours as needed for Anxiety.    methylcellulose (CITRUCEL ORAL) Take by mouth 2 (two) times daily.    metroNIDAZOLE (FLAGYL) 250 MG tablet Take 1 tablet (250 mg total)  by mouth 3 (three) times daily.    ondansetron (ZOFRAN) 4 MG tablet Take 1 tablet (4 mg total) by mouth every 6 (six) hours as needed for Nausea.    ondansetron (ZOFRAN-ODT) 8 MG TbDL Dissolve 1 tablet (8 mg total) by mouth once for 1 dose    oxyCODONE (ROXICODONE) 5 MG immediate release tablet Take 1 tablet (5 mg total) by mouth every 4 (four) hours as needed.    potassium chloride SA (K-DUR,KLOR-CON) 20 MEQ tablet Take 1 tablet (20 mEq total) by mouth 3 (three) times daily.    prochlorperazine (COMPAZINE) 5 MG tablet Take 1 tablet (5 mg total) by mouth every 6 (six) hours as needed for Nausea.    rizatriptan (MAXALT) 10 MG tablet TAKE ONE TABLET BY MOUTH AT ONSET OF MIGRAINE, MAY REPEAT EVERY 2 HOURS. DO NOT EXCEED 3 TABLETS IN 24 HOURS.    rosuvastatin (CRESTOR) 5 MG tablet Take 1 tablet (5 mg total) by mouth every evening.    valACYclovir (VALTREX) 1000 MG tablet Take 1 tablet (1,000 mg total) by mouth 3 (three) times daily. for 7 days    valACYclovir (VALTREX) 500 MG tablet TAKE 1 CAPLET BY MOUTH ONCE DAILY    VOLTAREN 1 % Gel APPLY 4 GRAMS TOPICALLY 4 TIMES DAILY AS DIRECTED     Family History     Problem Relation (Age of Onset)    Allergic rhinitis Father, Paternal Aunt, Paternal Uncle    Allergies Father, Paternal Aunt, Paternal Uncle    Cancer Father    Dementia Mother    Diabetes Mother    Hypertension Mother    Macular degeneration Cousin    Stroke Mother        Tobacco Use    Smoking status: Never Smoker    Smokeless tobacco: Never Used   Substance and Sexual Activity    Alcohol use: Not Currently     Frequency: Never     Binge frequency: Never     Comment: social wine use in the past not current. last was 15 years ago    Drug use: No    Sexual activity: Not on file     Review of Systems   Unable to perform ROS: Acuity of condition     Objective:     Vital Signs (Most Recent):  Temp: 98.8 °F (37.1 °C) (12/08/19 1600)  Pulse: 110 (12/08/19 1600)  Resp: 18 (12/08/19 1600)  BP: 138/73  (12/08/19 1600)  SpO2: (!) 94 % (12/08/19 1600) Vital Signs (24h Range):  Temp:  [98.8 °F (37.1 °C)-102 °F (38.9 °C)] 98.8 °F (37.1 °C)  Pulse:  [110-124] 110  Resp:  [18-47] 18  SpO2:  [94 %-99 %] 94 %  BP: (138-183)/(73-97) 138/73     Weight: 59 kg (130 lb)  Body mass index is 22.31 kg/m².    Physical Exam   Constitutional: She has a sickly appearance. She appears ill.   HENT:   Head: Normocephalic.   Eyes: Pupils are equal, round, and reactive to light.   Neck: Normal range of motion.   Cardiovascular: Intact distal pulses.   No murmur heard.  Pulmonary/Chest: Effort normal. No respiratory distress. She has no wheezes.   Abdominal: Soft. Bowel sounds are normal. She exhibits no distension. There is no tenderness. There is no guarding.   Musculoskeletal: She exhibits edema (edema noted to the bilateral ankles). She exhibits no tenderness.   Neurological: She is disoriented.   Skin: Skin is warm and dry. Capillary refill takes 2 to 3 seconds.   Psychiatric:   This patient is very agitated and combative.    Nursing note and vitals reviewed.        CRANIAL NERVES     CN III, IV, VI   Pupils are equal, round, and reactive to light.       Significant Labs:   CBC:   Recent Labs   Lab 12/08/19  1213   WBC 8.07   HGB 10.1*   HCT 31.2*   *     CMP:   Recent Labs   Lab 12/08/19  1213      K 3.1*      CO2 22*   *   BUN 8   CREATININE 0.8   CALCIUM 9.2   PROT 7.0   ALBUMIN 3.4*   BILITOT 0.7   ALKPHOS 77   AST 18   ALT 8*   ANIONGAP 15   EGFRNONAA >60     Lactic Acid:   Recent Labs   Lab 12/08/19  1215 12/08/19  1508   LACTATE 3.1* 2.2     Lipase:   Recent Labs   Lab 12/08/19  1213   LIPASE 24     Magnesium:   Recent Labs   Lab 12/08/19  1213   MG 1.6     TSH:   Recent Labs   Lab 12/08/19  1213   TSH 0.739     Urine Culture: No results for input(s): LABURIN in the last 48 hours.  Urine Studies:   Recent Labs   Lab 12/08/19  1410   COLORU Yellow   APPEARANCEUA Clear   PHUR >8.0*   SPECGRAV 1.020    PROTEINUA Trace*   GLUCUA Negative   KETONESU 1+*   BILIRUBINUA Negative   OCCULTUA Trace*   NITRITE Negative   UROBILINOGEN Negative   LEUKOCYTESUR Negative       Significant Imaging: I have reviewed all pertinent imaging results/findings within the past 24 hours.    Assessment/Plan:     * Sepsis  Pneumonia  Acute encephalopathy    Encephalopathy is related to infection and immunocompromised stated. Noted on the CT with pneumonia. WBC---8.07. BC pending. Will cont Vancomycin and cefepime. She will need a sitter at the bedside. Ativan ordered prn for pain.     Shingles  Immunocompromised. Will need contact precautions.       Hypokalemia  Chronically low per the family. Replace and monitor.       Anxiety  Depression    Prn ativan IV ordered. Agitated on exam.      Malignant neoplasm of ovary  She is being followed by her Oncologist Dr. Irvin Vinson with plans to start chemo soon.       Glaucoma suspect - Both Eyes  Monitor.     Chronic asthma  Chronic. No resp distress on exam.       Fibromyalgia  Chronic.        VTE Risk Mitigation (From admission, onward)         Ordered     enoxaparin injection 40 mg  Every 12 hours      12/08/19 1408     IP VTE HIGH RISK PATIENT  Once      12/08/19 1408                   Renetta Matamoros NP  Department of Hospital Medicine   [facility]

## 2019-12-09 NOTE — PROGRESS NOTES
[facility] Hospital Medicine  Progress Note    Patient Name: Margarita Dunne  MRN: [id number]  Patient Class: IP- Inpatient   Admission Date: 12/8/2019  Length of Stay: 1 days  Attending Physician: Vika Crocker*  Primary Care Provider: Columba Anderson MD        Subjective:     Principal Problem:Sepsis due to pneumonia        HPI:  Ms. Margarita Dunne is a 75 y/o female who lives in [place], [place] at her residence with her family. Her PCP is Dr. Columba Anderson. Her oncologist Dr. Irvin Vinson. She has a past medical history of arthritis, asthma, cancer, allergy, angio-edema, anxiety, cataract, chest pain at rest, depression, diverticulosis, fibromyalgia, glaucoma suspect, hand and joint pain. Headache, hx of psychiatric care, migraine, osteoporosis, psychiatric problem, recurrent upper respiratory infection, sleep difficulties, and urticaria. She has a past surgical history of an appendectomy, debulking of tumor, mobilization of splenic flexure, omentectomy, resection of peritoneal tissue, bilateral salpingoophorectomy, and total abd hysterectomy. She does not smoke cigarettes, drink alcohol, or use illicit drugs.                 She presents to [facility] with complaints of an altered mental status that began this morning per the patients . The  states the patient was her usual self until this morning where he noticed she has been very confused and agitated all day. The patients  states she was diagnosed with Ovarian CA, unable to start chemo just yet.  The patient was diagnosed with shingles 2 days ago. Per the  she has had no injuries or trauma, no complaints of chest pain, palpitations, fever, chills, vomiting, diarrhea, blood in the stool, or hematuria.     The ED workup notes a stable hemoglobin, hypokalemia, albumin 3.4, ALT 8, lactate 3.1>2.2, UDS negative, blood cultures pending, CT of the head shows no acute abnormalities, CTA of the  chest shows no PE but bilateral pleural effusions. Left lower lobe interval cluster of small nodules which can be seen with localized the broncho pneumonia from infectious or inflammatory etiology or aspiration. Upper abdominal small volume scattered ascites. She will be admitted to the [facility] service for further care.      Overview/Hospital Course:  She was admitted to the [facility] service for care. She was given IVF, IV potassium, IV abx, and IV ativan for confusion. She was noted to be extremely agitated on last night. She required soft wrist restraints along with IV sedation. She is better this morning. Restraints have been removed. Sitter is at the bedside.     Interval History: She is very calm on this morning. No distress noted. Sitter is at the bedside as well as the . Will cont to update the plan of care.     Review of Systems   Unable to perform ROS: Acuity of condition     Objective:     Vital Signs (Most Recent):  Temp: 96.8 °F (36 °C) (12/09/19 1150)  Pulse: 84 (12/09/19 1549)  Resp: 18 (12/09/19 1150)  BP: (!) 141/66 (12/09/19 1150)  SpO2: 96 % (12/09/19 1558) Vital Signs (24h Range):  Temp:  [96.8 °F (36 °C)-99.1 °F (37.3 °C)] 96.8 °F (36 °C)  Pulse:  [] 84  Resp:  [16-22] 18  SpO2:  [93 %-96 %] 96 %  BP: (141-162)/(66-77) 141/66     Weight: 59.1 kg (130 lb 4.7 oz)  Body mass index is 22.36 kg/m².    Intake/Output Summary (Last 24 hours) at 12/9/2019 1619  Last data filed at 12/9/2019 1400  Gross per 24 hour   Intake 358 ml   Output 931 ml   Net -573 ml      Physical Exam   Constitutional: She has a sickly appearance.   Eyes: Pupils are equal, round, and reactive to light.   Neck: Normal range of motion. No JVD present.   Cardiovascular: Normal rate and intact distal pulses.   No murmur heard.  Pulmonary/Chest: Effort normal. No respiratory distress. She has no wheezes.   Abdominal: Soft. Bowel sounds are normal. She exhibits no distension. There is no  tenderness. There is no guarding.   Musculoskeletal: She exhibits no edema or tenderness.   Neurological:   Still sleepy but she is calm. She is able to answer questions.   Skin: Skin is warm and dry. Capillary refill takes less than 2 seconds.   Psychiatric: She has a normal mood and affect. Her behavior is normal. Judgment and thought content normal.   Nursing note and vitals reviewed.      Significant Labs:   BMP:   Recent Labs   Lab 12/09/19  0712   GLU 88      K 2.6*      CO2 26   BUN 6*   CREATININE 0.7   CALCIUM 8.7   MG 1.7     CBC:   Recent Labs   Lab 12/08/19  1213 12/09/19  0713   WBC 8.07 6.13   HGB 10.1* 9.5*   HCT 31.2* 29.8*   * 375*     POCT Glucose:   Recent Labs   Lab 12/08/19  1230 12/09/19  1148   POCTGLUCOSE 124* 67*       Significant Imaging: I have reviewed all pertinent imaging results/findings within the past 24 hours.      Assessment/Plan:      * Sepsis due to pneumonia  Pneumonia  Acute encephalopathy    Encephalopathy is related to infection and immunocompromised stated. Noted on the CT with pneumonia. WBC---8.07>6.13. BC NGTD. Will cont Vancomycin and cefepime. She will need a sitter at the bedside. Ativan ordered prn for pain. Restraints to the bilateral wrist have been removed. She required restraints overnight.     Ascites  As noted on CT of the abd---small volume scattered ascites. Likely related to ovarian CA. Monitor.       Shingles  Immunocompromised. Will need contact precautions.       Hypokalemia  Chronically low per the family. Replace and monitor.         Anxiety  Depression    Prn ativan IV ordered. Agitated on exam.      Malignant neoplasm of ovary  She is being followed by her Oncologist Dr. Irvin Vinson with plans to start chemo soon.       Glaucoma suspect - Both Eyes  Monitor.     Chronic asthma  Chronic. No resp distress on exam.       Fibromyalgia  Chronic.        VTE Risk Mitigation (From admission, onward)         Ordered     enoxaparin injection  40 mg  Every 12 hours      12/08/19 1408     IP VTE HIGH RISK PATIENT  Once      12/08/19 1408                      Renetta Matamoros NP  Department of Hospital Medicine   [facility]

## 2019-12-09 NOTE — SUBJECTIVE & OBJECTIVE
Interval History: She is very calm on this morning. No distress noted. Sitter is at the bedside as well as the . Will cont to update the plan of care.     Review of Systems   Unable to perform ROS: Acuity of condition     Objective:     Vital Signs (Most Recent):  Temp: 96.8 °F (36 °C) (12/09/19 1150)  Pulse: 84 (12/09/19 1549)  Resp: 18 (12/09/19 1150)  BP: (!) 141/66 (12/09/19 1150)  SpO2: 96 % (12/09/19 1558) Vital Signs (24h Range):  Temp:  [96.8 °F (36 °C)-99.1 °F (37.3 °C)] 96.8 °F (36 °C)  Pulse:  [] 84  Resp:  [16-22] 18  SpO2:  [93 %-96 %] 96 %  BP: (141-162)/(66-77) 141/66     Weight: 59.1 kg (130 lb 4.7 oz)  Body mass index is 22.36 kg/m².    Intake/Output Summary (Last 24 hours) at 12/9/2019 1619  Last data filed at 12/9/2019 1400  Gross per 24 hour   Intake 358 ml   Output 931 ml   Net -573 ml      Physical Exam   Constitutional: She has a sickly appearance.   Eyes: Pupils are equal, round, and reactive to light.   Neck: Normal range of motion. No JVD present.   Cardiovascular: Normal rate and intact distal pulses.   No murmur heard.  Pulmonary/Chest: Effort normal. No respiratory distress. She has no wheezes.   Abdominal: Soft. Bowel sounds are normal. She exhibits no distension. There is no tenderness. There is no guarding.   Musculoskeletal: She exhibits no edema or tenderness.   Neurological:   Still sleepy but she is calm. She is able to answer questions.   Skin: Skin is warm and dry. Capillary refill takes less than 2 seconds.   Psychiatric: She has a normal mood and affect. Her behavior is normal. Judgment and thought content normal.   Nursing note and vitals reviewed.      Significant Labs:   BMP:   Recent Labs   Lab 12/09/19  0712   GLU 88      K 2.6*      CO2 26   BUN 6*   CREATININE 0.7   CALCIUM 8.7   MG 1.7     CBC:   Recent Labs   Lab 12/08/19  1213 12/09/19  0713   WBC 8.07 6.13   HGB 10.1* 9.5*   HCT 31.2* 29.8*   * 375*     POCT Glucose:   Recent Labs    Lab 12/08/19  1230 12/09/19  1148   POCTGLUCOSE 124* 67*       Significant Imaging: I have reviewed all pertinent imaging results/findings within the past 24 hours.

## 2019-12-09 NOTE — PLAN OF CARE
Pt vitals were maintained, pt tried several times to get out the bed and to pull at IV. Pt was put back in bed. MD was notified b/c pt was very agitated and pt prn ativan was not due, MD pt in for some haldol pt still was up after a hour and. Pt was voided 3 times and still was bladder scanned, we found >425 , MD was notified and straight cath was ordered, will continue to monitor

## 2019-12-09 NOTE — PLAN OF CARE
Pt remains confused, more alert to self and POC this PM. Wrist restraints d/c this AM r/t improvement in mentation, behavior. Still requires some redirection. Tolerated IV potassium, IV abx well. Up to bathroom with assist.  at bedside throughout shift and aware of POC. Sitter at bedside. Isolation maintained. Safety precautions maintained. Report given to JES Sierra.

## 2019-12-09 NOTE — PLAN OF CARE
MILAGRO Arshad Np notified that patient is having difficulty urinating and has a bladder scan greater than 425.  Order received for in and out cath.  Bedside nurse Ruby updated on plan

## 2019-12-09 NOTE — HPI
"Ms. Margarita Dunne is a 73 y/o female who lives in [place], [place] at her residence with her family. Her PCP is Dr. Columba Anderson. Her oncologist Dr. Irvin Vinson. She has a past medical history of arthritis, asthma, cancer, allergy, angio-edema, anxiety, cataract, chest pain at rest, depression, diverticulosis, fibromyalgia, glaucoma suspect, hand and joint pain. Headache, hx of psychiatric care, migraine, osteoporosis, psychiatric problem, recurrent upper respiratory infection, sleep difficulties, and urticaria. She has a past surgical history of an appendectomy, debulking of tumor, mobilization of splenic flexure, omentectomy, resection of peritoneal tissue, bilateral salpingoophorectomy, and total abd hysterectomy. She does not smoke cigarettes, drink alcohol, or use illicit drugs.                 She presents to [facility] with complaints of an altered mental status that began this morning per the patients . The  states the patient was her usual self until this morning where he noticed she has been very confused and agitated all day. The patients  states she was diagnosed with Ovarian CA, unable to start chemo just yet.  The patient was diagnosed with shingles 2 days ago. Per the  she has had no injuries or trauma, no complaints of chest pain, palpitations, fever, chills, vomiting, diarrhea, blood in the stool, or hematuria.     The ED workup notes a stable hemoglobin, hypokalemia, albumin 3.4, ALT 8, lactate 3.1>2.2, UDS negative, blood cultures pending, CT of the head shows no acute abnormalities, CTA of the chest shows no PE but bilateral pleural effusions. Left lower lobe interval cluster of small nodules which can be seen with localized the broncho pneumonia from infectious or inflammatory etiology or aspiration. Upper abdominal small volume scattered ascites.      Palliative medicine met with patient. She is awake and alert. "I just can't remember things " "lately. " Patient repeating same sentences and asking when to go home. Spoke with spouse via phone. He states "I am aware of her illness."  This is a family that will need ongoing education as disease progresses. Spouse goal is to continue seeking treatment for his wife. Palliative medicine will continue to follow patient. If patient is able, she can benefit from Palliative medicine clinic as ongoing education and emotional support is needed. All questions and concerns addressed. Contact information given.   "

## 2019-12-09 NOTE — PLAN OF CARE
TN met with patient and spouse Charles [phone] at bedside.  Currently, patient lives at home with her  and was independent of ADL's prior to admission. No DME or HH noted. Upon discharge, patient's  will be her help at home, along with providing transportation home and to any follow up appointments.       TN  updated whiteboard with contact information. Blue discharge folder and discharge brochure given to spouse. TN instructed patient and spouse to contact TN if they have any further questions on concerns. TN will continue to follow.         12/09/19 1411   Discharge Assessment   Assessment Type Discharge Planning Assessment   Assessment information obtained from? Medical Record;Caregiver;Patient  (, Charles [phone])   Expected Length of Stay (days) 2   Prior to hospitilization cognitive status: Alert/Oriented   Prior to hospitalization functional status: Independent   Current cognitive status: Not Oriented to Time   Current Functional Status: Needs Assistance   Lives With spouse  (Charles)   Able to Return to Prior Arrangements yes   Is patient able to care for self after discharge? Unable to determine at this time (comments)   Patient's perception of discharge disposition home or selfcare   Patient currently being followed by outpatient case management? No   Patient currently receives any other outside agency services? No   Equipment Currently Used at Home none   Do you have any problems affording any of your prescribed medications? No   Is the patient taking medications as prescribed? yes   Does the patient have transportation home? Yes   Transportation Anticipated family or friend will provide   Does the patient receive services at the Coumadin Clinic? No   Discharge Plan A Home with family   Discharge Plan B Home with family;Home Health   DME Needed Upon Discharge  other (see comments)  (TBD)   Patient/Family in Agreement with Plan yes   Does the patient have transportation to  healthcare appointments? Yes   Readmission Questionnaire   Have you felt down, depressed, or hopeless? 0

## 2019-12-09 NOTE — PROGRESS NOTES
Pharmacist Renal Dose Adjustment Note    Margarita Dunne is a 74 y.o. female being treated with the medication cefepime    Patient Data:    Vital Signs (Most Recent):  Temp: 97.6 °F (36.4 °C) (12/09/19 0809)  Pulse: 88 (12/09/19 0809)  Resp: 18 (12/09/19 0809)  BP: (!) 160/72 (12/09/19 0809)  SpO2: 96 % (12/09/19 0740)   Vital Signs (72h Range):  Temp:  [97.3 °F (36.3 °C)-102 °F (38.9 °C)]   Pulse:  []   Resp:  [16-47]   BP: (138-183)/(69-97)   SpO2:  [93 %-99 %]      Recent Labs   Lab 12/06/19  0953 12/08/19  1213 12/09/19  0712   CREATININE 0.8 0.8 0.7     Serum creatinine: 0.7 mg/dL 12/09/19 0712  Estimated creatinine clearance: 59 mL/min    Medication:cefepime 2g q24h will be changed to medication:cefepime 2g q12h    Pharmacist's Name: Olga Narvaez  Pharmacist's Extension: [phone]

## 2019-12-09 NOTE — NURSING
Wrist restraints removed at 1135. At this time, pt remains calm in bed, resting quietly, no evidence of behavior requiring continuation of restraints. At removal time, pt and  educated to removal process and behavior. Pt more oriented at that time, able to state full name and birthdate, as well as current location and city. Sitter remains present at bedside. Will continue to monitor pt and behavior.

## 2019-12-09 NOTE — PLAN OF CARE
FAYE Arshad NP notified that patient is agitated and continuously trying to climb out of bed at this time.  Patient already received prn Ativan at 1900 and is not allowed another dose at this time.  FAYE Arshad NP stated she would place additional orders for treatment.

## 2019-12-10 PROBLEM — R41.3 MEMORY LOSS: Chronic | Status: ACTIVE | Noted: 2017-12-21

## 2019-12-10 PROBLEM — B02.9 SHINGLES: Status: RESOLVED | Noted: 2019-12-08 | Resolved: 2019-12-10

## 2019-12-10 PROBLEM — J18.9 SEPSIS DUE TO PNEUMONIA: Status: RESOLVED | Noted: 2019-12-08 | Resolved: 2019-12-10

## 2019-12-10 PROBLEM — A41.9 SEPSIS DUE TO PNEUMONIA: Status: RESOLVED | Noted: 2019-12-08 | Resolved: 2019-12-10

## 2019-12-10 PROBLEM — E87.6 HYPOKALEMIA: Status: RESOLVED | Noted: 2019-12-08 | Resolved: 2019-12-10

## 2019-12-10 PROBLEM — J01.90 ACUTE SINUSITIS: Status: ACTIVE | Noted: 2019-12-08

## 2019-12-10 LAB
AMMONIA PLAS-SCNC: 20 UMOL/L (ref 10–50)
ANION GAP SERPL CALC-SCNC: 9 MMOL/L (ref 8–16)
BASOPHILS # BLD AUTO: 0.06 K/UL (ref 0–0.2)
BASOPHILS NFR BLD: 1 % (ref 0–1.9)
BUN SERPL-MCNC: 5 MG/DL (ref 8–23)
CALCIUM SERPL-MCNC: 8.6 MG/DL (ref 8.7–10.5)
CHLORIDE SERPL-SCNC: 105 MMOL/L (ref 95–110)
CO2 SERPL-SCNC: 23 MMOL/L (ref 23–29)
CREAT SERPL-MCNC: 0.7 MG/DL (ref 0.5–1.4)
DIFFERENTIAL METHOD: ABNORMAL
EOSINOPHIL # BLD AUTO: 0.5 K/UL (ref 0–0.5)
EOSINOPHIL NFR BLD: 7.3 % (ref 0–8)
ERYTHROCYTE [DISTWIDTH] IN BLOOD BY AUTOMATED COUNT: 18.6 % (ref 11.5–14.5)
EST. GFR  (AFRICAN AMERICAN): >60 ML/MIN/1.73 M^2
EST. GFR  (NON AFRICAN AMERICAN): >60 ML/MIN/1.73 M^2
FINAL PATHOLOGIC DIAGNOSIS: NORMAL
GLUCOSE SERPL-MCNC: 147 MG/DL (ref 70–110)
GROSS: NORMAL
HCT VFR BLD AUTO: 32.3 % (ref 37–48.5)
HGB BLD-MCNC: 10.6 G/DL (ref 12–16)
LYMPHOCYTES # BLD AUTO: 1.2 K/UL (ref 1–4.8)
LYMPHOCYTES NFR BLD: 18.9 % (ref 18–48)
MAGNESIUM SERPL-MCNC: 2.2 MG/DL (ref 1.6–2.6)
MCH RBC QN AUTO: 27.6 PG (ref 27–31)
MCHC RBC AUTO-ENTMCNC: 32.8 G/DL (ref 32–36)
MCV RBC AUTO: 84 FL (ref 82–98)
MONOCYTES # BLD AUTO: 0.9 K/UL (ref 0.3–1)
MONOCYTES NFR BLD: 13.8 % (ref 4–15)
NEUTROPHILS # BLD AUTO: 3.6 K/UL (ref 1.8–7.7)
NEUTROPHILS NFR BLD: 59 % (ref 38–73)
PLATELET # BLD AUTO: 380 K/UL (ref 150–350)
PMV BLD AUTO: 10.3 FL (ref 9.2–12.9)
POTASSIUM SERPL-SCNC: 3.4 MMOL/L (ref 3.5–5.1)
RBC # BLD AUTO: 3.84 M/UL (ref 4–5.4)
SODIUM SERPL-SCNC: 137 MMOL/L (ref 136–145)
VANCOMYCIN TROUGH SERPL-MCNC: 9.7 UG/ML (ref 10–22)
WBC # BLD AUTO: 6.15 K/UL (ref 3.9–12.7)

## 2019-12-10 PROCEDURE — 36415 COLL VENOUS BLD VENIPUNCTURE: CPT

## 2019-12-10 PROCEDURE — 25000003 PHARM REV CODE 250: Performed by: HOSPITALIST

## 2019-12-10 PROCEDURE — 11000001 HC ACUTE MED/SURG PRIVATE ROOM

## 2019-12-10 PROCEDURE — 83735 ASSAY OF MAGNESIUM: CPT

## 2019-12-10 PROCEDURE — 94761 N-INVAS EAR/PLS OXIMETRY MLT: CPT

## 2019-12-10 PROCEDURE — 82140 ASSAY OF AMMONIA: CPT

## 2019-12-10 PROCEDURE — 85025 COMPLETE CBC W/AUTO DIFF WBC: CPT

## 2019-12-10 PROCEDURE — 63600175 PHARM REV CODE 636 W HCPCS: Performed by: FAMILY MEDICINE

## 2019-12-10 PROCEDURE — 25000003 PHARM REV CODE 250: Performed by: NURSE PRACTITIONER

## 2019-12-10 PROCEDURE — 80048 BASIC METABOLIC PNL TOTAL CA: CPT

## 2019-12-10 PROCEDURE — 80202 ASSAY OF VANCOMYCIN: CPT

## 2019-12-10 PROCEDURE — 63600175 PHARM REV CODE 636 W HCPCS: Performed by: NURSE PRACTITIONER

## 2019-12-10 RX ORDER — ENOXAPARIN SODIUM 100 MG/ML
40 INJECTION SUBCUTANEOUS
Status: DISCONTINUED | OUTPATIENT
Start: 2019-12-11 | End: 2019-12-11 | Stop reason: HOSPADM

## 2019-12-10 RX ORDER — CEFPODOXIME PROXETIL 200 MG/1
200 TABLET, FILM COATED ORAL EVERY 12 HOURS
Status: DISCONTINUED | OUTPATIENT
Start: 2019-12-10 | End: 2019-12-11 | Stop reason: HOSPADM

## 2019-12-10 RX ADMIN — ENOXAPARIN SODIUM 40 MG: 100 INJECTION SUBCUTANEOUS at 09:12

## 2019-12-10 RX ADMIN — POTASSIUM CHLORIDE 40 MEQ: 1500 TABLET, EXTENDED RELEASE ORAL at 09:12

## 2019-12-10 RX ADMIN — ONDANSETRON 4 MG: 4 TABLET, ORALLY DISINTEGRATING ORAL at 10:12

## 2019-12-10 RX ADMIN — VANCOMYCIN HYDROCHLORIDE 1000 MG: 1 INJECTION, POWDER, LYOPHILIZED, FOR SOLUTION INTRAVENOUS at 03:12

## 2019-12-10 RX ADMIN — CEFEPIME HYDROCHLORIDE 2 G: 2 INJECTION, POWDER, FOR SOLUTION INTRAVENOUS at 11:12

## 2019-12-10 RX ADMIN — SENNOSIDES AND DOCUSATE SODIUM 1 TABLET: 8.6; 5 TABLET ORAL at 09:12

## 2019-12-10 RX ADMIN — CEFPODOXIME PROXETIL 200 MG: 200 TABLET, FILM COATED ORAL at 09:12

## 2019-12-10 RX ADMIN — ACETAMINOPHEN 650 MG: 325 TABLET ORAL at 09:12

## 2019-12-10 RX ADMIN — CEFEPIME HYDROCHLORIDE 2 G: 2 INJECTION, POWDER, FOR SOLUTION INTRAVENOUS at 12:12

## 2019-12-10 NOTE — PLAN OF CARE
Oriented to self. Scheduled medications administered per MD order. Prn anxiety medication administered. Appears to be in no pain or discomfort. Afebrile. Ambulating to bathroom with assistance. Isolation precautions maintained. Safety maintained. Will continue to monitor.

## 2019-12-10 NOTE — PLAN OF CARE
POC reviewed with patient; no evidence of learning noted. AAO to self. Compliant with medication administration regimen. Abx therapy maintained. Ambulates independently with standby assistance. Feeds self, independently. Safety precautions in place; Telesitter in place, bed alarm ON, call light within reach, bed in low position, wheels locked, side rails up x2. Will continue to monitor.

## 2019-12-10 NOTE — PLAN OF CARE
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 She lives in [place], [place]. She is . Her primary care physician is Dr. Columba Anderson. Her OBGYN is Dr. Irvin Vinson.    She was seen at [facility] on 12/5/19 for left neck shingles and upper respiratory infection. She was prescribed valacyclovir, codeine-guaifenesin, and prochlorperazine, and her budesonide-formoterol was refilled.    Labs on 12/619 showed hypokalemia with potassium of 2.7 mmol/L.   She presented to [facility] on 12/8/19 with delirium and agitation that her  reported started in the morning. She had no new complaints. In the emergency department, labs showed less severe hypokalemia (potassium 3.1). Lactate was elevated at 3.1 mmol/L. Head CT showed acute sinusitis. Chest CTA showed small bilateral pleural effusions, small pericardial effusion, small volume abdominal ascites, and small left lower lobe nodules. She was admitted to [facility].    She was given IV fluids, IV potassium, IV antibiotics, and IV lorazepam. She remained agitated and required soft wrist restraints.

## 2019-12-10 NOTE — PROGRESS NOTES
Pharmacokinetic Assessment Follow Up: IV Vancomycin    Vancomycin serum concentration assessment(s):    The trough level was drawn correctly and can be used to guide therapy at this time. The measurement is below the desired definitive target range of 15 to 20 mcg/mL.    Vancomycin Regimen Plan:    Change regimen to Vancomycin 1250 mg IV every 24 hours with next serum trough concentration measured at 1330 prior to 3rd dose on 12/12     Drug levels (last 3 results):  Recent Labs   Lab Result Units 12/10/19  1452   Vancomycin-Trough ug/mL 9.7*       Pharmacy will continue to follow and monitor vancomycin.    Please contact pharmacy at extension [phone] for questions regarding this assessment.    Thank you for the consult,   Olga Narvaez       Patient brief summary:  Margarita Dunne is a 74 y.o. female initiated on antimicrobial therapy with IV Vancomycin for treatment of lower respiratory infection    The patient's current regimen is 1g q24h    Drug Allergies:   Review of patient's allergies indicates:   Allergen Reactions    Doxycycline Nausea And Vomiting    Corticosteroids (glucocorticoids) Other (See Comments)     Causes pt to have migraines for 2 weeks    Pcn [penicillins] Rash    Sulfa (sulfonamide antibiotics) Rash       Actual Body Weight:   58kg    Renal Function:   Estimated Creatinine Clearance: 59 mL/min (based on SCr of 0.7 mg/dL).,     Dialysis Method (if applicable):  N/A    CBC (last 72 hours):  Recent Labs   Lab Result Units 12/08/19  1213 12/09/19  0713 12/10/19  0902   WBC K/uL 8.07 6.13 6.15   Hemoglobin g/dL 10.1* 9.5* 10.6*   Hematocrit % 31.2* 29.8* 32.3*   Platelets K/uL 416* 375* 380*   Gran% % 79.7* 61.7 59.0   Lymph% % 8.7* 19.9 18.9   Mono% % 11.0 16.3* 13.8   Eosinophil% % 0.1 1.6 7.3   Basophil% % 0.5 0.5 1.0   Differential Method  Automated Automated Automated       Metabolic Panel (last 72 hours):  Recent Labs   Lab Result Units 12/08/19  1213 12/08/19  1410 12/09/19  0712 12/09/19  0713  12/10/19  0902   Sodium mmol/L 138  --  139  --  137   Potassium mmol/L 3.1*  --  2.6* 2.8* 3.4*   Chloride mmol/L 101  --  103  --  105   CO2 mmol/L 22*  --  26  --  23   Glucose mg/dL 125*  --  88  --  147*   Glucose, UA   --  Negative  --   --   --    BUN, Bld mg/dL 8  --  6*  --  5*   Creatinine mg/dL 0.8  --  0.7  --  0.7   Creatinine, Random Ur mg/dL  --  33.2  --   --   --    Albumin g/dL 3.4*  --   --   --   --    Total Bilirubin mg/dL 0.7  --   --   --   --    Alkaline Phosphatase U/L 77  --   --   --   --    AST U/L 18  --   --   --   --    ALT U/L 8*  --   --   --   --    Magnesium mg/dL 1.6  --  1.7  --  2.2       Vancomycin Administrations:  vancomycin given in the last 96 hours                     vancomycin in dextrose 5 % 1 gram/250 mL IVPB 1,000 mg (mg) 1,000 mg New Bag 12/10/19 1534     1,000 mg New Bag 12/09/19 1515                      Microbiologic Results:  Microbiology Results (last 7 days)       Procedure Component Value Units Date/Time    Blood culture #2 **CANNOT BE ORDERED STAT** [[id number]] Collected:  12/08/19 1212    Order Status:  Completed Specimen:  Blood from Peripheral, Forearm, Right Updated:  12/09/19 1812     Blood Culture, Routine No Growth to date      No Growth to date    Blood culture #1 **CANNOT BE ORDERED STAT** [[id number]] Collected:  12/08/19 1212    Order Status:  Completed Specimen:  Blood from Peripheral, Forearm, Left Updated:  12/09/19 1812     Blood Culture, Routine No Growth to date      No Growth to date    Influenza A & B by Molecular [[id number]] Collected:  12/08/19 1218    Order Status:  Completed Specimen:  Nasopharyngeal Swab Updated:  12/08/19 1307     Influenza A, Molecular Negative     Influenza B, Molecular Negative     Flu A & B Source Nasal swab

## 2019-12-11 ENCOUNTER — PATIENT MESSAGE (OUTPATIENT)
Dept: GYNECOLOGIC ONCOLOGY | Facility: CLINIC | Age: 74
End: 2019-12-11

## 2019-12-11 VITALS
WEIGHT: 131.38 LBS | TEMPERATURE: 98 F | HEIGHT: 64 IN | OXYGEN SATURATION: 96 % | BODY MASS INDEX: 22.43 KG/M2 | RESPIRATION RATE: 18 BRPM | SYSTOLIC BLOOD PRESSURE: 138 MMHG | HEART RATE: 89 BPM | DIASTOLIC BLOOD PRESSURE: 65 MMHG

## 2019-12-11 PROBLEM — R33.9 URINARY RETENTION: Status: RESOLVED | Noted: 2019-12-08 | Resolved: 2019-12-11

## 2019-12-11 PROBLEM — Z71.89 GOALS OF CARE, COUNSELING/DISCUSSION: Status: RESOLVED | Noted: 2019-12-09 | Resolved: 2019-12-11

## 2019-12-11 PROBLEM — Z51.5 PALLIATIVE CARE ENCOUNTER: Status: RESOLVED | Noted: 2019-12-09 | Resolved: 2019-12-11

## 2019-12-11 PROBLEM — Z71.89 COUNSELING REGARDING ADVANCE CARE PLANNING AND GOALS OF CARE: Status: RESOLVED | Noted: 2019-12-09 | Resolved: 2019-12-11

## 2019-12-11 PROBLEM — G93.40 ACUTE ENCEPHALOPATHY: Status: RESOLVED | Noted: 2019-12-08 | Resolved: 2019-12-11

## 2019-12-11 LAB
AMMONIA PLAS-SCNC: 21 UMOL/L (ref 10–50)
ANION GAP SERPL CALC-SCNC: 8 MMOL/L (ref 8–16)
BASOPHILS # BLD AUTO: 0.05 K/UL (ref 0–0.2)
BASOPHILS NFR BLD: 1 % (ref 0–1.9)
BUN SERPL-MCNC: 6 MG/DL (ref 8–23)
CALCIUM SERPL-MCNC: 8.2 MG/DL (ref 8.7–10.5)
CHLORIDE SERPL-SCNC: 106 MMOL/L (ref 95–110)
CO2 SERPL-SCNC: 23 MMOL/L (ref 23–29)
CREAT SERPL-MCNC: 0.7 MG/DL (ref 0.5–1.4)
DIFFERENTIAL METHOD: ABNORMAL
EOSINOPHIL # BLD AUTO: 0.2 K/UL (ref 0–0.5)
EOSINOPHIL NFR BLD: 4.3 % (ref 0–8)
ERYTHROCYTE [DISTWIDTH] IN BLOOD BY AUTOMATED COUNT: 18.4 % (ref 11.5–14.5)
EST. GFR  (AFRICAN AMERICAN): >60 ML/MIN/1.73 M^2
EST. GFR  (NON AFRICAN AMERICAN): >60 ML/MIN/1.73 M^2
GLUCOSE SERPL-MCNC: 105 MG/DL (ref 70–110)
HCT VFR BLD AUTO: 29 % (ref 37–48.5)
HGB BLD-MCNC: 9.3 G/DL (ref 12–16)
LYMPHOCYTES # BLD AUTO: 0.9 K/UL (ref 1–4.8)
LYMPHOCYTES NFR BLD: 16.5 % (ref 18–48)
MAGNESIUM SERPL-MCNC: 2 MG/DL (ref 1.6–2.6)
MCH RBC QN AUTO: 27.2 PG (ref 27–31)
MCHC RBC AUTO-ENTMCNC: 32.1 G/DL (ref 32–36)
MCV RBC AUTO: 85 FL (ref 82–98)
MONOCYTES # BLD AUTO: 0.8 K/UL (ref 0.3–1)
MONOCYTES NFR BLD: 15.8 % (ref 4–15)
NEUTROPHILS # BLD AUTO: 3.2 K/UL (ref 1.8–7.7)
NEUTROPHILS NFR BLD: 62.4 % (ref 38–73)
PLATELET # BLD AUTO: 334 K/UL (ref 150–350)
PMV BLD AUTO: 9.3 FL (ref 9.2–12.9)
POCT GLUCOSE: 93 MG/DL (ref 70–110)
POTASSIUM SERPL-SCNC: 3.4 MMOL/L (ref 3.5–5.1)
RBC # BLD AUTO: 3.42 M/UL (ref 4–5.4)
SODIUM SERPL-SCNC: 137 MMOL/L (ref 136–145)
WBC # BLD AUTO: 5.14 K/UL (ref 3.9–12.7)

## 2019-12-11 PROCEDURE — 94761 N-INVAS EAR/PLS OXIMETRY MLT: CPT

## 2019-12-11 PROCEDURE — 25000003 PHARM REV CODE 250: Performed by: NURSE PRACTITIONER

## 2019-12-11 PROCEDURE — 80048 BASIC METABOLIC PNL TOTAL CA: CPT

## 2019-12-11 PROCEDURE — 99222 PR INITIAL HOSPITAL CARE,LEVL II: ICD-10-PCS | Mod: ,,, | Performed by: NURSE PRACTITIONER

## 2019-12-11 PROCEDURE — 99222 1ST HOSP IP/OBS MODERATE 55: CPT | Mod: ,,, | Performed by: NURSE PRACTITIONER

## 2019-12-11 PROCEDURE — 82140 ASSAY OF AMMONIA: CPT

## 2019-12-11 PROCEDURE — 36415 COLL VENOUS BLD VENIPUNCTURE: CPT

## 2019-12-11 PROCEDURE — 97535 SELF CARE MNGMENT TRAINING: CPT

## 2019-12-11 PROCEDURE — 83735 ASSAY OF MAGNESIUM: CPT

## 2019-12-11 PROCEDURE — 97161 PT EVAL LOW COMPLEX 20 MIN: CPT

## 2019-12-11 PROCEDURE — 97165 OT EVAL LOW COMPLEX 30 MIN: CPT

## 2019-12-11 PROCEDURE — 85025 COMPLETE CBC W/AUTO DIFF WBC: CPT

## 2019-12-11 PROCEDURE — 25000003 PHARM REV CODE 250: Performed by: HOSPITALIST

## 2019-12-11 RX ORDER — CEFDINIR 300 MG/1
300 CAPSULE ORAL 2 TIMES DAILY
Qty: 8 CAPSULE | Refills: 0 | Status: SHIPPED | OUTPATIENT
Start: 2019-12-11 | End: 2019-12-15

## 2019-12-11 RX ORDER — VALACYCLOVIR HYDROCHLORIDE 1 G/1
1000 TABLET, FILM COATED ORAL 3 TIMES DAILY
Qty: 21 TABLET | Refills: 0 | Status: SHIPPED | OUTPATIENT
Start: 2019-12-11 | End: 2019-12-19

## 2019-12-11 RX ADMIN — SENNOSIDES AND DOCUSATE SODIUM 1 TABLET: 8.6; 5 TABLET ORAL at 08:12

## 2019-12-11 RX ADMIN — POTASSIUM CHLORIDE 40 MEQ: 1500 TABLET, EXTENDED RELEASE ORAL at 08:12

## 2019-12-11 RX ADMIN — CEFPODOXIME PROXETIL 200 MG: 200 TABLET, FILM COATED ORAL at 08:12

## 2019-12-11 NOTE — SUBJECTIVE & OBJECTIVE
Interval History: No Physical or Occupational Therapy consult notes found. Apparently they were not consulted, so awaiting recommendations that will never come.    Review of Systems   Unable to perform ROS: Mental status change     Objective:     Vital Signs (Most Recent):  Temp: 96 °F (35.6 °C) (12/10/19 1606)  Pulse: 89 (12/10/19 1606)  Resp: 18 (12/10/19 1606)  BP: (!) 141/67 (12/10/19 1606)  SpO2: 96 % (12/10/19 1606) Vital Signs (24h Range):  Temp:  [96 °F (35.6 °C)-98 °F (36.7 °C)] 96 °F (35.6 °C)  Pulse:  [72-98] 89  Resp:  [17-20] 18  SpO2:  [93 %-97 %] 96 %  BP: (135-152)/(63-81) 141/67     Weight: 58.5 kg (128 lb 15.5 oz)  Body mass index is 22.14 kg/m².    Intake/Output Summary (Last 24 hours) at 12/10/2019 2014  Last data filed at 12/10/2019 1630  Gross per 24 hour   Intake 818 ml   Output 1150 ml   Net -332 ml      Physical Exam   Constitutional: She appears well-developed. No distress.   Pulmonary/Chest: Effort normal. No respiratory distress.   Neurological: She is alert.   Psychiatric: She has a normal mood and affect.   Nursing note and vitals reviewed.      Significant Labs: All pertinent labs within the past 24 hours have been reviewed.    Significant Imaging: I have reviewed all pertinent imaging results/findings within the past 24 hours.   CT Head Without Contrast 12/08/19: FINDINGS:  Intracranial compartment:  Ventricles and sulci are normal in size for age without evidence of hydrocephalus. No extra-axial blood or fluid collections.  There's some patchy nonspecific low-density changes seen throughout the supratentorial white matter, most likely representing sequela of chronic microvascular ischemic disease.  The appearance is unchanged from prior.  No parenchymal mass, hemorrhage, edema or major vascular distribution infarct.  Skull/extracranial contents (limited evaluation): No fracture. Mucosal thickening noted throughout the bilateral ethmoid sinuses as well as the left sphenoid and  visualized bilateral maxillary sinuses.  Air-fluid levels noted in the left sphenoid and right maxillary sinuses, in keeping with acute sinusitis.  Impression:  1. No acute intracranial findings  2. Findings in keeping with acute sinusitis as above.  CTA Chest Non-Coronary (PE Study) 12/08/19: FINDINGS:  Beam hardening with streak artifact from overlying monitoring leads and contrast bolus within the SVC and adjacent upper extremities somewhat limits evaluation.  There is also respiratory motion.  Suboptimal timing of contrast bolus in which the opacification is similar in the pulmonary arterial, venous and systemic circulations.  No central PE, saddle embolus or pulmonary infarction.  Heterogeneous opacification of multiple segmental and subsegmental pulmonary arterial branches to both lungs, presumably related to technical factors and limitations above noting pulmonary emboli not excluded in these vessels.  Pulmonary trunk is within normal limits.  Pulmonary arteries distribute normally.  There are 4 pulmonary veins.  Heart is mildly enlarged noting small volume nonspecific pericardial fluid.  No cardiac thrombus seen.  Left-sided 3 vessel arch.  Minimal scattered calcific atherosclerosis of the thoracic aorta without aneurysm or dissection.  Esophagus is normal in course and caliber.  No mediastinal, hilar or axillary lymphadenopathy noting calcified lymph nodes at the right hilum likely sequela of prior granulomatous disease.  Trachea is midline and patent.  Proximal airways are patent.  Right upper lobe subcentimeter calcified granuloma.  Small volume layering pleural effusions bilaterally with mild amount of overlying compressive atelectasis, right greater than left.  Minimal biapical pleuroparenchymal scarring.  Few scattered linear opacities consistent with subsegmental scarring versus atelectasis.  Cluster of small nodules within the posterior aspect of the left lower lobe, new from prior CT.  No large  consolidation or pneumothorax.  Structures at the base of the neck are within normal limits.  Extrathoracic soft tissues are within normal limits.  Imaged upper abdomen shows scattered small volume ascites.  Small hiatal hernia.  Osseous structures appear stable without acute or destructive process seen.  Impression:  1. No central PE or pulmonary infarction.  Cannot exclude pulmonary emboli in the segmental or distal subsegmental pulmonary arterial branches to each lung secondary to technical factors and limitations above.  Further evaluation/follow-up as warranted.  2. Small bilateral pleural effusions.  3. Left lower lobe interval cluster of small nodules which can be seen with localized the broncho pneumonia from infectious or inflammatory etiology or aspiration.  Follow-up as warranted.  4. Cardiomegaly and small pericardial effusion without convincing evidence of failure.  5. Upper abdominal small volume scattered ascites.  6. Additional findings as above.

## 2019-12-11 NOTE — PLAN OF CARE
Pt vitals were maintained, Pt remembered her name and were she was located at. Pt is still confused about why she is there. Pt is ambulating to bathroom with assist, also pt has had a BM. Pt had some mild bruising around old IV sites. Pt took all meds with time and patience. Will continue to monitor.

## 2019-12-11 NOTE — PLAN OF CARE
Problem: Physical Therapy Goal  Goal: Physical Therapy Goal  Description  Goals to be met by: 12/11/2019     No PT goals established         Outcome: Met   Appears patient has returned to functional mobility baseline  No DME needs noted  Will DC PT service.

## 2019-12-11 NOTE — CONSULTS
[facility]  Palliative Medicine  Consult Note    Patient Name: Margarita Dunne  MRN: [id number]  Admission Date: 12/8/2019  Hospital Length of Stay: 3 days  Code Status: Full Code   Attending Provider: Cesar Junior MD  Consulting Provider: Torri Chase NP  Primary Care Physician: Columba Anderson MD  Principal Problem:Sepsis due to pneumonia    Patient information was obtained from spouse/SO and ER records.      Inpatient consult to Palliative Care  Consult performed by: Torri Chase NP  Consult ordered by: Renetta Matamoros NP  Assessment/Recommendations:         Assessment/Plan:     Goals of care, counseling/discussion  Code status discussion-remain full code      Palliative care encounter  -GOC discussion  -Disease education  -Emotional support        Thank you for your consult. I will sign off. Please contact us if you have any additional questions.    Subjective:     HPI:   Ms. Margarita Dunne is a 75 y/o female who lives in [place], [place] at her residence with her family. Her PCP is Dr. Columba Anderson. Her oncologist Dr. Irvin Vinson. She has a past medical history of arthritis, asthma, cancer, allergy, angio-edema, anxiety, cataract, chest pain at rest, depression, diverticulosis, fibromyalgia, glaucoma suspect, hand and joint pain. Headache, hx of psychiatric care, migraine, osteoporosis, psychiatric problem, recurrent upper respiratory infection, sleep difficulties, and urticaria. She has a past surgical history of an appendectomy, debulking of tumor, mobilization of splenic flexure, omentectomy, resection of peritoneal tissue, bilateral salpingoophorectomy, and total abd hysterectomy. She does not smoke cigarettes, drink alcohol, or use illicit drugs.                 She presents to [facility] with complaints of an altered mental status that began this morning per the patients . The  states the patient was her usual self until this  "morning where he noticed she has been very confused and agitated all day. The patients  states she was diagnosed with Ovarian CA, unable to start chemo just yet.  The patient was diagnosed with shingles 2 days ago. Per the  she has had no injuries or trauma, no complaints of chest pain, palpitations, fever, chills, vomiting, diarrhea, blood in the stool, or hematuria.     The ED workup notes a stable hemoglobin, hypokalemia, albumin 3.4, ALT 8, lactate 3.1>2.2, UDS negative, blood cultures pending, CT of the head shows no acute abnormalities, CTA of the chest shows no PE but bilateral pleural effusions. Left lower lobe interval cluster of small nodules which can be seen with localized the broncho pneumonia from infectious or inflammatory etiology or aspiration. Upper abdominal small volume scattered ascites.      Palliative medicine met with patient. She is awake and alert. "I just can't remember things lately. " Patient repeating same sentences and asking when to go home. Spoke with spouse via phone. He states "I am aware of her illness."  This is a family that will need ongoing education as disease progresses. Spouse goal is to continue seeking treatment for his wife. Palliative medicine will continue to follow patient. If patient is able, she can benefit from Palliative medicine clinic as ongoing education and emotional support is needed. All questions and concerns addressed. Contact information given.     Hospital Course:  No notes on file    Interval History: Continue with present treatment. To remain a full code at this time.     Past Medical History:   Diagnosis Date    Allergy     Angio-edema     Anxiety     Arthritis     Asthma     Cancer     Cataract     Chest pain at rest     Depression     Diverticulosis     Fibromyalgia 7/8/2012    Glaucoma suspect, steroid responders     Hand joint pain 7/8/2012    Headache(784.0)     Hx of psychiatric care     Migraine     Osteoporosis  "    Psychiatric problem     Recurrent upper respiratory infection (URI)     Shingles 12/8/2019    Sleep difficulties     Therapy     Urticaria        Past Surgical History:   Procedure Laterality Date    APPENDECTOMY  11/12/2019    Procedure: APPENDECTOMY;  Surgeon: Irvin Vinson MD;  Location: [facility] OR 2ND FLR;  Service: OB/GYN;;    BIOPSY      DEBULKING OF TUMOR N/A 11/12/2019    Procedure: DEBULKING, NEOPLASM;  Surgeon: Irvin Vinson MD;  Location: [facility] OR 2ND FLR;  Service: OB/GYN;  Laterality: N/A;    dexa  2014    osteopenia    MOBILIZATION OF SPLENIC FLEXURE  11/12/2019    Procedure: MOBILIZATION, SPLENIC FLEXURE;  Surgeon: Irvin Vinson MD;  Location: [facility] OR [facility] FLR;  Service: OB/GYN;;    OMENTECTOMY N/A 11/12/2019    Procedure: OMENTECTOMY;  Surgeon: Irvin Vinson MD;  [facility];  Service: OB/GYN;  Laterality: N/A;    RESECTION OF PERITONEAL TISSUE  11/12/2019    Procedure: EXCISION, TISSUE, PERITONEUM;  Surgeon: Irvin Vinson MD;  [facility];  Service: OB/GYN;;    SALPINGOOPHORECTOMY Bilateral 11/12/2019    Procedure: SALPINGO-OOPHORECTOMY;  Surgeon: Irvin Vinson MD;  [facility];  Service: OB/GYN;  Laterality: Bilateral;    TOTAL ABDOMINAL HYSTERECTOMY N/A 11/12/2019    Procedure: HYSTERECTOMY, TOTAL, ABDOMINAL;  Surgeon: Irvin Vinson MD;  [facility];  Service: OB/GYN;  Laterality: N/A;       Review of patient's allergies indicates:   Allergen Reactions    Doxycycline Nausea And Vomiting    Corticosteroids (glucocorticoids) Other (See Comments)     Causes pt to have migraines for 2 weeks    Pcn [penicillins] Rash    Sulfa (sulfonamide antibiotics) Rash       Medications:  Continuous Infusions:  Scheduled Meds:   cefpodoxime  200 mg Oral Q12H    enoxparin  40 mg Subcutaneous Q24H    potassium chloride  40 mEq Oral BID    senna-docusate 8.6-50 mg  1 tablet Oral BID     PRN Meds:acetaminophen, glucagon (human recombinant),  haloperidol lactate, lorazepam, ondansetron, ondansetron, sodium chloride 0.9%, sodium chloride 0.9%    Family History     Problem Relation (Age of Onset)    Allergic rhinitis Father, Paternal Aunt, Paternal Uncle    Allergies Father, Paternal Aunt, Paternal Uncle    Cancer Father    Dementia Mother    Diabetes Mother    Hypertension Mother    Macular degeneration Cousin    Stroke Mother        Tobacco Use    Smoking status: Never Smoker    Smokeless tobacco: Never Used   Substance and Sexual Activity    Alcohol use: Not Currently     Frequency: Never     Binge frequency: Never     Comment: social wine use in the past not current. last was 15 years ago    Drug use: No    Sexual activity: Not on file       Review of Systems   Unable to perform ROS: Mental status change     Objective:     Vital Signs (Most Recent):  Temp: 97.4 °F (36.3 °C) (12/10/19 2130)  Pulse: 103 (12/10/19 2130)  Resp: 18 (12/10/19 2130)  BP: (!) 156/81 (12/10/19 2130)  SpO2: 96 % (12/10/19 2359) Vital Signs (24h Range):  Temp:  [96 °F (35.6 °C)-98 °F (36.7 °C)] 97.4 °F (36.3 °C)  Pulse:  [] 103  Resp:  [17-20] 18  SpO2:  [93 %-96 %] 96 %  BP: (135-156)/(63-81) 156/81     Weight: 58.5 kg (128 lb 15.5 oz)  Body mass index is 22.14 kg/m².    Review of Symptoms  Symptom Assessment (ESAS 0-10 scale)   ESAS 0 1 2 3 4 5 6 7 8 9 10   Pain              Dyspnea              Anxiety              Nausea              Depression               Anorexia              Fatigue              Insomnia              Restlessness               Agitation              CAM / Delirium __ --  ___+   Constipation     __ --  ___+   Diarrhea           __ --  ___+    Pain Assessment: yes    Physical Exam   Constitutional: She appears well-developed. No distress.   Pulmonary/Chest: Effort normal. No respiratory distress.   Neurological: She is alert.   Psychiatric: She has a normal mood and affect.   Nursing note and vitals reviewed.      Significant Labs: All pertinent  labs within the past 24 hours have been reviewed.  CBC:   Recent Labs   Lab 12/10/19  0902   WBC 6.15   HGB 10.6*   HCT 32.3*   MCV 84   *     BMP:  Recent Labs   Lab 12/10/19  0902   *      K 3.4*      CO2 23   BUN 5*   CREATININE 0.7   CALCIUM 8.6*   MG 2.2     LFT:  Lab Results   Component Value Date    AST 18 12/08/2019    ALKPHOS 77 12/08/2019    BILITOT 0.7 12/08/2019     Albumin:   Albumin   Date Value Ref Range Status   12/08/2019 3.4 (L) 3.5 - 5.2 g/dL Final     Protein:   Total Protein   Date Value Ref Range Status   12/08/2019 7.0 6.0 - 8.4 g/dL Final     Lactic acid:   Lab Results   Component Value Date    LACTATE 0.9 12/08/2019    LACTATE 2.2 12/08/2019       Significant Imaging: I have reviewed all pertinent imaging results/findings within the past 24 hours.    Advance Care Planning   Advanced Directives::  Living Will: No  LaPOST: No  Do Not Resuscitate Status: No  Medical Power of : No    Decision-Making Capacity: Family answered questions       Living Arrangements: Lives with spouse    Psychosocial/Cultural:  Patient's most important priorities:  none    Patient's biggest concerns/fears:  none    Previous death/end of life care history:  none    Patient's goals/hopes:  Finish cancer treatment    Spiritual:     F- Nevaeh and Belief: none    I - Importance: none    .  C - Community: none    A - Address in Care: none    Recommendations:  Continue medical treatment  Code status: Full Code     Thank you for the consult        Torri Chase, MSN, APRN, NP-C   Palliative Medicine   [facility]  [phone] or [phone]          >50% of 60  min visit spent in chart review, face to face discussion of goals of care with patient, family, symptom assessment, coordination of care and emotional support.      Patient will need Palliative medicine clinic appointment upon discharge.

## 2019-12-11 NOTE — DISCHARGE INSTRUCTIONS
Sepsis (English) View Edit Remove   Sepsis, Understanding (English) View Edit Remove   Adult, Pneumonia (English) View Edit Remove   [facility] IP AVS PNEUMONIA DISCHARGE INSTRUCTIONS View Edit Remove   Cefdinir capsules (English) View Edit Remove

## 2019-12-11 NOTE — PROGRESS NOTES
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 She lives in [place], [place]. She is . Her primary care physician is Dr. Columba Anderson. Her OBGYN is Dr. Irvin Vinson.    She was seen at [facility] on 12/5/19 for left neck shingles and upper respiratory infection. She was prescribed valacyclovir, codeine-guaifenesin, and prochlorperazine, and her budesonide-formoterol was refilled.    Labs on 12/619 showed hypokalemia with potassium of 2.7 mmol/L.   She presented to [facility] Emergency Department on 12/8/19 with delirium and agitation that her  reported started in the morning. She had no new complaints. In the emergency department, labs showed less severe hypokalemia (potassium 3.1). Lactate was elevated at 3.1 mmol/L. Head CT showed acute sinusitis. Chest CTA showed small bilateral pleural effusions, small pericardial effusion, small volume abdominal ascites, and small left lower lobe nodules. [facility] was [facility] to [facility].    Overview/Hospital Course:  She was given IV fluids, IV potassium, IV antibiotics, and IV lorazepam. She remained agitated and required soft wrist restraints. Agitation resolved but she remained  disoriented.     Interval History: No Physical or Occupational Therapy consult notes found. Apparently they were not consulted, so awaiting recommendations that will never come.    Review of Systems   Unable to perform ROS: Mental status change     Objective:     Vital Signs (Most Recent):  Temp: 96 °F (35.6 °C) (12/10/19 1606)  Pulse: 89 (12/10/19 1606)  Resp: 18 (12/10/19 1606)  BP: (!) 141/67 (12/10/19 1606)  SpO2: 96 % (12/10/19 1606) Vital Signs (24h Range):  Temp:  [96 °F (35.6 °C)-98 °F (36.7 °C)] 96 °F (35.6 °C)  Pulse:  [72-98] 89  Resp:  [17-20] 18  SpO2:  [93 %-97 %] 96 %  BP: (135-152)/(63-81) 141/67     Weight: 58.5 kg (128 lb 15.5 oz)  Body mass index is 22.14 kg/m².    Intake/Output Summary (Last 24 hours) at 12/10/2019 2014  Last data filed at 12/10/2019 1630  Gross per 24 hour   Intake 818 ml   Output 1150 ml   Net -332 ml      Physical Exam   Constitutional: She appears well-developed. No distress.   Pulmonary/Chest: Effort normal. No respiratory distress.   Neurological: She is alert.   Psychiatric: She has a normal mood and affect.   Nursing note and vitals reviewed.      Significant Labs: All pertinent labs within the past 24 hours have been reviewed.    Significant Imaging: I have reviewed all pertinent imaging results/findings within the past 24 hours.   CT Head Without Contrast 12/08/19: FINDINGS:  Intracranial compartment:  Ventricles and sulci are normal in size for age without evidence of hydrocephalus. No extra-axial blood or fluid collections.  There's some patchy nonspecific low-density changes seen throughout the supratentorial white matter, most likely representing sequela of chronic microvascular ischemic disease.  The appearance is unchanged from prior.  No parenchymal mass, hemorrhage, edema or major vascular distribution infarct.  Skull/extracranial contents (limited evaluation): No fracture. Mucosal thickening noted throughout the bilateral ethmoid sinuses as well as the left  sphenoid and visualized bilateral maxillary sinuses.  Air-fluid levels noted in the left sphenoid and right maxillary sinuses, in keeping with acute sinusitis.  Impression:  1. No acute intracranial findings  2. Findings in keeping with acute sinusitis as above.  CTA Chest Non-Coronary (PE Study) 12/08/19: FINDINGS:  Beam hardening with streak artifact from overlying monitoring leads and contrast bolus within the SVC and adjacent upper extremities somewhat limits evaluation.  There is also respiratory motion.  Suboptimal timing of contrast bolus in which the opacification is similar in the pulmonary arterial, venous and systemic circulations.  No central PE, saddle embolus or pulmonary infarction.  Heterogeneous opacification of multiple segmental and subsegmental pulmonary arterial branches to both lungs, presumably related to technical factors and limitations above noting pulmonary emboli not excluded in these vessels.  Pulmonary trunk is within normal limits.  Pulmonary arteries distribute normally.  There are 4 pulmonary veins.  Heart is mildly enlarged noting small volume nonspecific pericardial fluid.  No cardiac thrombus seen.  Left-sided 3 vessel arch.  Minimal scattered calcific atherosclerosis of the thoracic aorta without aneurysm or dissection.  Esophagus is normal in course and caliber.  No mediastinal, hilar or axillary lymphadenopathy noting calcified lymph nodes at the right hilum likely sequela of prior granulomatous disease.  Trachea is midline and patent.  Proximal airways are patent.  Right upper lobe subcentimeter calcified granuloma.  Small volume layering pleural effusions bilaterally with mild amount of overlying compressive atelectasis, right greater than left.  Minimal biapical pleuroparenchymal scarring.  Few scattered linear opacities consistent with subsegmental scarring versus atelectasis.  Cluster of small nodules within the posterior aspect of the left lower lobe, new from prior CT.   No large consolidation or pneumothorax.  Structures at the base of the neck are within normal limits.  Extrathoracic soft tissues are within normal limits.  Imaged upper abdomen shows scattered small volume ascites.  Small hiatal hernia.  Osseous structures appear stable without acute or destructive process seen.  Impression:  1. No central PE or pulmonary infarction.  Cannot exclude pulmonary emboli in the segmental or distal subsegmental pulmonary arterial branches to each lung secondary to technical factors and limitations above.  Further evaluation/follow-up as warranted.  2. Small bilateral pleural effusions.  3. Left lower lobe interval cluster of small nodules which can be seen with localized the broncho pneumonia from infectious or inflammatory etiology or aspiration.  Follow-up as warranted.  4. Cardiomegaly and small pericardial effusion without convincing evidence of failure.  5. Upper abdominal small volume scattered ascites.  6. Additional findings as above.      Assessment/Plan:      Acute sinusitis  Change antibiotics to cefpodoxime.    Ascites  Due to ovarian cancer. Nothing to do at this time.    Acute encephalopathy  Memory loss  Unclear how much of this is her chronic memory loss. Physical and Occupational Therapy. Avoid sedating medications.    Left lower lobe pneumonia  Change antibiotics to cefpodoxime.    Depression  See Anxiety.    Anxiety  Depression  Takes fluoxetine and lorazepam prn.    Malignant neoplasm of ovary  She is being followed by her Oncologist Dr. Irvin Vinson with plans to start chemo soon.     Glaucoma suspect - Both Eyes  No acute issues.    Chronic asthma  Chronic. No acute issues. Takes budesonide-formoterol daily, albuterol prn.    Fibromyalgia  Migraine with aura and without status migrainosus, not intractable  Chronic. Takes rizatriptan.      VTE Risk Mitigation (From admission, onward)         Ordered     enoxaparin injection 40 mg  Every 24 hours (non-standard times)       12/10/19 1343     IP VTE HIGH RISK PATIENT  Once      12/08/19 1401                      Cesar Junior MD  Department of Hospital Medicine   [facility]

## 2019-12-11 NOTE — PHYSICIAN QUERY
PT Name: Margarita Dunne  MR #: [id number]     CDS/: CLIFFORD Martinez, RN, CDS               Contact information:[email or web address]  This form is a permanent document in the medical record.     Query Date: December 11, 2019    Physician Query - Neurological Condition Clarification    By submitting this query, we are merely seeking further clarification of documentation to reflect the severity of illness of your patient. Please utilize your independent clinical judgment when addressing the question(s) below.    The Medical record reflects the following:     Indicators   Supporting Clinical Findings Location in Medical Record   X AMS, Confusion,  LOC, etc.   Presents for altered mental status     She is disoriented     This patient is very agitated and combative     Encephalopathy is related to infection and immunocompromised stated     She was noted to be extremely agitated on last night. She required soft wrist restraints along with IV sedation. She is better this morning     Still sleepy but she is calm. She is able to answer questions     Mental status change      Acute encephalopathy- Memory loss  Unclear how much of this is her chronic memory loss    ED provider notes, Dr. Dos Santos, 12/8     H&P, EVELIA Moore/ Dr. Crocker, 12/8             HM, EVELIA Moore/ Dr. Crocker, 12/9           HM, Dr. Junior, 12/10   X Acute / Chronic Illness  Sepsis, Pneumonia, shingles, hypokalemia, anxiety, malignant neoplasm ovary, Fibromyalgia     Arthritis, asthma, cancer, allergy, angio-edema, anxiety, cataract, chest pain at rest, depression, diverticulosis, fibromyalgia, glaucoma suspect, hand and joint pain. Headache, hx of psychiatric care, migraine, osteoporosis, psychiatric problem, recurrent upper respiratory infection, sleep difficulties, and urticaria.    H&P, EVELIA Moore/ Dr. Crocker, 12/8   X Radiology Findings  CT of the head shows no acute abnormalities    H&P,  EVELIA Moore/ Dr. Crocker, 12/8   X Electrolyte Imbalance  Hypokalemia  H&P, EVELIA Moore/ Dr. Crocker, 12/8      Medication     X Treatment          She was given IVF, IV potassium, IV abx, and IV ativan for confusion     She required restraints     , EVELIA Moore/ Dr. Crocker, 12/9      Other       Encephalopathy- is a general term for any diffuse disease of the brain that alters brain function or structure. Treatment of the cognitive dysfunction varies but is ultimately dependent on the treatment of the underlying condition.    Major Symptoms of Encephalopathy - Decreased level of consciousness, fluctuating alertness/concentration, confusion, agitation, lethargy, somnolence, drowsiness, obtundation, stupor, or coma.         References: National Institutes of Healths (NIH) National [place] of Neurological Disorders and Strokes;  HCPro 2016; Advisory Board     Clinical Guidelines:   These guidelines will set system standards to assist providers in managing, documentation, and coding of encephalopathy. The intent of this document is to serve as a system guideline, not replace the providers clinical judgment:  Provider, please specify the diagnosis or diagnoses associated with above clinical findings.    Doctor, please further specify the type of Encephalopathy.       [ x  ] Metabolic Encephalopathy - Due to electrolye imbalance, metabolic derangements, or infections processes, includes Septic Encephalopathy   [   ] Other Encephalopathy - Includes uremic encephalopathy   [   ] Unspecified Encephalopathy      [   ] Other Neurological Condition-  Includes Post-ictal altered mental status. (please specify condition): _________   [   ]  Clinically Undetermined     Please document in your progress notes daily for the duration of treatment until resolved, and include in your discharge summary.

## 2019-12-11 NOTE — PLAN OF CARE
Discharge rounds on patient. Discussed followup appointments. Discharge nurse will go over home medications and reasons for medications and final discharge instructions. All patient/caregiver questions answered. Patient verbalized understanding.       12/11/19 1330   Final Note   Assessment Type Final Discharge Note   Anticipated Discharge Disposition Home   What phone number can be called within the next 1-3 days to see how you are doing after discharge? [phone]   Hospital Follow Up  Appt(s) scheduled? Yes   Discharge plans and expectations educations in teach back method with documentation complete? Yes   Right Care Referral Info   Post Acute Recommendation Home-care   Referral Type None       Future Appointments   Date Time Provider Department Center   12/19/2019 10:00 AM Columba Anderson MD [facility]

## 2019-12-11 NOTE — DISCHARGE SUMMARY
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 She lives in [place], [place]. She is . Her primary care physician is Dr. Columba Anderson. Her OBGYN is Dr. Irvin Vinson.    She was seen at [facility] on 12/5/19 for left neck shingles and upper respiratory infection. She was prescribed valacyclovir, codeine-guaifenesin, and prochlorperazine, and her budesonide-formoterol was refilled.    Labs on 12/619 showed hypokalemia with potassium of 2.7 mmol/L.   She presented to [facility] on 12/8/19 with delirium and agitation that her  reported started in the morning. She had no new complaints. In the emergency department, labs showed less severe hypokalemia (potassium 3.1). Lactate was elevated at 3.1 mmol/L. Head CT showed acute sinusitis. Chest CTA showed small bilateral pleural effusions, small pericardial effusion, small volume abdominal ascites, and small left lower lobe nodules. She was admitted to [facility].      Hospital Course:   She was given IV fluids, IV potassium, IV cefepime, IV vancomycin, and IV lorazepam. Valacyclovir was not continued in the hospital for reasons known only to the care team that  admitted her. She remained agitated and required soft wrist restraints. Agitation resolved. Sepsis resolved. Care was assumed by me on 12/10/19. I switched IV antibiotics to oral cefpodoxime. She was medically ready for discharge but I consulted Physical and Occupational Therapy to evaluate her discharge needs. They noted that she was back to her functional baseline. Her  reported that her shingles were looking worse since being off the valacyclovir. She was discharged home on 12/11/19 and prescribed cefdinir for 4 more days. I represcribed valacyclovir.     Consults:   Consults (From admission, onward)        Status Ordering Provider     Inpatient consult to Palliative Care  Once     Provider:  Vika Crocker MD    Completed FRANCO SINGH        Final Active Diagnoses:    Diagnosis Date Noted POA    Ascites [R18.8] 12/09/2019 Yes    Left lower lobe pneumonia [J18.1] 12/08/2019 Yes    Shingles [B02.9] 12/08/2019 Yes    Acute sinusitis [J01.90] 12/08/2019 Yes    Anxiety [F41.9] 11/14/2019 Yes    Depression [F32.9] 11/14/2019 Yes    Malignant neoplasm of ovary [C56.9] 11/12/2019 Yes    Memory loss [R41.3] 12/21/2017 Yes     Chronic    Migraine with aura and without status migrainosus, not intractable [G43.109] 11/18/2013 Yes     Chronic    Glaucoma suspect - Both Eyes [H40.009] 12/10/2012 Yes    Chronic asthma [J45.909] 07/20/2012 Yes     Chronic    Fibromyalgia [M79.7] 07/08/2012 Yes     Chronic      Problems Resolved During this Admission:    Diagnosis Date Noted Date Resolved POA    PRINCIPAL PROBLEM:  Sepsis due to pneumonia [J18.9, A41.9] 12/08/2019 12/10/2019 Yes    Palliative care encounter [Z51.5] 12/09/2019 12/11/2019 Not Applicable    Goals of care, counseling/discussion [Z71.89] 12/09/2019 12/11/2019 Not Applicable    Counseling regarding advance care planning and goals of care [Z71.89] 12/09/2019 12/11/2019 Not Applicable    Hypokalemia [E87.6] 12/08/2019  12/10/2019 Yes    Acute encephalopathy [G93.40] 12/08/2019 12/11/2019 Yes    Urinary retention [R33.9] 12/08/2019 12/11/2019 Yes       Discharged Condition: good    Disposition: Home or Self Care    Follow Up:  Follow-up Information     Columba Anderson MD.    Specialty:  Internal Medicine  Contact information:  [address]  [place] [id number]  [phone]                 Patient Instructions:      Diet Adult Regular     Notify your health care provider if you experience any of the following:  persistent nausea and vomiting or diarrhea     Activity as tolerated       Significant Diagnostic Studies:   CT Head Without Contrast 12/08/19: FINDINGS:  Intracranial compartment:  Ventricles and sulci are normal in size for age without evidence of hydrocephalus. No extra-axial blood or fluid collections.  There's some patchy nonspecific low-density changes seen throughout the supratentorial white matter, most likely representing sequela of chronic microvascular ischemic disease.  The appearance is unchanged from prior.  No parenchymal mass, hemorrhage, edema or major vascular distribution infarct.  Skull/extracranial contents (limited evaluation): No fracture. Mucosal thickening noted throughout the bilateral ethmoid sinuses as well as the left sphenoid and visualized bilateral maxillary sinuses.  Air-fluid levels noted in the left sphenoid and right maxillary sinuses, in keeping with acute sinusitis.  Impression:  1. No acute intracranial findings  2. Findings in keeping with acute sinusitis as above.  CTA Chest Non-Coronary (PE Study) 12/08/19: FINDINGS:  Beam hardening with streak artifact from overlying monitoring leads and contrast bolus within the SVC and adjacent upper extremities somewhat limits evaluation.  There is also respiratory motion.  Suboptimal timing of contrast bolus in which the opacification is similar in the pulmonary arterial, venous and systemic circulations.  No central PE, saddle embolus  or pulmonary infarction.  Heterogeneous opacification of multiple segmental and subsegmental pulmonary arterial branches to both lungs, presumably related to technical factors and limitations above noting pulmonary emboli not excluded in these vessels.  Pulmonary trunk is within normal limits.  Pulmonary arteries distribute normally.  There are 4 pulmonary veins.  Heart is mildly enlarged noting small volume nonspecific pericardial fluid.  No cardiac thrombus seen.  Left-sided 3 vessel arch.  Minimal scattered calcific atherosclerosis of the thoracic aorta without aneurysm or dissection.  Esophagus is normal in course and caliber.  No mediastinal, hilar or axillary lymphadenopathy noting calcified lymph nodes at the right hilum likely sequela of prior granulomatous disease.  Trachea is midline and patent.  Proximal airways are patent.  Right upper lobe subcentimeter calcified granuloma.  Small volume layering pleural effusions bilaterally with mild amount of overlying compressive atelectasis, right greater than left.  Minimal biapical pleuroparenchymal scarring.  Few scattered linear opacities consistent with subsegmental scarring versus atelectasis.  Cluster of small nodules within the posterior aspect of the left lower lobe, new from prior CT.  No large consolidation or pneumothorax.  Structures at the base of the neck are within normal limits.  Extrathoracic soft tissues are within normal limits.  Imaged upper abdomen shows scattered small volume ascites.  Small hiatal hernia.  Osseous structures appear stable without acute or destructive process seen.  Impression:  1. No central PE or pulmonary infarction.  Cannot exclude pulmonary emboli in the segmental or distal subsegmental pulmonary arterial branches to each lung secondary to technical factors and limitations above.  Further evaluation/follow-up as warranted.  2. Small bilateral pleural effusions.  3. Left lower lobe interval cluster of small nodules which  can be seen with localized the broncho pneumonia from infectious or inflammatory etiology or aspiration.  Follow-up as warranted.  4. Cardiomegaly and small pericardial effusion without convincing evidence of failure.  5. Upper abdominal small volume scattered ascites.  6. Additional findings as above.    Medications:  Reconciled Home Medications:      Medication List      START taking these medications    cefdinir 300 MG capsule  Commonly known as:  OMNICEF  Take 1 capsule (300 mg total) by mouth 2 (two) times daily. for 4 days        CHANGE how you take these medications    valACYclovir 1000 MG tablet  Commonly known as:  VALTREX  Take 1 tablet (1,000 mg total) by mouth 3 (three) times daily. for 7 days  What changed:  Another medication with the same name was removed. Continue taking this medication, and follow the directions you see here.        CONTINUE taking these medications    acetaminophen 325 MG tablet  Commonly known as:  TYLENOL  Take 325 mg by mouth every 6 (six) hours as needed for Pain.     albuterol 90 mcg/actuation inhaler  Commonly known as:  PROVENTIL/VENTOLIN HFA  Inhale 2 puffs into the lungs every 6 (six) hours as needed for Wheezing. Rescue     budesonide-formoterol 80-4.5 mcg 80-4.5 mcg/actuation Hfaa  Commonly known as:  Symbicort  Inhale 2 puffs into the lungs once daily. Controller     CITRUCEL ORAL  Take by mouth 2 (two) times daily.     enoxaparin 40 mg/0.4 mL Syrg  Commonly known as:  LOVENOX  Inject 0.4 mLs (40 mg total) into the skin once daily.     * FLUoxetine 10 MG Tab  Take 1 tablet (10 mg total) by mouth once daily.     * FLUoxetine 10 MG Tab  Take 1 tablet (10 mg total) by mouth once daily.     * FLUoxetine 10 MG Tab  TAKE 1 TABLET BY MOUTH ONCE DAILY     ibuprofen 600 MG tablet  Commonly known as:  ADVIL,MOTRIN  Take 1 tablet (600 mg total) by mouth every 6 (six) hours.     LORazepam 0.5 MG tablet  Commonly known as:  Ativan  Take 1 tablet (0.5 mg total) by mouth every 8  (eight) hours as needed for Anxiety.     metroNIDAZOLE 250 MG tablet  Commonly known as:  FLAGYL  Take 1 tablet (250 mg total) by mouth 3 (three) times daily.     ondansetron 4 MG tablet  Commonly known as:  ZOFRAN  Take 1 tablet (4 mg total) by mouth every 6 (six) hours as needed for Nausea.     oxyCODONE 5 MG immediate release tablet  Commonly known as:  ROXICODONE  Take 1 tablet (5 mg total) by mouth every 4 (four) hours as needed.     potassium chloride SA 20 MEQ tablet  Commonly known as:  K-DUR,KLOR-CON  Take 1 tablet (20 mEq total) by mouth 3 (three) times daily.     rizatriptan 10 MG tablet  Commonly known as:  MAXALT  TAKE ONE TABLET BY MOUTH AT ONSET OF MIGRAINE, MAY REPEAT EVERY 2 HOURS. DO NOT EXCEED 3 TABLETS IN 24 HOURS.     rosuvastatin 5 MG tablet  Commonly known as:  CRESTOR  Take 1 tablet (5 mg total) by mouth every evening.     Voltaren 1 % Gel  Generic drug:  diclofenac sodium  APPLY 4 GRAMS TOPICALLY 4 TIMES DAILY AS DIRECTED         * This list has 3 medication(s) that are the same as other medications prescribed for you. Read the directions carefully, and ask your doctor or other care provider to review them with you.            STOP taking these medications    guaifenesin-codeine 100-10 mg/5 ml  mg/5 mL syrup  Commonly known as:  Cheratussin AC     ondansetron 8 MG Tbdl  Commonly known as:  ZOFRAN-ODT     prochlorperazine 5 MG tablet  Commonly known as:  COMPAZINE            Indwelling Lines/Drains at time of discharge: None    Time spent on the discharge of patient: 35 minutes  Patient was seen and examined on the date of discharge and determined to be suitable for discharge.         Cesar Junior MD  Department of Hospital Medicine  [facility]

## 2019-12-11 NOTE — SUBJECTIVE & OBJECTIVE
Interval History: Continue with present treatment. To remain a full code at this time.     Past Medical History:   Diagnosis Date    Allergy     Angio-edema     Anxiety     Arthritis     Asthma     Cancer     Cataract     Chest pain at rest     Depression     Diverticulosis     Fibromyalgia 7/8/2012    Glaucoma suspect, steroid responders     Hand joint pain 7/8/2012    Headache(784.0)     Hx of psychiatric care     Migraine     Osteoporosis     Psychiatric problem     Recurrent upper respiratory infection (URI)     Shingles 12/8/2019    Sleep difficulties     Therapy     Urticaria        Past Surgical History:   Procedure Laterality Date    APPENDECTOMY  11/12/2019    Procedure: APPENDECTOMY;  Surgeon: Irvin Vinson MD;  [facility];  Service: OB/GYN;;    BIOPSY      DEBULKING OF TUMOR N/A 11/12/2019    Procedure: DEBULKING, NEOPLASM;  Surgeon: Irvin Vinson MD;  [facility];  Service: OB/GYN;  Laterality: N/A;    dexa  2014    osteopenia    MOBILIZATION OF SPLENIC FLEXURE  11/12/2019    Procedure: MOBILIZATION, SPLENIC FLEXURE;  Surgeon: Irvin Vinson MD;  [facility];  Service: OB/GYN;;    OMENTECTOMY N/A 11/12/2019    Procedure: OMENTECTOMY;  Surgeon: Irvin Vinson MD;  [facility];  Service: OB/GYN;  Laterality: N/A;    RESECTION OF PERITONEAL TISSUE  11/12/2019    Procedure: EXCISION, TISSUE, PERITONEUM;  Surgeon: Irvin Vinson MD;  [facility];  Service: OB/GYN;;    SALPINGOOPHORECTOMY Bilateral 11/12/2019    Procedure: SALPINGO-OOPHORECTOMY;  Surgeon: Irvin Vinson MD;  [facility];  Service: OB/GYN;  Laterality: Bilateral;    TOTAL ABDOMINAL HYSTERECTOMY N/A 11/12/2019    Procedure: HYSTERECTOMY, TOTAL, ABDOMINAL;  Surgeon: Irvin Vinson MD;  [facility];  Service: OB/GYN;  Laterality: N/A;       Review of patient's allergies indicates:   Allergen Reactions    Doxycycline Nausea  And Vomiting    Corticosteroids (glucocorticoids) Other (See Comments)     Causes pt to have migraines for 2 weeks    Pcn [penicillins] Rash    Sulfa (sulfonamide antibiotics) Rash       Medications:  Continuous Infusions:  Scheduled Meds:   cefpodoxime  200 mg Oral Q12H    enoxparin  40 mg Subcutaneous Q24H    potassium chloride  40 mEq Oral BID    senna-docusate 8.6-50 mg  1 tablet Oral BID     PRN Meds:acetaminophen, glucagon (human recombinant), haloperidol lactate, lorazepam, ondansetron, ondansetron, sodium chloride 0.9%, sodium chloride 0.9%    Family History     Problem Relation (Age of Onset)    Allergic rhinitis Father, Paternal Aunt, Paternal Uncle    Allergies Father, Paternal Aunt, Paternal Uncle    Cancer Father    Dementia Mother    Diabetes Mother    Hypertension Mother    Macular degeneration Cousin    Stroke Mother        Tobacco Use    Smoking status: Never Smoker    Smokeless tobacco: Never Used   Substance and Sexual Activity    Alcohol use: Not Currently     Frequency: Never     Binge frequency: Never     Comment: social wine use in the past not current. last was 15 years ago    Drug use: No    Sexual activity: Not on file       Review of Systems   Unable to perform ROS: Mental status change     Objective:     Vital Signs (Most Recent):  Temp: 97.4 °F (36.3 °C) (12/10/19 2130)  Pulse: 103 (12/10/19 2130)  Resp: 18 (12/10/19 2130)  BP: (!) 156/81 (12/10/19 2130)  SpO2: 96 % (12/10/19 2359) Vital Signs (24h Range):  Temp:  [96 °F (35.6 °C)-98 °F (36.7 °C)] 97.4 °F (36.3 °C)  Pulse:  [] 103  Resp:  [17-20] 18  SpO2:  [93 %-96 %] 96 %  BP: (135-156)/(63-81) 156/81     Weight: 58.5 kg (128 lb 15.5 oz)  Body mass index is 22.14 kg/m².    Review of Symptoms  Symptom Assessment (ESAS 0-10 scale)   ESAS 0 1 2 3 4 5 6 7 8 9 10   Pain              Dyspnea              Anxiety              Nausea              Depression               Anorexia              Fatigue              Insomnia               Restlessness               Agitation              CAM / Delirium __ --  ___+   Constipation     __ --  ___+   Diarrhea           __ --  ___+    Pain Assessment: yes    Physical Exam   Constitutional: She appears well-developed. No distress.   Pulmonary/Chest: Effort normal. No respiratory distress.   Neurological: She is alert.   Psychiatric: She has a normal mood and affect.   Nursing note and vitals reviewed.      Significant Labs: All pertinent labs within the past 24 hours have been reviewed.  CBC:   Recent Labs   Lab 12/10/19  0902   WBC 6.15   HGB 10.6*   HCT 32.3*   MCV 84   *     BMP:  Recent Labs   Lab 12/10/19  0902   *      K 3.4*      CO2 23   BUN 5*   CREATININE 0.7   CALCIUM 8.6*   MG 2.2     LFT:  Lab Results   Component Value Date    AST 18 12/08/2019    ALKPHOS 77 12/08/2019    BILITOT 0.7 12/08/2019     Albumin:   Albumin   Date Value Ref Range Status   12/08/2019 3.4 (L) 3.5 - 5.2 g/dL Final     Protein:   Total Protein   Date Value Ref Range Status   12/08/2019 7.0 6.0 - 8.4 g/dL Final     Lactic acid:   Lab Results   Component Value Date    LACTATE 0.9 12/08/2019    LACTATE 2.2 12/08/2019       Significant Imaging: I have reviewed all pertinent imaging results/findings within the past 24 hours.    Advance Care Planning   Advanced Directives::  Living Will: No  LaPOST: No  Do Not Resuscitate Status: No  Medical Power of : No    Decision-Making Capacity: Family answered questions       Living Arrangements: Lives with spouse    Psychosocial/Cultural:  Patient's most important priorities:  none    Patient's biggest concerns/fears:  none    Previous death/end of life care history:  none    Patient's goals/hopes:  Finish cancer treatment    Spiritual:     F- Nevaeh and Belief: none    I - Importance: none    .  C - Community: none    A - Address in Care: none    Recommendations:  Continue medical treatment  Code status: Full Code     Thank you for the  consult        Torri Chase, MSN, APRN, NP-C   Palliative Medicine   [facility]  [phone] or [phone]          >50% of 60  min visit spent in chart review, face to face discussion of goals of care with patient, family, symptom assessment, coordination of care and emotional support.

## 2019-12-11 NOTE — PT/OT/SLP EVAL
Physical Therapy Evaluation and Discharge Note    Patient Name:  Margarita Dunne   MRN:  [id number]    Recommendations:     Discharge Recommendations:  home   Discharge Equipment Recommendations: none   Barriers to discharge: None    Assessment:     Margarita Dunne is a 74 y.o. female admitted with a medical diagnosis of Sepsis due to pneumonia. .  At this time, patient is functioning at their prior level of function and does not require further acute PT services.     Recent Surgery: * No surgery found *      Plan:     During this hospitalization, patient does not require further acute PT services.  Please re-consult if situation changes.      Subjective     Chief Complaint: foggy thriveragths  Patient/Family Comments/goals: go home  Pain/Comfort:  · Pain Rating 1: 0/10  · Pain Rating Post-Intervention 1: 0/10    Patients cultural, spiritual, Judaism conflicts given the current situation:      Living Environment:  Lives with spouse SSH with t/s  Prior to admission, patients level of function was independent.  Equipment used at home: none.  DME owned (not currently used): none.  Upon discharge, patient will have assistance from family.    Objective:     Communicated with primary nurse prior to session.  Patient found HOB elevated with bed alarm upon PT entry to room.    General Precautions: Standard, droplet, fall, contact   Orthopedic Precautions:N/A   Braces: N/A     Exams:  · RLE ROM: WFL  · RLE Strength: WFL  · LLE ROM: WFL  · LLE Strength: WFL    Functional Mobility:  · Bed Mobility:     · Supine to Sit: supervision  · Sit to Supine: supervision  · Transfers:     · Sit to Stand:  modified independence and supervision with no AD  · Gait: 30 ft in room no AD   · Balance: fair +    AM-PAC 6 CLICK MOBILITY  Total Score:22       Therapeutic Activities and Exercises:   na    AM-PAC 6 CLICK MOBILITY  Total Score:22     Patient left up in chair with all lines intact, call button in reach, chair alarm on and spouse  present.    GOALS:   Multidisciplinary Problems     Physical Therapy Goals     Not on file          Multidisciplinary Problems (Resolved)        Problem: Physical Therapy Goal    Goal Priority Disciplines Outcome Goal Variances Interventions   Physical Therapy Goal   (Resolved)     PT, PT/OT Met     Description:  Goals to be met by: 12/11/2019     No PT goals established                          History:     Past Medical History:   Diagnosis Date    Allergy     Angio-edema     Anxiety     Arthritis     Asthma     Cancer     Cataract     Chest pain at rest     Depression     Diverticulosis     Fibromyalgia 7/8/2012    Glaucoma suspect, steroid responders     Hand joint pain 7/8/2012    Headache(784.0)     Hx of psychiatric care     Migraine     Osteoporosis     Psychiatric problem     Recurrent upper respiratory infection (URI)     Shingles 12/8/2019    Sleep difficulties     Therapy     Urticaria        Past Surgical History:   Procedure Laterality Date    APPENDECTOMY  11/12/2019    Procedure: APPENDECTOMY;  Surgeon: Irvin Vinson MD;  Location: [facility] OR [address] [place] [address];  Service: OB/GYN;;    BIOPSY      DEBULKING OF TUMOR N/A 11/12/2019    Procedure: DEBULKING, NEOPLASM;  Surgeon: Irvin Vinson MD;  Location: [facility] OR [address] [place] [address];  Service: OB/GYN;  Laterality: N/A;    dexa  2014    osteopenia    MOBILIZATION OF SPLENIC FLEXURE  11/12/2019    Procedure: MOBILIZATION, SPLENIC FLEXURE;  Surgeon: Irvin Vinson MD;  Location: [facility] OR [address] [place] [address];  Service: OB/GYN;;    OMENTECTOMY N/A 11/12/2019    Procedure: OMENTECTOMY;  Surgeon: Irvin Vinson MD;  Location: [facility] OR [address] [place] [address];  Service: OB/GYN;  Laterality: N/A;    RESECTION OF PERITONEAL TISSUE  11/12/2019    Procedure: EXCISION, TISSUE, PERITONEUM;  Surgeon: Irvin Vinson MD;  Location: [address];  Service: OB/GYN;;    SALPINGOOPHORECTOMY Bilateral 11/12/2019    Procedure: SALPINGO-OOPHORECTOMY;  Surgeon: Irvin Vinson MD;  [facility];  Service: OB/GYN;  Laterality: Bilateral;    TOTAL ABDOMINAL HYSTERECTOMY N/A 11/12/2019    Procedure: HYSTERECTOMY, TOTAL, ABDOMINAL;  Surgeon: Irvin Vinson MD;  [facility];  Service: OB/GYN;  Laterality: N/A;       Time Tracking:     PT Received On: 12/11/19  PT Start Time: 0937     PT Stop Time: 0955  PT Total Time (min): 18 min     Billable Minutes: Evaluation 18      Estuardo Serrano, PT  12/11/2019

## 2019-12-11 NOTE — NURSING
1640 Pt being discharged home in stable condition. AVS packet given to pt. CORY Neville to go over discharged instruction with pt and pt's .

## 2019-12-11 NOTE — PT/OT/SLP EVAL
"Occupational Therapy   Evaluation and Discharge Note    Name: Margarita Dunne  MRN: [id number]  Admitting Diagnosis:  Sepsis due to pneumonia      Recommendations:     Discharge Recommendations: home  Discharge Equipment Recommendations:  none  Barriers to discharge:       Assessment:     Margarita Dunne is a 74 y.o. female with a medical diagnosis of Sepsis due to pneumonia. At this time, patient is functioning at their prior level of function and does not require further acute OT services.     Plan:     During this hospitalization, patient does not require further acute OT services.  Please re-consult if situation changes.    · Plan of Care Reviewed with: patient, spouse    Subjective     Chief Complaint: "I am ready to go home"  Patient/Family Comments/goals: return to PLOF    Occupational Profile:  Living Environment: Lives w/spouse in h, no RANDI, T/S  Previous level of function: Indep-Mod I  Roles and Routines:   Equipment Used at home:  none  Assistance upon Discharge: family    Pain/Comfort:  · Pain Rating 1: 0/10  · Pain Rating Post-Intervention 1: 0/10    Patients cultural, spiritual, Yazidi conflicts given the current situation: no    Objective:     Communicated with: nurse prior to session.  Patient found HOB elevated with peripheral IV upon OT entry to room.    General Precautions: Standard, fall, droplet, contact   Orthopedic Precautions:    Braces:       Occupational Performance:    Bed Mobility:    · Patient completed Rolling/Turning to Right with modified independence  · Patient completed Scooting/Bridging with modified independence  · Patient completed Supine to Sit with modified independence    Functional Mobility/Transfers:  · Patient completed Sit <> Stand Transfer with modified independence and supervision  with  no assistive device   · Patient completed Bed <> Chair Transfer using Step Transfer technique with modified independence and supervision with no assistive device  · Functional Mobility: " supervision-mod I no AD    Activities of Daily Living:  · Grooming: modified independence standing at sink  · Lower Body Dressing: modified independence and supervision no AD    Cognitive/Visual Perceptual:  AO4, however increased time to answer    Physical Exam:  BUE AROM/strength WFL  Normal sit balance, good stand balance    AMPAC 6 Click ADL:  AMPAC Total Score: 21    Treatment & Education:  Pt/spouse educated on role of OT/POC, general safety awareness; ADLs as above  Education:    Patient left up in chair with all lines intact, call button in reach, chair alarm on, nurse notified and spouse present    GOALS:   Multidisciplinary Problems     Occupational Therapy Goals     Not on file          Multidisciplinary Problems (Resolved)        Problem: Occupational Therapy Goal    Goal Priority Disciplines Outcome Interventions   Occupational Therapy Goal   (Resolved)     OT, PT/OT Met                    History:     Past Medical History:   Diagnosis Date    Allergy     Angio-edema     Anxiety     Arthritis     Asthma     Cancer     Cataract     Chest pain at rest     Depression     Diverticulosis     Fibromyalgia 7/8/2012    Glaucoma suspect, steroid responders     Hand joint pain 7/8/2012    Headache(784.0)     Hx of psychiatric care     Migraine     Osteoporosis     Psychiatric problem     Recurrent upper respiratory infection (URI)     Shingles 12/8/2019    Sleep difficulties     Therapy     Urticaria        Past Surgical History:   Procedure Laterality Date    APPENDECTOMY  11/12/2019    Procedure: APPENDECTOMY;  Surgeon: Irvin Vinson MD;  Location: [facility] OR [address] [place] [address];  Service: OB/GYN;;    BIOPSY      DEBULKING OF TUMOR N/A 11/12/2019    Procedure: DEBULKING, NEOPLASM;  Surgeon: Irvin Vinson MD;  Location: [facility] OR [address] [place] [address];  Service: OB/GYN;  Laterality: N/A;    dexa  2014    osteopenia    MOBILIZATION OF SPLENIC FLEXURE  11/12/2019    Procedure: MOBILIZATION, SPLENIC FLEXURE;  Surgeon:  Irvin Vinson MD;  [facility];  Service: OB/GYN;;    OMENTECTOMY N/A 11/12/2019    Procedure: OMENTECTOMY;  Surgeon: Irvin Vinson MD;  [facility];  Service: OB/GYN;  Laterality: N/A;    RESECTION OF PERITONEAL TISSUE  11/12/2019    Procedure: EXCISION, TISSUE, PERITONEUM;  Surgeon: Irvin Vinson MD;  [facility];  Service: OB/GYN;;    SALPINGOOPHORECTOMY Bilateral 11/12/2019    Procedure: SALPINGO-OOPHORECTOMY;  Surgeon: Irvin Vinson MD;  [facility];  Service: OB/GYN;  Laterality: Bilateral;    TOTAL ABDOMINAL HYSTERECTOMY N/A 11/12/2019    Procedure: HYSTERECTOMY, TOTAL, ABDOMINAL;  Surgeon: Irvin Vinson MD;  Location: [facility] OR [address] [place] [address];  Service: OB/GYN;  Laterality: N/A;       Time Tracking:     OT Date of Treatment: 12/11/19  OT Start Time: 0934  OT Stop Time: 0958  OT Total Time (min): 24 min    Billable Minutes:Evaluation 15  Self Care/Home Management 9    Chucky Sotelo OT  12/11/2019

## 2019-12-11 NOTE — NURSING
"VN note: VN cued into patient's room to review discharge papers.  at bedside. VN informed them of new medication prescribed and side effects. VN also reviewed change, continue, and stop medications. Medications also "flagged for removal" in which she is not taking or completed. Prescriptions delivered at bedside. Follow-up information given. VN also education them pneumonia and when to seek medical attention.  verbalized understanding and all questions answered. Refer to clinical references for further education given. Transport requested.      "

## 2019-12-11 NOTE — ASSESSMENT & PLAN NOTE
Memory loss  Unclear how much of this is her chronic memory loss. Physical and Occupational Therapy. Avoid sedating medications.

## 2019-12-12 ENCOUNTER — PATIENT MESSAGE (OUTPATIENT)
Dept: INTERNAL MEDICINE | Facility: CLINIC | Age: 74
End: 2019-12-12

## 2019-12-12 ENCOUNTER — TELEPHONE (OUTPATIENT)
Dept: INFUSION THERAPY | Facility: HOSPITAL | Age: 74
End: 2019-12-12

## 2019-12-13 LAB
BACTERIA BLD CULT: NORMAL
BACTERIA BLD CULT: NORMAL

## 2019-12-13 NOTE — PHYSICIAN QUERY
PT Name: Margarita Dunne  MR #: [id number]    Physician Query Form - Pathology Findings Clarification     CDS/: CLIFFORD Ingram,RNC-MNN        Contact information:[email or web address]  This form is a permanent document in the medical record.     Query Date: December 13, 2019      By submitting this query, we are merely seeking further clarification of documentation.  Please utilize your independent clinical judgment when addressing the question(s) below.      The medical record contains the following:     Findings Supporting Clinical Information Location in Medical Record   1. Bladder peritoneum (excision):  Positive for carcinoma  2. Uterus, cervix and bilateral fallopian tubes and ovaries (117 grams, excision):  High-grade serous carcinoma, see cancer synoptic report below  3. Left uterosacral nodule (excision):  Positive for carcinoma  Background endometriosis  4. Omentum (excision):  Positive for carcinoma  5. Cecal nodule (biopsy):  Positive for carcinoma, 2.5 cm  6. Appendix (appendectomy):  Positive for carcinoma  Involving subserosal tissue and focally extending into muscular wall  7. Ascending colon epiploic (biopsy):  Positive carcinoma                                       POST-OPERATIVE DIAGNOSIS  Post-Op Diagnosis Codes:     * Malignant neoplasm of ovary, unspecified laterality [C56.9]        FINDINGS:  Very minimum disease involving the right diaphragm.  Normal left diaphragm.  Normal portal triad.  Omental caking.  Disease throughout the abdominal pelvic peritoneum.  Most of her peritoneal disease was actually in the pelvis.  She had a thick rind of disease on the bladder peritoneum.  This was stripped without interrupting the bladder adventitia or mucosa.  She also had a thick rind of disease in the left and right pelvic sidewall and para colic gutters.  No lymphadenopathy.  Plastered appendix to the right pericolic gutter.  Bilateral adnexal masses. Normal uterus.  Her cul-de-sac was  initially obliterated but after lysis of adhesions normal anatomy was restored.  She had some bleeding in her left infundibular pelvic ligament that was controlled with 3 clips. She also had some serosal defects throughout her ascending colon which were reapproximated with silk sutures. Pathology report 11/12                                    Op note 11/12     Please document the clinical significance of the Pathologists findings of   1. Bladder peritoneum (excision):  Positive for carcinoma  2. Uterus, cervix and bilateral fallopian tubes and ovaries (117 grams, excision):  High-grade serous carcinoma, see cancer synoptic report below  3. Left uterosacral nodule (excision):  Positive for carcinoma  Background endometriosis  4. Omentum (excision):  Positive for carcinoma  5. Cecal nodule (biopsy):  Positive for carcinoma, 2.5 cm  6. Appendix (appendectomy):  Positive for carcinoma  Involving subserosal tissue and focally extending into muscular wall  7. Ascending colon epiploic (biopsy):  Positive carcinoma    [   X] I agree with the Pathology Findings   [   ] I do not agree with the Pathology Findings   [   ] Other/Clarification of Findings:   [   ] Clinically Insignificant   [  ] Clinically Undetermined       Please document in your progress notes daily for the duration of treatment until resolved and include in your discharge summary.

## 2019-12-16 ENCOUNTER — TELEPHONE (OUTPATIENT)
Dept: INFUSION THERAPY | Facility: HOSPITAL | Age: 74
End: 2019-12-16

## 2019-12-16 DIAGNOSIS — C56.9 MALIGNANT NEOPLASM OF OVARY, UNSPECIFIED LATERALITY: Primary | ICD-10-CM

## 2019-12-16 RX ORDER — HEPARIN 100 UNIT/ML
500 SYRINGE INTRAVENOUS
Status: CANCELLED | OUTPATIENT
Start: 2019-12-16

## 2019-12-16 RX ORDER — DIPHENHYDRAMINE HYDROCHLORIDE 50 MG/ML
50 INJECTION INTRAMUSCULAR; INTRAVENOUS ONCE AS NEEDED
Status: CANCELLED | OUTPATIENT
Start: 2019-12-16

## 2019-12-16 RX ORDER — FAMOTIDINE 10 MG/ML
20 INJECTION INTRAVENOUS
Status: CANCELLED | OUTPATIENT
Start: 2019-12-16

## 2019-12-16 RX ORDER — SODIUM CHLORIDE 0.9 % (FLUSH) 0.9 %
10 SYRINGE (ML) INJECTION
Status: CANCELLED | OUTPATIENT
Start: 2019-12-16

## 2019-12-16 RX ORDER — EPINEPHRINE 0.3 MG/.3ML
0.3 INJECTION SUBCUTANEOUS ONCE AS NEEDED
Status: CANCELLED | OUTPATIENT
Start: 2019-12-16

## 2019-12-18 ENCOUNTER — PATIENT MESSAGE (OUTPATIENT)
Dept: GYNECOLOGIC ONCOLOGY | Facility: CLINIC | Age: 74
End: 2019-12-18

## 2019-12-18 ENCOUNTER — TELEPHONE (OUTPATIENT)
Dept: GYNECOLOGIC ONCOLOGY | Facility: CLINIC | Age: 74
End: 2019-12-18

## 2019-12-19 ENCOUNTER — OFFICE VISIT (OUTPATIENT)
Dept: GYNECOLOGIC ONCOLOGY | Facility: CLINIC | Age: 74
End: 2019-12-19
Payer: MEDICARE

## 2019-12-19 ENCOUNTER — DOCUMENTATION ONLY (OUTPATIENT)
Dept: PSYCHIATRY | Facility: CLINIC | Age: 74
End: 2019-12-19

## 2019-12-19 ENCOUNTER — OFFICE VISIT (OUTPATIENT)
Dept: INTERNAL MEDICINE | Facility: CLINIC | Age: 74
End: 2019-12-19
Payer: MEDICARE

## 2019-12-19 ENCOUNTER — HOSPITAL ENCOUNTER (OUTPATIENT)
Dept: RADIOLOGY | Facility: OTHER | Age: 74
Discharge: HOME | End: 2019-12-19
Attending: INTERNAL MEDICINE
Payer: MEDICARE

## 2019-12-19 ENCOUNTER — PATIENT MESSAGE (OUTPATIENT)
Dept: GYNECOLOGIC ONCOLOGY | Facility: CLINIC | Age: 74
End: 2019-12-19

## 2019-12-19 VITALS
WEIGHT: 127 LBS | SYSTOLIC BLOOD PRESSURE: 132 MMHG | DIASTOLIC BLOOD PRESSURE: 88 MMHG | OXYGEN SATURATION: 99 % | HEART RATE: 101 BPM | HEIGHT: 64 IN | BODY MASS INDEX: 21.68 KG/M2

## 2019-12-19 VITALS
BODY MASS INDEX: 22.13 KG/M2 | HEART RATE: 95 BPM | SYSTOLIC BLOOD PRESSURE: 157 MMHG | DIASTOLIC BLOOD PRESSURE: 76 MMHG | HEIGHT: 64 IN | WEIGHT: 129.63 LBS

## 2019-12-19 DIAGNOSIS — Z09 HOSPITAL DISCHARGE FOLLOW-UP: Primary | ICD-10-CM

## 2019-12-19 DIAGNOSIS — C56.9 MALIGNANT NEOPLASM OF OVARY, UNSPECIFIED LATERALITY: Primary | ICD-10-CM

## 2019-12-19 DIAGNOSIS — F41.9 ANXIETY: ICD-10-CM

## 2019-12-19 DIAGNOSIS — B02.9 HERPES ZOSTER WITHOUT COMPLICATION: ICD-10-CM

## 2019-12-19 DIAGNOSIS — J15.9 BACTERIAL PNEUMONIA: ICD-10-CM

## 2019-12-19 DIAGNOSIS — J69.0 ASPIRATION PNEUMONIA OF LEFT LOWER LOBE, UNSPECIFIED ASPIRATION PNEUMONIA TYPE: ICD-10-CM

## 2019-12-19 PROBLEM — N32.89 BLADDER WALL THICKENING: Status: RESOLVED | Noted: 2019-08-08 | Resolved: 2019-12-19

## 2019-12-19 PROCEDURE — 1125F PR PAIN SEVERITY QUANTIFIED, PAIN PRESENT: ICD-10-PCS | Mod: ,,, | Performed by: OBSTETRICS & GYNECOLOGY

## 2019-12-19 PROCEDURE — 99999 PR PBB SHADOW E&M-EST. PATIENT-LVL IV: ICD-10-PCS | Mod: PBBFAC,,, | Performed by: OBSTETRICS & GYNECOLOGY

## 2019-12-19 PROCEDURE — 1125F AMNT PAIN NOTED PAIN PRSNT: CPT | Mod: ,,, | Performed by: OBSTETRICS & GYNECOLOGY

## 2019-12-19 PROCEDURE — 99214 PR OFFICE/OUTPT VISIT, EST, LEVL IV, 30-39 MIN: ICD-10-PCS | Mod: S$PBB,,, | Performed by: INTERNAL MEDICINE

## 2019-12-19 PROCEDURE — 99999 PR PBB SHADOW E&M-EST. PATIENT-LVL IV: CPT | Mod: PBBFAC,,, | Performed by: OBSTETRICS & GYNECOLOGY

## 2019-12-19 PROCEDURE — 1159F MED LIST DOCD IN RCRD: CPT | Mod: ,,, | Performed by: OBSTETRICS & GYNECOLOGY

## 2019-12-19 PROCEDURE — 99214 OFFICE O/P EST MOD 30 MIN: CPT | Mod: PBBFAC,25,27 | Performed by: INTERNAL MEDICINE

## 2019-12-19 PROCEDURE — 71046 X-RAY EXAM CHEST 2 VIEWS: CPT | Mod: 26,,, | Performed by: RADIOLOGY

## 2019-12-19 PROCEDURE — 1125F AMNT PAIN NOTED PAIN PRSNT: CPT | Mod: ,,, | Performed by: INTERNAL MEDICINE

## 2019-12-19 PROCEDURE — 1159F PR MEDICATION LIST DOCUMENTED IN MEDICAL RECORD: ICD-10-PCS | Mod: ,,, | Performed by: OBSTETRICS & GYNECOLOGY

## 2019-12-19 PROCEDURE — 71046 XR CHEST PA AND LATERAL: ICD-10-PCS | Mod: 26,,, | Performed by: RADIOLOGY

## 2019-12-19 PROCEDURE — 99214 OFFICE O/P EST MOD 30 MIN: CPT | Mod: S$PBB,,, | Performed by: INTERNAL MEDICINE

## 2019-12-19 PROCEDURE — 99215 PR OFFICE/OUTPT VISIT, EST, LEVL V, 40-54 MIN: ICD-10-PCS | Mod: 24,S$PBB,, | Performed by: OBSTETRICS & GYNECOLOGY

## 2019-12-19 PROCEDURE — 1159F MED LIST DOCD IN RCRD: CPT | Mod: ,,, | Performed by: INTERNAL MEDICINE

## 2019-12-19 PROCEDURE — 1159F PR MEDICATION LIST DOCUMENTED IN MEDICAL RECORD: ICD-10-PCS | Mod: ,,, | Performed by: INTERNAL MEDICINE

## 2019-12-19 PROCEDURE — 99999 PR PBB SHADOW E&M-EST. PATIENT-LVL IV: ICD-10-PCS | Mod: PBBFAC,,, | Performed by: INTERNAL MEDICINE

## 2019-12-19 PROCEDURE — 71046 X-RAY EXAM CHEST 2 VIEWS: CPT | Mod: TC,FY

## 2019-12-19 PROCEDURE — 99999 PR PBB SHADOW E&M-EST. PATIENT-LVL IV: CPT | Mod: PBBFAC,,, | Performed by: INTERNAL MEDICINE

## 2019-12-19 PROCEDURE — 1125F PR PAIN SEVERITY QUANTIFIED, PAIN PRESENT: ICD-10-PCS | Mod: ,,, | Performed by: INTERNAL MEDICINE

## 2019-12-19 PROCEDURE — 99214 OFFICE O/P EST MOD 30 MIN: CPT | Mod: PBBFAC,25 | Performed by: OBSTETRICS & GYNECOLOGY

## 2019-12-19 PROCEDURE — 99215 OFFICE O/P EST HI 40 MIN: CPT | Mod: 24,S$PBB,, | Performed by: OBSTETRICS & GYNECOLOGY

## 2019-12-19 RX ORDER — VALACYCLOVIR HYDROCHLORIDE 500 MG/1
1000 TABLET, FILM COATED ORAL 3 TIMES DAILY
Qty: 42 TABLET | Refills: 0 | Status: SHIPPED | OUTPATIENT
Start: 2019-12-19 | End: 2020-07-24

## 2019-12-19 RX ORDER — ALBUTEROL SULFATE 0.83 MG/ML
2.5 SOLUTION RESPIRATORY (INHALATION) EVERY 6 HOURS PRN
Qty: 90 ML | Refills: 1 | Status: SHIPPED | OUTPATIENT
Start: 2019-12-19 | End: 2020-12-18

## 2019-12-19 RX ORDER — SENNOSIDES 8.6 MG/1
1 TABLET ORAL 2 TIMES DAILY
Qty: 60 TABLET | Refills: 2 | Status: SHIPPED | OUTPATIENT
Start: 2019-12-19 | End: 2020-02-17 | Stop reason: SDUPTHER

## 2019-12-19 NOTE — PROGRESS NOTES
REFERRING PROVIDER  No ref. provider found     REASON FOR CONSULT  Margarita Dunne  is a 74 y.o.  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    HISTORY OF PRESENT ILLNESS    Please refer below for the patient's tumor history.  Since she last saw me she was admitted to an outside hospital on December 8, 2019 for a community-acquired pneumonia.  Per the discharge note she was initially treated with IV antibiotics and transition to p.o. antibiotics.  She completed the p.o. antibiotics but is unsure the completion date.  She denies any chest pain, shortness of air, palpitations, or productive cough.  She is still being treated for her shingles.  She is currently on valacyclovir.  Her lesions have crusted.  Her pain is controlled.  Her appetite is improving.  She tolerates p.o. without any nausea vomiting.  She is having a bowel movement but is taking MiraLax q.day.       Malignant neoplasm of ovary    9/11/2019 Imaging Significant Findings     CT C/A/P: Irregularity of the uterus in this patient with thickened endometrium identified on prior ultrasound with small, nonspecific soft tissue nodules in the pelvis and abdomen with surrounding streaking in the greater omentum as described above.          9/20/2019 Tumor Markers      = 75      11/12/2019 Surgery     LAPAROTOMY, EXPLORATORY  HYSTERECTOMY, TOTAL, ABDOMINAL   SALPINGO-OOPHORECTOMY (Bilateral)  OMENTECTOMY   APPENDECTOMY  EXCISION, TISSUE, PERITONEUM  RD 1 mm on the R diaphragm and sigmoid colon        12/3/2019 -  Chemotherapy     Treatment Summary   Plan Name: OP GYN PACLITAXEL CARBOPLATIN (AUC 6) Q3W  Treatment Goal: Curative  Status: Active  Start Date: 12/3/2019 (Planned)  End Date: 3/17/2020 (Planned)  Provider: Irvin Vinson MD  Chemotherapy: CARBOplatin (PARAPLATIN) 555 mg in sodium chloride 0.9% 250 mL chemo infusion, 555 mg (original dose ), Intravenous, Clinic/HOD 1 time, 0 of 6 cycles  Dose modification:   (Cycle 1)  PACLitaxel (TAXOL)  175 mg/m2 = 294 mg in sodium chloride 0.9% 500 mL chemo infusion, 175 mg/m2 = 294 mg, Intravenous, Clinic/HOD 1 time, 0 of 6 cycles          REVIEW OF SYSTEMS  All systems reviewed and negative except as noted in HPI.    OBJECTIVE   Vitals:    12/19/19 1127   BP: (!) 157/76   Pulse: 95      Body mass index is 22.25 kg/m².      1. General: Well appearing, no apparent distress, alert and oriented.  2. Lymph: Neck symmetric without cervical or supraclavicular adenopathy or mass.  3. Lungs: Normal respiratory rate, no accessory muscle use; decreased breath sounds in the lower lung bases; no wheezing, rales, or rhonchi.  4. Cardiac: Normal rate  5. Psych: Normal affect.  6. Abdomen:  non-distended, soft, non-tender, are no masses, no ascites, no hepatosplenomegaly; incision is C/D/I  7. Skin: Warm, dry, no rashes or lesions.   8. Extremities: Bilateral lower extremities without edema or tenderness.    ECOG status: 1    LABORATORY DATA  Lab data reviewed.    RADIOLOGICAL DATA  Radiology data reviewed.    ASSESSMENT / PLAN     1. Malignant neoplasm of ovary, unspecified laterality    2. Herpes zoster without complication    3. Aspiration pneumonia of left lower lobe, unspecified aspiration pneumonia type    4. Anxiety       -Continue treatment for shingles.  Her lesions are crusted.  No contraindication to beginning chemotherapy  -She has completed a course of antibiotics for her community-acquired pneumonia.  No cultures were obtained.  She is asymptomatic in her respiratory status is improving.  No contraindications to beginning chemotherapy.  -Senna b.i.d. for constipation.  -Compazine scheduled on days 1-3 of CT    PATIENT EDUCATION  Ready to learn, no apparent learning barriers were identified; learning preferences include listening. Explained diagnosis and treatment plan; patient expressed understanding of the content.    ADMINISTRATIVE BILLING  This consultation lasted 50 minutes and greater than 50% of was spent in  counseling.       Irvin Vinson

## 2019-12-19 NOTE — PROGRESS NOTES
Subjective:      Patient ID: Margarita Dunne is a 74 y.o. female.    Chief Complaint: Follow-up    HPI:  SAUL Avila is scheduled for an appt with Dr. Vinson at 11:15 at the [facility]. She was asked to see me in follow up of a recent pneumonia and shingle. She did not complete valtrex as she found that she could not swallow the 1000mg tablet. She did complete the antibiotics but did not have repeat CXR.  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    Glaucoma suspect - Both Eyes    Nuclear sclerosis - Both Eyes    Chronic rhinitis    Eyelid myokymia - Right Eye    Herpes labialis    Visual field defect - Both Eyes    Penicillin allergy    Itching    Hyperlipidemia    Migraine with aura and without status migrainosus, not intractable    Osteopenia    Numbness and tingling of both legs below knees    Neck pain    Vitamin D deficiency    Memory loss    Pain management    Incomplete bladder emptying    Bladder wall thickening    Malignant neoplasm of ovary    Anxiety    Depression    Moderate malnutrition    Left lower lobe pneumonia    Shingles    Ascites    Acute sinusitis     Past Medical History:   Diagnosis Date    Allergy     Angio-edema     Anxiety     Arthritis     Asthma     Cancer     Cataract     Chest pain at rest     Depression     Diverticulosis     Fibromyalgia 7/8/2012    Glaucoma suspect, steroid responders     Hand joint pain 7/8/2012    Headache(784.0)     Hx of psychiatric care     Migraine     Osteoporosis     Psychiatric problem     Recurrent upper respiratory infection (URI)     Shingles 12/8/2019    Sleep difficulties     Therapy     Urticaria      Past Surgical History:   Procedure Laterality Date    APPENDECTOMY  11/12/2019    Procedure: APPENDECTOMY;  Surgeon: Irvin Vinson MD;  Location: [facility] OR [address] [place] [address];  Service: OB/GYN;;    BIOPSY       DEBULKING OF TUMOR N/A 11/12/2019    Procedure: DEBULKING, NEOPLASM;  Surgeon: Irvin Vinson MD;  Location: [facility] OR 2ND FLR;  Service: OB/GYN;  Laterality: N/A;    dexa  2014    osteopenia    MOBILIZATION OF SPLENIC FLEXURE  11/12/2019    Procedure: MOBILIZATION, SPLENIC FLEXURE;  Surgeon: Irvin Vinson MD;  Location: [facility] OR 2ND F[facility];  Service: OB/GYN;;    OMENTECTOMY N/A 11/12/2019    Procedure: OMENTECTOMY;  Surgeon: Irvin Vinson MD;  Location: [facility] OR 2ND F[facility];  Service: OB/GYN;  Laterality: N/A;    RESECTION OF PERITONEAL TISSUE  11/12/2019    Procedure: EXCISION, TISSUE, PERITONEUM;  Surgeon: Irvin Vinson MD;  Location: [facility] OR 2ND FLR;  Service: OB/GYN;;    SALPINGOOPHORECTOMY Bilateral 11/12/2019    Procedure: SALPINGO-OOPHORECTOMY;  Surgeon: Irvin Vinson MD;  Location: [facility] OR 2ND FLR;  Service: OB/GYN;  Laterality: Bilateral;    TOTAL ABDOMINAL HYSTERECTOMY N/A 11/12/2019    Procedure: HYSTERECTOMY, TOTAL, ABDOMINAL;  Surgeon: Irvin Vinson MD;  Location: [facility] OR 2ND FLR;  Service: OB/GYN;  Laterality: N/A;     Family History   Problem Relation Age of Onset    Hypertension Mother     Diabetes Mother     Stroke Mother     Dementia Mother     Cancer Father         pancreas    Allergies Father     Allergic rhinitis Father     Macular degeneration Cousin     Allergic rhinitis Paternal Aunt     Allergies Paternal Aunt     Allergic rhinitis Paternal Uncle     Allergies Paternal Uncle     Glaucoma Neg Hx     Amblyopia Neg Hx     Blindness Neg Hx     Cataracts Neg Hx     Retinal detachment Neg Hx     Strabismus Neg Hx     Thyroid disease Neg Hx     Angioedema Neg Hx     Asthma Neg Hx     Eczema Neg Hx     Immunodeficiency Neg Hx     Colon cancer Neg Hx     Cystic fibrosis Neg Hx     Ulcerative colitis Neg Hx     Stomach cancer Neg Hx     Esophageal cancer Neg Hx      Review of Systems   Patient is weak, eating little, no shortness of breath or chest pain  Objective:  "    Vitals:    12/19/19 1019   BP: 132/88   Pulse: 101   SpO2: 99%   Weight: 57.6 kg (126 lb 15.8 oz)   Height: 5' 4" (1.626 m)   PainSc:   4     Body mass index is 21.8 kg/m².  Physical Exam   Constitutional: She appears well-developed and well-nourished.   Neck: No JVD present. No thyromegaly present.   Cardiovascular: Normal rate, normal heart sounds and intact distal pulses.   Pulmonary/Chest: Effort normal and breath sounds normal. No respiratory distress.   Skin:   Lesions appear about the neck     Assessment:     1. Bacterial pneumonia    2. Herpes zoster without complication      Plan:   Margarita was seen today for follow-up.    Diagnoses and all orders for this visit:    Bacterial pneumonia  -     X-Ray Chest PA And Lateral; Future  -     albuterol (PROVENTIL) 2.5 mg /3 mL (0.083 %) nebulizer solution; Take 3 mLs (2.5 mg total) by nebulization every 6 (six) hours as needed for Wheezing. Rescue    Herpes zoster without complication  -     valACYclovir (VALTREX) 500 MG tablet; Take 2 tablets (1,000 mg total) by mouth 3 (three) times daily. for 7 days        Problem List Items Addressed This Visit     Shingles    Relevant Medications    valACYclovir (VALTREX) 500 MG tablet      Other Visit Diagnoses     Bacterial pneumonia    -  Primary    Relevant Medications    albuterol (PROVENTIL) 2.5 mg /3 mL (0.083 %) nebulizer solution    Other Relevant Orders    X-Ray Chest PA And Lateral        Orders Placed This Encounter   Procedures    X-Ray Chest PA And Lateral     Standing Status:   Future     Standing Expiration Date:   2/17/2020     Follow up for Follow up.     Medication List           Accurate as of December 19, 2019 10:51 AM. If you have any questions, ask your nurse or doctor.               CHANGE how you take these medications    * albuterol 90 mcg/actuation inhaler  Commonly known as:  PROVENTIL/VENTOLIN HFA  Inhale 2 puffs into the lungs every 6 (six) hours as needed for Wheezing. Rescue  What changed:  " Another medication with the same name was added. Make sure you understand how and when to take each.  Changed by:  Columba Anderson MD     * albuterol 2.5 mg /3 mL (0.083 %) nebulizer solution  Commonly known as:  PROVENTIL  Take 3 mLs (2.5 mg total) by nebulization every 6 (six) hours as needed for Wheezing. Rescue  What changed:  You were already taking a medication with the same name, and this prescription was added. Make sure you understand how and when to take each.  Changed by:  Columba Anderson MD     * valACYclovir 1000 MG tablet  Commonly known as:  VALTREX  Take 1 tablet (1,000 mg total) by mouth 3 (three) times daily. for 7 days  What changed:  Another medication with the same name was added. Make sure you understand how and when to take each.  Changed by:  Columba Anderson MD     * valACYclovir 500 MG tablet  Commonly known as:  VALTREX  Take 2 tablets (1,000 mg total) by mouth 3 (three) times daily. for 7 days  What changed:  You were already taking a medication with the same name, and this prescription was added. Make sure you understand how and when to take each.  Changed by:  Columba Anderson MD         * This list has 4 medication(s) that are the same as other medications prescribed for you. Read the directions carefully, and ask your doctor or other care provider to review them with you.            CONTINUE taking these medications    acetaminophen 325 MG tablet  Commonly known as:  TYLENOL     budesonide-formoterol 80-4.5 mcg 80-4.5 mcg/actuation Hfaa  Commonly known as:  Symbicort  Inhale 2 puffs into the lungs once daily. Controller     CITRUCEL ORAL     * FLUoxetine 10 MG Tab  Take 1 tablet (10 mg total) by mouth once daily.     * FLUoxetine 10 MG Tab  Take 1 tablet (10 mg total) by mouth once daily.     * FLUoxetine 10 MG Tab  TAKE 1 TABLET BY MOUTH ONCE DAILY     ibuprofen 600 MG tablet  Commonly known as:  ADVIL,MOTRIN  Take 1 tablet (600 mg total) by mouth every 6 (six) hours.     LORazepam 0.5  MG tablet  Commonly known as:  Ativan  Take 1 tablet (0.5 mg total) by mouth every 8 (eight) hours as needed for Anxiety.     metroNIDAZOLE 250 MG tablet  Commonly known as:  FLAGYL  Take 1 tablet (250 mg total) by mouth 3 (three) times daily.     ondansetron 4 MG tablet  Commonly known as:  ZOFRAN  Take 1 tablet (4 mg total) by mouth every 6 (six) hours as needed for Nausea.     oxyCODONE 5 MG immediate release tablet  Commonly known as:  ROXICODONE  Take 1 tablet (5 mg total) by mouth every 4 (four) hours as needed.     potassium chloride SA 20 MEQ tablet  Commonly known as:  K-DUR,KLOR-CON  Take 1 tablet (20 mEq total) by mouth 3 (three) times daily.     rizatriptan 10 MG tablet  Commonly known as:  MAXALT  TAKE ONE TABLET BY MOUTH AT ONSET OF MIGRAINE, MAY REPEAT EVERY 2 HOURS. DO NOT EXCEED 3 TABLETS IN 24 HOURS.     rosuvastatin 5 MG tablet  Commonly known as:  CRESTOR  Take 1 tablet (5 mg total) by mouth every evening.     Voltaren 1 % Gel  Generic drug:  diclofenac sodium  APPLY 4 GRAMS TOPICALLY 4 TIMES DAILY AS DIRECTED         * This list has 3 medication(s) that are the same as other medications prescribed for you. Read the directions carefully, and ask your doctor or other care provider to review them with you.               Where to Get Your Medications      These medications were sent to [facility]  [address] [address]    Hours:  Mon-Fri, 8a-5:30p Phone:  [phone]   · albuterol 2.5 mg /3 mL (0.083 %) nebulizer solution  · valACYclovir 500 MG tablet

## 2019-12-19 NOTE — PROGRESS NOTES
Received note from  stated that patient checked in for an appointment with  but wanted to reschedule due to fatigue and having to attend several appointments today. Provided patient with my card to reschedule and she acknowledged and agreed.

## 2019-12-20 ENCOUNTER — INFUSION (OUTPATIENT)
Dept: INFUSION THERAPY | Facility: HOSPITAL | Age: 74
End: 2019-12-20
Attending: INTERNAL MEDICINE
Payer: MEDICARE

## 2019-12-20 VITALS
TEMPERATURE: 98 F | RESPIRATION RATE: 18 BRPM | DIASTOLIC BLOOD PRESSURE: 87 MMHG | SYSTOLIC BLOOD PRESSURE: 145 MMHG | HEART RATE: 88 BPM

## 2019-12-20 DIAGNOSIS — C56.9 MALIGNANT NEOPLASM OF OVARY, UNSPECIFIED LATERALITY: Primary | ICD-10-CM

## 2019-12-20 PROCEDURE — 25000003 PHARM REV CODE 250: Performed by: OBSTETRICS & GYNECOLOGY

## 2019-12-20 PROCEDURE — 96367 TX/PROPH/DG ADDL SEQ IV INF: CPT

## 2019-12-20 PROCEDURE — 96375 TX/PRO/DX INJ NEW DRUG ADDON: CPT

## 2019-12-20 PROCEDURE — 96415 CHEMO IV INFUSION ADDL HR: CPT

## 2019-12-20 PROCEDURE — 63600175 PHARM REV CODE 636 W HCPCS: Performed by: OBSTETRICS & GYNECOLOGY

## 2019-12-20 PROCEDURE — 96417 CHEMO IV INFUS EACH ADDL SEQ: CPT

## 2019-12-20 PROCEDURE — 96361 HYDRATE IV INFUSION ADD-ON: CPT

## 2019-12-20 PROCEDURE — 96413 CHEMO IV INFUSION 1 HR: CPT

## 2019-12-20 PROCEDURE — S0028 INJECTION, FAMOTIDINE, 20 MG: HCPCS | Performed by: OBSTETRICS & GYNECOLOGY

## 2019-12-20 RX ORDER — EPINEPHRINE 0.3 MG/.3ML
0.3 INJECTION SUBCUTANEOUS ONCE AS NEEDED
Status: DISCONTINUED | OUTPATIENT
Start: 2019-12-20 | End: 2019-12-20 | Stop reason: HOSPADM

## 2019-12-20 RX ORDER — HEPARIN 100 UNIT/ML
500 SYRINGE INTRAVENOUS
Status: DISCONTINUED | OUTPATIENT
Start: 2019-12-20 | End: 2019-12-20 | Stop reason: HOSPADM

## 2019-12-20 RX ORDER — SODIUM CHLORIDE 0.9 % (FLUSH) 0.9 %
10 SYRINGE (ML) INJECTION
Status: DISCONTINUED | OUTPATIENT
Start: 2019-12-20 | End: 2019-12-20 | Stop reason: HOSPADM

## 2019-12-20 RX ORDER — DIPHENHYDRAMINE HYDROCHLORIDE 50 MG/ML
50 INJECTION INTRAMUSCULAR; INTRAVENOUS ONCE AS NEEDED
Status: DISCONTINUED | OUTPATIENT
Start: 2019-12-20 | End: 2019-12-20 | Stop reason: HOSPADM

## 2019-12-20 RX ORDER — FAMOTIDINE 10 MG/ML
20 INJECTION INTRAVENOUS
Status: COMPLETED | OUTPATIENT
Start: 2019-12-20 | End: 2019-12-20

## 2019-12-20 RX ADMIN — PALONOSETRON HYDROCHLORIDE: 0.25 INJECTION INTRAVENOUS at 08:12

## 2019-12-20 RX ADMIN — DIPHENHYDRAMINE HYDROCHLORIDE 50 MG: 50 INJECTION INTRAMUSCULAR; INTRAVENOUS at 08:12

## 2019-12-20 RX ADMIN — SODIUM CHLORIDE 500 ML: 9 INJECTION, SOLUTION INTRAVENOUS at 01:12

## 2019-12-20 RX ADMIN — FAMOTIDINE 20 MG: 10 INJECTION, SOLUTION INTRAVENOUS at 08:12

## 2019-12-20 RX ADMIN — CARBOPLATIN 505 MG: 10 INJECTION, SOLUTION INTRAVENOUS at 12:12

## 2019-12-20 RX ADMIN — PACLITAXEL 294 MG: 6 INJECTION, SOLUTION INTRAVENOUS at 09:12

## 2019-12-26 ENCOUNTER — PATIENT MESSAGE (OUTPATIENT)
Dept: GYNECOLOGIC ONCOLOGY | Facility: CLINIC | Age: 74
End: 2019-12-26

## 2020-01-03 ENCOUNTER — PATIENT MESSAGE (OUTPATIENT)
Dept: GYNECOLOGIC ONCOLOGY | Facility: CLINIC | Age: 75
End: 2020-01-03

## 2020-01-06 DIAGNOSIS — C56.9 MALIGNANT NEOPLASM OF OVARY, UNSPECIFIED LATERALITY: Primary | ICD-10-CM

## 2020-01-08 ENCOUNTER — LAB VISIT (OUTPATIENT)
Dept: LAB | Facility: OTHER | Age: 75
End: 2020-01-08
Attending: OBSTETRICS & GYNECOLOGY
Payer: MEDICARE

## 2020-01-08 ENCOUNTER — OFFICE VISIT (OUTPATIENT)
Dept: GYNECOLOGIC ONCOLOGY | Facility: CLINIC | Age: 75
End: 2020-01-08
Payer: MEDICARE

## 2020-01-08 VITALS
SYSTOLIC BLOOD PRESSURE: 135 MMHG | BODY MASS INDEX: 21.83 KG/M2 | DIASTOLIC BLOOD PRESSURE: 77 MMHG | WEIGHT: 127.88 LBS | HEIGHT: 64 IN | HEART RATE: 99 BPM

## 2020-01-08 DIAGNOSIS — F41.9 ANXIETY: ICD-10-CM

## 2020-01-08 DIAGNOSIS — C56.9 MALIGNANT NEOPLASM OF OVARY, UNSPECIFIED LATERALITY: Primary | ICD-10-CM

## 2020-01-08 DIAGNOSIS — R11.0 NAUSEA WITHOUT VOMITING: ICD-10-CM

## 2020-01-08 DIAGNOSIS — C56.9 MALIGNANT NEOPLASM OF OVARY: ICD-10-CM

## 2020-01-08 PROBLEM — J18.9 LEFT LOWER LOBE PNEUMONIA: Status: RESOLVED | Noted: 2019-12-08 | Resolved: 2020-01-08

## 2020-01-08 LAB
ALBUMIN SERPL BCP-MCNC: 3.4 G/DL (ref 3.5–5.2)
ALP SERPL-CCNC: 83 U/L (ref 55–135)
ALT SERPL W/O P-5'-P-CCNC: 9 U/L (ref 10–44)
ANION GAP SERPL CALC-SCNC: 10 MMOL/L (ref 8–16)
AST SERPL-CCNC: 18 U/L (ref 10–40)
BILIRUB SERPL-MCNC: 0.1 MG/DL (ref 0.1–1)
BUN SERPL-MCNC: 13 MG/DL (ref 8–23)
CALCIUM SERPL-MCNC: 9.6 MG/DL (ref 8.7–10.5)
CHLORIDE SERPL-SCNC: 106 MMOL/L (ref 95–110)
CO2 SERPL-SCNC: 24 MMOL/L (ref 23–29)
CREAT SERPL-MCNC: 0.7 MG/DL (ref 0.5–1.4)
ERYTHROCYTE [DISTWIDTH] IN BLOOD BY AUTOMATED COUNT: 18.5 % (ref 11.5–14.5)
EST. GFR  (AFRICAN AMERICAN): >60 ML/MIN/1.73 M^2
EST. GFR  (NON AFRICAN AMERICAN): >60 ML/MIN/1.73 M^2
GLUCOSE SERPL-MCNC: 86 MG/DL (ref 70–110)
HCT VFR BLD AUTO: 33 % (ref 37–48.5)
HGB BLD-MCNC: 10.5 G/DL (ref 12–16)
IMM GRANULOCYTES # BLD AUTO: 0.01 K/UL (ref 0–0.04)
MCH RBC QN AUTO: 28.6 PG (ref 27–31)
MCHC RBC AUTO-ENTMCNC: 31.8 G/DL (ref 32–36)
MCV RBC AUTO: 90 FL (ref 82–98)
NEUTROPHILS # BLD AUTO: 2.3 K/UL (ref 1.8–7.7)
PLATELET # BLD AUTO: 247 K/UL (ref 150–350)
PMV BLD AUTO: 9.9 FL (ref 9.2–12.9)
POTASSIUM SERPL-SCNC: 3.8 MMOL/L (ref 3.5–5.1)
PROT SERPL-MCNC: 7.1 G/DL (ref 6–8.4)
RBC # BLD AUTO: 3.67 M/UL (ref 4–5.4)
SODIUM SERPL-SCNC: 140 MMOL/L (ref 136–145)
WBC # BLD AUTO: 4.25 K/UL (ref 3.9–12.7)

## 2020-01-08 PROCEDURE — 36415 COLL VENOUS BLD VENIPUNCTURE: CPT

## 2020-01-08 PROCEDURE — 85027 COMPLETE CBC AUTOMATED: CPT

## 2020-01-08 PROCEDURE — 99214 OFFICE O/P EST MOD 30 MIN: CPT | Mod: 24,S$PBB,, | Performed by: OBSTETRICS & GYNECOLOGY

## 2020-01-08 PROCEDURE — 1159F MED LIST DOCD IN RCRD: CPT | Mod: ,,, | Performed by: OBSTETRICS & GYNECOLOGY

## 2020-01-08 PROCEDURE — 1159F PR MEDICATION LIST DOCUMENTED IN MEDICAL RECORD: ICD-10-PCS | Mod: ,,, | Performed by: OBSTETRICS & GYNECOLOGY

## 2020-01-08 PROCEDURE — 1125F PR PAIN SEVERITY QUANTIFIED, PAIN PRESENT: ICD-10-PCS | Mod: ,,, | Performed by: OBSTETRICS & GYNECOLOGY

## 2020-01-08 PROCEDURE — 99999 PR PBB SHADOW E&M-EST. PATIENT-LVL III: CPT | Mod: PBBFAC,,, | Performed by: OBSTETRICS & GYNECOLOGY

## 2020-01-08 PROCEDURE — 99213 OFFICE O/P EST LOW 20 MIN: CPT | Mod: PBBFAC | Performed by: OBSTETRICS & GYNECOLOGY

## 2020-01-08 PROCEDURE — 80053 COMPREHEN METABOLIC PANEL: CPT

## 2020-01-08 PROCEDURE — 1125F AMNT PAIN NOTED PAIN PRSNT: CPT | Mod: ,,, | Performed by: OBSTETRICS & GYNECOLOGY

## 2020-01-08 PROCEDURE — 99214 PR OFFICE/OUTPT VISIT, EST, LEVL IV, 30-39 MIN: ICD-10-PCS | Mod: 24,S$PBB,, | Performed by: OBSTETRICS & GYNECOLOGY

## 2020-01-08 PROCEDURE — 99999 PR PBB SHADOW E&M-EST. PATIENT-LVL III: ICD-10-PCS | Mod: PBBFAC,,, | Performed by: OBSTETRICS & GYNECOLOGY

## 2020-01-08 RX ORDER — POLYETHYLENE GLYCOL 3350 17 G/17G
1 POWDER, FOR SOLUTION ORAL DAILY
Status: ON HOLD | COMMUNITY
End: 2021-01-01

## 2020-01-08 NOTE — PROGRESS NOTES
REFERRING PROVIDER  No ref. provider found     REASON FOR CONSULT  Margarita Dunne  is a 75 y.o.  woman who presents for evaluation of stage IIIC HGSOC, gBRCA and sBRCA testing is pending.      HISTORY OF PRESENT ILLNESS    Chemotherapy regimen: carboplatin/paclitaxel q21 days  Cycle: 2  Previous dose limiting toxicities: N  Previous dose reductions: N  Previous breaks: N  Previous changes in pre-medications: N    Energy level: baseline  Appetite: improving  Fevers/chills/nights: no  Nausea: no  Emesis: no  Neuropathy: no  Discoloration of lower extremities: no  Tolerating PO: yes  Flatus: yes  BM: yes       Malignant neoplasm of ovary    9/11/2019 Imaging Significant Findings     CT C/A/P: Irregularity of the uterus in this patient with thickened endometrium identified on prior ultrasound with small, nonspecific soft tissue nodules in the pelvis and abdomen with surrounding streaking in the greater omentum as described above.          9/20/2019 Tumor Markers      = 75      11/12/2019 Surgery     LAPAROTOMY, EXPLORATORY  HYSTERECTOMY, TOTAL, ABDOMINAL   SALPINGO-OOPHORECTOMY (Bilateral)  OMENTECTOMY   APPENDECTOMY  EXCISION, TISSUE, PERITONEUM  RD 1 mm on the R diaphragm and sigmoid colon        12/3/2019 -  Chemotherapy     Treatment Summary   Plan Name: OP GYN PACLITAXEL CARBOPLATIN (AUC 6) Q3W  Treatment Goal: Curative  Status: Active  Start Date: 12/20/2019  End Date: 3/17/2020 (Planned)  Provider: Irvin Vinson MD  Chemotherapy: CARBOplatin (PARAPLATIN) 505 mg in sodium chloride 0.9% 250 mL chemo infusion, 505 mg (90.8 % of original dose 555.6 mg), Intravenous, Clinic/HOD 1 time, 1 of 6 cycles  Dose modification:   (original dose 555.6 mg, Cycle 1)  Administration: 505 mg (12/20/2019)  PACLitaxel (TAXOL) 175 mg/m2 = 294 mg in sodium chloride 0.9% 500 mL chemo infusion, 175 mg/m2 = 294 mg, Intravenous, Clinic/HOD 1 time, 1 of 6 cycles  Administration: 294 mg (12/20/2019)          The following portions of  the patient's history were reviewed and updated as appropriate: allergies, current medications, family history, medical history, social history and surgical history.    REVIEW OF SYSTEMS  All systems reviewed and negative except as noted in HPI.    OBJECTIVE   Vitals:    01/08/20 1004   BP: 135/77   Pulse: 99      Body mass index is 21.95 kg/m².      1. General: Well appearing, no apparent distress, alert and oriented.  2. Lymph: Neck symmetric without cervical or supraclavicular adenopathy or mass.  3. Lungs: Normal respiratory rate, no accessory muscle use.  4. Cardiac: Normal rate  5. Psych: Normal affect.  6. Abdomen:  non-distended, soft, non-tender, are no masses, no ascites, no hepatosplenomegaly.  7. Skin: Warm, dry, no rashes or lesions.   8. Extremities: Bilateral lower extremities without edema or tenderness.    ECOG status: 1    LABORATORY DATA  Lab data reviewed.    RADIOLOGICAL DATA  Radiology data reviewed.    ASSESSMENT / PLAN     1. Malignant neoplasm of ovary, unspecified laterality    2. Anxiety       -Compazine on days 1-4  -Encouraged her to try eating dinner to see if it improves her AM nausea.  Also consider taking a Zofran before bed time, although we discussed the half life  -Anxiety support  -F/U genetic testing  -Palliative Care consult to discuss goals of care  -Cancer Psych consult (she missed her last appointment with Dr. Morrison but would like to be seen)    No dose limiting toxicities or signs of recurrence.  Administer cycle #2.    PATIENT EDUCATION  Ready to learn, no apparent learning barriers were identified; learning preferences include listening. Explained diagnosis and treatment plan; patient expressed understanding of the content.    ADMINISTRATIVE BILLING  Greater than 50% of was spent in counseling.

## 2020-01-09 ENCOUNTER — TELEPHONE (OUTPATIENT)
Dept: INFUSION THERAPY | Facility: HOSPITAL | Age: 75
End: 2020-01-09

## 2020-01-09 DIAGNOSIS — C56.9 MALIGNANT NEOPLASM OF OVARY, UNSPECIFIED LATERALITY: Primary | ICD-10-CM

## 2020-01-09 RX ORDER — DIPHENHYDRAMINE HYDROCHLORIDE 50 MG/ML
50 INJECTION INTRAMUSCULAR; INTRAVENOUS ONCE AS NEEDED
Status: CANCELLED | OUTPATIENT
Start: 2020-01-09

## 2020-01-09 RX ORDER — FAMOTIDINE 10 MG/ML
20 INJECTION INTRAVENOUS
Status: CANCELLED | OUTPATIENT
Start: 2020-01-09

## 2020-01-09 RX ORDER — HEPARIN 100 UNIT/ML
500 SYRINGE INTRAVENOUS
Status: CANCELLED | OUTPATIENT
Start: 2020-01-09

## 2020-01-09 RX ORDER — SODIUM CHLORIDE 0.9 % (FLUSH) 0.9 %
10 SYRINGE (ML) INJECTION
Status: CANCELLED | OUTPATIENT
Start: 2020-01-09

## 2020-01-09 RX ORDER — EPINEPHRINE 0.3 MG/.3ML
0.3 INJECTION SUBCUTANEOUS ONCE AS NEEDED
Status: CANCELLED | OUTPATIENT
Start: 2020-01-09

## 2020-01-10 ENCOUNTER — INFUSION (OUTPATIENT)
Dept: INFUSION THERAPY | Facility: HOSPITAL | Age: 75
End: 2020-01-10
Attending: INTERNAL MEDICINE
Payer: MEDICARE

## 2020-01-10 VITALS
OXYGEN SATURATION: 99 % | SYSTOLIC BLOOD PRESSURE: 167 MMHG | HEIGHT: 64 IN | RESPIRATION RATE: 18 BRPM | DIASTOLIC BLOOD PRESSURE: 74 MMHG | HEART RATE: 80 BPM | TEMPERATURE: 98 F | WEIGHT: 127.88 LBS | BODY MASS INDEX: 21.83 KG/M2

## 2020-01-10 DIAGNOSIS — C56.9 MALIGNANT NEOPLASM OF OVARY, UNSPECIFIED LATERALITY: Primary | ICD-10-CM

## 2020-01-10 PROCEDURE — 96375 TX/PRO/DX INJ NEW DRUG ADDON: CPT

## 2020-01-10 PROCEDURE — 96415 CHEMO IV INFUSION ADDL HR: CPT

## 2020-01-10 PROCEDURE — 25000003 PHARM REV CODE 250: Performed by: OBSTETRICS & GYNECOLOGY

## 2020-01-10 PROCEDURE — 96367 TX/PROPH/DG ADDL SEQ IV INF: CPT

## 2020-01-10 PROCEDURE — 96365 THER/PROPH/DIAG IV INF INIT: CPT

## 2020-01-10 PROCEDURE — S0028 INJECTION, FAMOTIDINE, 20 MG: HCPCS | Performed by: OBSTETRICS & GYNECOLOGY

## 2020-01-10 PROCEDURE — 63600175 PHARM REV CODE 636 W HCPCS: Performed by: OBSTETRICS & GYNECOLOGY

## 2020-01-10 PROCEDURE — 96413 CHEMO IV INFUSION 1 HR: CPT

## 2020-01-10 PROCEDURE — 96417 CHEMO IV INFUS EACH ADDL SEQ: CPT

## 2020-01-10 RX ORDER — FAMOTIDINE 10 MG/ML
20 INJECTION INTRAVENOUS
Status: COMPLETED | OUTPATIENT
Start: 2020-01-10 | End: 2020-01-10

## 2020-01-10 RX ORDER — DIPHENHYDRAMINE HYDROCHLORIDE 50 MG/ML
50 INJECTION INTRAMUSCULAR; INTRAVENOUS ONCE AS NEEDED
Status: DISCONTINUED | OUTPATIENT
Start: 2020-01-10 | End: 2020-01-10 | Stop reason: HOSPADM

## 2020-01-10 RX ORDER — EPINEPHRINE 0.3 MG/.3ML
0.3 INJECTION SUBCUTANEOUS ONCE AS NEEDED
Status: DISCONTINUED | OUTPATIENT
Start: 2020-01-10 | End: 2020-01-10 | Stop reason: HOSPADM

## 2020-01-10 RX ADMIN — DEXAMETHASONE SODIUM PHOSPHATE: 4 INJECTION, SOLUTION INTRA-ARTICULAR; INTRALESIONAL; INTRAMUSCULAR; INTRAVENOUS; SOFT TISSUE at 07:01

## 2020-01-10 RX ADMIN — CARBOPLATIN 550 MG: 10 INJECTION, SOLUTION INTRAVENOUS at 11:01

## 2020-01-10 RX ADMIN — DIPHENHYDRAMINE HYDROCHLORIDE 50 MG: 50 INJECTION INTRAMUSCULAR; INTRAVENOUS at 07:01

## 2020-01-10 RX ADMIN — SODIUM CHLORIDE 500 ML: 0.9 INJECTION, SOLUTION INTRAVENOUS at 07:01

## 2020-01-10 RX ADMIN — PACLITAXEL 294 MG: 6 INJECTION, SOLUTION INTRAVENOUS at 08:01

## 2020-01-10 RX ADMIN — FAMOTIDINE 20 MG: 10 INJECTION, SOLUTION INTRAVENOUS at 07:01

## 2020-01-10 NOTE — PLAN OF CARE
Pt tolerated C2 taxol/carbo/IVF without adverse effects. VSS. Verbalized understanding of RTC date. DC with family ambulating independently.

## 2020-01-12 RX ORDER — DIPHENHYDRAMINE HYDROCHLORIDE 50 MG/ML
50 INJECTION INTRAMUSCULAR; INTRAVENOUS ONCE AS NEEDED
Status: CANCELLED | OUTPATIENT
Start: 2020-01-14

## 2020-01-12 RX ORDER — EPINEPHRINE 0.3 MG/.3ML
0.3 INJECTION SUBCUTANEOUS ONCE AS NEEDED
Status: CANCELLED | OUTPATIENT
Start: 2020-01-14

## 2020-01-12 RX ORDER — FAMOTIDINE 10 MG/ML
20 INJECTION INTRAVENOUS
Status: CANCELLED | OUTPATIENT
Start: 2020-01-14

## 2020-01-12 RX ORDER — SODIUM CHLORIDE 0.9 % (FLUSH) 0.9 %
10 SYRINGE (ML) INJECTION
Status: CANCELLED | OUTPATIENT
Start: 2020-01-14

## 2020-01-12 RX ORDER — HEPARIN 100 UNIT/ML
500 SYRINGE INTRAVENOUS
Status: CANCELLED | OUTPATIENT
Start: 2020-01-14

## 2020-01-15 ENCOUNTER — TELEPHONE (OUTPATIENT)
Dept: INFUSION THERAPY | Facility: HOSPITAL | Age: 75
End: 2020-01-15

## 2020-01-15 ENCOUNTER — PATIENT MESSAGE (OUTPATIENT)
Dept: GYNECOLOGIC ONCOLOGY | Facility: CLINIC | Age: 75
End: 2020-01-15

## 2020-01-24 ENCOUNTER — PATIENT MESSAGE (OUTPATIENT)
Dept: GYNECOLOGIC ONCOLOGY | Facility: CLINIC | Age: 75
End: 2020-01-24

## 2020-01-28 DIAGNOSIS — C56.9 MALIGNANT NEOPLASM OF OVARY, UNSPECIFIED LATERALITY: Primary | ICD-10-CM

## 2020-01-28 PROBLEM — R18.8 ASCITES: Status: RESOLVED | Noted: 2019-12-09 | Resolved: 2020-01-28

## 2020-01-28 PROBLEM — B02.9 SHINGLES: Status: RESOLVED | Noted: 2019-12-08 | Resolved: 2020-01-28

## 2020-01-28 NOTE — PROGRESS NOTES
REFERRING PROVIDER  No ref. provider found     REASON FOR CONSULT  Margarita Dunne  is a 75 y.o.  woman who presents for evaluation of stage IIIC HGSOC, gBRCA and sBRCA testing is pending.      HISTORY OF PRESENT ILLNESS    Chemotherapy regimen: carboplatin/paclitaxel q21 days  Cycle: 3  Previous dose limiting toxicities: Y  Cycle 2: Chemotherapy induced thrombocytopenia (104) and neutropenia (1.4)  Previous dose reductions: Y  Cycle 3: AUC 5 due to thrombocytopenia  Previous breaks: N  Previous changes in pre-medications: N  Previous growth support: N    Energy level: baseline  Appetite: improving  Fevers/chills/nights: no  Nausea: no  Emesis: no  Neuropathy: no  Discoloration of lower extremities: no  Tolerating PO: yes  Flatus: yes  BM: yes       Malignant neoplasm of ovary    9/11/2019 Imaging Significant Findings     CT C/A/P: Irregularity of the uterus in this patient with thickened endometrium identified on prior ultrasound with small, nonspecific soft tissue nodules in the pelvis and abdomen with surrounding streaking in the greater omentum as described above.          9/20/2019 Tumor Markers      = 75      11/12/2019 Surgery     LAPAROTOMY, EXPLORATORY  HYSTERECTOMY, TOTAL, ABDOMINAL   SALPINGO-OOPHORECTOMY (Bilateral)  OMENTECTOMY   APPENDECTOMY  EXCISION, TISSUE, PERITONEUM  RD 1 mm on the R diaphragm and sigmoid colon        12/3/2019 -  Chemotherapy     Treatment Summary   Plan Name: OP GYN PACLITAXEL CARBOPLATIN (AUC 6) Q3W  Treatment Goal: Curative  Status: Active  Start Date: 12/20/2019  End Date: 3/17/2020 (Planned)  Provider: Irvin Vinson MD  Chemotherapy: CARBOplatin (PARAPLATIN) 505 mg in sodium chloride 0.9% 250 mL chemo infusion, 505 mg (90.8 % of original dose 555.6 mg), Intravenous, Clinic/HOD 1 time, 2 of 6 cycles  Dose modification:   (original dose 555.6 mg, Cycle 1)  Administration: 505 mg (12/20/2019), 550 mg (1/10/2020)  PACLitaxel (TAXOL) 175 mg/m2 = 294 mg in sodium chloride 0.9%  500 mL chemo infusion, 175 mg/m2 = 294 mg, Intravenous, Clinic/HOD 1 time, 2 of 6 cycles  Administration: 294 mg (12/20/2019), 294 mg (1/10/2020)          The following portions of the patient's history were reviewed and updated as appropriate: allergies, current medications, family history, medical history, social history and surgical history.    REVIEW OF SYSTEMS  All systems reviewed and negative except as noted in HPI.    OBJECTIVE   There were no vitals filed for this visit.   There is no height or weight on file to calculate BMI.      1. General: Well appearing, no apparent distress, alert and oriented.  2. Lymph: Neck symmetric without cervical or supraclavicular adenopathy or mass.  3. Lungs: Normal respiratory rate, no accessory muscle use.  4. Cardiac: Normal rate  5. Psych: Normal affect.  6. Abdomen:  non-distended, soft, non-tender, are no masses, no ascites, no hepatosplenomegaly.  7. Skin: Warm, dry, no rashes or lesions.   8. Extremities: Bilateral lower extremities without edema or tenderness.    ECOG status: 1    LABORATORY DATA  Lab data reviewed.    RADIOLOGICAL DATA  Radiology data reviewed.    ASSESSMENT / PLAN     1. Malignant neoplasm of ovary, unspecified laterality    2. Anxiety       -Compazine on days 1-4  -Anxiety support  -F/U somatic testing  -Repeat genetic testing  -Palliative Care consult to discuss goals of care  -Cancer Psych consult (she missed her last appointment with Dr. Morrison but would like to be seen)    Will reduce AUC to 5 to combat her neutropenia and cytopenia.  If we have to delay her on cycle #4 we will delay by 1 week and add Neulasta.    RTC 3 weeks for repeat CT C/A/P.  Will tentatively schedule cycle #4.    PATIENT EDUCATION  Ready to learn, no apparent learning barriers were identified; learning preferences include listening. Explained diagnosis and treatment plan; patient expressed understanding of the content.    ADMINISTRATIVE BILLING  Greater than 50% of was  spent in counseling.

## 2020-01-29 ENCOUNTER — OFFICE VISIT (OUTPATIENT)
Dept: GYNECOLOGIC ONCOLOGY | Facility: CLINIC | Age: 75
End: 2020-01-29
Payer: MEDICARE

## 2020-01-29 ENCOUNTER — LAB VISIT (OUTPATIENT)
Dept: LAB | Facility: OTHER | Age: 75
End: 2020-01-29
Attending: OBSTETRICS & GYNECOLOGY
Payer: MEDICARE

## 2020-01-29 VITALS
HEART RATE: 91 BPM | WEIGHT: 128.31 LBS | DIASTOLIC BLOOD PRESSURE: 67 MMHG | BODY MASS INDEX: 21.91 KG/M2 | SYSTOLIC BLOOD PRESSURE: 142 MMHG | HEIGHT: 64 IN

## 2020-01-29 DIAGNOSIS — F41.9 ANXIETY: ICD-10-CM

## 2020-01-29 DIAGNOSIS — T45.1X5A CHEMOTHERAPY INDUCED NEUTROPENIA: ICD-10-CM

## 2020-01-29 DIAGNOSIS — C56.9 MALIGNANT NEOPLASM OF OVARY, UNSPECIFIED LATERALITY: ICD-10-CM

## 2020-01-29 DIAGNOSIS — C56.9 MALIGNANT NEOPLASM OF OVARY, UNSPECIFIED LATERALITY: Primary | ICD-10-CM

## 2020-01-29 DIAGNOSIS — D70.1 CHEMOTHERAPY INDUCED NEUTROPENIA: Primary | ICD-10-CM

## 2020-01-29 DIAGNOSIS — C56.9 MALIGNANT NEOPLASM OF OVARY: ICD-10-CM

## 2020-01-29 DIAGNOSIS — T45.1X5A CHEMOTHERAPY INDUCED NEUTROPENIA: Primary | ICD-10-CM

## 2020-01-29 DIAGNOSIS — D70.1 CHEMOTHERAPY INDUCED NEUTROPENIA: ICD-10-CM

## 2020-01-29 LAB
ALBUMIN SERPL BCP-MCNC: 3.5 G/DL (ref 3.5–5.2)
ALP SERPL-CCNC: 89 U/L (ref 55–135)
ALT SERPL W/O P-5'-P-CCNC: 13 U/L (ref 10–44)
ANION GAP SERPL CALC-SCNC: 9 MMOL/L (ref 8–16)
AST SERPL-CCNC: 18 U/L (ref 10–40)
BILIRUB SERPL-MCNC: 0.2 MG/DL (ref 0.1–1)
BUN SERPL-MCNC: 15 MG/DL (ref 8–23)
CALCIUM SERPL-MCNC: 9.4 MG/DL (ref 8.7–10.5)
CANCER AG125 SERPL-ACNC: 27 U/ML (ref 0–30)
CHLORIDE SERPL-SCNC: 105 MMOL/L (ref 95–110)
CO2 SERPL-SCNC: 27 MMOL/L (ref 23–29)
CREAT SERPL-MCNC: 0.7 MG/DL (ref 0.5–1.4)
ERYTHROCYTE [DISTWIDTH] IN BLOOD BY AUTOMATED COUNT: 19.6 % (ref 11.5–14.5)
EST. GFR  (AFRICAN AMERICAN): >60 ML/MIN/1.73 M^2
EST. GFR  (NON AFRICAN AMERICAN): >60 ML/MIN/1.73 M^2
GLUCOSE SERPL-MCNC: 89 MG/DL (ref 70–110)
HCT VFR BLD AUTO: 30.8 % (ref 37–48.5)
HGB BLD-MCNC: 10.2 G/DL (ref 12–16)
IMM GRANULOCYTES # BLD AUTO: 0.01 K/UL (ref 0–0.04)
MCH RBC QN AUTO: 30.2 PG (ref 27–31)
MCHC RBC AUTO-ENTMCNC: 33.1 G/DL (ref 32–36)
MCV RBC AUTO: 91 FL (ref 82–98)
NEUTROPHILS # BLD AUTO: 1.4 K/UL (ref 1.8–7.7)
PLATELET # BLD AUTO: 102 K/UL (ref 150–350)
PMV BLD AUTO: 9.6 FL (ref 9.2–12.9)
POTASSIUM SERPL-SCNC: 3.8 MMOL/L (ref 3.5–5.1)
PROT SERPL-MCNC: 7 G/DL (ref 6–8.4)
RBC # BLD AUTO: 3.38 M/UL (ref 4–5.4)
SODIUM SERPL-SCNC: 141 MMOL/L (ref 136–145)
WBC # BLD AUTO: 3.28 K/UL (ref 3.9–12.7)

## 2020-01-29 PROCEDURE — 99213 OFFICE O/P EST LOW 20 MIN: CPT | Mod: PBBFAC | Performed by: OBSTETRICS & GYNECOLOGY

## 2020-01-29 PROCEDURE — 99999 PR PBB SHADOW E&M-EST. PATIENT-LVL III: CPT | Mod: PBBFAC,,, | Performed by: OBSTETRICS & GYNECOLOGY

## 2020-01-29 PROCEDURE — 99214 PR OFFICE/OUTPT VISIT, EST, LEVL IV, 30-39 MIN: ICD-10-PCS | Mod: S$PBB,24,, | Performed by: OBSTETRICS & GYNECOLOGY

## 2020-01-29 PROCEDURE — 36415 COLL VENOUS BLD VENIPUNCTURE: CPT

## 2020-01-29 PROCEDURE — 99214 OFFICE O/P EST MOD 30 MIN: CPT | Mod: S$PBB,24,, | Performed by: OBSTETRICS & GYNECOLOGY

## 2020-01-29 PROCEDURE — 1125F PR PAIN SEVERITY QUANTIFIED, PAIN PRESENT: ICD-10-PCS | Mod: ,,, | Performed by: OBSTETRICS & GYNECOLOGY

## 2020-01-29 PROCEDURE — 99999 PR PBB SHADOW E&M-EST. PATIENT-LVL III: ICD-10-PCS | Mod: PBBFAC,,, | Performed by: OBSTETRICS & GYNECOLOGY

## 2020-01-29 PROCEDURE — 1159F MED LIST DOCD IN RCRD: CPT | Mod: ,,, | Performed by: OBSTETRICS & GYNECOLOGY

## 2020-01-29 PROCEDURE — 85027 COMPLETE CBC AUTOMATED: CPT

## 2020-01-29 PROCEDURE — 1159F PR MEDICATION LIST DOCUMENTED IN MEDICAL RECORD: ICD-10-PCS | Mod: ,,, | Performed by: OBSTETRICS & GYNECOLOGY

## 2020-01-29 PROCEDURE — 80053 COMPREHEN METABOLIC PANEL: CPT

## 2020-01-29 PROCEDURE — 1125F AMNT PAIN NOTED PAIN PRSNT: CPT | Mod: ,,, | Performed by: OBSTETRICS & GYNECOLOGY

## 2020-01-29 PROCEDURE — 86304 IMMUNOASSAY TUMOR CA 125: CPT

## 2020-01-30 ENCOUNTER — OFFICE VISIT (OUTPATIENT)
Dept: PSYCHIATRY | Facility: CLINIC | Age: 75
End: 2020-01-30
Payer: MEDICARE

## 2020-01-30 ENCOUNTER — PATIENT MESSAGE (OUTPATIENT)
Dept: GYNECOLOGIC ONCOLOGY | Facility: CLINIC | Age: 75
End: 2020-01-30

## 2020-01-30 DIAGNOSIS — C56.9 MALIGNANT NEOPLASM OF OVARY, UNSPECIFIED LATERALITY: ICD-10-CM

## 2020-01-30 DIAGNOSIS — F41.9 ANXIETY: Primary | ICD-10-CM

## 2020-01-30 DIAGNOSIS — F32.A DEPRESSION, UNSPECIFIED DEPRESSION TYPE: ICD-10-CM

## 2020-01-30 PROCEDURE — 99212 OFFICE O/P EST SF 10 MIN: CPT | Mod: PBBFAC | Performed by: PSYCHOLOGIST

## 2020-01-30 PROCEDURE — 90834 PSYTX W PT 45 MINUTES: CPT | Mod: ,,, | Performed by: PSYCHOLOGIST

## 2020-01-30 PROCEDURE — 99999 PR PBB SHADOW E&M-EST. PATIENT-LVL II: CPT | Mod: PBBFAC,,, | Performed by: PSYCHOLOGIST

## 2020-01-30 PROCEDURE — 99999 PR PBB SHADOW E&M-EST. PATIENT-LVL II: ICD-10-PCS | Mod: PBBFAC,,, | Performed by: PSYCHOLOGIST

## 2020-01-30 PROCEDURE — 90834 PR PSYCHOTHERAPY W/PATIENT, 45 MIN: ICD-10-PCS | Mod: ,,, | Performed by: PSYCHOLOGIST

## 2020-01-30 NOTE — PROGRESS NOTES
INFORMED CONSENT: Margarita Dunne   is known to this provider and identity was confirmed via NAME and .  The patient has been informed of the risks and benefits associated with engaging in psychotherapy, the handling of protected health information, the rights of privacy and the limits of confidentiality. The patient has also been informed of the importance of reporting any suicidal or homicidal ideation to this or any provider to ensure safety of all parties, and the Margarita Dunne expressed understanding. The patient was agreeable to these terms and freely participates in individual psychotherapy.    PSYCHO-ONCOLOGY NOTE/ Individual Psychotherapy     Date: 2020   Site:  [facility]        Therapeutic Intervention: Met with patient.  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    Glaucoma suspect - Both Eyes    Nuclear sclerosis - Both Eyes    Chronic rhinitis    Eyelid myokymia - Right Eye    Visual field defect - Both Eyes    Penicillin allergy    Itching    Hyperlipidemia    Migraine with aura and without status migrainosus, not intractable    Osteopenia    Numbness and tingling of both legs below knees    Neck pain    Vitamin D deficiency    Memory loss    Pain management    Incomplete bladder emptying    Malignant neoplasm of ovary    Anxiety    Depression    Moderate malnutrition    Acute sinusitis    Chemotherapy induced neutropenia       Chief complaint/reason for encounter: depression and anxiety   Met with patient to evaluate psychosocial adaptation to diagnosis/treatment/survivorship of Ovarian Cancer    Current Medications  Current Outpatient Medications   Medication    acetaminophen (TYLENOL) 325 MG tablet     albuterol (PROVENTIL) 2.5 mg /3 mL (0.083 %) nebulizer solution    albuterol (PROVENTIL/VENTOLIN HFA) 90 mcg/actuation inhaler    budesonide-formoterol 80-4.5 mcg (SYMBICORT) 80-4.5 mcg/actuation HFAA    FLUoxetine 10 MG Tab    FLUoxetine 10 MG Tab    ibuprofen (ADVIL,MOTRIN) 600 MG tablet    LORazepam (ATIVAN) 0.5 MG tablet    methylcellulose (CITRUCEL ORAL)    metroNIDAZOLE (FLAGYL) 250 MG tablet    ondansetron (ZOFRAN) 4 MG tablet    oxyCODONE (ROXICODONE) 5 MG immediate release tablet    polyethylene glycol (GLYCOLAX) 17 gram PwPk    potassium chloride SA (K-DUR,KLOR-CON) 20 MEQ tablet    rizatriptan (MAXALT) 10 MG tablet    rosuvastatin (CRESTOR) 5 MG tablet    senna (SENOKOT) 8.6 mg tablet    valACYclovir (VALTREX) 500 MG tablet    VOLTAREN 1 % Gel     No current facility-administered medications for this visit.        Objective:  Margarita Dunne arrived promptly for the session.    Ms. Dunne was independently ambulatory at the time of session. The patient was fully cooperative throughout the session.  Appearance: age appropriate, appropriately  dressed, adequately  groomed  Behavior/Cooperation: guarded  Speech: normal in rate, volume, and tone and appropriate quality, quantity and organization of sentences  Mood: euthymic  Affect: full range and appropriate  Thought Process: goal-directed, logical  Thought Content: normal,  No delusions or paranoia; did not appear to be responding to internal stimuli during the session  Orientation: grossly intact  Memory: Grossly intact  Attention Span/Concentration: Attends to session without distraction; reports no difficulty  Fund of Knowledge: average  Estimate of Intelligence: average from verbal skills and history  Cognition: grossly intact  Insight: patient has awareness of illness; good insight into own behavior and behavior of others  Judgment: the patient's behavior is adequate to circumstances    Interval history and content of current session: She  reports trouble staying asleep. She stated that feels able to manage symptoms on her own and declined to continue with psychotherapy at present.  Discussed current adaptation to disease and treatment status and family's adaptation to disease and treatment status. Reports to be coping in a passive manner. Evaluated cognitive response, paying particular attention to negative intrusive thoughts of a persistent and detrimental nature. Thoughts of this type are in evidence with mild distress. Provided cognitive behavioral therapy to address negative cognitions. Identified and evaluated psychosocial and environmental stressors secondary to diagnosis and treatment.  Examined proactive behaviors that may be implemented to minimize or ameliorate psychosocial stressors secondary to diagnosis and treatment.     Risk parameters:   Patient reports no suicidal ideation  Patient reports no homicidal ideation  Patient reports no self-injurious behavior  Patient reports no violent behavior   Safety needs:  None at this time      Verbal deficits: None     Patient's response to intervention:The patient's response to intervention is guarded.     Progress toward goals and other mental status changes:  The patient's progress toward goals is fair .      Progress to date:Progress - Ongoing, but Slow      Goals from last visit: N/A     Patient reported outcomes:    Distress Thermometer: 2   PHQ-9= 5, no SI   LIZET-7= 7      Client Strengths: verbal, intelligent, successful, good social support, good insight, commitment to wellness, strong max, strong cultural traditions     Diagnosis:       ICD-10-CM ICD-9-CM   1. Anxiety F41.9 300.00   2. Depression, unspecified depression type F32.9 311   3. Malignant neoplasm of ovary, unspecified laterality C56.9 183.0       Treatment Plan:medication management by physician  · Target symptoms: depression, anxiety   · Patient feels that she is able to manage symptoms on her own and declined a follow up  appointment at this time. Return to Clinic as Needed      Behavioral goals:   Continue utilizing coping skills    Return to clinic: as scheduled      Length of Service (minutes direct face-to-face contact): 45    Tamiko Maynard, PhD  [place] License #[id number]

## 2020-01-31 ENCOUNTER — INFUSION (OUTPATIENT)
Dept: INFUSION THERAPY | Facility: HOSPITAL | Age: 75
End: 2020-01-31
Attending: INTERNAL MEDICINE
Payer: MEDICARE

## 2020-01-31 VITALS
BODY MASS INDEX: 21.83 KG/M2 | HEIGHT: 64 IN | TEMPERATURE: 98 F | SYSTOLIC BLOOD PRESSURE: 141 MMHG | HEART RATE: 91 BPM | WEIGHT: 127.88 LBS | DIASTOLIC BLOOD PRESSURE: 74 MMHG | RESPIRATION RATE: 18 BRPM

## 2020-01-31 DIAGNOSIS — C56.9 MALIGNANT NEOPLASM OF OVARY, UNSPECIFIED LATERALITY: ICD-10-CM

## 2020-01-31 DIAGNOSIS — D70.1 CHEMOTHERAPY INDUCED NEUTROPENIA: Primary | ICD-10-CM

## 2020-01-31 DIAGNOSIS — T45.1X5A CHEMOTHERAPY INDUCED NEUTROPENIA: Primary | ICD-10-CM

## 2020-01-31 PROCEDURE — 96415 CHEMO IV INFUSION ADDL HR: CPT

## 2020-01-31 PROCEDURE — 96413 CHEMO IV INFUSION 1 HR: CPT

## 2020-01-31 PROCEDURE — 96375 TX/PRO/DX INJ NEW DRUG ADDON: CPT

## 2020-01-31 PROCEDURE — 63600175 PHARM REV CODE 636 W HCPCS: Performed by: OBSTETRICS & GYNECOLOGY

## 2020-01-31 PROCEDURE — 96417 CHEMO IV INFUS EACH ADDL SEQ: CPT

## 2020-01-31 PROCEDURE — S0028 INJECTION, FAMOTIDINE, 20 MG: HCPCS | Performed by: OBSTETRICS & GYNECOLOGY

## 2020-01-31 PROCEDURE — 25000003 PHARM REV CODE 250: Performed by: OBSTETRICS & GYNECOLOGY

## 2020-01-31 PROCEDURE — 96367 TX/PROPH/DG ADDL SEQ IV INF: CPT

## 2020-01-31 RX ORDER — HEPARIN 100 UNIT/ML
500 SYRINGE INTRAVENOUS
Status: DISCONTINUED | OUTPATIENT
Start: 2020-01-31 | End: 2020-01-31 | Stop reason: HOSPADM

## 2020-01-31 RX ORDER — DIPHENHYDRAMINE HYDROCHLORIDE 50 MG/ML
50 INJECTION INTRAMUSCULAR; INTRAVENOUS ONCE AS NEEDED
Status: DISCONTINUED | OUTPATIENT
Start: 2020-01-31 | End: 2020-01-31 | Stop reason: HOSPADM

## 2020-01-31 RX ORDER — FAMOTIDINE 10 MG/ML
20 INJECTION INTRAVENOUS
Status: COMPLETED | OUTPATIENT
Start: 2020-01-31 | End: 2020-01-31

## 2020-01-31 RX ORDER — EPINEPHRINE 0.3 MG/.3ML
0.3 INJECTION SUBCUTANEOUS ONCE AS NEEDED
Status: DISCONTINUED | OUTPATIENT
Start: 2020-01-31 | End: 2020-01-31 | Stop reason: HOSPADM

## 2020-01-31 RX ORDER — SODIUM CHLORIDE 0.9 % (FLUSH) 0.9 %
10 SYRINGE (ML) INJECTION
Status: DISCONTINUED | OUTPATIENT
Start: 2020-01-31 | End: 2020-01-31 | Stop reason: HOSPADM

## 2020-01-31 RX ADMIN — DEXAMETHASONE SODIUM PHOSPHATE: 4 INJECTION, SOLUTION INTRA-ARTICULAR; INTRALESIONAL; INTRAMUSCULAR; INTRAVENOUS; SOFT TISSUE at 08:01

## 2020-01-31 RX ADMIN — FAMOTIDINE 20 MG: 10 INJECTION, SOLUTION INTRAVENOUS at 09:01

## 2020-01-31 RX ADMIN — SODIUM CHLORIDE 500 ML: 0.9 INJECTION, SOLUTION INTRAVENOUS at 12:01

## 2020-01-31 RX ADMIN — DIPHENHYDRAMINE HYDROCHLORIDE 50 MG: 50 INJECTION, SOLUTION INTRAMUSCULAR; INTRAVENOUS at 08:01

## 2020-01-31 RX ADMIN — CARBOPLATIN 460 MG: 10 INJECTION, SOLUTION INTRAVENOUS at 12:01

## 2020-01-31 RX ADMIN — PACLITAXEL 294 MG: 6 INJECTION, SOLUTION INTRAVENOUS at 09:01

## 2020-01-31 NOTE — PLAN OF CARE
3714  Infusion completed, pt tolerated well; pt instructed to remain well hydrated; discussed when to contact MD, when to report to ER; pt declined AVS, verbalized understanding of all discussed and when to report next

## 2020-01-31 NOTE — NURSING
0800  Pt here for Taxol, Carbo, IVFs, accompanied by spouse, no new complaints or concerns at present, reports tolerating treatment; discussed treatment plan for today, all questions answered and pt agrees to proceed

## 2020-02-06 ENCOUNTER — PATIENT MESSAGE (OUTPATIENT)
Dept: GYNECOLOGIC ONCOLOGY | Facility: CLINIC | Age: 75
End: 2020-02-06

## 2020-02-11 ENCOUNTER — INITIAL CONSULT (OUTPATIENT)
Dept: PALLIATIVE MEDICINE | Facility: CLINIC | Age: 75
End: 2020-02-11
Payer: MEDICARE

## 2020-02-11 VITALS
WEIGHT: 125 LBS | BODY MASS INDEX: 21.34 KG/M2 | HEIGHT: 64 IN | OXYGEN SATURATION: 98 % | SYSTOLIC BLOOD PRESSURE: 138 MMHG | HEART RATE: 104 BPM | DIASTOLIC BLOOD PRESSURE: 70 MMHG

## 2020-02-11 DIAGNOSIS — Z51.5 ENCOUNTER FOR PALLIATIVE CARE: ICD-10-CM

## 2020-02-11 DIAGNOSIS — C56.9 MALIGNANT NEOPLASM OF OVARY, UNSPECIFIED LATERALITY: Primary | ICD-10-CM

## 2020-02-11 DIAGNOSIS — Z71.89 GOALS OF CARE, COUNSELING/DISCUSSION: ICD-10-CM

## 2020-02-11 DIAGNOSIS — R52 PAIN MANAGEMENT: ICD-10-CM

## 2020-02-11 PROCEDURE — 99999 PR PBB SHADOW E&M-EST. PATIENT-LVL III: ICD-10-PCS | Mod: PBBFAC,,, | Performed by: INTERNAL MEDICINE

## 2020-02-11 PROCEDURE — 99213 OFFICE O/P EST LOW 20 MIN: CPT | Mod: PBBFAC | Performed by: INTERNAL MEDICINE

## 2020-02-11 PROCEDURE — 99205 OFFICE O/P NEW HI 60 MIN: CPT | Mod: S$PBB,,, | Performed by: INTERNAL MEDICINE

## 2020-02-11 PROCEDURE — 99999 PR PBB SHADOW E&M-EST. PATIENT-LVL III: CPT | Mod: PBBFAC,,, | Performed by: INTERNAL MEDICINE

## 2020-02-11 PROCEDURE — 99205 PR OFFICE/OUTPT VISIT, NEW, LEVL V, 60-74 MIN: ICD-10-PCS | Mod: S$PBB,,, | Performed by: INTERNAL MEDICINE

## 2020-02-11 NOTE — PROGRESS NOTES
"Consult Note  Palliative Care      Consult Requested By: Dr. Irvin Vinson  Reason for Consult: [facility]       ASSESSMENT/PLAN:     Plan/Recommendations:  Diagnoses and all orders for this visit:    Encounter for palliative care / Goals of care, counseling/discussion  Malignant neoplasm of ovary, unspecified laterality  Introduced palliative care. In talking about her disease/prognosis patient expressed that she knows that the ovarian cancer will likely eventually lead to the end of her life. She currently believes her quality of life is good. She is hopeful that the chemotherapy will give her more meaningful years. We discussed quantity vs quality of life and her desire to continue aggressive therapies as long as they are giving her meaningful time.   Discussed hoping for the best while preparing for if things don't go the way we want. Patient does not have an advanced directive. She stated, "I do not believe in them. I will make decisions when I have to." Discussed the scenario where she would not be able to tell us her wishes. She is unsure if she would want her  to be her surrogate decision maker. If it was not her , she is unsure who she would want. Provided advanced care planning pamphlet including MPOA form. Emphasized the importance of thinking about these things now. Patient expressed that she has always thought "I would want everything done" but admitted that she should think more about it now that she has cancer.       Pain management in setting of malignancy   -Reports some lower abdominal pain controlled with tylenol     Constipation   -Chronic problem which gets worse when patient is taking zofran following chemotherapy. Encouraged the use of a stimulant laxative such as senna daily.         Ethical / Legal Advance Care Planning      - surrogate decision maker: Patient understands her  Charles Dunne is her default decision maker. She is unsure if she would rather have someone else be " MPOA. MPOA paperwork provided    - Code Status: Full     - [place]: no    - other advance directive: no , Capacity to make medical decisions: yes          SUBJECTIVE:     History of Present Illness:  Patient is a 75 y.o. year old female presenting with stage IIIC ovarian cancer. S/p debulking surgery with REZA/BSO. Currently undergoing chemotherapy with carboplatin/paclitaxel. Referred to palliative care clinic for GOC     Past Medical History:   Diagnosis Date    Allergy     Angio-edema     Anxiety     Arthritis     Asthma     Cancer     Cataract     Chest pain at rest     Depression     Diverticulosis     Fibromyalgia 7/8/2012    Glaucoma suspect, steroid responders     Hand joint pain 7/8/2012    Headache(784.0)     Hx of psychiatric care     Migraine     Osteoporosis     Psychiatric problem     Recurrent upper respiratory infection (URI)     Shingles 12/8/2019    Sleep difficulties     Therapy     Urticaria      Past Surgical History:   Procedure Laterality Date    APPENDECTOMY  11/12/2019    Procedure: APPENDECTOMY;  Surgeon: Irvin Vinson MD;  Location: [facility] OR [address] [place] [address];  Service: OB/GYN;;    BIOPSY      DEBULKING OF TUMOR N/A 11/12/2019    Procedure: DEBULKING, NEOPLASM;  Surgeon: Irvin Vinson MD;  Location: [facility] OR [address] [place] [address];  Service: OB/GYN;  Laterality: N/A;    dexa  2014    osteopenia    MOBILIZATION OF SPLENIC FLEXURE  11/12/2019    Procedure: MOBILIZATION, SPLENIC FLEXURE;  Surgeon: Irvin Vinson MD;  Location: [facility] OR [address] [place] [address];  Service: OB/GYN;;    OMENTECTOMY N/A 11/12/2019    Procedure: OMENTECTOMY;  Surgeon: Irvin Vinson MD;  Location: [facility] OR [address] [place] [address];  Service: OB/GYN;  Laterality: N/A;    RESECTION OF PERITONEAL TISSUE  11/12/2019    Procedure: EXCISION, TISSUE, PERITONEUM;  Surgeon: Irvin Vinson MD;  Location: [facility] OR [address] [place] [address];  Service: OB/GYN;;    SALPINGOOPHORECTOMY Bilateral 11/12/2019    Procedure: SALPINGO-OOPHORECTOMY;  Surgeon: Irvin Vinson MD;  Location:  [facility] OR 2ND FLR;  Service: OB/GYN;  Laterality: Bilateral;    TOTAL ABDOMINAL HYSTERECTOMY N/A 11/12/2019    Procedure: HYSTERECTOMY, TOTAL, ABDOMINAL;  Surgeon: Irvin Vinson MD;  Location: [facility] OR 2ND FLR;  Service: OB/GYN;  Laterality: N/A;     Family History   Problem Relation Age of Onset    Hypertension Mother     Diabetes Mother     Stroke Mother     Dementia Mother     Cancer Father         pancreas    Allergies Father     Allergic rhinitis Father     Macular degeneration Cousin     Allergic rhinitis Paternal Aunt     Allergies Paternal Aunt     Allergic rhinitis Paternal Uncle     Allergies Paternal Uncle     Glaucoma Neg Hx     Amblyopia Neg Hx     Blindness Neg Hx     Cataracts Neg Hx     Retinal detachment Neg Hx     Strabismus Neg Hx     Thyroid disease Neg Hx     Angioedema Neg Hx     Asthma Neg Hx     Eczema Neg Hx     Immunodeficiency Neg Hx     Colon cancer Neg Hx     Cystic fibrosis Neg Hx     Ulcerative colitis Neg Hx     Stomach cancer Neg Hx     Esophageal cancer Neg Hx      Review of patient's allergies indicates:   Allergen Reactions    Doxycycline Nausea And Vomiting    Corticosteroids (glucocorticoids) Other (See Comments)     Causes pt to have migraines for 2 weeks    Pcn [penicillins] Rash    Sulfa (sulfonamide antibiotics) Rash       Medications:    Current Outpatient Medications:     acetaminophen (TYLENOL) 325 MG tablet, Take 325 mg by mouth every 6 (six) hours as needed for Pain., Disp: , Rfl:     albuterol (PROVENTIL) 2.5 mg /3 mL (0.083 %) nebulizer solution, Take 3 mLs (2.5 mg total) by nebulization every 6 (six) hours as needed for Wheezing. Rescue, Disp: 90 mL, Rfl: 1    albuterol (PROVENTIL/VENTOLIN HFA) 90 mcg/actuation inhaler, Inhale 2 puffs into the lungs every 6 (six) hours as needed for Wheezing. Rescue, Disp: 1 Inhaler, Rfl: 11    budesonide-formoterol 80-4.5 mcg (SYMBICORT) 80-4.5 mcg/actuation HFAA, Inhale 2 puffs into the lungs once  daily. Controller, Disp: 1 Inhaler, Rfl: 11    FLUoxetine 10 MG Tab, Take 1 tablet (10 mg total) by mouth once daily., Disp: 30 tablet, Rfl: 1    FLUoxetine 10 MG Tab, TAKE 1 TABLET BY MOUTH ONCE DAILY, Disp: 15 tablet, Rfl: 0    ibuprofen (ADVIL,MOTRIN) 600 MG tablet, Take 1 tablet (600 mg total) by mouth every 6 (six) hours. (Patient taking differently: Take 600 mg by mouth every 6 (six) hours as needed. ), Disp: 30 tablet, Rfl: 1    methylcellulose (CITRUCEL ORAL), Take by mouth 2 (two) times daily., Disp: , Rfl:     metroNIDAZOLE (FLAGYL) 250 MG tablet, Take 1 tablet (250 mg total) by mouth 3 (three) times daily., Disp: 15 tablet, Rfl: 0    ondansetron (ZOFRAN) 4 MG tablet, Take 1 tablet (4 mg total) by mouth every 6 (six) hours as needed for Nausea., Disp: 60 tablet, Rfl: 1    oxyCODONE (ROXICODONE) 5 MG immediate release tablet, Take 1 tablet (5 mg total) by mouth every 4 (four) hours as needed., Disp: 30 tablet, Rfl: 0    polyethylene glycol (GLYCOLAX) 17 gram PwPk, Take 1 g by mouth once daily., Disp: , Rfl:     potassium chloride SA (K-DUR,KLOR-CON) 20 MEQ tablet, Take 1 tablet (20 mEq total) by mouth 3 (three) times daily., Disp: 30 tablet, Rfl: 11    rizatriptan (MAXALT) 10 MG tablet, TAKE ONE TABLET BY MOUTH AT ONSET OF MIGRAINE, MAY REPEAT EVERY 2 HOURS. DO NOT EXCEED 3 TABLETS IN 24 HOURS., Disp: 10 tablet, Rfl: 11    rosuvastatin (CRESTOR) 5 MG tablet, Take 1 tablet (5 mg total) by mouth every evening., Disp: 30 tablet, Rfl: 5    senna (SENOKOT) 8.6 mg tablet, Take 1 tablet by mouth 2 (two) times daily., Disp: 60 tablet, Rfl: 2    VOLTAREN 1 % Gel, APPLY 4 GRAMS TOPICALLY 4 TIMES DAILY AS DIRECTED, Disp: 2 Tube, Rfl: 6    LORazepam (ATIVAN) 0.5 MG tablet, Take 1 tablet (0.5 mg total) by mouth every 8 (eight) hours as needed for Anxiety. (Patient not taking: Reported on 1/8/2020), Disp: 20 tablet, Rfl: 0    valACYclovir (VALTREX) 500 MG tablet, Take 2 tablets (1,000 mg total) by mouth 3  (three) times daily. for 7 days, Disp: 42 tablet, Rfl: 0      24h Oral Morphine Equivalents (OME): 0    OBJECTIVE:     Symptom Assessment (ESAS 0-10 scale)    ESAS 0 1 2 3 4 5 6 7 8 9 10   Pain []  []  [x]  []  []  []  []  []  []  []  []    Dyspnea [x]  []  []  []  []  []  []  []  []  []  []    Anxiety []  []  []  []  [x]  []  []  []  []  []  []    Nausea [x]  []  []  []  []  []  []  []  []  []  []    Depression  []  []  []  []  [x]  []  []  []  []  []  []    Anorexia [x]  []  []  []  []  []  []  []  []  []  []    Fatigue []  []  []  [x]  []  []  []  []  []  []  []    Insomnia [x]  []  []  []  []  []  []  []  []  []  []    Restlessness  [x]  []  []  []  []  []  []  []  []  []  []    Agitation [x]  []  []  []  []  []  []  []  []  []  []        Constipation    yes  Bowel Management Plan (BMP): yes  Diarrhea        no  Comments:   Perfromance Status: PPS Score 90  ROS:  Review of Systems   Constitutional: Negative for appetite change and fever.   HENT: Negative.    Respiratory: Negative.    Cardiovascular: Negative.    Gastrointestinal: Positive for abdominal pain and constipation.   Genitourinary: Negative.    Musculoskeletal: Negative.    Neurological: Negative.        Physical Exam:  Vitals: Pulse: 104 (02/11/20 1141)  BP: 138/70 (02/11/20 1141)  SpO2: 98 % (02/11/20 1141)  Physical Exam   Constitutional: She is oriented to person, place, and time and well-developed, well-nourished, and in no distress.   HENT:   Head: Normocephalic.   Eyes: EOM are normal.   Neck: Neck supple.   Cardiovascular:   tachycardic   Pulmonary/Chest: Effort normal. No respiratory distress.   Abdominal: Soft. She exhibits no distension.   Musculoskeletal: Normal range of motion.   Neurological: She is alert and oriented to person, place, and time.   Skin: Skin is warm and dry.   Psychiatric: Affect and judgment normal.       Labs:  CBC:   WBC   Date Value Ref Range Status   01/29/2020 3.28 (L) 3.90 - 12.70 K/uL Final       Hemoglobin   Date  Value Ref Range Status   01/29/2020 10.2 (L) 12.0 - 16.0 g/dL Final       POC Hematocrit   Date Value Ref Range Status   11/12/2019 32 (L) 36 - 54 %PCV Final     Hematocrit   Date Value Ref Range Status   01/29/2020 30.8 (L) 37.0 - 48.5 % Final       Mean Corpuscular Volume   Date Value Ref Range Status   01/29/2020 91 82 - 98 fL Final       Platelets   Date Value Ref Range Status   01/29/2020 102 (L) 150 - 350 K/uL Final           LFT:   Lab Results   Component Value Date    AST 18 01/29/2020    ALKPHOS 89 01/29/2020    BILITOT 0.2 01/29/2020       Albumin:   Albumin   Date Value Ref Range Status   01/29/2020 3.5 3.5 - 5.2 g/dL Final     Protein:   Total Protein   Date Value Ref Range Status   01/29/2020 7.0 6.0 - 8.4 g/dL Final       Radiology:I have reviewed all pertinent imaging results/findings within the past 24 hours.    Psychosocial/Cultural/Spiritual:   Patient lives with her  and two dogs in [place]. She has no children. She is a retired . She enjoys renovating houses.     F- Nevaeh and Belief: Agnostic     I - Importance   .  C - Community     A - Address in Care      > 50% of 60 min visit spent in chart review, face to face discussion of goals of care,  symptom assessment, coordination of care and emotional support.      Signature: Sowmya Arora MD

## 2020-02-11 NOTE — LETTER
February 11, 2020      Irvin Vinson MD  [address]  [address]  [facility] [address]           [facility]  [address]  [facility] [address]  Phone: [phone]  Fax: [phone]          Patient: Margarita Dunne   MR Number: [id number]   YOB: 1945   Date of Visit: 2/11/2020       Dear Dr. Irvin Vinson:    Thank you for referring Margarita Dunne to me for evaluation. Attached you will find relevant portions of my assessment and plan of care.    If you have questions, please do not hesitate to call me. I look forward to following Margarita Dunne along with you.    Sincerely,    Sowmya Arora MD    Enclosure  CC:  No Recipients    If you would like to receive this communication electronically, please contact [email or web address] or [phone] to request more information on [employer] [facility] access.    For providers and/or their staff who would like to refer a patient to [facility], please contact us through our one-stop-shop provider referral line, [facility], at [phone].    If you feel you have received this communication in error or would no longer like to receive these types of communications, please e-mail [email or web address]

## 2020-02-12 ENCOUNTER — PATIENT MESSAGE (OUTPATIENT)
Dept: GYNECOLOGIC ONCOLOGY | Facility: CLINIC | Age: 75
End: 2020-02-12

## 2020-02-12 ENCOUNTER — TELEPHONE (OUTPATIENT)
Dept: GYNECOLOGIC ONCOLOGY | Facility: CLINIC | Age: 75
End: 2020-02-12

## 2020-02-12 NOTE — TELEPHONE ENCOUNTER
RN contacted patient in regards to messages sent via portal explaining scheduling conflicts. Discussed options for rescheduling with patient and she voiced frustration and unwillingness to change appointments. RN agreed to look into alternatives and relay lack of availability to Dr. Vinson. Patient made aware of less appointment availability due to the upcoming holiday. Both parties agreed to discuss further once Mrs. Dunne has an opportunity to seek alternative transportation.

## 2020-02-17 DIAGNOSIS — C56.9 MALIGNANT NEOPLASM OF OVARY, UNSPECIFIED LATERALITY: ICD-10-CM

## 2020-02-17 RX ORDER — SENNOSIDES 8.6 MG/1
1 TABLET ORAL 2 TIMES DAILY
Qty: 60 TABLET | Refills: 2 | Status: SHIPPED | OUTPATIENT
Start: 2020-02-17 | End: 2021-01-01

## 2020-02-20 ENCOUNTER — HOSPITAL ENCOUNTER (OUTPATIENT)
Dept: RADIOLOGY | Facility: HOSPITAL | Age: 75
Discharge: HOME | End: 2020-02-20
Attending: PHYSICIAN ASSISTANT
Payer: MEDICARE

## 2020-02-20 ENCOUNTER — OFFICE VISIT (OUTPATIENT)
Dept: INTERNAL MEDICINE | Facility: CLINIC | Age: 75
End: 2020-02-20
Payer: MEDICARE

## 2020-02-20 VITALS
SYSTOLIC BLOOD PRESSURE: 122 MMHG | WEIGHT: 123.69 LBS | HEART RATE: 75 BPM | OXYGEN SATURATION: 100 % | BODY MASS INDEX: 21.23 KG/M2 | DIASTOLIC BLOOD PRESSURE: 62 MMHG

## 2020-02-20 DIAGNOSIS — G89.29 CHRONIC ABDOMINAL PAIN: ICD-10-CM

## 2020-02-20 DIAGNOSIS — C56.9 MALIGNANT NEOPLASM OF OVARY, UNSPECIFIED LATERALITY: ICD-10-CM

## 2020-02-20 DIAGNOSIS — K58.9 IRRITABLE BOWEL SYNDROME, UNSPECIFIED TYPE: Primary | ICD-10-CM

## 2020-02-20 DIAGNOSIS — R10.9 CHRONIC ABDOMINAL PAIN: ICD-10-CM

## 2020-02-20 DIAGNOSIS — K58.9 IRRITABLE BOWEL SYNDROME, UNSPECIFIED TYPE: ICD-10-CM

## 2020-02-20 PROCEDURE — 99214 OFFICE O/P EST MOD 30 MIN: CPT | Mod: S$PBB,,, | Performed by: PHYSICIAN ASSISTANT

## 2020-02-20 PROCEDURE — 74018 RADEX ABDOMEN 1 VIEW: CPT | Mod: TC

## 2020-02-20 PROCEDURE — 74018 RADEX ABDOMEN 1 VIEW: CPT | Mod: 26,,, | Performed by: RADIOLOGY

## 2020-02-20 PROCEDURE — 74018 XR ABDOMEN AP 1 VIEW: ICD-10-PCS | Mod: 26,,, | Performed by: RADIOLOGY

## 2020-02-20 PROCEDURE — 99214 PR OFFICE/OUTPT VISIT, EST, LEVL IV, 30-39 MIN: ICD-10-PCS | Mod: S$PBB,,, | Performed by: PHYSICIAN ASSISTANT

## 2020-02-20 PROCEDURE — 99215 OFFICE O/P EST HI 40 MIN: CPT | Mod: PBBFAC,25 | Performed by: PHYSICIAN ASSISTANT

## 2020-02-20 PROCEDURE — 99999 PR PBB SHADOW E&M-EST. PATIENT-LVL V: CPT | Mod: PBBFAC,,, | Performed by: PHYSICIAN ASSISTANT

## 2020-02-20 PROCEDURE — 99999 PR PBB SHADOW E&M-EST. PATIENT-LVL V: ICD-10-PCS | Mod: PBBFAC,,, | Performed by: PHYSICIAN ASSISTANT

## 2020-02-20 NOTE — PROGRESS NOTES
"Subjective:       Patient ID: Margarita Dunne is a 75 y.o. female.    Chief Complaint: bowel issue    HPI  Established pt of Columba Anderson MD     Here for same day/urgent care appt.     Here with concerns of "bowel issues" on and off for the past several months since her recent surgery(11/2019_ and ovarian cancer diagnosis(9/2019). Intermittent constipation, occ straining, then may havefrequent bowel movements. Feels like changes are associated with her chemo schedule. Taking OTC stool softner. Reports last 2 days BMs were more normal. She takes tylenol as needed for pain which provided some relief. Has declined other meds for paincontrol and also bentyl. No diarrhea, no blood in stool or melena. No fecal icontinence     She has repeat CT Abdomen/Pelvis scan in one week.       Answers for HPI/ROS submitted by the patient on 2/19/2020   Abdominal pain  Chronicity: recurrent  Onset: more than 1 month ago  Onset quality: gradual  Frequency: constantly  Episode duration: 4 hours  Progression since onset: waxing and waning  Pain location: epigastric region  Pain - numeric: 6/10  Pain quality: a sensation of fullness  constipation: Yes  diarrhea: Yes  Aggravated by: nothing  Relieved by: bowel movements  Diagnostic workup: surgery  Pain severity: moderate  Treatments tried: acetaminophen  Improvement on treatment: no relief  abdominal surgery: Yes  irritable bowel syndrome: Yes    Review of Systems   Constitutional: Negative for chills, fever and unexpected weight change.   Respiratory: Negative for cough and shortness of breath.    Cardiovascular: Negative for chest pain and leg swelling.   Gastrointestinal: Positive for abdominal pain, constipation (improved with stool softner) and nausea. Negative for vomiting.   Skin: Negative for rash.   Neurological: Negative for weakness and headaches.       Objective: /62   Pulse 75   Wt 56.1 kg (123 lb 10.9 oz)   SpO2 100%   BMI 21.23 kg/m²         Physical Exam "   Constitutional: She appears well-developed. No distress.   HENT:   Head: Normocephalic and atraumatic.   Mouth/Throat: Oropharynx is clear and moist.   Cardiovascular: Normal rate and regular rhythm. Exam reveals no friction rub.   No murmur heard.  Pulmonary/Chest: Effort normal and breath sounds normal. She has no wheezes. She has no rales.   Abdominal: Soft. Bowel sounds are normal. She exhibits no distension and no mass. There is tenderness (generalized). There is no guarding.   Musculoskeletal: She exhibits no edema.   Neurological: She is alert.   Skin: Skin is warm and dry. No rash noted.   Vitals reviewed.      Assessment:       1. Irritable bowel syndrome, unspecified type    2. Chronic abdominal pain    3. Malignant neoplasm of ovary, unspecified laterality        Plan:       Margarita was seen today for bowel issue.    Diagnoses and all orders for this visit:    Irritable bowel syndrome, unspecified type  -     Cancel: X-Ray Abdomen AP 1 View; Future  -     X-Ray Abdomen AP 1 View; Future    Chronic abdominal pain  -     Cancel: X-Ray Abdomen AP 1 View; Future  -     X-Ray Abdomen AP 1 View; Future    Malignant neoplasm of ovary, unspecified laterality  -     X-Ray Abdomen AP 1 View; Future      Advised on bowel regimen with increased water intake, fiber and daily stool softner  Senna recently prescribed by Gyn Onc on 2/17  Xray Abdomen today in clinic no signs of obstruction or ileus mild constipation noted  Keep upcoming appt with Gyn Onc  Noted fwd to PCP and Oncologist      X-Ray Abdomen AP 1 View  EXAMINATION:  XR ABDOMEN AP 1 VIEW    CLINICAL HISTORY:  rule or signs of obstruction; chronic abdominal pain, alternating constipation/diarrhea, hx of abdominal surgery;  Irritable bowel syndrome without diarrhea    FINDINGS:  No obstruction, ileus or perforation seen.  There is mild constipation.    Electronically signed by: Norberto Ball MD  Date:    02/20/2020  Time:    14:39        Shayy Horne  MARYANNE      Future Appointments   Date Time Provider Department Center   2/27/2020  9:15 AM LAB, SAME DAY [facility]DRAW [facility]   2/27/2020 10:00 AM [facility] CT OP LIMIT 450 LBS [facility] CTSCANO [facility]   2/27/2020 10:45 AM Irvin Vinson MD [facility] GYN ONC [facility]   2/28/2020  7:00 AM NURSE 6, NOM CHEMO [facility]

## 2020-02-27 ENCOUNTER — OFFICE VISIT (OUTPATIENT)
Dept: GYNECOLOGIC ONCOLOGY | Facility: CLINIC | Age: 75
End: 2020-02-27
Payer: MEDICARE

## 2020-02-27 ENCOUNTER — HOSPITAL ENCOUNTER (OUTPATIENT)
Dept: RADIOLOGY | Facility: OTHER | Age: 75
Discharge: HOME | End: 2020-02-27
Attending: OBSTETRICS & GYNECOLOGY
Payer: MEDICARE

## 2020-02-27 VITALS
SYSTOLIC BLOOD PRESSURE: 150 MMHG | DIASTOLIC BLOOD PRESSURE: 71 MMHG | WEIGHT: 125.44 LBS | HEIGHT: 64 IN | BODY MASS INDEX: 21.42 KG/M2 | HEART RATE: 88 BPM

## 2020-02-27 DIAGNOSIS — C56.9 MALIGNANT NEOPLASM OF OVARY, UNSPECIFIED LATERALITY: ICD-10-CM

## 2020-02-27 DIAGNOSIS — T45.1X5A CHEMOTHERAPY INDUCED NEUTROPENIA: ICD-10-CM

## 2020-02-27 DIAGNOSIS — D70.1 CHEMOTHERAPY INDUCED NEUTROPENIA: ICD-10-CM

## 2020-02-27 DIAGNOSIS — C56.9 MALIGNANT NEOPLASM OF OVARY, UNSPECIFIED LATERALITY: Primary | ICD-10-CM

## 2020-02-27 DIAGNOSIS — F41.9 ANXIETY: ICD-10-CM

## 2020-02-27 PROCEDURE — 74177 CT ABD & PELVIS W/CONTRAST: CPT | Mod: 26,,, | Performed by: RADIOLOGY

## 2020-02-27 PROCEDURE — 71260 CT CHEST ABDOMEN PELVIS WITH CONTRAST (XPD): ICD-10-PCS | Mod: 26,,, | Performed by: RADIOLOGY

## 2020-02-27 PROCEDURE — 99214 PR OFFICE/OUTPT VISIT, EST, LEVL IV, 30-39 MIN: ICD-10-PCS | Mod: S$PBB,,, | Performed by: OBSTETRICS & GYNECOLOGY

## 2020-02-27 PROCEDURE — 71260 CT THORAX DX C+: CPT | Mod: 26,,, | Performed by: RADIOLOGY

## 2020-02-27 PROCEDURE — 25500020 PHARM REV CODE 255: Performed by: OBSTETRICS & GYNECOLOGY

## 2020-02-27 PROCEDURE — 74177 CT CHEST ABDOMEN PELVIS WITH CONTRAST (XPD): ICD-10-PCS | Mod: 26,,, | Performed by: RADIOLOGY

## 2020-02-27 PROCEDURE — 74177 CT ABD & PELVIS W/CONTRAST: CPT | Mod: TC

## 2020-02-27 PROCEDURE — 99999 PR PBB SHADOW E&M-EST. PATIENT-LVL III: CPT | Mod: PBBFAC,,, | Performed by: OBSTETRICS & GYNECOLOGY

## 2020-02-27 PROCEDURE — 99213 OFFICE O/P EST LOW 20 MIN: CPT | Mod: PBBFAC,25 | Performed by: OBSTETRICS & GYNECOLOGY

## 2020-02-27 PROCEDURE — 99214 OFFICE O/P EST MOD 30 MIN: CPT | Mod: S$PBB,,, | Performed by: OBSTETRICS & GYNECOLOGY

## 2020-02-27 PROCEDURE — 99999 PR PBB SHADOW E&M-EST. PATIENT-LVL III: ICD-10-PCS | Mod: PBBFAC,,, | Performed by: OBSTETRICS & GYNECOLOGY

## 2020-02-27 RX ORDER — EPINEPHRINE 0.3 MG/.3ML
0.3 INJECTION SUBCUTANEOUS ONCE AS NEEDED
Status: CANCELLED | OUTPATIENT
Start: 2020-02-27

## 2020-02-27 RX ORDER — DIPHENHYDRAMINE HYDROCHLORIDE 50 MG/ML
50 INJECTION INTRAMUSCULAR; INTRAVENOUS ONCE AS NEEDED
Status: CANCELLED | OUTPATIENT
Start: 2020-02-27

## 2020-02-27 RX ORDER — FAMOTIDINE 10 MG/ML
20 INJECTION INTRAVENOUS
Status: CANCELLED | OUTPATIENT
Start: 2020-02-27

## 2020-02-27 RX ORDER — SODIUM CHLORIDE 0.9 % (FLUSH) 0.9 %
10 SYRINGE (ML) INJECTION
Status: CANCELLED | OUTPATIENT
Start: 2020-02-27

## 2020-02-27 RX ORDER — HEPARIN 100 UNIT/ML
500 SYRINGE INTRAVENOUS
Status: CANCELLED | OUTPATIENT
Start: 2020-02-27

## 2020-02-27 RX ADMIN — IOHEXOL 1000 ML: 9 SOLUTION ORAL at 10:02

## 2020-02-27 RX ADMIN — IOHEXOL 75 ML: 350 INJECTION, SOLUTION INTRAVENOUS at 11:02

## 2020-02-27 NOTE — PROGRESS NOTES
REFERRING PROVIDER  No ref. provider found     REASON FOR CONSULT  Margarita Dunne  is a 75 y.o.  woman who presents for evaluation of gBRCA- stage IIIC HGSOC.  sBRCA testing is pending.      HISTORY OF PRESENT ILLNESS    Chemotherapy regimen: carboplatin/paclitaxel q21 days  Cycle: 4  Previous dose limiting toxicities: Y  Cycle 2: Chemotherapy induced thrombocytopenia (104) and neutropenia (1.4)  Previous dose reductions: Y  Cycle 3: AUC 5 due to thrombocytopenia  Previous breaks: N  Previous changes in pre-medications: N  Previous growth support: N    Energy level: baseline  Appetite: improving  Fevers/chills/nights: no  Nausea: no  Emesis: no  Neuropathy: no  Discoloration of lower extremities: no  Tolerating PO: yes  Flatus: yes  BM: yes       Malignant neoplasm of ovary    9/11/2019 Imaging Significant Findings     CT C/A/P: Irregularity of the uterus in this patient with thickened endometrium identified on prior ultrasound with small, nonspecific soft tissue nodules in the pelvis and abdomen with surrounding streaking in the greater omentum as described above.          9/20/2019 Tumor Markers      = 75      11/12/2019 Surgery     LAPAROTOMY, EXPLORATORY  HYSTERECTOMY, TOTAL, ABDOMINAL   SALPINGO-OOPHORECTOMY (Bilateral)  OMENTECTOMY   APPENDECTOMY  EXCISION, TISSUE, PERITONEUM  RD 1 mm on the R diaphragm and sigmoid colon        12/3/2019 -  Chemotherapy     Treatment Summary   Plan Name: OP GYN PACLITAXEL CARBOPLATIN (AUC 6) Q3W  Treatment Goal: Curative  Status: Active  Start Date: 12/20/2019  End Date: 3/17/2020 (Planned)  Provider: Irvin Vinson MD  Chemotherapy: CARBOplatin (PARAPLATIN) 505 mg in sodium chloride 0.9% 250 mL chemo infusion, 505 mg (90.8 % of original dose 555.6 mg), Intravenous, Clinic/HOD 1 time, 3 of 6 cycles  Dose modification:   (original dose 555.6 mg, Cycle 1)  Administration: 505 mg (12/20/2019), 550 mg (1/10/2020), 460 mg (1/31/2020)  PACLitaxel (TAXOL) 175 mg/m2 = 294 mg in  sodium chloride 0.9% 500 mL chemo infusion, 175 mg/m2 = 294 mg, Intravenous, Clinic/HOD 1 time, 3 of 6 cycles  Administration: 294 mg (12/20/2019), 294 mg (1/10/2020), 294 mg (1/31/2020)      1/28/2020 Genetic Testing     [facility]: no mutations detected.          The following portions of the patient's history were reviewed and updated as appropriate: allergies, current medications, family history, medical history, social history and surgical history.    REVIEW OF SYSTEMS  All systems reviewed and negative except as noted in HPI.    OBJECTIVE   Vitals:    02/27/20 1126   BP: (!) 150/71   Pulse: 88      Body mass index is 21.53 kg/m².      1. General: Well appearing, no apparent distress, alert and oriented.  2. Lymph: Neck symmetric without cervical or supraclavicular adenopathy or mass.  3. Lungs: Normal respiratory rate, no accessory muscle use.  4. Cardiac: Normal rate  5. Psych: Normal affect.  6. Abdomen:  non-distended, soft, non-tender, are no masses, no ascites, no hepatosplenomegaly.  7. Skin: Warm, dry, no rashes or lesions.   8. Extremities: Bilateral lower extremities without edema or tenderness.    ECOG status: 1    LABORATORY DATA  Lab data reviewed.    RADIOLOGICAL DATA  Radiology data reviewed.    ASSESSMENT / PLAN     1. Malignant neoplasm of ovary, unspecified laterality    2. Chemotherapy induced neutropenia    3. Anxiety       Physical exam and CT C/A/P with SILVINA.  Cycle #4 of Carboplatin AUC 5/Paclitaxel 175 mg/m2.    PATIENT EDUCATION  Ready to learn, no apparent learning barriers were identified; learning preferences include listening. Explained diagnosis and treatment plan; patient expressed understanding of the content.    ADMINISTRATIVE BILLING  Greater than 50% of was spent in counseling.

## 2020-02-28 ENCOUNTER — INFUSION (OUTPATIENT)
Dept: INFUSION THERAPY | Facility: HOSPITAL | Age: 75
End: 2020-02-28
Attending: INTERNAL MEDICINE
Payer: MEDICARE

## 2020-02-28 ENCOUNTER — PATIENT MESSAGE (OUTPATIENT)
Dept: GYNECOLOGIC ONCOLOGY | Facility: CLINIC | Age: 75
End: 2020-02-28

## 2020-02-28 VITALS
RESPIRATION RATE: 18 BRPM | HEART RATE: 97 BPM | DIASTOLIC BLOOD PRESSURE: 61 MMHG | TEMPERATURE: 98 F | SYSTOLIC BLOOD PRESSURE: 124 MMHG

## 2020-02-28 DIAGNOSIS — T45.1X5A CHEMOTHERAPY INDUCED NEUTROPENIA: Primary | ICD-10-CM

## 2020-02-28 DIAGNOSIS — D70.1 CHEMOTHERAPY INDUCED NEUTROPENIA: Primary | ICD-10-CM

## 2020-02-28 DIAGNOSIS — C56.9 MALIGNANT NEOPLASM OF OVARY, UNSPECIFIED LATERALITY: ICD-10-CM

## 2020-02-28 PROCEDURE — 96413 CHEMO IV INFUSION 1 HR: CPT

## 2020-02-28 PROCEDURE — 63600175 PHARM REV CODE 636 W HCPCS: Performed by: OBSTETRICS & GYNECOLOGY

## 2020-02-28 PROCEDURE — 25000003 PHARM REV CODE 250: Performed by: OBSTETRICS & GYNECOLOGY

## 2020-02-28 PROCEDURE — 96361 HYDRATE IV INFUSION ADD-ON: CPT

## 2020-02-28 PROCEDURE — 96367 TX/PROPH/DG ADDL SEQ IV INF: CPT

## 2020-02-28 PROCEDURE — S0028 INJECTION, FAMOTIDINE, 20 MG: HCPCS | Performed by: OBSTETRICS & GYNECOLOGY

## 2020-02-28 PROCEDURE — 96415 CHEMO IV INFUSION ADDL HR: CPT

## 2020-02-28 PROCEDURE — 96375 TX/PRO/DX INJ NEW DRUG ADDON: CPT

## 2020-02-28 PROCEDURE — 96417 CHEMO IV INFUS EACH ADDL SEQ: CPT

## 2020-02-28 RX ORDER — ACETAMINOPHEN 325 MG/1
650 TABLET ORAL
Status: COMPLETED | OUTPATIENT
Start: 2020-02-28 | End: 2020-02-28

## 2020-02-28 RX ORDER — EPINEPHRINE 0.3 MG/.3ML
0.3 INJECTION SUBCUTANEOUS ONCE AS NEEDED
Status: DISCONTINUED | OUTPATIENT
Start: 2020-02-28 | End: 2020-02-28 | Stop reason: HOSPADM

## 2020-02-28 RX ORDER — SODIUM CHLORIDE 0.9 % (FLUSH) 0.9 %
10 SYRINGE (ML) INJECTION
Status: DISCONTINUED | OUTPATIENT
Start: 2020-02-28 | End: 2020-02-28 | Stop reason: HOSPADM

## 2020-02-28 RX ORDER — HEPARIN 100 UNIT/ML
500 SYRINGE INTRAVENOUS
Status: DISCONTINUED | OUTPATIENT
Start: 2020-02-28 | End: 2020-02-28 | Stop reason: HOSPADM

## 2020-02-28 RX ORDER — DIPHENHYDRAMINE HYDROCHLORIDE 50 MG/ML
50 INJECTION INTRAMUSCULAR; INTRAVENOUS ONCE AS NEEDED
Status: DISCONTINUED | OUTPATIENT
Start: 2020-02-28 | End: 2020-02-28 | Stop reason: HOSPADM

## 2020-02-28 RX ORDER — FAMOTIDINE 10 MG/ML
20 INJECTION INTRAVENOUS
Status: COMPLETED | OUTPATIENT
Start: 2020-02-28 | End: 2020-02-28

## 2020-02-28 RX ADMIN — FAMOTIDINE 20 MG: 10 INJECTION, SOLUTION INTRAVENOUS at 07:02

## 2020-02-28 RX ADMIN — PACLITAXEL 294 MG: 300 INJECTION, SOLUTION INTRAVENOUS at 08:02

## 2020-02-28 RX ADMIN — CARBOPLATIN 415 MG: 10 INJECTION, SOLUTION INTRAVENOUS at 11:02

## 2020-02-28 RX ADMIN — Medication 50 MG: at 07:02

## 2020-02-28 RX ADMIN — ACETAMINOPHEN 650 MG: 325 TABLET ORAL at 10:02

## 2020-02-28 RX ADMIN — PALONOSETRON HYDROCHLORIDE: 0.25 INJECTION INTRAVENOUS at 08:02

## 2020-02-28 RX ADMIN — SODIUM CHLORIDE 500 ML: 0.9 INJECTION, SOLUTION INTRAVENOUS at 12:02

## 2020-03-13 ENCOUNTER — OFFICE VISIT (OUTPATIENT)
Dept: GYNECOLOGIC ONCOLOGY | Facility: CLINIC | Age: 75
End: 2020-03-13
Payer: MEDICARE

## 2020-03-13 VITALS
SYSTOLIC BLOOD PRESSURE: 156 MMHG | WEIGHT: 126.13 LBS | BODY MASS INDEX: 21.53 KG/M2 | HEIGHT: 64 IN | DIASTOLIC BLOOD PRESSURE: 74 MMHG | HEART RATE: 91 BPM

## 2020-03-13 DIAGNOSIS — T45.1X5A CHEMOTHERAPY INDUCED NEUTROPENIA: ICD-10-CM

## 2020-03-13 DIAGNOSIS — D70.1 CHEMOTHERAPY INDUCED NEUTROPENIA: ICD-10-CM

## 2020-03-13 DIAGNOSIS — T45.1X5A CHEMOTHERAPY-INDUCED THROMBOCYTOPENIA: ICD-10-CM

## 2020-03-13 DIAGNOSIS — D69.59 CHEMOTHERAPY-INDUCED THROMBOCYTOPENIA: ICD-10-CM

## 2020-03-13 DIAGNOSIS — C56.9 MALIGNANT NEOPLASM OF OVARY, UNSPECIFIED LATERALITY: Primary | ICD-10-CM

## 2020-03-13 PROCEDURE — 99214 PR OFFICE/OUTPT VISIT, EST, LEVL IV, 30-39 MIN: ICD-10-PCS | Mod: S$PBB,,, | Performed by: OBSTETRICS & GYNECOLOGY

## 2020-03-13 PROCEDURE — 99213 OFFICE O/P EST LOW 20 MIN: CPT | Mod: PBBFAC | Performed by: OBSTETRICS & GYNECOLOGY

## 2020-03-13 PROCEDURE — 99214 OFFICE O/P EST MOD 30 MIN: CPT | Mod: S$PBB,,, | Performed by: OBSTETRICS & GYNECOLOGY

## 2020-03-13 PROCEDURE — 99999 PR PBB SHADOW E&M-EST. PATIENT-LVL III: CPT | Mod: PBBFAC,,, | Performed by: OBSTETRICS & GYNECOLOGY

## 2020-03-13 PROCEDURE — 99999 PR PBB SHADOW E&M-EST. PATIENT-LVL III: ICD-10-PCS | Mod: PBBFAC,,, | Performed by: OBSTETRICS & GYNECOLOGY

## 2020-03-13 NOTE — PROGRESS NOTES
REFERRING PROVIDER  No ref. provider found     REASON FOR CONSULT  Margarita Dunne  is a 75 y.o.  woman who presents for evaluation of gBRCA- stage IIIC HGSOC.  sBRCA testing is pending.      HISTORY OF PRESENT ILLNESS    Chemotherapy regimen: carboplatin/paclitaxel q21 days  Cycle: 5  Previous dose limiting toxicities: Y  Cycle 2: Chemotherapy induced thrombocytopenia (104) and neutropenia (1.4)  Previous dose reductions: Y  Cycle 3: AUC 5 due to thrombocytopenia  Previous breaks: N  Previous changes in pre-medications: N  Previous growth support: N    Energy level: baseline  Appetite: improving  Fevers/chills/nights: no  Nausea: no  Emesis: no  Neuropathy: no  Discoloration of lower extremities: no  Tolerating PO: yes  Flatus: yes  BM: yes       Malignant neoplasm of ovary    9/11/2019 Imaging Significant Findings     CT C/A/P: Irregularity of the uterus in this patient with thickened endometrium identified on prior ultrasound with small, nonspecific soft tissue nodules in the pelvis and abdomen with surrounding streaking in the greater omentum as described above.          9/20/2019 Tumor Markers      = 75      11/12/2019 Surgery     LAPAROTOMY, EXPLORATORY  HYSTERECTOMY, TOTAL, ABDOMINAL   SALPINGO-OOPHORECTOMY (Bilateral)  OMENTECTOMY   APPENDECTOMY  EXCISION, TISSUE, PERITONEUM  RD 1 mm on the R diaphragm and sigmoid colon        12/3/2019 -  Chemotherapy     Treatment Summary   Plan Name: OP GYN PACLITAXEL CARBOPLATIN (AUC 6) Q3W  Treatment Goal: Curative  Status: Active  Start Date: 12/20/2019  End Date: 3/21/2020 (Planned)  Provider: Irvin Vinson MD  Chemotherapy: CARBOplatin (PARAPLATIN) 505 mg in sodium chloride 0.9% 250 mL chemo infusion, 505 mg (90.8 % of original dose 555.6 mg), Intravenous, Clinic/HOD 1 time, 4 of 6 cycles  Dose modification:   (original dose 555.6 mg, Cycle 1)  Administration: 505 mg (12/20/2019), 550 mg (1/10/2020), 460 mg (1/31/2020)  PACLitaxel (TAXOL) 175 mg/m2 = 294 mg in  sodium chloride 0.9% 500 mL chemo infusion, 175 mg/m2 = 294 mg, Intravenous, Clinic/HOD 1 time, 4 of 6 cycles  Administration: 294 mg (12/20/2019), 294 mg (1/10/2020), 294 mg (1/31/2020), 294 mg (2/28/2020)      1/28/2020 Genetic Testing     Myriad: no mutations detected.          The following portions of the patient's history were reviewed and updated as appropriate: allergies, current medications, family history, medical history, social history and surgical history.    REVIEW OF SYSTEMS  All systems reviewed and negative except as noted in HPI.    OBJECTIVE   Vitals:    03/13/20 1013   BP: (!) 156/74   Pulse: 91      Body mass index is 21.65 kg/m².      1. General: Well appearing, no apparent distress, alert and oriented.  2. Lymph: Neck symmetric without cervical or supraclavicular adenopathy or mass.  3. Lungs: Normal respiratory rate, no accessory muscle use.  4. Cardiac: Normal rate  5. Psych: Normal affect.  6. Abdomen:  non-distended, soft, non-tender, are no masses, no ascites, no hepatosplenomegaly.  7. Skin: Warm, dry, no rashes or lesions.   8. Extremities: Bilateral lower extremities without edema or tenderness.    ECOG status: 1    LABORATORY DATA  Lab data reviewed.    RADIOLOGICAL DATA  Radiology data reviewed.    ASSESSMENT / PLAN     1. Malignant neoplasm of ovary, unspecified laterality    2. Chemotherapy induced neutropenia    3. Chemotherapy-induced thrombocytopenia       SILVINA.  Labs next week.  Will plan to proceed with cycle #5 of Carboplatin AUC 5/Paclitaxel 175 mg/m2.    PATIENT EDUCATION  Ready to learn, no apparent learning barriers were identified; learning preferences include listening. Explained diagnosis and treatment plan; patient expressed understanding of the content.    ADMINISTRATIVE BILLING  Greater than 50% of was spent in counseling.

## 2020-03-16 PROBLEM — J01.90 ACUTE SINUSITIS: Status: RESOLVED | Noted: 2019-12-08 | Resolved: 2020-03-16

## 2020-03-19 ENCOUNTER — LAB VISIT (OUTPATIENT)
Dept: LAB | Facility: HOSPITAL | Age: 75
End: 2020-03-19
Attending: OBSTETRICS & GYNECOLOGY
Payer: MEDICARE

## 2020-03-19 ENCOUNTER — TELEPHONE (OUTPATIENT)
Dept: INFUSION THERAPY | Facility: HOSPITAL | Age: 75
End: 2020-03-19

## 2020-03-19 DIAGNOSIS — C56.9 MALIGNANT NEOPLASM OF OVARY: ICD-10-CM

## 2020-03-19 DIAGNOSIS — C56.9 MALIGNANT NEOPLASM OF OVARY, UNSPECIFIED LATERALITY: ICD-10-CM

## 2020-03-19 LAB
ALBUMIN SERPL BCP-MCNC: 3.8 G/DL (ref 3.5–5.2)
ALP SERPL-CCNC: 137 U/L (ref 55–135)
ALT SERPL W/O P-5'-P-CCNC: 24 U/L (ref 10–44)
ANION GAP SERPL CALC-SCNC: 8 MMOL/L (ref 8–16)
AST SERPL-CCNC: 26 U/L (ref 10–40)
BILIRUB SERPL-MCNC: 0.3 MG/DL (ref 0.1–1)
BUN SERPL-MCNC: 18 MG/DL (ref 8–23)
CALCIUM SERPL-MCNC: 9.7 MG/DL (ref 8.7–10.5)
CANCER AG125 SERPL-ACNC: 12 U/ML (ref 0–30)
CHLORIDE SERPL-SCNC: 104 MMOL/L (ref 95–110)
CO2 SERPL-SCNC: 26 MMOL/L (ref 23–29)
CREAT SERPL-MCNC: 0.8 MG/DL (ref 0.5–1.4)
ERYTHROCYTE [DISTWIDTH] IN BLOOD BY AUTOMATED COUNT: 16.6 % (ref 11.5–14.5)
EST. GFR  (AFRICAN AMERICAN): >60 ML/MIN/1.73 M^2
EST. GFR  (NON AFRICAN AMERICAN): >60 ML/MIN/1.73 M^2
GLUCOSE SERPL-MCNC: 83 MG/DL (ref 70–110)
HCT VFR BLD AUTO: 33.8 % (ref 37–48.5)
HGB BLD-MCNC: 11.2 G/DL (ref 12–16)
IMM GRANULOCYTES # BLD AUTO: 0.01 K/UL (ref 0–0.04)
MCH RBC QN AUTO: 32.5 PG (ref 27–31)
MCHC RBC AUTO-ENTMCNC: 33.1 G/DL (ref 32–36)
MCV RBC AUTO: 98 FL (ref 82–98)
NEUTROPHILS # BLD AUTO: 1.9 K/UL (ref 1.8–7.7)
PLATELET # BLD AUTO: 140 K/UL (ref 150–350)
PMV BLD AUTO: 10 FL (ref 9.2–12.9)
POTASSIUM SERPL-SCNC: 4.1 MMOL/L (ref 3.5–5.1)
PROT SERPL-MCNC: 7.3 G/DL (ref 6–8.4)
RBC # BLD AUTO: 3.45 M/UL (ref 4–5.4)
SODIUM SERPL-SCNC: 138 MMOL/L (ref 136–145)
WBC # BLD AUTO: 3.74 K/UL (ref 3.9–12.7)

## 2020-03-19 PROCEDURE — 36415 COLL VENOUS BLD VENIPUNCTURE: CPT

## 2020-03-19 PROCEDURE — 86304 IMMUNOASSAY TUMOR CA 125: CPT

## 2020-03-19 PROCEDURE — 80053 COMPREHEN METABOLIC PANEL: CPT

## 2020-03-19 PROCEDURE — 85027 COMPLETE CBC AUTOMATED: CPT

## 2020-03-19 RX ORDER — SODIUM CHLORIDE 0.9 % (FLUSH) 0.9 %
10 SYRINGE (ML) INJECTION
Status: CANCELLED | OUTPATIENT
Start: 2020-03-19

## 2020-03-19 RX ORDER — EPINEPHRINE 0.3 MG/.3ML
0.3 INJECTION SUBCUTANEOUS ONCE AS NEEDED
Status: CANCELLED | OUTPATIENT
Start: 2020-03-19

## 2020-03-19 RX ORDER — HEPARIN 100 UNIT/ML
500 SYRINGE INTRAVENOUS
Status: CANCELLED | OUTPATIENT
Start: 2020-03-19

## 2020-03-19 RX ORDER — DIPHENHYDRAMINE HYDROCHLORIDE 50 MG/ML
50 INJECTION INTRAMUSCULAR; INTRAVENOUS ONCE AS NEEDED
Status: CANCELLED | OUTPATIENT
Start: 2020-03-19

## 2020-03-19 RX ORDER — FAMOTIDINE 10 MG/ML
20 INJECTION INTRAVENOUS
Status: CANCELLED | OUTPATIENT
Start: 2020-03-19

## 2020-03-20 ENCOUNTER — INFUSION (OUTPATIENT)
Dept: INFUSION THERAPY | Facility: HOSPITAL | Age: 75
End: 2020-03-20
Attending: INTERNAL MEDICINE
Payer: MEDICARE

## 2020-03-20 ENCOUNTER — PATIENT MESSAGE (OUTPATIENT)
Dept: GYNECOLOGIC ONCOLOGY | Facility: CLINIC | Age: 75
End: 2020-03-20

## 2020-03-20 VITALS
DIASTOLIC BLOOD PRESSURE: 63 MMHG | RESPIRATION RATE: 18 BRPM | HEIGHT: 64 IN | BODY MASS INDEX: 21.53 KG/M2 | HEART RATE: 95 BPM | WEIGHT: 126.13 LBS | SYSTOLIC BLOOD PRESSURE: 127 MMHG

## 2020-03-20 DIAGNOSIS — D70.1 CHEMOTHERAPY INDUCED NEUTROPENIA: Primary | ICD-10-CM

## 2020-03-20 DIAGNOSIS — T45.1X5A CHEMOTHERAPY INDUCED NEUTROPENIA: Primary | ICD-10-CM

## 2020-03-20 DIAGNOSIS — C56.9 MALIGNANT NEOPLASM OF OVARY, UNSPECIFIED LATERALITY: ICD-10-CM

## 2020-03-20 PROCEDURE — 96415 CHEMO IV INFUSION ADDL HR: CPT

## 2020-03-20 PROCEDURE — 96413 CHEMO IV INFUSION 1 HR: CPT

## 2020-03-20 PROCEDURE — 25000003 PHARM REV CODE 250: Performed by: OBSTETRICS & GYNECOLOGY

## 2020-03-20 PROCEDURE — 96375 TX/PRO/DX INJ NEW DRUG ADDON: CPT

## 2020-03-20 PROCEDURE — S0028 INJECTION, FAMOTIDINE, 20 MG: HCPCS | Performed by: OBSTETRICS & GYNECOLOGY

## 2020-03-20 PROCEDURE — 96417 CHEMO IV INFUS EACH ADDL SEQ: CPT

## 2020-03-20 PROCEDURE — 63600175 PHARM REV CODE 636 W HCPCS: Performed by: OBSTETRICS & GYNECOLOGY

## 2020-03-20 PROCEDURE — 96367 TX/PROPH/DG ADDL SEQ IV INF: CPT

## 2020-03-20 RX ORDER — EPINEPHRINE 0.3 MG/.3ML
0.3 INJECTION SUBCUTANEOUS ONCE AS NEEDED
Status: DISCONTINUED | OUTPATIENT
Start: 2020-03-20 | End: 2020-03-20 | Stop reason: HOSPADM

## 2020-03-20 RX ORDER — FAMOTIDINE 10 MG/ML
20 INJECTION INTRAVENOUS
Status: COMPLETED | OUTPATIENT
Start: 2020-03-20 | End: 2020-03-20

## 2020-03-20 RX ORDER — DIPHENHYDRAMINE HYDROCHLORIDE 50 MG/ML
50 INJECTION INTRAMUSCULAR; INTRAVENOUS ONCE AS NEEDED
Status: DISCONTINUED | OUTPATIENT
Start: 2020-03-20 | End: 2020-03-20 | Stop reason: HOSPADM

## 2020-03-20 RX ORDER — SODIUM CHLORIDE 0.9 % (FLUSH) 0.9 %
10 SYRINGE (ML) INJECTION
Status: DISCONTINUED | OUTPATIENT
Start: 2020-03-20 | End: 2020-03-20 | Stop reason: HOSPADM

## 2020-03-20 RX ORDER — HEPARIN 100 UNIT/ML
500 SYRINGE INTRAVENOUS
Status: DISCONTINUED | OUTPATIENT
Start: 2020-03-20 | End: 2020-03-20 | Stop reason: HOSPADM

## 2020-03-20 RX ADMIN — CARBOPLATIN 415 MG: 10 INJECTION, SOLUTION INTRAVENOUS at 12:03

## 2020-03-20 RX ADMIN — SODIUM CHLORIDE 500 ML: 0.9 INJECTION, SOLUTION INTRAVENOUS at 08:03

## 2020-03-20 RX ADMIN — PACLITAXEL 294 MG: 6 INJECTION, SOLUTION INTRAVENOUS at 09:03

## 2020-03-20 RX ADMIN — DEXAMETHASONE SODIUM PHOSPHATE: 4 INJECTION, SOLUTION INTRA-ARTICULAR; INTRALESIONAL; INTRAMUSCULAR; INTRAVENOUS; SOFT TISSUE at 08:03

## 2020-03-20 RX ADMIN — DIPHENHYDRAMINE HYDROCHLORIDE 50 MG: 50 INJECTION, SOLUTION INTRAMUSCULAR; INTRAVENOUS at 09:03

## 2020-03-20 RX ADMIN — FAMOTIDINE 20 MG: 10 INJECTION INTRAVENOUS at 09:03

## 2020-03-20 NOTE — PLAN OF CARE
1356-Patient tolerated treatment well. Discharged without complaints or S/S of adverse event. AVS given.  Instructed to call provider for any questions or concerns.

## 2020-03-20 NOTE — PLAN OF CARE
0757-Labs , hx, and medications reviewed, patient seen by md last week, labs yesterday okay for treatment . Assessment completed. Discussed plan of care with patient. Patient in agreement. Chair reclined and warm blanket and snack offered.

## 2020-03-29 ENCOUNTER — PATIENT MESSAGE (OUTPATIENT)
Dept: GYNECOLOGIC ONCOLOGY | Facility: CLINIC | Age: 75
End: 2020-03-29

## 2020-03-30 ENCOUNTER — TELEPHONE (OUTPATIENT)
Dept: PHARMACY | Facility: CLINIC | Age: 75
End: 2020-03-30

## 2020-03-30 DIAGNOSIS — C56.9 MALIGNANT NEOPLASM OF OVARY, UNSPECIFIED LATERALITY: Primary | ICD-10-CM

## 2020-03-30 NOTE — TELEPHONE ENCOUNTER
LVM for callback to inform patient that [facility] received prescription for Zejula and prior authorization is required.  [facility] will be back in touch once insurance determination is received.

## 2020-03-31 ENCOUNTER — PATIENT MESSAGE (OUTPATIENT)
Dept: GYNECOLOGIC ONCOLOGY | Facility: CLINIC | Age: 75
End: 2020-03-31

## 2020-03-31 NOTE — TELEPHONE ENCOUNTER
DOCUMENTATION ONLY:  Prior authorization for Zejula 100 mg Capsule #60/30 approved from 03/31/2020 to   Case ID: [id number]    Co-pay: $79.80    Patient Assistance IS required. Sending to the financial assistance team to investigate assistance options. Cleo RIVAS

## 2020-04-02 NOTE — TELEPHONE ENCOUNTER
Jessicala is approved by the patients insurance with high copay. Patient may be eligible for CAGNO assistance for Zejula. We will be assisting the patient in the application process. CAGNO application requires prescriber consent and signature. Sending a staff message to Dr. Vinson regarding MARCELL assistance for Zejula and faxing the application for his review and signature.

## 2020-04-06 ENCOUNTER — PATIENT MESSAGE (OUTPATIENT)
Dept: GYNECOLOGIC ONCOLOGY | Facility: CLINIC | Age: 75
End: 2020-04-06

## 2020-04-07 ENCOUNTER — LAB VISIT (OUTPATIENT)
Dept: LAB | Facility: HOSPITAL | Age: 75
End: 2020-04-07
Attending: OBSTETRICS & GYNECOLOGY
Payer: MEDICARE

## 2020-04-07 DIAGNOSIS — C56.9 MALIGNANT NEOPLASM OF OVARY: ICD-10-CM

## 2020-04-07 LAB
ALBUMIN SERPL BCP-MCNC: 3.7 G/DL (ref 3.5–5.2)
ALP SERPL-CCNC: 112 U/L (ref 55–135)
ALT SERPL W/O P-5'-P-CCNC: 14 U/L (ref 10–44)
ANION GAP SERPL CALC-SCNC: 10 MMOL/L (ref 8–16)
AST SERPL-CCNC: 19 U/L (ref 10–40)
BILIRUB SERPL-MCNC: 0.4 MG/DL (ref 0.1–1)
BUN SERPL-MCNC: 18 MG/DL (ref 8–23)
CALCIUM SERPL-MCNC: 9.3 MG/DL (ref 8.7–10.5)
CHLORIDE SERPL-SCNC: 105 MMOL/L (ref 95–110)
CO2 SERPL-SCNC: 22 MMOL/L (ref 23–29)
CREAT SERPL-MCNC: 0.8 MG/DL (ref 0.5–1.4)
EST. GFR  (AFRICAN AMERICAN): >60 ML/MIN/1.73 M^2
EST. GFR  (NON AFRICAN AMERICAN): >60 ML/MIN/1.73 M^2
GLUCOSE SERPL-MCNC: 88 MG/DL (ref 70–110)
POTASSIUM SERPL-SCNC: 3.8 MMOL/L (ref 3.5–5.1)
PROT SERPL-MCNC: 7.1 G/DL (ref 6–8.4)
SODIUM SERPL-SCNC: 137 MMOL/L (ref 136–145)

## 2020-04-07 PROCEDURE — 36415 COLL VENOUS BLD VENIPUNCTURE: CPT

## 2020-04-07 PROCEDURE — 80053 COMPREHEN METABOLIC PANEL: CPT

## 2020-04-08 ENCOUNTER — OFFICE VISIT (OUTPATIENT)
Dept: GYNECOLOGIC ONCOLOGY | Facility: CLINIC | Age: 75
End: 2020-04-08
Payer: MEDICARE

## 2020-04-08 DIAGNOSIS — T45.1X5A CHEMOTHERAPY INDUCED NEUTROPENIA: ICD-10-CM

## 2020-04-08 DIAGNOSIS — G62.0 CHEMOTHERAPY-INDUCED NEUROPATHY: ICD-10-CM

## 2020-04-08 DIAGNOSIS — C56.9 MALIGNANT NEOPLASM OF OVARY, UNSPECIFIED LATERALITY: Primary | ICD-10-CM

## 2020-04-08 DIAGNOSIS — K59.04 CHRONIC IDIOPATHIC CONSTIPATION: ICD-10-CM

## 2020-04-08 DIAGNOSIS — D70.1 CHEMOTHERAPY INDUCED NEUTROPENIA: ICD-10-CM

## 2020-04-08 DIAGNOSIS — D69.59 CHEMOTHERAPY-INDUCED THROMBOCYTOPENIA: ICD-10-CM

## 2020-04-08 DIAGNOSIS — T45.1X5A CHEMOTHERAPY-INDUCED THROMBOCYTOPENIA: ICD-10-CM

## 2020-04-08 DIAGNOSIS — T45.1X5A CHEMOTHERAPY-INDUCED NEUROPATHY: ICD-10-CM

## 2020-04-08 PROCEDURE — 99214 PR OFFICE/OUTPT VISIT, EST, LEVL IV, 30-39 MIN: ICD-10-PCS | Mod: 95,,, | Performed by: OBSTETRICS & GYNECOLOGY

## 2020-04-08 PROCEDURE — 99214 OFFICE O/P EST MOD 30 MIN: CPT | Mod: 95,,, | Performed by: OBSTETRICS & GYNECOLOGY

## 2020-04-08 NOTE — Clinical Note
Chris, can you please order a CT C/A/P in about 4 weeks in [place] and a telemedicine JODIE in 4 weeks?Brenda, she is going to message us when she starts Niraparib.  If you could please order weekly CBC, CMP every week for the first month from the time she starts taking the drug.  Thank you.

## 2020-04-08 NOTE — PROGRESS NOTES
The patient location is: home  The chief complaint leading to consultation is: PARPi  Visit type: Virtual visit with synchronous audio and video  Total time spent with patient: 30  Each patient to whom he or she provides medical services by telemedicine is:  (1) informed of the relationship between the physician and patient and the respective role of any other health care provider with respect to management of the patient; and (2) notified that he or she may decline to receive medical services by telemedicine and may withdraw from such care at any time.      REFERRING PROVIDER  No ref. provider found     REASON FOR CONSULT  Margarita Dunne  is a 75 y.o.  woman who presents for evaluation of gBRCA- stage IIIC HGSOC.  sBRCA testing is pending.      HISTORY OF PRESENT ILLNESS    Chemotherapy regimen: carboplatin/paclitaxel q21 days  Cycle: 5  Previous dose limiting toxicities: Y  Cycle 2: Chemotherapy induced thrombocytopenia (104) and neutropenia (1.4)  Previous dose reductions: Y  Cycle 3: AUC 5 due to thrombocytopenia  Cycle 5: Grade 1 neuropathy  Previous breaks: N  Previous changes in pre-medications: N  Previous growth support: N    Energy level: baseline  Appetite: improving  Fevers/chills/nights: no  Nausea: no  Emesis: no  Neuropathy: yes, grade 1  Discoloration of lower extremities: no  Tolerating PO: yes  Flatus: yes  BM: yes       Malignant neoplasm of ovary    9/11/2019 Imaging Significant Findings     CT C/A/P: Irregularity of the uterus in this patient with thickened endometrium identified on prior ultrasound with small, nonspecific soft tissue nodules in the pelvis and abdomen with surrounding streaking in the greater omentum as described above.          9/20/2019 Tumor Markers      = 75      11/12/2019 Surgery     LAPAROTOMY, EXPLORATORY  HYSTERECTOMY, TOTAL, ABDOMINAL   SALPINGO-OOPHORECTOMY (Bilateral)  OMENTECTOMY   APPENDECTOMY  EXCISION, TISSUE, PERITONEUM  RD 1 mm on the R diaphragm and  sigmoid colon        12/3/2019 - 3/20/2020 Chemotherapy     Treatment Summary   Plan Name: OP GYN PACLITAXEL CARBOPLATIN (AUC 6) Q3W  Treatment Goal: Curative  Status: Active  Start Date: 12/20/2019  End Date: 3/21/2020 (Planned)  Provider: Irvin Vinson MD  Chemotherapy: CARBOplatin (PARAPLATIN) 505 mg in sodium chloride 0.9% 250 mL chemo infusion, 505 mg (90.8 % of original dose 555.6 mg), Intravenous, Clinic/HOD 1 time, 5 of 6 cycles  Dose modification:   (original dose 555.6 mg, Cycle 1)  Administration: 505 mg (12/20/2019), 550 mg (1/10/2020), 460 mg (1/31/2020), 415 mg (2/28/2020)  PACLitaxel (TAXOL) 175 mg/m2 = 294 mg in sodium chloride 0.9% 500 mL chemo infusion, 175 mg/m2 = 294 mg, Intravenous, Clinic/HOD 1 time, 5 of 6 cycles  Administration: 294 mg (12/20/2019), 294 mg (1/10/2020), 294 mg (1/31/2020), 294 mg (2/28/2020), 294 mg (3/20/2020)    Completed 5 cycles of carboplatin/paclitex q21 days.  Opted out of cycle #6 due to COVID.  Chemotherapy was complicated by:  1. Cycle 2: Chemotherapy induced thrombocytopenia (104) and neutropenia (1.4)  2. Cycle 3: AUC 5 due to thrombocytopenia  3. Cycle 5: Grade 1 neuropathy    Her AUC was reduced to 5.  She didn't require CSF.           1/28/2020 Genetic Testing     Myriad: no mutations detected.      3/19/2020 Tumor Markers      = 12          The following portions of the patient's history were reviewed and updated as appropriate: allergies, current medications, family history, medical history, social history and surgical history.    REVIEW OF SYSTEMS  All systems reviewed and negative except as noted in HPI.    OBJECTIVE   There were no vitals filed for this visit.   There is no height or weight on file to calculate BMI.      1. General: Well appearing, no apparent distress, alert and oriented.  2. Lymph: Neck symmetric without cervical or supraclavicular adenopathy or mass.  3. Lungs: Normal respiratory rate, no accessory muscle use.  4. Cardiac: Normal  rate  5. Psych: Normal affect.  6. Abdomen:  non-distended, soft, non-tender, are no masses, no ascites, no hepatosplenomegaly.  7. Skin: Warm, dry, no rashes or lesions.   8. Extremities: Bilateral lower extremities without edema or tenderness.    ECOG status: 1    LABORATORY DATA  Lab data reviewed.    RADIOLOGICAL DATA  Radiology data reviewed.    ASSESSMENT / PLAN     1. Malignant neoplasm of ovary, unspecified laterality    2. Chemotherapy induced neutropenia    3. Chemotherapy-induced thrombocytopenia    4. Chemotherapy-induced neuropathy    5. Chronic idiopathic constipation       F/U STRATA testing (I messaged Venus Prado, and she said it's under the Pathology tab)  Patient opted against cycle #6 due to COVID pandemic.  Discussed risks associated with cancer patients and COVID  F/U CBC.  CMP is WNL.   Patient verbally agreed to Niraparib.  We discussed logistics and common side effects including thrombocytopenia, anemia, and MDS.  She will need to sign a consent in the future  She will need weekly CBC, CMP for the first month.  She will message us with a start date.  CT C/A/P in 1 month.  Typically we do them after completion of CT but we will delay it due to COVID-19  Telemedicine visit in 1 month    PATIENT EDUCATION  Ready to learn, no apparent learning barriers were identified; learning preferences include listening. Explained diagnosis and treatment plan; patient expressed understanding of the content.    ADMINISTRATIVE BILLING  Greater than 50% of was spent in counseling.

## 2020-04-09 DIAGNOSIS — C56.9 MALIGNANT NEOPLASM OF OVARY, UNSPECIFIED LATERALITY: Primary | ICD-10-CM

## 2020-04-13 ENCOUNTER — TELEPHONE (OUTPATIENT)
Dept: PHARMACY | Facility: CLINIC | Age: 75
End: 2020-04-13

## 2020-04-13 ENCOUNTER — PATIENT MESSAGE (OUTPATIENT)
Dept: GYNECOLOGIC ONCOLOGY | Facility: CLINIC | Age: 75
End: 2020-04-13

## 2020-04-13 ENCOUNTER — TELEPHONE (OUTPATIENT)
Dept: GYNECOLOGIC ONCOLOGY | Facility: CLINIC | Age: 75
End: 2020-04-13

## 2020-04-13 NOTE — TELEPHONE ENCOUNTER
Initial Zejula consult completed on 2020. Zejula will be picked up on 2020. $0.00 copay.  Confirmed 2 patient identifiers - name and . Therapy Appropriate. Patient plans to start Zejula on 2020.    Patient was counseled on the administration directions:  · Take 2 capsules (200mg) by mouth once daily, with or without food.   · If possible, patient was instructed tip the tablets from the RX bottle to the cap, and take directly from the cap to the mouth.  Patient may handle the medication with their hands if they wear with a latex or nitrile glove and wash their hands before and after handling the tablets.     Patient was counseled on the following possible side effects, which include, but are not limited to:  · nausea/vomiting, fatigue, diarrhea, indigestion/heartburn, headache, decreased appetite/taste changes, upper respiratory symptoms, abdominal pain, joint and muscle  pain, insomnia, hypertension, and rash - Hydrocortisone cream provided. But not limited to these side effects.  · Patient advised to contact provider if signs of infection, and more severe side effects such as: bleeding, irregular heartbeat, shortness of breath, chest pain. ER precautions advised.  · Discussed importance of lab monitoring and MD appointments as advised by Md.  · Also discusses warning of MDS/AML, bone marrow suppression and cardiovascular effects.     DDIs: medications reviewed, none upon review today.      Patient will be given 2 patient education handouts on how to handle oral chemotherapy and specific recommendations- do's and don'ts. Instructed the patient that if they have any remaining oral chemotherapy, not to flush down the toilet or throw away in the trash; the patient or caregiver should return the unused oral chemotherapy to either the clinic or to myself in the Pharmacy where the oral chemotherapy can be disposed of properly.     Patient confirmed understanding. Patient did not have additional  questions. Patient advised to call myself or provider should any questions arise. Patient plans to start Zejula on 4/13/2020. Consultation included: indication; goals of treatment; administration; storage and handling; side effects; how to handle side effects; the importance of compliance; how to handle missed doses; the importance of laboratory monitoring; the importance of keeping all follow up appointments.  Patient understands to report any medication changes to [facility] and provider. All questions answered and addressed to patients satisfaction. I will f/u with patient in 1 week from start and [facility] will contact patient in 3 weeks to coordinate further refills.

## 2020-04-13 NOTE — TELEPHONE ENCOUNTER
Patient's  will  Zejula today as he is at main campus currently.  Patient disconnected line to reach him before he left and will call back to discuss medication.

## 2020-04-20 ENCOUNTER — TELEPHONE (OUTPATIENT)
Dept: PHARMACY | Facility: CLINIC | Age: 75
End: 2020-04-20

## 2020-04-20 NOTE — TELEPHONE ENCOUNTER
Patient reports that she has been tolerating Zejula fairly well since starting on 4/13/2020. She has noticed an increase in headaches without aura and aura without a headache.  She normally gets 1-2 migraines each month, but she feels that her headaches have increased slightly since starting Zejula.  Headaches have not become too disruptive at this time so she will keep track and report any progression to pharmacy or provider.  She has had some nausea upon waking each morning and at each dose.  Nausea with each dose has started to improve.  She has antinausea medication at home and was advised that if necessary she could take 30 minutes before dose if nausea becomes problematic.  She verbalized understanding and does not have any additional questions at this time.

## 2020-04-21 ENCOUNTER — PATIENT MESSAGE (OUTPATIENT)
Dept: GYNECOLOGIC ONCOLOGY | Facility: CLINIC | Age: 75
End: 2020-04-21

## 2020-04-21 ENCOUNTER — LAB VISIT (OUTPATIENT)
Dept: LAB | Facility: HOSPITAL | Age: 75
End: 2020-04-21
Attending: OBSTETRICS & GYNECOLOGY
Payer: MEDICARE

## 2020-04-21 DIAGNOSIS — C56.9 MALIGNANT NEOPLASM OF OVARY: ICD-10-CM

## 2020-04-21 LAB
ALBUMIN SERPL BCP-MCNC: 4.1 G/DL (ref 3.5–5.2)
ALP SERPL-CCNC: 177 U/L (ref 55–135)
ALT SERPL W/O P-5'-P-CCNC: 33 U/L (ref 10–44)
ANION GAP SERPL CALC-SCNC: 9 MMOL/L (ref 8–16)
AST SERPL-CCNC: 33 U/L (ref 10–40)
BILIRUB SERPL-MCNC: 0.4 MG/DL (ref 0.1–1)
BUN SERPL-MCNC: 18 MG/DL (ref 8–23)
CALCIUM SERPL-MCNC: 9.5 MG/DL (ref 8.7–10.5)
CHLORIDE SERPL-SCNC: 104 MMOL/L (ref 95–110)
CO2 SERPL-SCNC: 25 MMOL/L (ref 23–29)
CREAT SERPL-MCNC: 0.9 MG/DL (ref 0.5–1.4)
ERYTHROCYTE [DISTWIDTH] IN BLOOD BY AUTOMATED COUNT: 14.4 % (ref 11.5–14.5)
EST. GFR  (AFRICAN AMERICAN): >60 ML/MIN/1.73 M^2
EST. GFR  (NON AFRICAN AMERICAN): >60 ML/MIN/1.73 M^2
GLUCOSE SERPL-MCNC: 91 MG/DL (ref 70–110)
HCT VFR BLD AUTO: 34.1 % (ref 37–48.5)
HGB BLD-MCNC: 11.5 G/DL (ref 12–16)
MCH RBC QN AUTO: 34.1 PG (ref 27–31)
MCHC RBC AUTO-ENTMCNC: 33.7 G/DL (ref 32–36)
MCV RBC AUTO: 101 FL (ref 82–98)
NEUTROPHILS # BLD AUTO: 1.5 K/UL (ref 1.8–7.7)
PLATELET # BLD AUTO: 218 K/UL (ref 150–350)
PMV BLD AUTO: 9.1 FL (ref 9.2–12.9)
POTASSIUM SERPL-SCNC: 4.2 MMOL/L (ref 3.5–5.1)
PROT SERPL-MCNC: 7.4 G/DL (ref 6–8.4)
RBC # BLD AUTO: 3.37 M/UL (ref 4–5.4)
SODIUM SERPL-SCNC: 138 MMOL/L (ref 136–145)
WBC # BLD AUTO: 3.08 K/UL (ref 3.9–12.7)

## 2020-04-21 PROCEDURE — 80053 COMPREHEN METABOLIC PANEL: CPT | Mod: PO

## 2020-04-21 PROCEDURE — 36415 COLL VENOUS BLD VENIPUNCTURE: CPT | Mod: PO

## 2020-04-21 PROCEDURE — 85027 COMPLETE CBC AUTOMATED: CPT | Mod: PO

## 2020-04-28 ENCOUNTER — TELEPHONE (OUTPATIENT)
Dept: ADMINISTRATIVE | Facility: OTHER | Age: 75
End: 2020-04-28

## 2020-04-28 ENCOUNTER — PATIENT MESSAGE (OUTPATIENT)
Dept: GYNECOLOGIC ONCOLOGY | Facility: CLINIC | Age: 75
End: 2020-04-28

## 2020-04-28 ENCOUNTER — LAB VISIT (OUTPATIENT)
Dept: LAB | Facility: HOSPITAL | Age: 75
End: 2020-04-28
Attending: OBSTETRICS & GYNECOLOGY
Payer: MEDICARE

## 2020-04-28 DIAGNOSIS — C56.9 MALIGNANT NEOPLASM OF OVARY: ICD-10-CM

## 2020-04-28 LAB
ALBUMIN SERPL BCP-MCNC: 4.1 G/DL (ref 3.5–5.2)
ALP SERPL-CCNC: 161 U/L (ref 55–135)
ALT SERPL W/O P-5'-P-CCNC: 24 U/L (ref 10–44)
ANION GAP SERPL CALC-SCNC: 10 MMOL/L (ref 8–16)
AST SERPL-CCNC: 31 U/L (ref 10–40)
BILIRUB SERPL-MCNC: 0.6 MG/DL (ref 0.1–1)
BUN SERPL-MCNC: 17 MG/DL (ref 8–23)
CALCIUM SERPL-MCNC: 9.6 MG/DL (ref 8.7–10.5)
CHLORIDE SERPL-SCNC: 104 MMOL/L (ref 95–110)
CO2 SERPL-SCNC: 23 MMOL/L (ref 23–29)
CREAT SERPL-MCNC: 0.9 MG/DL (ref 0.5–1.4)
ERYTHROCYTE [DISTWIDTH] IN BLOOD BY AUTOMATED COUNT: 13.7 % (ref 11.5–14.5)
EST. GFR  (AFRICAN AMERICAN): >60 ML/MIN/1.73 M^2
EST. GFR  (NON AFRICAN AMERICAN): >60 ML/MIN/1.73 M^2
GLUCOSE SERPL-MCNC: 89 MG/DL (ref 70–110)
HCT VFR BLD AUTO: 35 % (ref 37–48.5)
HGB BLD-MCNC: 11.6 G/DL (ref 12–16)
IMM GRANULOCYTES # BLD AUTO: 0 K/UL (ref 0–0.04)
MCH RBC QN AUTO: 34.2 PG (ref 27–31)
MCHC RBC AUTO-ENTMCNC: 33.1 G/DL (ref 32–36)
MCV RBC AUTO: 103 FL (ref 82–98)
NEUTROPHILS # BLD AUTO: 1.6 K/UL (ref 1.8–7.7)
PLATELET # BLD AUTO: 189 K/UL (ref 150–350)
PMV BLD AUTO: 9.5 FL (ref 9.2–12.9)
POTASSIUM SERPL-SCNC: 4 MMOL/L (ref 3.5–5.1)
PROT SERPL-MCNC: 7.7 G/DL (ref 6–8.4)
RBC # BLD AUTO: 3.39 M/UL (ref 4–5.4)
SODIUM SERPL-SCNC: 137 MMOL/L (ref 136–145)
WBC # BLD AUTO: 3.06 K/UL (ref 3.9–12.7)

## 2020-04-28 PROCEDURE — 36415 COLL VENOUS BLD VENIPUNCTURE: CPT

## 2020-04-28 PROCEDURE — 85027 COMPLETE CBC AUTOMATED: CPT

## 2020-04-28 PROCEDURE — 80053 COMPREHEN METABOLIC PANEL: CPT

## 2020-05-01 ENCOUNTER — PATIENT MESSAGE (OUTPATIENT)
Dept: GYNECOLOGIC ONCOLOGY | Facility: CLINIC | Age: 75
End: 2020-05-01

## 2020-05-04 ENCOUNTER — PATIENT MESSAGE (OUTPATIENT)
Dept: GYNECOLOGIC ONCOLOGY | Facility: CLINIC | Age: 75
End: 2020-05-04

## 2020-05-06 ENCOUNTER — HOSPITAL ENCOUNTER (OUTPATIENT)
Dept: RADIOLOGY | Facility: HOSPITAL | Age: 75
Discharge: HOME | End: 2020-05-06
Attending: OBSTETRICS & GYNECOLOGY
Payer: MEDICARE

## 2020-05-06 ENCOUNTER — TELEPHONE (OUTPATIENT)
Dept: PHARMACY | Facility: CLINIC | Age: 75
End: 2020-05-06

## 2020-05-06 DIAGNOSIS — C56.9 MALIGNANT NEOPLASM OF OVARY, UNSPECIFIED LATERALITY: ICD-10-CM

## 2020-05-06 PROCEDURE — 74176 CT CHEST ABDOMEN PELVIS WITHOUT CONTRAST(XPD): ICD-10-PCS | Mod: 26,,, | Performed by: RADIOLOGY

## 2020-05-06 PROCEDURE — 74176 CT ABD & PELVIS W/O CONTRAST: CPT | Mod: 26,,, | Performed by: RADIOLOGY

## 2020-05-06 PROCEDURE — 71250 CT THORAX DX C-: CPT | Mod: 26,,, | Performed by: RADIOLOGY

## 2020-05-06 PROCEDURE — 25500020 PHARM REV CODE 255: Mod: PO | Performed by: OBSTETRICS & GYNECOLOGY

## 2020-05-06 PROCEDURE — 71250 CT THORAX DX C-: CPT | Mod: TC,PO

## 2020-05-06 PROCEDURE — 71250 CT CHEST ABDOMEN PELVIS WITHOUT CONTRAST(XPD): ICD-10-PCS | Mod: 26,,, | Performed by: RADIOLOGY

## 2020-05-06 PROCEDURE — 74176 CT ABD & PELVIS W/O CONTRAST: CPT | Mod: TC,PO

## 2020-05-06 RX ADMIN — IOHEXOL 1000 ML: 9 SOLUTION ORAL at 02:05

## 2020-05-07 ENCOUNTER — PATIENT MESSAGE (OUTPATIENT)
Dept: GYNECOLOGIC ONCOLOGY | Facility: CLINIC | Age: 75
End: 2020-05-07

## 2020-05-11 ENCOUNTER — PATIENT MESSAGE (OUTPATIENT)
Dept: GYNECOLOGIC ONCOLOGY | Facility: CLINIC | Age: 75
End: 2020-05-11

## 2020-05-11 ENCOUNTER — OFFICE VISIT (OUTPATIENT)
Dept: GYNECOLOGIC ONCOLOGY | Facility: CLINIC | Age: 75
End: 2020-05-11
Payer: MEDICARE

## 2020-05-11 DIAGNOSIS — T45.1X5A CHEMOTHERAPY-INDUCED NAUSEA: Primary | ICD-10-CM

## 2020-05-11 DIAGNOSIS — C56.9 MALIGNANT NEOPLASM OF OVARY, UNSPECIFIED LATERALITY: ICD-10-CM

## 2020-05-11 DIAGNOSIS — R11.0 CHEMOTHERAPY-INDUCED NAUSEA: Primary | ICD-10-CM

## 2020-05-11 PROCEDURE — 99214 PR OFFICE/OUTPT VISIT, EST, LEVL IV, 30-39 MIN: ICD-10-PCS | Mod: 95,,, | Performed by: OBSTETRICS & GYNECOLOGY

## 2020-05-11 PROCEDURE — 99214 OFFICE O/P EST MOD 30 MIN: CPT | Mod: 95,,, | Performed by: OBSTETRICS & GYNECOLOGY

## 2020-05-11 RX ORDER — LORAZEPAM 0.5 MG/1
0.5 TABLET ORAL EVERY 6 HOURS PRN
Qty: 25 TABLET | Refills: 2 | Status: SHIPPED | OUTPATIENT
Start: 2020-05-11 | End: 2020-07-24

## 2020-05-11 NOTE — PROGRESS NOTES
The patient location is: home  The chief complaint leading to consultation is: PARPi  Visit type: Virtual visit with synchronous audio and video  Total time spent with patient: 25  Each patient to whom he or she provides medical services by telemedicine is:  (1) informed of the relationship between the physician and patient and the respective role of any other health care provider with respect to management of the patient; and (2) notified that he or she may decline to receive medical services by telemedicine and may withdraw from such care at any time.      REFERRING PROVIDER  No ref. provider found     REASON FOR CONSULT  Margarita Dunne  is a 75 y.o.  woman who presents on maintenance Niraparib for a gBRCA- stage IIIC HGSOC .  sBRCA testing is pending.      HISTORY OF PRESENT ILLNESS    Energy level: baseline  Appetite: improving  Fevers/chills/nights: no  Nausea: yes, in anticipation of her niraparib  Emesis: no  Neuropathy: no  Discoloration of lower extremities: no   Neuropathy: yes in bilateral lower extremities. L>R.  She states it is mostly confined to her feet.  She did have a ground level fall.  She denies any changes in proprioception.  Her only complain is now some foot drop  Tolerating PO: yes   Flatus: yes  BM: yes   Vaginal bleeding:        Malignant neoplasm of ovary    9/11/2019 Imaging Significant Findings     CT C/A/P: Irregularity of the uterus in this patient with thickened endometrium identified on prior ultrasound with small, nonspecific soft tissue nodules in the pelvis and abdomen with surrounding streaking in the greater omentum as described above.          9/20/2019 Tumor Markers      = 75      11/12/2019 Surgery     LAPAROTOMY, EXPLORATORY  HYSTERECTOMY, TOTAL, ABDOMINAL   SALPINGO-OOPHORECTOMY (Bilateral)  OMENTECTOMY   APPENDECTOMY  EXCISION, TISSUE, PERITONEUM  RD 1 mm on the R diaphragm and sigmoid colon        12/3/2019 - 3/20/2020 Chemotherapy     Treatment Summary   Plan Name:  OP GYN PACLITAXEL CARBOPLATIN (AUC 6) Q3W  Treatment Goal: Curative  Status: Active  Start Date: 12/20/2019  End Date: 3/21/2020 (Planned)  Provider: Irvin Vinson MD  Chemotherapy: CARBOplatin (PARAPLATIN) 505 mg in sodium chloride 0.9% 250 mL chemo infusion, 505 mg (90.8 % of original dose 555.6 mg), Intravenous, Clinic/HOD 1 time, 5 of 6 cycles  Dose modification:   (original dose 555.6 mg, Cycle 1)  Administration: 505 mg (12/20/2019), 550 mg (1/10/2020), 460 mg (1/31/2020), 415 mg (2/28/2020)  PACLitaxel (TAXOL) 175 mg/m2 = 294 mg in sodium chloride 0.9% 500 mL chemo infusion, 175 mg/m2 = 294 mg, Intravenous, Clinic/HOD 1 time, 5 of 6 cycles  Administration: 294 mg (12/20/2019), 294 mg (1/10/2020), 294 mg (1/31/2020), 294 mg (2/28/2020), 294 mg (3/20/2020)    Completed 5 cycles of carboplatin/paclitex q21 days.  Opted out of cycle #6 due to COVID.  Chemotherapy was complicated by:  1. Cycle 2: Chemotherapy induced thrombocytopenia (104) and neutropenia (1.4)  2. Cycle 3: AUC 5 due to thrombocytopenia  3. Cycle 5: Grade 1 neuropathy    Her AUC was reduced to 5.  She didn't require CSF.           1/28/2020 Genetic Testing     Myriad: no mutations detected.      3/19/2020 Tumor Markers      = 12      4/13/2020 -  Maintenance Therapy     Nirparib 300 mg qDaily      5/6/2020 Imaging Significant Findings     CT C/A/P: SILVINA    (This CT scan was delayed 1 month after completion of 5 cycles of adjuvant CT on 3/30.  It was performed after starting on Niraparib maintenance therapy.  The patient opted to not receive cycle #6 due to COVID and then delay her CT scan by 1 month due to COVID)          The following portions of the patient's history were reviewed and updated as appropriate: allergies, current medications, family history, medical history, social history and surgical history.    REVIEW OF SYSTEMS  All systems reviewed and negative except as noted in HPI.    OBJECTIVE   There were no vitals filed for this  visit.   There is no height or weight on file to calculate BMI.      1. General: Well appearing, no apparent distress, alert and oriented.  2. Lymph: Neck symmetric without cervical or supraclavicular adenopathy or mass.  3. Lungs: Normal respiratory rate, no accessory muscle use.  4. Cardiac: Normal rate  5. Psych: Normal affect.    ECOG status: 1    LABORATORY DATA  Lab data reviewed.    RADIOLOGICAL DATA  Radiology data reviewed.    ASSESSMENT / PLAN     1. Chemotherapy-induced nausea    2. Malignant neoplasm of ovary, unspecified laterality       F/U STRATA testing (I messaged Venus ABAD and Dr. Green)  CBC, CMP WNL  CT C/A/P w/o IV contrast (no veins) with SILVINA  CBC, , CMP 4 weeks  RONC 4 weeks  Ativan for anticipatory nausea  Neurology consult if neuropathy hasn't improved in 4 weeks  No turmeric while on niraparib    I also counseled the patient on an in person visit to perform a physical exam.  She is still nervous about COVID, but told her we need to do one in the next 1-2 months.  She voiced understanding.  I gave her strong precautions about her foot drop.    She does not need to check her temperature and BP daily.      PATIENT EDUCATION  Ready to learn, no apparent learning barriers were identified; learning preferences include listening. Explained diagnosis and treatment plan; patient expressed understanding of the content.    ADMINISTRATIVE BILLING  Greater than 50% of was spent in counseling.

## 2020-05-11 NOTE — Clinical Note
Hi if we can just schedule the CBC, CMP, CA-125 for 4 weeks close to home.  I placed the order.  [facility] in 4 weeks after blood work.  Thanks.

## 2020-05-12 ENCOUNTER — LAB VISIT (OUTPATIENT)
Dept: LAB | Facility: HOSPITAL | Age: 75
End: 2020-05-12
Attending: INTERNAL MEDICINE
Payer: MEDICARE

## 2020-05-12 DIAGNOSIS — C56.9 MALIGNANT NEOPLASM OF OVARY, UNSPECIFIED LATERALITY: ICD-10-CM

## 2020-05-12 LAB
ALBUMIN SERPL BCP-MCNC: 4 G/DL (ref 3.5–5.2)
ALP SERPL-CCNC: 149 U/L (ref 55–135)
ALT SERPL W/O P-5'-P-CCNC: 27 U/L (ref 10–44)
ANION GAP SERPL CALC-SCNC: 8 MMOL/L (ref 8–16)
AST SERPL-CCNC: 31 U/L (ref 10–40)
BILIRUB SERPL-MCNC: 0.4 MG/DL (ref 0.1–1)
BUN SERPL-MCNC: 19 MG/DL (ref 8–23)
CALCIUM SERPL-MCNC: 9.6 MG/DL (ref 8.7–10.5)
CHLORIDE SERPL-SCNC: 105 MMOL/L (ref 95–110)
CO2 SERPL-SCNC: 24 MMOL/L (ref 23–29)
CREAT SERPL-MCNC: 1 MG/DL (ref 0.5–1.4)
ERYTHROCYTE [DISTWIDTH] IN BLOOD BY AUTOMATED COUNT: 13 % (ref 11.5–14.5)
EST. GFR  (AFRICAN AMERICAN): >60 ML/MIN/1.73 M^2
EST. GFR  (NON AFRICAN AMERICAN): 54 ML/MIN/1.73 M^2
GLUCOSE SERPL-MCNC: 101 MG/DL (ref 70–110)
HCT VFR BLD AUTO: 34.6 % (ref 37–48.5)
HGB BLD-MCNC: 11.5 G/DL (ref 12–16)
IMM GRANULOCYTES # BLD AUTO: 0.01 K/UL (ref 0–0.04)
MCH RBC QN AUTO: 34.1 PG (ref 27–31)
MCHC RBC AUTO-ENTMCNC: 33.2 G/DL (ref 32–36)
MCV RBC AUTO: 103 FL (ref 82–98)
NEUTROPHILS # BLD AUTO: 2.1 K/UL (ref 1.8–7.7)
PLATELET # BLD AUTO: 157 K/UL (ref 150–350)
PMV BLD AUTO: 9.5 FL (ref 9.2–12.9)
POTASSIUM SERPL-SCNC: 3.9 MMOL/L (ref 3.5–5.1)
PROT SERPL-MCNC: 7.6 G/DL (ref 6–8.4)
RBC # BLD AUTO: 3.37 M/UL (ref 4–5.4)
SODIUM SERPL-SCNC: 137 MMOL/L (ref 136–145)
WBC # BLD AUTO: 4 K/UL (ref 3.9–12.7)

## 2020-05-12 PROCEDURE — 80053 COMPREHEN METABOLIC PANEL: CPT

## 2020-05-12 PROCEDURE — 36415 COLL VENOUS BLD VENIPUNCTURE: CPT

## 2020-05-12 PROCEDURE — 85027 COMPLETE CBC AUTOMATED: CPT

## 2020-05-17 RX ORDER — DICLOFENAC SODIUM 10 MG/G
GEL TOPICAL
Qty: 200 G | Refills: 0 | Status: SHIPPED | OUTPATIENT
Start: 2020-05-17

## 2020-05-21 ENCOUNTER — PATIENT MESSAGE (OUTPATIENT)
Dept: GYNECOLOGIC ONCOLOGY | Facility: CLINIC | Age: 75
End: 2020-05-21

## 2020-06-03 ENCOUNTER — LAB VISIT (OUTPATIENT)
Dept: LAB | Facility: HOSPITAL | Age: 75
End: 2020-06-03
Attending: OBSTETRICS & GYNECOLOGY
Payer: MEDICARE

## 2020-06-03 DIAGNOSIS — C56.9 MALIGNANT NEOPLASM OF OVARY, UNSPECIFIED LATERALITY: ICD-10-CM

## 2020-06-03 LAB
ERYTHROCYTE [DISTWIDTH] IN BLOOD BY AUTOMATED COUNT: 13.4 % (ref 11.5–14.5)
HCT VFR BLD AUTO: 36.2 % (ref 37–48.5)
HGB BLD-MCNC: 11.5 G/DL (ref 12–16)
IMM GRANULOCYTES # BLD AUTO: 0.01 K/UL (ref 0–0.04)
MCH RBC QN AUTO: 34.1 PG (ref 27–31)
MCHC RBC AUTO-ENTMCNC: 31.8 G/DL (ref 32–36)
MCV RBC AUTO: 107 FL (ref 82–98)
NEUTROPHILS # BLD AUTO: 2.5 K/UL (ref 1.8–7.7)
PLATELET # BLD AUTO: 153 K/UL (ref 150–350)
PMV BLD AUTO: 10 FL (ref 9.2–12.9)
RBC # BLD AUTO: 3.37 M/UL (ref 4–5.4)
WBC # BLD AUTO: 4.88 K/UL (ref 3.9–12.7)

## 2020-06-03 PROCEDURE — 80053 COMPREHEN METABOLIC PANEL: CPT

## 2020-06-03 PROCEDURE — 85027 COMPLETE CBC AUTOMATED: CPT

## 2020-06-03 PROCEDURE — 86304 IMMUNOASSAY TUMOR CA 125: CPT

## 2020-06-03 PROCEDURE — 36415 COLL VENOUS BLD VENIPUNCTURE: CPT | Mod: PO

## 2020-06-04 ENCOUNTER — TELEPHONE (OUTPATIENT)
Dept: PHARMACY | Facility: CLINIC | Age: 75
End: 2020-06-04

## 2020-06-04 LAB
ALBUMIN SERPL BCP-MCNC: 4.3 G/DL (ref 3.5–5.2)
ALP SERPL-CCNC: 173 U/L (ref 55–135)
ALT SERPL W/O P-5'-P-CCNC: 25 U/L (ref 10–44)
ANION GAP SERPL CALC-SCNC: 11 MMOL/L (ref 8–16)
AST SERPL-CCNC: 29 U/L (ref 10–40)
BILIRUB SERPL-MCNC: 0.3 MG/DL (ref 0.1–1)
BUN SERPL-MCNC: 21 MG/DL (ref 8–23)
CALCIUM SERPL-MCNC: 9.9 MG/DL (ref 8.7–10.5)
CANCER AG125 SERPL-ACNC: 9 U/ML (ref 0–30)
CHLORIDE SERPL-SCNC: 103 MMOL/L (ref 95–110)
CO2 SERPL-SCNC: 25 MMOL/L (ref 23–29)
CREAT SERPL-MCNC: 1 MG/DL (ref 0.5–1.4)
EST. GFR  (AFRICAN AMERICAN): >60 ML/MIN/1.73 M^2
EST. GFR  (NON AFRICAN AMERICAN): 54.5 ML/MIN/1.73 M^2
GLUCOSE SERPL-MCNC: 87 MG/DL (ref 70–110)
POTASSIUM SERPL-SCNC: 4.2 MMOL/L (ref 3.5–5.1)
PROT SERPL-MCNC: 7.6 G/DL (ref 6–8.4)
SODIUM SERPL-SCNC: 139 MMOL/L (ref 136–145)

## 2020-06-09 ENCOUNTER — OFFICE VISIT (OUTPATIENT)
Dept: GYNECOLOGIC ONCOLOGY | Facility: CLINIC | Age: 75
End: 2020-06-09
Payer: MEDICARE

## 2020-06-09 VITALS
SYSTOLIC BLOOD PRESSURE: 135 MMHG | BODY MASS INDEX: 20.92 KG/M2 | HEART RATE: 101 BPM | HEIGHT: 64 IN | DIASTOLIC BLOOD PRESSURE: 68 MMHG | WEIGHT: 122.56 LBS

## 2020-06-09 DIAGNOSIS — K59.00 CONSTIPATION, UNSPECIFIED CONSTIPATION TYPE: ICD-10-CM

## 2020-06-09 DIAGNOSIS — Z86.69 HX OF MIGRAINES: ICD-10-CM

## 2020-06-09 DIAGNOSIS — C56.9 MALIGNANT NEOPLASM OF OVARY, UNSPECIFIED LATERALITY: Primary | ICD-10-CM

## 2020-06-09 DIAGNOSIS — R29.898 WEAKNESS OF BOTH LOWER EXTREMITIES: ICD-10-CM

## 2020-06-09 PROCEDURE — 99214 PR OFFICE/OUTPT VISIT, EST, LEVL IV, 30-39 MIN: ICD-10-PCS | Mod: S$PBB,,, | Performed by: OBSTETRICS & GYNECOLOGY

## 2020-06-09 PROCEDURE — 99999 PR PBB SHADOW E&M-EST. PATIENT-LVL IV: ICD-10-PCS | Mod: PBBFAC,,, | Performed by: OBSTETRICS & GYNECOLOGY

## 2020-06-09 PROCEDURE — 99214 OFFICE O/P EST MOD 30 MIN: CPT | Mod: PBBFAC | Performed by: OBSTETRICS & GYNECOLOGY

## 2020-06-09 PROCEDURE — 99214 OFFICE O/P EST MOD 30 MIN: CPT | Mod: S$PBB,,, | Performed by: OBSTETRICS & GYNECOLOGY

## 2020-06-09 PROCEDURE — 99999 PR PBB SHADOW E&M-EST. PATIENT-LVL IV: CPT | Mod: PBBFAC,,, | Performed by: OBSTETRICS & GYNECOLOGY

## 2020-06-09 NOTE — PROGRESS NOTES
REFERRING PROVIDER  No ref. provider found     REASON FOR CONSULT  Margarita Dunne  is a 75 y.o.  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 Unchanged nausea. -Emesis. +Flatus. +BM. -VB, VD, or abdominal pain.  Ambulating but complaints of worsening lower extremity neuropathy.  She describes it as both sensory and motor.  It is associated with tripping and falls.  It begins at her knees and radiates to her feet.  Her falls have not been associated with any head or hip trauma.  She denies any changes in her speech or vision.  She has chronic HA and they have not worsened.        Malignant neoplasm of ovary    9/11/2019 Imaging Significant Findings     CT C/A/P: Irregularity of the uterus in this patient with thickened endometrium identified on prior ultrasound with small, nonspecific soft tissue nodules in the pelvis and abdomen with surrounding streaking in the greater omentum as described above.          9/20/2019 Tumor Markers      = 75      11/12/2019 Surgery     LAPAROTOMY, EXPLORATORY  HYSTERECTOMY, TOTAL, ABDOMINAL   SALPINGO-OOPHORECTOMY (Bilateral)  OMENTECTOMY   APPENDECTOMY  EXCISION, TISSUE, PERITONEUM  RD 1 mm on the R diaphragm and sigmoid colon        12/3/2019 - 3/20/2020 Chemotherapy     Treatment Summary   Plan Name: OP GYN PACLITAXEL CARBOPLATIN (AUC 6) Q3W  Treatment Goal: Curative  Status: Active  Start Date: 12/20/2019  End Date: 3/21/2020 (Planned)  Provider: Irvin Vinson MD  Chemotherapy: CARBOplatin (PARAPLATIN) 505 mg in sodium chloride 0.9% 250 mL chemo infusion, 505 mg (90.8 % of original dose 555.6 mg), Intravenous, Clinic/HOD 1 time, 5 of 6 cycles  Dose modification:   (original dose 555.6 mg, Cycle 1)  Administration: 505 mg (12/20/2019), 550 mg (1/10/2020), 460 mg (1/31/2020), 415 mg (2/28/2020)  PACLitaxel (TAXOL) 175 mg/m2 = 294 mg in sodium chloride 0.9% 500 mL  chemo infusion, 175 mg/m2 = 294 mg, Intravenous, Clinic/[facility] 1 time, 5 of 6 cycles  Administration: 294 mg (12/20/2019), 294 mg (1/10/2020), 294 mg (1/31/2020), 294 mg (2/28/2020), 294 mg (3/20/2020)    Completed 5 cycles of carboplatin/paclitex q21 days.  Opted out of cycle #6 due to COVID.  Chemotherapy was complicated by:  1. Cycle 2: Chemotherapy induced thrombocytopenia (104) and neutropenia (1.4)  2. Cycle 3: AUC 5 due to thrombocytopenia  3. Cycle 5: Grade 1 neuropathy    Her AUC was reduced to 5.  She didn't require CSF.           1/28/2020 Genetic Testing     Myriad: no mutations detected.      3/19/2020 Tumor Markers      = 12      4/13/2020 -  Maintenance Therapy     Nirparib 300 mg qDaily      5/6/2020 Imaging Significant Findings     CT C/A/P: SILVINA    (This CT scan was delayed 1 month after completion of 5 cycles of adjuvant CT on 3/30.  It was performed after starting on Niraparib maintenance therapy.  The patient opted to not receive cycle #6 due to COVID and then delay her CT scan by 1 month due to COVID)          The following portions of the patient's history were reviewed and updated as appropriate: allergies, current medications, family history, medical history, social history and surgical history.    REVIEW OF SYSTEMS  All systems reviewed and negative except as noted in HPI.    OBJECTIVE   Vitals:    06/09/20 1022   BP: 135/68   Pulse: 101      Body mass index is 21.04 kg/m².      1. General: Well appearing, no apparent distress, alert and oriented.  2. Lymph: Neck symmetric without cervical or supraclavicular adenopathy or mass.  3. Lungs: Normal respiratory rate, no accessory muscle use.  4. Cardiac: Normal rate  5. GI: Soft, NT/ND, no guarding or rebound tenderness; midline incision is C/D/I  6. : Vaginal cuff is intact without any visible lesions  7. Rectal: Normal  8. Extremities: No erythema, tenderness, or edema.  R > L LE motor function, especially in her distal extremities. 5 on  the R, 4 on the L.  Likewise for sensory function.   9. Psych: Normal affect    ECOG status: 1    LABORATORY DATA  Lab data reviewed.    RADIOLOGICAL DATA  Radiology data reviewed.    ASSESSMENT / PLAN     1. Malignant neoplasm of ovary, unspecified laterality    2. Weakness of both lower extremities    3. Hx of migraines         Her motor and sensory complaints are a little unusual for paclitaxel induced neuropathy.  I would also expect improvement and not worsening after completion of CT.  I would not expect changes in motor function.  A brain metastasis would be unusual, but if it worsens at her next visit we should image her head.  In the meantime I will refer her to Neurology for a complete work-up    Continue with niraparib maintenance  CBC, CMP, CA-125 4 weeks  Virtual visit 4 weeks      PATIENT EDUCATION  Ready to learn, no apparent learning barriers were identified; learning preferences include listening. Explained diagnosis and treatment plan; patient expressed understanding of the content.    ADMINISTRATIVE BILLING  Greater than 50% of was spent in counseling.

## 2020-06-18 ENCOUNTER — CLINICAL SUPPORT (OUTPATIENT)
Dept: HEMATOLOGY/ONCOLOGY | Facility: CLINIC | Age: 75
End: 2020-06-18
Payer: MEDICARE

## 2020-06-18 VITALS — WEIGHT: 123 LBS | BODY MASS INDEX: 21 KG/M2 | HEIGHT: 64 IN

## 2020-06-18 DIAGNOSIS — E44.0 MODERATE MALNUTRITION: ICD-10-CM

## 2020-06-18 DIAGNOSIS — C56.9 MALIGNANT NEOPLASM OF OVARY, UNSPECIFIED LATERALITY: ICD-10-CM

## 2020-06-18 DIAGNOSIS — Z71.3 NUTRITIONAL COUNSELING: Primary | ICD-10-CM

## 2020-06-18 DIAGNOSIS — K59.09 CHRONIC CONSTIPATION: ICD-10-CM

## 2020-06-18 PROCEDURE — 97802 MEDICAL NUTRITION INDIV IN: CPT | Mod: 95,,, | Performed by: DIETITIAN, REGISTERED

## 2020-06-18 PROCEDURE — 97802 PR MED NUTR THER, 1ST, INDIV, EA 15 MIN: ICD-10-PCS | Mod: 95,,, | Performed by: DIETITIAN, REGISTERED

## 2020-06-18 NOTE — PROGRESS NOTES
The patient location is: home   The chief complaint leading to consultation is: ovarian cancer, chronic constipation     Visit type: audiovisual    Face to Face time with patient: 30 minutes   45 minutes of total time spent on the encounter, which includes face to face time and non-face to face time preparing to see the patient (eg, review of tests), Obtaining and/or reviewing separately obtained history, Documenting clinical information in the electronic or other health record, Independently interpreting results (not separately reported) and communicating results to the patient/family/caregiver, or Care coordination (not separately reported).       Each patient to whom he or she provides medical services by telemedicine is:  (1) informed of the relationship between the physician and patient and the respective role of any other health care provider with respect to management of the patient; and (2) notified that he or she may decline to receive medical services by telemedicine and may withdraw from such care at any time.    Oncology Nutrition Assessment for Medical Nutrition Therapy  Initial Visit    Margarita DUPONT Dunne   1945    Referring Provider:  No ref. provider found      Reason for Visit: Pt in for education and nutrition counseling     PMHx:   Past Medical History:   Diagnosis Date    Allergy     Angio-edema     Anxiety     Arthritis     Asthma     Cancer     Cataract     Chest pain at rest     Depression     Diverticulosis     Fibromyalgia 7/8/2012    Glaucoma suspect, steroid responders     Hand joint pain 7/8/2012    Headache(784.0)     Hx of psychiatric care     Migraine     Osteoporosis     Psychiatric problem     Recurrent upper respiratory infection (URI)     Shingles 12/8/2019    Sleep difficulties     Therapy     Urticaria        Nutrition Assessment    This is a 75 y.o.female with a medical diagnosis of ovarian cancer. She is s/p 6 cycles of carbo/taxol and now on treatment  "with maintenance Niraparib. Referred to nutrition for limited PO intake (more related to picky eating), weight loss, and chronic constipation. Constipation has been ongoing issue her whole life. She is on stool softeners, laxatives, prune juice and still has infrequent bowel movements. She drinks water 7up and sprite and occasional decaf tea or coffee. Estimates about 6 cups of fluid intake daily.   She eats limited fruits and vegetables. Likes sweets.   Previously overweight- around 170lb. She lost weight with surgery and has gained a little back.      Weight:55.8 kg (123 lb)  Height:5' 4" (1.626 m)  BMI:Body mass index is 21.11 kg/m².   IBW: Ideal body weight: 54.7 kg (120 lb 9.5 oz)  Adjusted ideal body weight: 55.1 kg (121 lb 8.9 oz)    Usual BW: 170lb  Weight Change:50lb loss    Nutrition focused physical findings: unable to fully assess     Allergies: Doxycycline, Corticosteroids (glucocorticoids), Pcn [penicillins], and Sulfa (sulfonamide antibiotics)    Current Medications:    Current Outpatient Medications:     acetaminophen (TYLENOL) 325 MG tablet, Take 325 mg by mouth every 6 (six) hours as needed for Pain., Disp: , Rfl:     albuterol (PROVENTIL) 2.5 mg /3 mL (0.083 %) nebulizer solution, Take 3 mLs (2.5 mg total) by nebulization every 6 (six) hours as needed for Wheezing. Rescue, Disp: 90 mL, Rfl: 1    albuterol (PROVENTIL/VENTOLIN HFA) 90 mcg/actuation inhaler, Inhale 2 puffs into the lungs every 6 (six) hours as needed for Wheezing. Rescue, Disp: 1 Inhaler, Rfl: 11    budesonide-formoterol 80-4.5 mcg (SYMBICORT) 80-4.5 mcg/actuation HFAA, Inhale 2 puffs into the lungs once daily. Controller, Disp: 1 Inhaler, Rfl: 11    FLUoxetine 10 MG Tab, Take 1 tablet (10 mg total) by mouth once daily. (Patient not taking: Reported on 6/9/2020), Disp: 30 tablet, Rfl: 1    FLUoxetine 10 MG Tab, TAKE 1 TABLET BY MOUTH ONCE DAILY (Patient not taking: Reported on 6/9/2020), Disp: 15 tablet, Rfl: 0    ibuprofen " (ADVIL,MOTRIN) 600 MG tablet, Take 1 tablet (600 mg total) by mouth every 6 (six) hours. (Patient not taking: Reported on 6/9/2020), Disp: 30 tablet, Rfl: 1    LORazepam (ATIVAN) 0.5 MG tablet, Take 1 tablet (0.5 mg total) by mouth every 6 (six) hours as needed (Anctipatory nausea)., Disp: 25 tablet, Rfl: 2    methylcellulose (CITRUCEL ORAL), Take by mouth 2 (two) times daily., Disp: , Rfl:     niraparib (ZEJULA) 100 mg Cap, Take 200 mg (2 capsules) by mouth once daily., Disp: 60 capsule, Rfl: 3    ondansetron (ZOFRAN) 4 MG tablet, Take 1 tablet (4 mg total) by mouth every 6 (six) hours as needed for Nausea. (Patient not taking: Reported on 6/9/2020), Disp: 60 tablet, Rfl: 1    oxyCODONE (ROXICODONE) 5 MG immediate release tablet, Take 1 tablet (5 mg total) by mouth every 4 (four) hours as needed. (Patient not taking: Reported on 3/13/2020), Disp: 30 tablet, Rfl: 0    polyethylene glycol (GLYCOLAX) 17 gram PwPk, Take 1 g by mouth once daily., Disp: , Rfl:     rizatriptan (MAXALT) 10 MG tablet, TAKE ONE TABLET BY MOUTH AT ONSET OF MIGRAINE, MAY REPEAT EVERY 2 HOURS. DO NOT EXCEED 3 TABLETS IN 24 HOURS. (Patient not taking: Reported on 6/9/2020), Disp: 10 tablet, Rfl: 11    rosuvastatin (CRESTOR) 5 MG tablet, Take 1 tablet (5 mg total) by mouth every evening. (Patient not taking: Reported on 6/9/2020), Disp: 30 tablet, Rfl: 5    senna (SENOKOT) 8.6 mg tablet, Take 1 tablet by mouth 2 (two) times daily., Disp: 60 tablet, Rfl: 2    valACYclovir (VALTREX) 500 MG tablet, Take 2 tablets (1,000 mg total) by mouth 3 (three) times daily. for 7 days, Disp: 42 tablet, Rfl: 0    VOLTAREN 1 % Gel, APPLY 4 GRAMS TOPICALLY 4 TIMES A DAY AS DIRECTED (Patient not taking: Reported on 6/9/2020), Disp: 200 g, Rfl: 0    Food/medication interactions noted: avoid grapefruit     Vitamins/Supplements: none reported- previously used probiotics but did not think it helped     Labs: Reviewed -no concerning nutrition labs      Nutrition Diagnosis    Problem: inadequate protein/energy intake  Etiology (related to): altered GI function  Signs/Symptoms (as evidenced by): weight loss and constipation    Nutrition Intervention    Nutrition Prescription   1680 Kcals (30kcal/kg)  67 g protein (1.2g/kg)   1680 mL fluid (30mL/kg)    Recommendations:  Nopalina fiber supplement once daily   Increase fluid intake- add one 16oz bottle of water daily    4-5 dried apricots per day  Aim for weight gain -goal of at least 130lb    If above strategies do not work- consider adding a daily probiotic- refrigerated versions typically best (Discuss with Dr. Karel ford)    Nutrition Monitoring and Evaluation    Monitor: diet education needs     Goals: weight gain, increased frequency of bowel movements     Motivation to change/anticipated barriers: no barriers identified     Follow up Patient provided with dietitian contact number and advised to call with questions or make future appointment if further intervention is needed.    Communication to referring provider/care team: note available in chart     Counseling time: 30 Minutes    Lissette Baeza, MPH, RD, , LDN, FAND   [phone]

## 2020-06-18 NOTE — PATIENT INSTRUCTIONS
Nopalina fiber supplement once daily   Increase fluid intake- add one 16oz bottle of water daily    4-5 dried apricots per day  Aim for weight gain -goal of at least 130lb

## 2020-06-27 ENCOUNTER — PATIENT OUTREACH (OUTPATIENT)
Dept: ADMINISTRATIVE | Facility: HOSPITAL | Age: 75
End: 2020-06-27

## 2020-06-28 NOTE — PROGRESS NOTES
PATIENT:Margarita Dunne  MRN: [id number], : 1945, Sex: F        Provider Information   -        Name      Authorizing Provider Columba Anderson MD              Order Information       Priority Order Date Diagnosis    Routine 2018 Encounter for screening for malignant neoplasm of colon      Encounter for screening for malignant neoplasm of rectum           Specimen Information       Type Source Specimen ID Collected Received Result Date    Stool Per Rectum [id number] 5/03/19 5/06/19 5/15/19      [id number]              Cologuard   Status: Final result Visible to patient: No (not released) Dx: Encounter for screening for malignant... Order: [id number]         Ref Range & Units 5/3/19 7:45 AM      Test Result Not Applicable  Negative     Comment: A negative result indicates a low likelihood that a colorectal cancer (CRC) or an advanced adenoma (adenomatous polyps with more advanced pre-malignant features) is present. The chance that a person with a negative Cologuard test has a colorectal cancer is less than 1 in 1500 (negative predictive value >99.9%) or has an advanced adenoma is less than 5.3% (negative predictive value 94.7%). These data are based on a prospective cross-sectional screening study of 10,000 individuals at average risk for colorectal cancer who were screened with both Cologuard and colonoscopy. (Rajiv NESBITT. et al, N Engl J Med 2014;370(14):5051-1161) COLOGUARD RE-SCREENING RECOMMENDATION: Periodic routine colorectal cancer screening is an important part of preventive healthcare for asymptomatic persons at average risk for colorectal cancer. Following a negative Cologuard result, the American Cancer Society and U.S. Multi-Society Task Force screening guidelines recommend a Cologuard re-screening interval of 3 years. References: American Cancer Society (ACS). Colorectal cancer prevention and early detection. [place], [place]: American Cancer Society; [updated 2016 ].  https://www.cancer.org/cancer/colon-rectal-cancer/cihxryjjj-wnhhkwulk-eoqndvj/acs-recommendations.html. Accessed August 31, 2018; Jaren DK, Everett CR, Gladis CROCKETTK, Colorectal Cancer Screening: Recommendations for Physicians and Patients from the U.S. Multi-Society Task Force on Colorectal Cancer Screening, Am J Gastroenterology 2017; 112:5808-0448.   Test Type: Composite algorithmic analysis of stool DNA-biomarkers with hemoglobin immunoassay. Quantitative values of individual biomarkers are not reportable and are not associated with individual biomarker result reference ranges.   Precautions and Limitations: Cologuard is intended for colorectal cancer screening of adults of either sex, 50 years or older, who are at typical average-risk for colorectal cancer. A negative Cologuard test result does not guarantee the absence of colorectal cancer or advanced adenoma (pre-cancer). Patients with a negative Cologuard test result should be advised to continue participating in a colorectal cancer screening program. Cologuard may produce a positive result, even though a colonoscopy may not find colorectal cancer or precancerous polyps. The performance of Cologuard has been established in a cross sectional study (i.e., single point in time). Performance has not been evaluated in adults who have been previously tested with Cologuard or in patients less than 50 years of age. Cologuard has been approved for use by the U.S. FDA. Cologuard performance data in a 10,000 patient pivotal study using colonoscopy as the reference method can be accessed at the following location: [email or web address] [email or web address]/results. Additional description of the Cologuard test process, warnings and precautions can be found at www.cologuardtest.com. Rx Only.          Specimen Collected: 05/03/19 7:45 AM Last Resulted: 05/15/19 4:34 AM

## 2020-06-30 ENCOUNTER — TELEPHONE (OUTPATIENT)
Dept: PHARMACY | Facility: CLINIC | Age: 75
End: 2020-06-30

## 2020-06-30 NOTE — TELEPHONE ENCOUNTER
Dosing, how taking Zejula: Takes 2 capsules (200mg) by mouth 7-8pm every evening. Takes 1 pill and then waits about 20 min and takes the second. Missed 1 pill twice this past month. She couldn't remember if she already took it or not and did not wish to double up.  Storage: room temperature  Handling: aware of proper handling precaution  Side effects: patient has some ongoing side effects, but tolerable at this time. Notes some headaches, nausea, and constipation. She does not wish to use any anti-nausea medication and mostly just makes sure she drinks plenty of water when she takes her Zejula.   Recent infections: no fever, chills, cough  Pain: some joint pain. Takes APAP prn and this helps. Not very bad, 2/10  Appetite: good, no issues with appetite  Energy, fatigue: since she's had the surgery, her energy has been low. Energy slowly improving. She is still able to complete normal daily activities.   Health, mood, QOL: unable to assess as patient was in the car and was losing connection. Overall, patient states that she is doing fairly well.   ED/UC visits: no  Next clinical follow up: 3 months  Medication list reviewed. No new allergies or health conditions.

## 2020-07-01 ENCOUNTER — TELEPHONE (OUTPATIENT)
Dept: SLEEP MEDICINE | Facility: CLINIC | Age: 75
End: 2020-07-01

## 2020-07-01 NOTE — TELEPHONE ENCOUNTER
Contacted and spoke with pt in regards to rescheduling appt on 07/27/20 due to Dr. Oneal not being available. Appt has been rescheduled

## 2020-07-08 ENCOUNTER — TELEPHONE (OUTPATIENT)
Dept: GYNECOLOGIC ONCOLOGY | Facility: CLINIC | Age: 75
End: 2020-07-08

## 2020-07-08 DIAGNOSIS — C56.9 MALIGNANT NEOPLASM OF OVARY, UNSPECIFIED LATERALITY: Primary | ICD-10-CM

## 2020-07-08 NOTE — TELEPHONE ENCOUNTER
----- Message from Gauri Galdamez sent at 7/8/2020  9:19 AM CDT -----  Name of Who is Calling: ASHOK MARROQUIN [[id number]]    What is the request in detail: Patient states her CT orders need to be changed before 10 am today. Please contact to further discuss and advise      Can the clinic reply by [facility]: no    What Number to Call Back if not in [facility]: [phone]

## 2020-07-09 ENCOUNTER — HOSPITAL ENCOUNTER (OUTPATIENT)
Dept: RADIOLOGY | Facility: HOSPITAL | Age: 75
Discharge: HOME | End: 2020-07-09
Attending: OBSTETRICS & GYNECOLOGY
Payer: MEDICARE

## 2020-07-09 DIAGNOSIS — C56.9 MALIGNANT NEOPLASM OF OVARY, UNSPECIFIED LATERALITY: ICD-10-CM

## 2020-07-09 PROCEDURE — 74176 CT CHEST ABDOMEN PELVIS WITHOUT CONTRAST(XPD): ICD-10-PCS | Mod: 26,,, | Performed by: RADIOLOGY

## 2020-07-09 PROCEDURE — 71250 CT THORAX DX C-: CPT | Mod: TC

## 2020-07-09 PROCEDURE — 71250 CT THORAX DX C-: CPT | Mod: 26,,, | Performed by: RADIOLOGY

## 2020-07-09 PROCEDURE — 71250 CT CHEST ABDOMEN PELVIS WITHOUT CONTRAST(XPD): ICD-10-PCS | Mod: 26,,, | Performed by: RADIOLOGY

## 2020-07-09 PROCEDURE — 74176 CT ABD & PELVIS W/O CONTRAST: CPT | Mod: TC

## 2020-07-09 PROCEDURE — 74176 CT ABD & PELVIS W/O CONTRAST: CPT | Mod: 26,,, | Performed by: RADIOLOGY

## 2020-07-10 ENCOUNTER — OFFICE VISIT (OUTPATIENT)
Dept: GYNECOLOGIC ONCOLOGY | Facility: CLINIC | Age: 75
End: 2020-07-10
Payer: MEDICARE

## 2020-07-10 DIAGNOSIS — R29.898 WEAKNESS OF BOTH LOWER EXTREMITIES: ICD-10-CM

## 2020-07-10 DIAGNOSIS — R11.0 NAUSEA WITHOUT VOMITING: ICD-10-CM

## 2020-07-10 DIAGNOSIS — C56.9 MALIGNANT NEOPLASM OF OVARY, UNSPECIFIED LATERALITY: Primary | ICD-10-CM

## 2020-07-10 DIAGNOSIS — K59.04 CHRONIC IDIOPATHIC CONSTIPATION: ICD-10-CM

## 2020-07-10 PROCEDURE — 99214 PR OFFICE/OUTPT VISIT, EST, LEVL IV, 30-39 MIN: ICD-10-PCS | Mod: 95,,, | Performed by: OBSTETRICS & GYNECOLOGY

## 2020-07-10 PROCEDURE — 99214 OFFICE O/P EST MOD 30 MIN: CPT | Mod: 95,,, | Performed by: OBSTETRICS & GYNECOLOGY

## 2020-07-10 NOTE — PROGRESS NOTES
Audio Only Telehealth Visit     The patient location is: home  The chief complaint leading to consultation is: ovarian cancer  Visit type: Virtual visit with audio only (telephone)  Total time spent with patient: 15     The reason for the audio only service rather than synchronous audio and video virtual visit was related to technical difficulties or patient preference/necessity.     Each patient to whom I provide medical services by telemedicine is:  (1) informed of the relationship between the physician and patient and the respective role of any other health care provider with respect to management of the patient; and (2) notified that they may decline to receive medical services by telemedicine and may withdraw from such care at any time. Patient verbally consented to receive this service via voice-only telephone call.    This service was not originating from a related E/M service provided within the previous 7 days nor will  to an E/M service or procedure within the next 24 hours or my soonest available appointment.  Prevailing standard of care was able to be met in this audio-only visit.      REFERRING PROVIDER  No ref. provider found     REASON FOR CONSULT  Margarita Dunne  is a 75 y.o.  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 Unchanged nausea. -Emesis. +Flatus. +BM. -VB, VD, or abdominal pain. Persistent tingliness and numbness in her feet.  No new ground level falls.  -Changes in speech, strength, or sensory function.     Oncology History   Malignant neoplasm of ovary   9/11/2019 Imaging Significant Findings    CT C/A/P: Irregularity of the uterus in this patient with thickened endometrium identified on prior ultrasound with small, nonspecific soft tissue nodules in the pelvis and abdomen with surrounding streaking in the greater omentum as described above.         9/20/2019 Tumor Markers      = 75     11/12/2019 Surgery    LAPAROTOMY, EXPLORATORY  HYSTERECTOMY, TOTAL, ABDOMINAL   SALPINGO-OOPHORECTOMY (Bilateral)  OMENTECTOMY   APPENDECTOMY  EXCISION, TISSUE, PERITONEUM  RD 1 mm on the R diaphragm and sigmoid colon       12/20/2019 -  Chemotherapy    Treatment Summary   Plan Name: OP GYN PACLITAXEL CARBOPLATIN (AUC 6) Q3W  Treatment Goal: Curative  Status: Active  Start Date: 12/20/2019  End Date: 3/21/2020 (Planned)  Provider: Irvin Vinson MD  Chemotherapy: CARBOplatin (PARAPLATIN) 505 mg in sodium chloride 0.9% 250 mL chemo infusion, 505 mg (90.8 % of original dose 555.6 mg), Intravenous, Clinic/HOD 1 time, 5 of 6 cycles  Dose modification:   (original dose 555.6 mg, Cycle 1)  Administration: 505 mg (12/20/2019), 550 mg (1/10/2020), 460 mg (1/31/2020), 415 mg (2/28/2020)  PACLitaxel (TAXOL) 175 mg/m2 = 294 mg in sodium chloride 0.9% 500 mL chemo infusion, 175 mg/m2 = 294 mg, Intravenous, Clinic/HOD 1 time, 5 of 6 cycles  Administration: 294 mg (12/20/2019), 294 mg (1/10/2020), 294 mg (1/31/2020), 294 mg (2/28/2020), 294 mg (3/20/2020)    Completed 5 cycles of carboplatin/paclitex q21 days.  Opted out of cycle #6 due to COVID.  Chemotherapy was complicated by:  1. Cycle 2: Chemotherapy induced thrombocytopenia (104) and neutropenia (1.4)  2. Cycle 3: AUC 5 due to thrombocytopenia  3. Cycle 5: Grade 1 neuropathy    Her AUC was reduced to 5.  She didn't require CSF.          1/28/2020 Genetic Testing    [facility]: no mutations detected.     3/19/2020 Tumor Markers     = 12     4/13/2020 -  Maintenance Therapy    Nirparib 300 mg qDaily     5/6/2020 Imaging Significant Findings    CT C/A/P: SILVINA    (This CT scan was delayed 1 month after completion of 5 cycles of adjuvant CT on 3/30.  It was performed after starting on Niraparib maintenance therapy.  The patient opted to not receive cycle #6 due to COVID and then delay her CT scan by 1 month due to COVID)     7/9/2020 Tumor Markers    CA-125 =  9     7/9/2020 Imaging Significant Findings    CT C/A/P: 5 mm left lung nodule        The following portions of the patient's history were reviewed and updated as appropriate: allergies, current medications, family history, medical history, social history and surgical history.    REVIEW OF SYSTEMS  All systems reviewed and negative except as noted in HPI.    OBJECTIVE   There were no vitals filed for this visit.   There is no height or weight on file to calculate BMI.      ECOG status: 1    LABORATORY DATA  Lab data reviewed.    RADIOLOGICAL DATA  Radiology data reviewed.    ASSESSMENT / PLAN     1. Malignant neoplasm of ovary, unspecified laterality    2. Weakness of both lower extremities    3. Chronic idiopathic constipation    4. Nausea without vomiting       F/U CBC, CMP in 1 month  F/U CT C/A/P w/o IV contrast in 3 months (ordered today).  Special attention to L lung nodule.  F/U virtual visit in 1 month  F/U Neurology on 7/24/20      PATIENT EDUCATION  Ready to learn, no apparent learning barriers were identified; learning preferences include listening. Explained diagnosis and treatment plan; patient expressed understanding of the content.    ADMINISTRATIVE BILLING  Greater than 50% of was spent in counseling.

## 2020-07-10 NOTE — Clinical Note
Looks like Brenda already put a CBC and CMP in 1 month.  We just need a virtual visit in 1 month, preferably after her lab appointment.  I also ordered a CT scan for 3 months.  Thank you.

## 2020-07-13 ENCOUNTER — PATIENT MESSAGE (OUTPATIENT)
Dept: GYNECOLOGIC ONCOLOGY | Facility: CLINIC | Age: 75
End: 2020-07-13

## 2020-07-15 DIAGNOSIS — Z71.89 COMPLEX CARE COORDINATION: ICD-10-CM

## 2020-07-24 ENCOUNTER — OFFICE VISIT (OUTPATIENT)
Dept: NEUROLOGY | Facility: CLINIC | Age: 75
End: 2020-07-24
Payer: MEDICARE

## 2020-07-24 ENCOUNTER — LAB VISIT (OUTPATIENT)
Dept: LAB | Facility: OTHER | Age: 75
End: 2020-07-24
Attending: PSYCHIATRY & NEUROLOGY
Payer: MEDICARE

## 2020-07-24 VITALS
DIASTOLIC BLOOD PRESSURE: 67 MMHG | HEART RATE: 108 BPM | SYSTOLIC BLOOD PRESSURE: 131 MMHG | WEIGHT: 128.06 LBS | HEIGHT: 64 IN | BODY MASS INDEX: 21.86 KG/M2

## 2020-07-24 DIAGNOSIS — R63.4 ABNORMAL WEIGHT LOSS: ICD-10-CM

## 2020-07-24 DIAGNOSIS — C56.9 MALIGNANT NEOPLASM OF OVARY, UNSPECIFIED LATERALITY: ICD-10-CM

## 2020-07-24 DIAGNOSIS — G89.29 CHRONIC NONINTRACTABLE HEADACHE, UNSPECIFIED HEADACHE TYPE: ICD-10-CM

## 2020-07-24 DIAGNOSIS — R73.03 PREDIABETES: ICD-10-CM

## 2020-07-24 DIAGNOSIS — G60.3 IDIOPATHIC PROGRESSIVE POLYNEUROPATHY: ICD-10-CM

## 2020-07-24 DIAGNOSIS — R51.9 CHRONIC NONINTRACTABLE HEADACHE, UNSPECIFIED HEADACHE TYPE: ICD-10-CM

## 2020-07-24 DIAGNOSIS — G60.3 IDIOPATHIC PROGRESSIVE POLYNEUROPATHY: Primary | ICD-10-CM

## 2020-07-24 DIAGNOSIS — R29.898 WEAKNESS OF BOTH LOWER EXTREMITIES: ICD-10-CM

## 2020-07-24 LAB
BASOPHILS # BLD AUTO: 0.04 K/UL (ref 0–0.2)
BASOPHILS NFR BLD: 0.9 % (ref 0–1.9)
CRP SERPL-MCNC: 1.4 MG/L (ref 0–8.2)
DIFFERENTIAL METHOD: ABNORMAL
EOSINOPHIL # BLD AUTO: 0.1 K/UL (ref 0–0.5)
EOSINOPHIL NFR BLD: 1.2 % (ref 0–8)
ERYTHROCYTE [DISTWIDTH] IN BLOOD BY AUTOMATED COUNT: 14.8 % (ref 11.5–14.5)
ERYTHROCYTE [SEDIMENTATION RATE] IN BLOOD: 43 MM/HR (ref 0–20)
ESTIMATED AVG GLUCOSE: 120 MG/DL (ref 68–131)
HBA1C MFR BLD HPLC: 5.8 % (ref 4–5.6)
HCT VFR BLD AUTO: 30.1 % (ref 37–48.5)
HGB BLD-MCNC: 9.9 G/DL (ref 12–16)
IMM GRANULOCYTES # BLD AUTO: 0.01 K/UL (ref 0–0.04)
IMM GRANULOCYTES NFR BLD AUTO: 0.2 % (ref 0–0.5)
LYMPHOCYTES # BLD AUTO: 1.1 K/UL (ref 1–4.8)
LYMPHOCYTES NFR BLD: 24.9 % (ref 18–48)
MCH RBC QN AUTO: 35 PG (ref 27–31)
MCHC RBC AUTO-ENTMCNC: 32.9 G/DL (ref 32–36)
MCV RBC AUTO: 106 FL (ref 82–98)
MONOCYTES # BLD AUTO: 0.4 K/UL (ref 0.3–1)
MONOCYTES NFR BLD: 9.5 % (ref 4–15)
NEUTROPHILS # BLD AUTO: 2.7 K/UL (ref 1.8–7.7)
NEUTROPHILS NFR BLD: 63.3 % (ref 38–73)
NRBC BLD-RTO: 0 /100 WBC
PLATELET # BLD AUTO: 172 K/UL (ref 150–350)
PMV BLD AUTO: 9.7 FL (ref 9.2–12.9)
RBC # BLD AUTO: 2.83 M/UL (ref 4–5.4)
RPR SER QL: NORMAL
WBC # BLD AUTO: 4.33 K/UL (ref 3.9–12.7)

## 2020-07-24 PROCEDURE — 99214 PR OFFICE/OUTPT VISIT, EST, LEVL IV, 30-39 MIN: ICD-10-PCS | Mod: S$PBB,,, | Performed by: PSYCHIATRY & NEUROLOGY

## 2020-07-24 PROCEDURE — 99999 PR PBB SHADOW E&M-EST. PATIENT-LVL IV: ICD-10-PCS | Mod: PBBFAC,,, | Performed by: PSYCHIATRY & NEUROLOGY

## 2020-07-24 PROCEDURE — 99214 OFFICE O/P EST MOD 30 MIN: CPT | Mod: S$PBB,,, | Performed by: PSYCHIATRY & NEUROLOGY

## 2020-07-24 PROCEDURE — 86334 PATHOLOGIST INTERPRETATION IFE: ICD-10-PCS | Mod: 26,,, | Performed by: PATHOLOGY

## 2020-07-24 PROCEDURE — 86140 C-REACTIVE PROTEIN: CPT

## 2020-07-24 PROCEDURE — 82525 ASSAY OF COPPER: CPT

## 2020-07-24 PROCEDURE — 86334 IMMUNOFIX E-PHORESIS SERUM: CPT

## 2020-07-24 PROCEDURE — 84425 ASSAY OF VITAMIN B-1: CPT

## 2020-07-24 PROCEDURE — 99999 PR PBB SHADOW E&M-EST. PATIENT-LVL IV: CPT | Mod: PBBFAC,,, | Performed by: PSYCHIATRY & NEUROLOGY

## 2020-07-24 PROCEDURE — 36415 COLL VENOUS BLD VENIPUNCTURE: CPT

## 2020-07-24 PROCEDURE — 83036 HEMOGLOBIN GLYCOSYLATED A1C: CPT

## 2020-07-24 PROCEDURE — 86592 SYPHILIS TEST NON-TREP QUAL: CPT

## 2020-07-24 PROCEDURE — 84255 ASSAY OF SELENIUM: CPT

## 2020-07-24 PROCEDURE — 85651 RBC SED RATE NONAUTOMATED: CPT

## 2020-07-24 PROCEDURE — 86334 IMMUNOFIX E-PHORESIS SERUM: CPT | Mod: 26,,, | Performed by: PATHOLOGY

## 2020-07-24 PROCEDURE — 84446 ASSAY OF VITAMIN E: CPT

## 2020-07-24 PROCEDURE — 84207 ASSAY OF VITAMIN B-6: CPT

## 2020-07-24 PROCEDURE — 99214 OFFICE O/P EST MOD 30 MIN: CPT | Mod: PBBFAC | Performed by: PSYCHIATRY & NEUROLOGY

## 2020-07-24 PROCEDURE — 85025 COMPLETE CBC W/AUTO DIFF WBC: CPT

## 2020-07-24 PROCEDURE — 80074 ACUTE HEPATITIS PANEL: CPT

## 2020-07-24 RX ORDER — DULOXETIN HYDROCHLORIDE 30 MG/1
30 CAPSULE, DELAYED RELEASE ORAL DAILY
Qty: 30 CAPSULE | Refills: 11 | Status: SHIPPED | OUTPATIENT
Start: 2020-07-24 | End: 2020-08-21 | Stop reason: DRUGHIGH

## 2020-07-24 RX ORDER — RIMEGEPANT SULFATE 75 MG/75MG
75 TABLET, ORALLY DISINTEGRATING ORAL ONCE AS NEEDED
Qty: 15 TABLET | Refills: 2 | Status: SHIPPED | OUTPATIENT
Start: 2020-07-24 | End: 2020-07-24

## 2020-07-24 NOTE — LETTER
July 25, 2020      Irvin Vinson MD  [address]  [address]  [facility] [address]           [facility] [address]  [address]  [facility] [address]  Phone: [phone]  Fax: [phone]          Patient: Margarita Dunne   MR Number: [id number]   YOB: 1945   Date of Visit: 7/24/2020       Dear Dr. Irvin Vinson:    Thank you for referring Margarita Dunne to me for evaluation. Attached you will find relevant portions of my assessment and plan of care.    If you have questions, please do not hesitate to call me. I look forward to following Margarita Dunne along with you.    Sincerely,    Kaylie Oneal MD    Enclosure  CC:  No Recipients    If you would like to receive this communication electronically, please contact [email or web address] or [phone] to request more information on [employer] Link access.    For providers and/or their staff who would like to refer a patient to [facility], please contact us through our one-stop-shop provider referral line, [facility] [place], at [phone].    If you feel you have received this communication in error or would no longer like to receive these types of communications, please e-mail [email or web address]

## 2020-07-24 NOTE — PATIENT INSTRUCTIONS
You have chronic migraine:    Prophylactic-  Duloxetine     Abortive:  Rizatriptan  Nurtec ODT 75 mg     Peripheral neuropathy  - Likely induced by chemotherapy  - We will check blood work today  -Start Duloxetine 30 mg x 5 days then increase to 60mg nightly       New headache  - Please get MRI brain with and without contrast   -You can get it done at -  [facility]   Address: [address] [place] [address] [address] [place] [address] [address]   Phone: [phone]         Follow up in 3 months

## 2020-07-24 NOTE — PROGRESS NOTES
NEUROLOGY  Outpatient Follow Up    [address] [address]  [phone] (office) / [phone] ( (fax)    Patient Name:  Margarita Dunne  :  1945  MR #:  [id number]  Acct #:  [id number]    Date of Neurology Visit: 2020  Name of Neurologist: Kaylie Oneal MD    Other Physicians:  Columba Anderson MD (Primary Care Physician); Irvin Vinson MD (Referring)      Chief Complaint: Numbness (both feet sometimes hands ) and Migraine      History of Present Illness (HPI):  Margarita Dunne is a 75 y.o. female  here for evaluation numbness/ tingling involving the bottom of her feet presents for follow up.    She was last seen by Dr. Delong on 2017 for migraine management.    2019 diagnosed with ovarian cancer s/p chemotherapy. She received iv Paclitaxel/ Carboplatin chemotherapy in December and completed the treatment in 2020 and is on oral Niraparib now. She states she had foot pain in the past but the numbness and tingling came on after she received her chemotherapy early this year. She noted difficulty walking and her toe gets caught and that is when she began to notoce the sensory symptoms. The pain is in the anterior plantar aspect of the foot. It is 6/10 in intensity. If she is sitting the pain is not there, it is worse when she moves around. She has not taken medication for it. She has had left hand symptoms for several years which she describes as pain in the wrist and palm region. These symptoms are ocassional when she carries something. The foot symptoms are constant.    Duration: 6 months   Location: bilateral feet   Intensity: moderate to severe   Aggravating factors: walking on feet  Relieving factors: resting   Frequency: moderate to severe  Timing: all day  Associated symptoms: tingling       Ovarian cancer history:  On maintenance Niraparib for a gBRCA-, sBRCA- stage IIIC HGSOCShe underwent placlitaxel/ Carboplatin iv.        Smoking:  "none    Alcohol: none     She has developed a new type of headache different from her migraines and her migraines have increased in frequency. Migraine and new headache history is mentioned below-    First type of migraine:   Headache: Increased in frequency since she started the oral Niraparib.   Type: throbbing   Location:right temple and whole side of face   Frequency: 5 days/ week , sometimes has 2 migraines in one day   Duration: always takes medication - lessens the headache- lasts several hours can roll over into the next day  Aura: sees sparkling lights- moves from one side to another- left to right mainly but can be right to left- lasts 30 minutes   Photophobia:++  Phonophobia:++  Nausea/ vomiting: ++, rarely has had episodes of vomiting   Aggravating factors: sparkling/ flashing light  Relieving factors: Rizatriptan- takes it at aura onset, acetaminophen, food helps   Previously failed therapies:  Has taken several medications since college years, does not remember names   Autonomic symptoms: nose runs at times   Activity during headache: goes to sleep to help       She additionally has new " face" headaches outside of her migraines.    Headache: "face headache"  Type: achy type   Intensity: 5-6/10  Location: across forehead, top of cheeks or bilateral jaw  Frequency:  Wakes up with it at times , happens 5 days./ week   Duration: can last all day , shortest duration- 1 hour   Aura: none   Photophobia: none   Phonophobia: none   Nausea/ vomiting: sometimes nausea   Aggravating factors:  Relieving factors: 2 acetaminophen helps diminish the headache, sometimes will take it away, eating helps these headaches always    Previously failed therapies:  None   Autonomic symptoms: nose runs at times   Activity during headache:  Can limit activity if severe   Hormonal therapies: none   Previous or current pregnancy: never     Has had motion sickness for years     Treatment to date:   None    Review of Systems:  "   General: Weight gain: Yes, Weight Loss: Yes, Fatigue: Yes,   Fever: No, Chills: No, Night Sweats: Yes, Insomnia: Yes, Excessive sleeping: No   Respiratory:  Cough: No, Shortness of Breath: Yes,   Wheezing: Yes, Excessive Snoring: No, Coughing up blood: No  Endocrine: Heat Intolerance: Yes, Cold Intolerance: No,   Excessive Thirst: No, Excessive Hunger: No,   Eyes:  Blurred Vision: Yes, Double Vision: No,   Light Sensitivity: Yes, Eye pain: No  Musculoskeletal: Muscle Aches/Pain: Yes, Joint Pain/Swelling: Yes, Muscle Cramps: Yes, Muscle Weakness: No, Neck Pain: Yes, Back Pain: Yes   Neurological: Difficulty Walking/Falls: Yes, Headache Migraine: Yes, Dizziness/Vertigo: Yes, Fainting: No, Difficulty with Speech: No, Weakness: No, Tingling/Numbness: Yes, Tremors: No, Memory Problems: No, Seizures: No, Difficulty Swallowing: Yes, Altered Taste: No.  Cardiovascular: Chest Pain: Yes, Shortness of Breath: Yes,   Palpitations: No,  Gastrointestinal: Nausea/Vomiting: Yes, Constipation: Yes, Diarrhea: No, Bloody Stools: No   Psych/Cog:  Depression: No, Anxiety: Yes, Hallucinations: No, Problems Concentrating: Yes  : Frequent Urination: No, Incontinence: No, Blood of Urine: No, Urinary Infections: No, Changes in Sex Drive: No   ENT:Hearing Loss: No, Earache: No, Ringing in Ears: Yes,   Facial Pain: Yes, Chronic Congestion: No   Immune: Seasonal Allergies: Yes, Hives and/or Rashes: No  The remainder of the review of twelve body systems was reviewed and normal.    Past Medical, Surgical, Family & Social History:   Past Medical History:   Diagnosis Date    Allergy     Angio-edema     Anxiety     Arthritis     Asthma     Cancer     Cataract     Chest pain at rest     Depression     Diverticulosis     Fibromyalgia 7/8/2012    Glaucoma suspect, steroid responders     Hand joint pain 7/8/2012    Headache(784.0)     Hx of psychiatric care     Migraine     Osteoporosis     Psychiatric problem     Recurrent upper  respiratory infection (URI)     Shingles 12/8/2019    Sleep difficulties     Therapy     Urticaria        Home Medications:     Current Outpatient Medications:     acetaminophen (TYLENOL) 325 MG tablet, Take 325 mg by mouth every 6 (six) hours as needed for Pain., Disp: , Rfl:     albuterol (PROVENTIL) 2.5 mg /3 mL (0.083 %) nebulizer solution, Take 3 mLs (2.5 mg total) by nebulization every 6 (six) hours as needed for Wheezing. Rescue, Disp: 90 mL, Rfl: 1    albuterol (PROVENTIL/VENTOLIN HFA) 90 mcg/actuation inhaler, Inhale 2 puffs into the lungs every 6 (six) hours as needed for Wheezing. Rescue, Disp: 1 Inhaler, Rfl: 11    budesonide-formoterol 80-4.5 mcg (SYMBICORT) 80-4.5 mcg/actuation HFAA, Inhale 2 puffs into the lungs once daily. Controller, Disp: 1 Inhaler, Rfl: 11    FLUoxetine 10 MG Tab, Take 1 tablet (10 mg total) by mouth once daily., Disp: 30 tablet, Rfl: 1    FLUoxetine 10 MG Tab, TAKE 1 TABLET BY MOUTH ONCE DAILY, Disp: 15 tablet, Rfl: 0    ibuprofen (ADVIL,MOTRIN) 600 MG tablet, Take 1 tablet (600 mg total) by mouth every 6 (six) hours., Disp: 30 tablet, Rfl: 1    methylcellulose (CITRUCEL ORAL), Take by mouth 2 (two) times daily., Disp: , Rfl:     niraparib (ZEJULA) 100 mg Cap, Take 200 mg (2 capsules) by mouth once daily., Disp: 60 capsule, Rfl: 3    ondansetron (ZOFRAN) 4 MG tablet, Take 1 tablet (4 mg total) by mouth every 6 (six) hours as needed for Nausea., Disp: 60 tablet, Rfl: 1    oxyCODONE (ROXICODONE) 5 MG immediate release tablet, Take 1 tablet (5 mg total) by mouth every 4 (four) hours as needed., Disp: 30 tablet, Rfl: 0    polyethylene glycol (GLYCOLAX) 17 gram PwPk, Take 1 g by mouth once daily., Disp: , Rfl:     rizatriptan (MAXALT) 10 MG tablet, TAKE ONE TABLET BY MOUTH AT ONSET OF MIGRAINE, MAY REPEAT EVERY 2 HOURS. DO NOT EXCEED 3 TABLETS IN 24 HOURS., Disp: 10 tablet, Rfl: 11    rosuvastatin (CRESTOR) 5 MG tablet, Take 1 tablet (5 mg total) by mouth every  "evening., Disp: 30 tablet, Rfl: 5    senna (SENOKOT) 8.6 mg tablet, Take 1 tablet by mouth 2 (two) times daily., Disp: 60 tablet, Rfl: 2    VOLTAREN 1 % Gel, APPLY 4 GRAMS TOPICALLY 4 TIMES A DAY AS DIRECTED, Disp: 200 g, Rfl: 0    LORazepam (ATIVAN) 0.5 MG tablet, Take 1 tablet (0.5 mg total) by mouth every 6 (six) hours as needed (Anctipatory nausea)., Disp: 25 tablet, Rfl: 2    valACYclovir (VALTREX) 500 MG tablet, Take 2 tablets (1,000 mg total) by mouth 3 (three) times daily. for 7 days, Disp: 42 tablet, Rfl: 0    Physical Examination:  /67   Pulse 108   Ht 5' 4" (1.626 m)   Wt 58.1 kg (128 lb 1.4 oz)   BMI 21.99 kg/m²     GENERAL:  General appearance: Well, non-toxic appearing.  No apparent distress.  Fundi exam: normal.  Neck: supple.  Carotid auscultation: normal.  Heart auscultation: normal.  Peripheral pulses: normal.  Extremities: normal.    MENTAL STATUS:  Alertness, attention span & concentration: normal.  Language: normal.  Orientation to self, place & time:  normal.  Memory, recent & remote: normal.  Fund of knowledge: normal.  MMSE:    SPEECH:  Clear and fluent.  Follows complex commands.    CRANIAL NERVES:  Cranial Nerves II-XII were examined.  II - Visual fields: normal.  III, IV, VI: PERRL, EOMI, No ptosis, No nystagmus.  V - Facial sensation: normal.  VII - Face symmetry & mobility: normal.  VIII - Hearing: normal.  IX, X - Palate: mobile & midline.  XI - Shoulder shrug: normal.  XII - Tongue protrusion: normal.    GROSS MOTOR:  Gait & station: normal.  Tone: normal.  Abnormal movements: none.  Finger-nose & Heel-knee-shin: normal.  Rapid alternating movements & drift: normal.  Romberg: positive    MUSCLE STRENGTH:     Fascics Atrophy RIGHT    LEFT Atrophy Fascics     5 Neck Ext. 5       5 Neck Flex 5       5 Deltoids 5       5 Sh.Ext.Rot. 5       5 Sh.Int.Rot. 5       5 Biceps 5       5 Triceps 5       5 Forearm.Pr. 5       5 Wrist Ext. 5       5 Wrist Flex 5       5 Finger Ext. 5  "      5 Finger Flex 5       5 FPL 5       5 Inteross. 5                         5 Iliopsoas 5       5 Hip Abduct 5       5 Hip Adduct 5       5 Quads 5       5 Hams 5       5 Dorsiflex 5       5 Plantar Flex 5       5 Ankle Rubio 5       5 Ankle Invert 5       5 Toe Ext. 5       5 Toe Flex 4                         REFLEXES:    RIGHT Reflex   LEFT   1+ Biceps 1+   1+ Brachiorad. 1+   1+ Triceps 1+        1+ Patellar 1+   0 Ankle 0        Down PLANTAR Down     SENSORY:  Light touch: Normal throughout.  Sharp touch: Normal on the right, gradient to left hip, at left hip- 3 seconds  Vibration:  gradient to left hip, at left hip- 3 seconds, on the right absent to knee- at knee- 3 seconds  Temperature: Gradient to knees bilaterally   Joint Position: Lost to low amplitude change at the toes, intact to high amplitude changes    Diagnostic Data Reviewed:   3/4/2015:  NCS of upper extremity: WNL  Patient refused EMG        Assessment and Plan:    Margarita Dunne is a 75 y.o. female  here for evaluation numbness/ tingling involving the bottom of her feet presents for follow up. This is her initial visit with me    She was last seen by Dr. Delong on 12/21/2017 for migraine management. She reports pain, numbness and tingling involving the anterior plantar aspect of her feet. Examination with evidence of significant loss of sensation to all sensory modalities left > right. 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 In addition she has developed new headaches and now has chronic migraine with aura.     Assessment:  1. Idiopathic progressive polyneuropathy  2. Chronic migraine with aura  3. New bilateral jaw / facial headache    Plan:  1. Start Duloxetine 30 mg daily x 5 days then increase to 60mg daily  2. The Duloxetine may help with migraine prophylaxis  3. Obtain MRI brain with and without contrast to evaluate new headaches  4. Check ESR, CRP  5. Continue Rizatriptan for  acute migraine management. Add Nurtec for migraines when she runs out of triptan.  6. Patient is not interested in migraine prophylaxis. I spoke with her about the dual benefits of Duloxetine.            The patient will return to clinic in 3 months.    Time Spent: 45 minutes spent face-to-face, >50% spent advising about: counseling and/or coordination of care    This note was created with voice recognition software.  Grammatical, syntax and spelling errors may be inevitable.           Kaylie Oneal MD  Medicine- Neurology, Clinical Neurophysiology

## 2020-07-27 ENCOUNTER — TELEPHONE (OUTPATIENT)
Dept: NEUROLOGY | Facility: CLINIC | Age: 75
End: 2020-07-27

## 2020-07-27 ENCOUNTER — PATIENT MESSAGE (OUTPATIENT)
Dept: NEUROLOGY | Facility: CLINIC | Age: 75
End: 2020-07-27

## 2020-07-27 LAB
COPPER SERPL-MCNC: 1223 UG/L (ref 810–1990)
HAV IGM SERPL QL IA: NEGATIVE
HBV CORE IGM SERPL QL IA: NEGATIVE
HBV SURFACE AG SERPL QL IA: NEGATIVE
HCV AB SERPL QL IA: NEGATIVE
INTERPRETATION SERPL IFE-IMP: NORMAL

## 2020-07-27 NOTE — TELEPHONE ENCOUNTER
----- Message from Anastasiya Blair sent at 7/27/2020 10:17 AM CDT -----  Type:  Pharmacy Calling to Clarify an RX    Name of Caller:  Minnie Oneal     Pharmacy Name: [place] [id number]  [address] [address] [facility] [phone] (Phone)  [phone] (Fax)        Prescription Name:Nurtec     What do they need to clarify?  Patient's medication need a PA . Patient also take other headache medication so they wanted to make sure it was need     Can you be contacted via [facility]? Call

## 2020-07-28 LAB
A-TOCOPHEROL VIT E SERPL-MCNC: 1201 UG/DL (ref 500–1800)
PATHOLOGIST INTERPRETATION IFE: NORMAL
PYRIDOXAL SERPL-MCNC: 6 UG/L (ref 5–50)

## 2020-07-29 LAB
SELENIUM SERPL-MCNC: 142.2 UG/L (ref 23–190)
VIT B1 BLD-MCNC: 33 UG/L (ref 38–122)

## 2020-07-31 DIAGNOSIS — G43.109 MIGRAINE WITH AURA AND WITHOUT STATUS MIGRAINOSUS, NOT INTRACTABLE: ICD-10-CM

## 2020-07-31 RX ORDER — RIZATRIPTAN BENZOATE 10 MG/1
TABLET ORAL
Qty: 10 TABLET | Refills: 11 | Status: SHIPPED | OUTPATIENT
Start: 2020-07-31 | End: 2020-10-26 | Stop reason: SDUPTHER

## 2020-08-04 DIAGNOSIS — C56.9 MALIGNANT NEOPLASM OF OVARY, UNSPECIFIED LATERALITY: ICD-10-CM

## 2020-08-05 ENCOUNTER — OFFICE VISIT (OUTPATIENT)
Dept: INTERNAL MEDICINE | Facility: CLINIC | Age: 75
End: 2020-08-05
Payer: MEDICARE

## 2020-08-05 ENCOUNTER — TELEPHONE (OUTPATIENT)
Dept: PHARMACY | Facility: CLINIC | Age: 75
End: 2020-08-05

## 2020-08-05 VITALS
DIASTOLIC BLOOD PRESSURE: 80 MMHG | OXYGEN SATURATION: 99 % | SYSTOLIC BLOOD PRESSURE: 150 MMHG | HEIGHT: 64 IN | BODY MASS INDEX: 21.98 KG/M2 | WEIGHT: 128.75 LBS | HEART RATE: 95 BPM

## 2020-08-05 DIAGNOSIS — E78.5 HYPERLIPIDEMIA, UNSPECIFIED HYPERLIPIDEMIA TYPE: Primary | ICD-10-CM

## 2020-08-05 DIAGNOSIS — Z12.83 SCREENING EXAM FOR SKIN CANCER: ICD-10-CM

## 2020-08-05 DIAGNOSIS — M81.0 POSTMENOPAUSAL BONE LOSS: ICD-10-CM

## 2020-08-05 DIAGNOSIS — Z12.31 ENCOUNTER FOR SCREENING MAMMOGRAM FOR BREAST CANCER: ICD-10-CM

## 2020-08-05 DIAGNOSIS — H40.10X0 OPEN-ANGLE GLAUCOMA, UNSPECIFIED GLAUCOMA STAGE, UNSPECIFIED LATERALITY, UNSPECIFIED OPEN-ANGLE GLAUCOMA TYPE: ICD-10-CM

## 2020-08-05 PROCEDURE — 99214 OFFICE O/P EST MOD 30 MIN: CPT | Mod: S$PBB,,, | Performed by: INTERNAL MEDICINE

## 2020-08-05 PROCEDURE — 99999 PR PBB SHADOW E&M-EST. PATIENT-LVL V: CPT | Mod: PBBFAC,,, | Performed by: INTERNAL MEDICINE

## 2020-08-05 PROCEDURE — 99999 PR PBB SHADOW E&M-EST. PATIENT-LVL V: ICD-10-PCS | Mod: PBBFAC,,, | Performed by: INTERNAL MEDICINE

## 2020-08-05 PROCEDURE — 99215 OFFICE O/P EST HI 40 MIN: CPT | Mod: PBBFAC | Performed by: INTERNAL MEDICINE

## 2020-08-05 PROCEDURE — 99214 PR OFFICE/OUTPT VISIT, EST, LEVL IV, 30-39 MIN: ICD-10-PCS | Mod: S$PBB,,, | Performed by: INTERNAL MEDICINE

## 2020-08-05 RX ORDER — VALACYCLOVIR HYDROCHLORIDE 500 MG/1
500 TABLET, FILM COATED ORAL DAILY PRN
Qty: 30 TABLET | Refills: 0 | Status: SHIPPED | OUTPATIENT
Start: 2020-08-05 | End: 2020-11-06

## 2020-08-05 NOTE — PATIENT INSTRUCTIONS
Flu shot will come out in Sept    Discuss the shingrix vaccine with your oncologist    Outpatient Procedures Ordered This Visit     Ambulatory referral/consult to Dermatology          Ambulatory referral/consult to Ophthalmology          DXA Bone Density Spine And Hip          Lipid Panel           Change/Modify 0 Orders        Mammo

## 2020-08-05 NOTE — PROGRESS NOTES
Subjective:      Patient ID: Margarita Dunne is a 75 y.o. female.    Chief Complaint: Follow-up    HPI:  SAUL Avila comes in today with her .  We reviewed her neurology consult as well as her oncology consult.  She is currently under treatment for ovarian cancer.  She states that the neuropathy is significant and she has been prescribed several medications to help.  She has not fallen but is being very careful.  She does state that she has had more constipation and in discussion with her  tells me that she has been using Tums which may contribute to this.  I have suggested to her that there are some medications that can help if she continues to have problems.  She was interested in her health maintenance and we reviewed this and have ordered several test that are due.  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    Glaucoma suspect - Both Eyes    Nuclear sclerosis - Both Eyes    Chronic rhinitis    Eyelid myokymia - Right Eye    Visual field defect - Both Eyes    Penicillin allergy    Itching    Hyperlipidemia    Migraine with aura and without status migrainosus, not intractable    Osteopenia    Numbness and tingling of both legs below knees    Neck pain    Vitamin D deficiency    Memory loss    Pain management    Incomplete bladder emptying    Malignant neoplasm of ovary    Anxiety    Depression    Moderate malnutrition    Chemotherapy induced neutropenia     Past Medical History:   Diagnosis Date    Allergy     Angio-edema     Anxiety     Arthritis     Asthma     Cancer     Cataract     Chest pain at rest     Depression     Diverticulosis     Fibromyalgia 7/8/2012    Glaucoma suspect, steroid responders     Hand joint pain 7/8/2012    Headache(784.0)     Hx of psychiatric care     Migraine     Osteoporosis     Psychiatric problem     Recurrent upper  respiratory infection (URI)     Shingles 12/8/2019    Sleep difficulties     Therapy     Urticaria      Past Surgical History:   Procedure Laterality Date    APPENDECTOMY  11/12/2019    Procedure: APPENDECTOMY;  Surgeon: Irvin Vinson MD;  Location: [facility] OR 2ND FLR;  Service: OB/GYN;;    BIOPSY      DEBULKING OF TUMOR N/A 11/12/2019    Procedure: DEBULKING, NEOPLASM;  Surgeon: Irvin Vinson MD;  Location: [facility] OR 2ND FLR;  Service: OB/GYN;  Laterality: N/A;    dexa  2014    osteopenia    MOBILIZATION OF SPLENIC FLEXURE  11/12/2019    Procedure: MOBILIZATION, SPLENIC FLEXURE;  Surgeon: Irvin Vinson MD;  Location: [facility] OR 2ND FLR;  Service: OB/GYN;;    OMENTECTOMY N/A 11/12/2019    Procedure: OMENTECTOMY;  Surgeon: Irvin Vinson MD;  Location: [facility] OR 2ND FLR;  Service: OB/GYN;  Laterality: N/A;    RESECTION OF PERITONEAL TISSUE  11/12/2019    Procedure: EXCISION, TISSUE, PERITONEUM;  Surgeon: Irvin Vinson MD;  [facility];  Service: OB/GYN;;    SALPINGOOPHORECTOMY Bilateral 11/12/2019    Procedure: SALPINGO-OOPHORECTOMY;  Surgeon: Irvin Vinson MD;  Location: [facility] OR 2ND FLR;  Service: OB/GYN;  Laterality: Bilateral;    TOTAL ABDOMINAL HYSTERECTOMY N/A 11/12/2019    Procedure: HYSTERECTOMY, TOTAL, ABDOMINAL;  Surgeon: Irvin Vinson MD;  Location: [facility] OR 2ND FLR;  Service: OB/GYN;  Laterality: N/A;     Family History   Problem Relation Age of Onset    Hypertension Mother     Diabetes Mother     Stroke Mother     Dementia Mother     Cancer Father         pancreas    Allergies Father     Allergic rhinitis Father     Macular degeneration Cousin     Allergic rhinitis Paternal Aunt     Allergies Paternal Aunt     Allergic rhinitis Paternal Uncle     Allergies Paternal Uncle     Glaucoma Neg Hx     Amblyopia Neg Hx     Blindness Neg Hx     Cataracts Neg Hx     Retinal detachment Neg Hx     Strabismus Neg Hx     Thyroid disease Neg Hx     Angioedema Neg Hx     Asthma Neg Hx   "   Eczema Neg Hx     Immunodeficiency Neg Hx     Colon cancer Neg Hx     Cystic fibrosis Neg Hx     Ulcerative colitis Neg Hx     Stomach cancer Neg Hx     Esophageal cancer Neg Hx      Review of Systems     Patient describes no shortness of breath or chest pain.  She has had no swelling to her extremities.  Objective:     Vitals:    08/05/20 1105   BP: (!) 150/80   Pulse: 95   SpO2: 99%   Weight: 58.4 kg (128 lb 12 oz)   Height: 5' 4" (1.626 m)   PainSc:   4     Body mass index is 22.1 kg/m².  Physical Exam  Constitutional:       General: She is not in acute distress.     Appearance: She is well-developed.   Neck:      Thyroid: No thyromegaly.      Vascular: No carotid bruit.   Cardiovascular:      Rate and Rhythm: Normal rate and regular rhythm.      Chest Wall: PMI is not displaced.      Heart sounds: Normal heart sounds.   Pulmonary:      Effort: Pulmonary effort is normal. No respiratory distress.      Breath sounds: Normal breath sounds.   Abdominal:      General: Bowel sounds are normal. There is no distension.      Palpations: Abdomen is soft.      Tenderness: There is no abdominal tenderness.   Neurological:      Mental Status: She is alert and oriented to person, place, and time.       Assessment:     1. Hyperlipidemia, unspecified hyperlipidemia type    2. Encounter for screening mammogram for breast cancer    3. Postmenopausal bone loss    4. Screening exam for skin cancer    5. Open-angle glaucoma, unspecified glaucoma stage, unspecified laterality, unspecified open-angle glaucoma type      Plan:   Margarita was seen today for follow-up.    Diagnoses and all orders for this visit:    Hyperlipidemia, unspecified hyperlipidemia type  -     Lipid Panel; Future    Encounter for screening mammogram for breast cancer  -     Mammo Digital Screening Bilat w/ Wilbur; Future    Postmenopausal bone loss  -     DXA Bone Density Spine And Hip; Future    Screening exam for skin cancer  -     Ambulatory " referral/consult to Dermatology; Future    Open-angle glaucoma, unspecified glaucoma stage, unspecified laterality, unspecified open-angle glaucoma type  -     Ambulatory referral/consult to Ophthalmology; Future    Other orders  -     valACYclovir (VALTREX) 500 MG tablet; Take 1 tablet (500 mg total) by mouth daily as needed.        Problem List Items Addressed This Visit     Hyperlipidemia - Primary    Relevant Orders    Lipid Panel      Other Visit Diagnoses     Encounter for screening mammogram for breast cancer        Relevant Orders    Mammo Digital Screening Bilat w/ Wilbur    Postmenopausal bone loss        Relevant Orders    DXA Bone Density Spine And Hip    Screening exam for skin cancer        Relevant Orders    Ambulatory referral/consult to Dermatology    Open-angle glaucoma, unspecified glaucoma stage, unspecified laterality, unspecified open-angle glaucoma type        Relevant Orders    Ambulatory referral/consult to Ophthalmology        Orders Placed This Encounter   Procedures    DXA Bone Density Spine And Hip     Standing Status:   Future     Standing Expiration Date:   8/5/2021     Order Specific Question:   Reason for Exam:     Answer:   screening for osteoporosis    Mammo Digital Screening Bilat w/ Wilbur     Standing Status:   Future     Standing Expiration Date:   10/5/2021     Order Specific Question:   May the Radiologist modify the order per protocol to meet the clinical needs of the patient?     Answer:   Yes    Lipid Panel     Standing Status:   Future     Standing Expiration Date:   10/4/2020    Ambulatory referral/consult to Dermatology     Standing Status:   Future     Standing Expiration Date:   9/5/2021     Referral Priority:   Routine     Referral Type:   Consultation     Referral Reason:   Specialty Services Required     Requested Specialty:   Dermatology     Number of Visits Requested:   1    Ambulatory referral/consult to Ophthalmology     Standing Status:   Future     Standing  Expiration Date:   9/5/2021     Referral Priority:   Routine     Referral Type:   Consultation     Referral Reason:   Specialty Services Required     Referred to Provider:   Opal Painting MD     Requested Specialty:   Ophthalmology     Number of Visits Requested:   1     Follow up in about 1 year (around 8/5/2021) for Review.     Medication List          Accurate as of August 5, 2020  7:21 PM. If you have any questions, ask your nurse or doctor.            START taking these medications    valACYclovir 500 MG tablet  Commonly known as: VALTREX  Take 1 tablet (500 mg total) by mouth daily as needed.  Started by: Columba Anderson MD        CONTINUE taking these medications    * albuterol 90 mcg/actuation inhaler  Commonly known as: PROVENTIL/VENTOLIN HFA  Inhale 2 puffs into the lungs every 6 (six) hours as needed for Wheezing. Rescue     * albuterol 2.5 mg /3 mL (0.083 %) nebulizer solution  Commonly known as: PROVENTIL  Take 3 mLs (2.5 mg total) by nebulization every 6 (six) hours as needed for Wheezing. Rescue     budesonide-formoterol 80-4.5 mcg 80-4.5 mcg/actuation Hfaa  Commonly known as: SYMBICORT  Inhale 2 puffs into the lungs once daily. Controller     CITRUCEL ORAL     DULoxetine 30 MG capsule  Commonly known as: CYMBALTA  Take 1 capsule (30 mg total) by mouth once daily.     niraparib 100 mg Cap  Commonly known as: ZEJULA  Take 200 mg (2 capsules) by mouth once daily.     ondansetron 4 MG tablet  Commonly known as: ZOFRAN  Take 1 tablet (4 mg total) by mouth every 6 (six) hours as needed for Nausea.     polyethylene glycol 17 gram Pwpk  Commonly known as: GLYCOLAX     rizatriptan 10 MG tablet  Commonly known as: MAXALT  TAKE ONE TABLET BY MOUTH AT ONSET OF MIGRAINE, MAY REPEAT EVERY 2 HOURS. DO NOT EXCEED 3 TABLETS IN 24 HOURS.     rosuvastatin 5 MG tablet  Commonly known as: CRESTOR  Take 1 tablet (5 mg total) by mouth every evening.     senna 8.6 mg tablet  Commonly known as: SENOKOT  Take 1 tablet  by mouth 2 (two) times daily.     VOLTAREN 1 % Gel  Generic drug: diclofenac sodium  APPLY 4 GRAMS TOPICALLY 4 TIMES A DAY AS DIRECTED         * This list has 2 medication(s) that are the same as other medications prescribed for you. Read the directions carefully, and ask your doctor or other care provider to review them with you.               Where to Get Your Medications      These medications were sent to [facility] - [place] - [address]  [address] [place] [address]    Phone: [phone]   · valACYclovir 500 MG tablet

## 2020-08-06 ENCOUNTER — LAB VISIT (OUTPATIENT)
Dept: LAB | Facility: HOSPITAL | Age: 75
End: 2020-08-06
Attending: INTERNAL MEDICINE
Payer: MEDICARE

## 2020-08-06 DIAGNOSIS — E78.5 HYPERLIPIDEMIA, UNSPECIFIED HYPERLIPIDEMIA TYPE: ICD-10-CM

## 2020-08-06 LAB
CHOLEST SERPL-MCNC: 125 MG/DL (ref 120–199)
CHOLEST/HDLC SERPL: 2.3 {RATIO} (ref 2–5)
HDLC SERPL-MCNC: 54 MG/DL (ref 40–75)
HDLC SERPL: 43.2 % (ref 20–50)
LDLC SERPL CALC-MCNC: 61.2 MG/DL (ref 63–159)
NONHDLC SERPL-MCNC: 71 MG/DL
TRIGL SERPL-MCNC: 49 MG/DL (ref 30–150)

## 2020-08-06 PROCEDURE — 80061 LIPID PANEL: CPT

## 2020-08-06 PROCEDURE — 36415 COLL VENOUS BLD VENIPUNCTURE: CPT

## 2020-08-07 ENCOUNTER — LAB VISIT (OUTPATIENT)
Dept: LAB | Facility: HOSPITAL | Age: 75
End: 2020-08-07
Attending: OBSTETRICS & GYNECOLOGY
Payer: MEDICARE

## 2020-08-07 ENCOUNTER — OFFICE VISIT (OUTPATIENT)
Dept: GYNECOLOGIC ONCOLOGY | Facility: CLINIC | Age: 75
End: 2020-08-07
Payer: MEDICARE

## 2020-08-07 DIAGNOSIS — D64.81 ANTINEOPLASTIC CHEMOTHERAPY INDUCED ANEMIA: ICD-10-CM

## 2020-08-07 DIAGNOSIS — Z86.69 HX OF MIGRAINES: ICD-10-CM

## 2020-08-07 DIAGNOSIS — C56.9 MALIGNANT NEOPLASM OF OVARY, UNSPECIFIED LATERALITY: ICD-10-CM

## 2020-08-07 DIAGNOSIS — R11.0 NAUSEA WITHOUT VOMITING: ICD-10-CM

## 2020-08-07 DIAGNOSIS — T45.1X5A CHEMOTHERAPY-INDUCED THROMBOCYTOPENIA: ICD-10-CM

## 2020-08-07 DIAGNOSIS — D69.59 CHEMOTHERAPY-INDUCED THROMBOCYTOPENIA: ICD-10-CM

## 2020-08-07 DIAGNOSIS — T45.1X5A CHEMOTHERAPY-INDUCED NEUROPATHY: ICD-10-CM

## 2020-08-07 DIAGNOSIS — T45.1X5A ANTINEOPLASTIC CHEMOTHERAPY INDUCED ANEMIA: ICD-10-CM

## 2020-08-07 DIAGNOSIS — G62.0 CHEMOTHERAPY-INDUCED NEUROPATHY: ICD-10-CM

## 2020-08-07 DIAGNOSIS — K59.00 CONSTIPATION, UNSPECIFIED CONSTIPATION TYPE: ICD-10-CM

## 2020-08-07 DIAGNOSIS — R29.898 WEAKNESS OF BOTH LOWER EXTREMITIES: ICD-10-CM

## 2020-08-07 DIAGNOSIS — C56.9 MALIGNANT NEOPLASM OF OVARY, UNSPECIFIED LATERALITY: Primary | ICD-10-CM

## 2020-08-07 LAB
ALBUMIN SERPL BCP-MCNC: 4.1 G/DL (ref 3.5–5.2)
ALP SERPL-CCNC: 138 U/L (ref 55–135)
ALT SERPL W/O P-5'-P-CCNC: 27 U/L (ref 10–44)
ANION GAP SERPL CALC-SCNC: 6 MMOL/L (ref 8–16)
AST SERPL-CCNC: 31 U/L (ref 10–40)
BILIRUB SERPL-MCNC: 0.5 MG/DL (ref 0.1–1)
BUN SERPL-MCNC: 19 MG/DL (ref 8–23)
CALCIUM SERPL-MCNC: 9.1 MG/DL (ref 8.7–10.5)
CANCER AG125 SERPL-ACNC: 9 U/ML (ref 0–30)
CHLORIDE SERPL-SCNC: 106 MMOL/L (ref 95–110)
CO2 SERPL-SCNC: 26 MMOL/L (ref 23–29)
CREAT SERPL-MCNC: 0.9 MG/DL (ref 0.5–1.4)
ERYTHROCYTE [DISTWIDTH] IN BLOOD BY AUTOMATED COUNT: 15.3 % (ref 11.5–14.5)
EST. GFR  (AFRICAN AMERICAN): >60 ML/MIN/1.73 M^2
EST. GFR  (NON AFRICAN AMERICAN): >60 ML/MIN/1.73 M^2
GLUCOSE SERPL-MCNC: 112 MG/DL (ref 70–110)
HCT VFR BLD AUTO: 24.7 % (ref 37–48.5)
HGB BLD-MCNC: 8.3 G/DL (ref 12–16)
IMM GRANULOCYTES # BLD AUTO: 0.01 K/UL (ref 0–0.04)
MCH RBC QN AUTO: 35.8 PG (ref 27–31)
MCHC RBC AUTO-ENTMCNC: 33.6 G/DL (ref 32–36)
MCV RBC AUTO: 107 FL (ref 82–98)
NEUTROPHILS # BLD AUTO: 2.8 K/UL (ref 1.8–7.7)
PLATELET # BLD AUTO: 113 K/UL (ref 150–350)
PMV BLD AUTO: 10.1 FL (ref 9.2–12.9)
POTASSIUM SERPL-SCNC: 3.9 MMOL/L (ref 3.5–5.1)
PROT SERPL-MCNC: 6.9 G/DL (ref 6–8.4)
RBC # BLD AUTO: 2.32 M/UL (ref 4–5.4)
SODIUM SERPL-SCNC: 138 MMOL/L (ref 136–145)
WBC # BLD AUTO: 4.13 K/UL (ref 3.9–12.7)

## 2020-08-07 PROCEDURE — 85027 COMPLETE CBC AUTOMATED: CPT

## 2020-08-07 PROCEDURE — 36415 COLL VENOUS BLD VENIPUNCTURE: CPT

## 2020-08-07 PROCEDURE — 99214 OFFICE O/P EST MOD 30 MIN: CPT | Mod: 95,,, | Performed by: OBSTETRICS & GYNECOLOGY

## 2020-08-07 PROCEDURE — 99214 PR OFFICE/OUTPT VISIT, EST, LEVL IV, 30-39 MIN: ICD-10-PCS | Mod: 95,,, | Performed by: OBSTETRICS & GYNECOLOGY

## 2020-08-07 PROCEDURE — 86304 IMMUNOASSAY TUMOR CA 125: CPT

## 2020-08-07 PROCEDURE — 80053 COMPREHEN METABOLIC PANEL: CPT

## 2020-08-07 NOTE — PROGRESS NOTES
The patient location is: home  The chief complaint leading to consultation is: ovarian cancer    Visit type: audiovisual    Face to Face time with patient: 20  30 minutes of total time spent on the encounter, which includes face to face time and non-face to face time preparing to see the patient (eg, review of tests), Obtaining and/or reviewing separately obtained history, Documenting clinical information in the electronic or other health record, Independently interpreting results (not separately reported) and communicating results to the patient/family/caregiver, or Care coordination (not separately reported).         Each patient to whom he or she provides medical services by telemedicine is:  (1) informed of the relationship between the physician and patient and the respective role of any other health care provider with respect to management of the patient; and (2) notified that he or she may decline to receive medical services by telemedicine and may withdraw from such care at any time.    Notes:     REFERRING PROVIDER  No ref. provider found     REASON FOR CONSULT  Margarita Dunne  is a 75 y.o.  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 Unchanged nausea. -Emesis. +Flatus. +BM. -VB, VD, or abdominal pain. Persistent tingliness and numbness in her feet.  No new ground level falls and is actually better balanced.  -Changes in speech, strength, or sensory function.  Persistent HA which MRI Head w/ (8/7).    Oncology History   Malignant neoplasm of ovary   9/11/2019 Imaging Significant Findings    CT C/A/P: Irregularity of the uterus in this patient with thickened endometrium identified on prior ultrasound with small, nonspecific soft tissue nodules in the pelvis and abdomen with surrounding streaking in the greater omentum as described above.         9/20/2019 Tumor Markers     = 75     11/12/2019  Surgery    LAPAROTOMY, EXPLORATORY  HYSTERECTOMY, TOTAL, ABDOMINAL   SALPINGO-OOPHORECTOMY (Bilateral)  OMENTECTOMY   APPENDECTOMY  EXCISION, TISSUE, PERITONEUM  RD 1 mm on the R diaphragm and sigmoid colon       12/20/2019 -  Chemotherapy    Treatment Summary   Plan Name: OP GYN PACLITAXEL CARBOPLATIN (AUC 6) Q3W  Treatment Goal: Curative  Status: Active  Start Date: 12/20/2019  End Date: 3/21/2020 (Planned)  Provider: Irvin Vinson MD  Chemotherapy: CARBOplatin (PARAPLATIN) 505 mg in sodium chloride 0.9% 250 mL chemo infusion, 505 mg (90.8 % of original dose 555.6 mg), Intravenous, Clinic/HOD 1 time, 5 of 6 cycles  Dose modification:   (original dose 555.6 mg, Cycle 1)  Administration: 505 mg (12/20/2019), 550 mg (1/10/2020), 460 mg (1/31/2020), 415 mg (2/28/2020)  PACLitaxel (TAXOL) 175 mg/m2 = 294 mg in sodium chloride 0.9% 500 mL chemo infusion, 175 mg/m2 = 294 mg, Intravenous, Clinic/HOD 1 time, 5 of 6 cycles  Administration: 294 mg (12/20/2019), 294 mg (1/10/2020), 294 mg (1/31/2020), 294 mg (2/28/2020), 294 mg (3/20/2020)    Completed 5 cycles of carboplatin/paclitex q21 days.  Opted out of cycle #6 due to COVID.  Chemotherapy was complicated by:  1. Cycle 2: Chemotherapy induced thrombocytopenia (104) and neutropenia (1.4)  2. Cycle 3: AUC 5 due to thrombocytopenia  3. Cycle 5: Grade 1 neuropathy    Her AUC was reduced to 5.  She didn't require CSF.          1/28/2020 Genetic Testing    Myriad: no mutations detected.     3/19/2020 Tumor Markers     = 12     4/13/2020 -  Maintenance Therapy    Nirparib 300 mg qDaily     5/6/2020 Imaging Significant Findings    CT C/A/P: SILVINA    (This CT scan was delayed 1 month after completion of 5 cycles of adjuvant CT on 3/30.  It was performed after starting on Niraparib maintenance therapy.  The patient opted to not receive cycle #6 due to COVID and then delay her CT scan by 1 month due to COVID)     7/9/2020 Tumor Markers    CA-125 = 9     7/9/2020 Imaging  Significant Findings    CT C/A/P: 5 mm left lung nodule        The following portions of the patient's history were reviewed and updated as appropriate: allergies, current medications, family history, medical history, social history and surgical history.    REVIEW OF SYSTEMS  All systems reviewed and negative except as noted in HPI.    OBJECTIVE   There were no vitals filed for this visit.   There is no height or weight on file to calculate BMI.      ECOG status: 1    LABORATORY DATA  Lab data reviewed.    RADIOLOGICAL DATA  Radiology data reviewed.    ASSESSMENT / PLAN     1. Malignant neoplasm of ovary, unspecified laterality    2. Weakness of both lower extremities    3. Nausea without vomiting    4. Hx of migraines    5. Constipation, unspecified constipation type    6. Chemotherapy-induced thrombocytopenia    7. Chemotherapy-induced neuropathy    8. Antineoplastic chemotherapy induced anemia       F/U CBC, CMP in 1 month.  Will give a Niraparib break and reduce to 200 mg qDaily if there are dose limiting toxicities.  F/U virtual visit in 1 month      PATIENT EDUCATION  Ready to learn, no apparent learning barriers were identified; learning preferences include listening. Explained diagnosis and treatment plan; patient expressed understanding of the content.    ADMINISTRATIVE BILLING  Greater than 50% of was spent in counseling.

## 2020-08-07 NOTE — Clinical Note
Hi,   If we could please do a CBC, CMP (no Ca125 please) in 1 month followed by a virtual visit.  Order is in.  Thanks.

## 2020-08-18 ENCOUNTER — HOSPITAL ENCOUNTER (OUTPATIENT)
Dept: RADIOLOGY | Facility: HOSPITAL | Age: 75
Discharge: HOME | End: 2020-08-18
Attending: INTERNAL MEDICINE
Payer: MEDICARE

## 2020-08-18 ENCOUNTER — TELEPHONE (OUTPATIENT)
Dept: INTERNAL MEDICINE | Facility: CLINIC | Age: 75
End: 2020-08-18

## 2020-08-18 DIAGNOSIS — M81.0 POSTMENOPAUSAL BONE LOSS: ICD-10-CM

## 2020-08-18 PROCEDURE — 77080 DEXA BONE DENSITY SPINE HIP: ICD-10-PCS | Mod: 26,,, | Performed by: RADIOLOGY

## 2020-08-18 PROCEDURE — 77080 DXA BONE DENSITY AXIAL: CPT | Mod: TC,PO

## 2020-08-18 PROCEDURE — 77080 DXA BONE DENSITY AXIAL: CPT | Mod: 26,,, | Performed by: RADIOLOGY

## 2020-08-18 NOTE — TELEPHONE ENCOUNTER
Please tell the patient she has osteoporosis: would she like to come in and discuss treatment options: I have time in Sept

## 2020-08-19 ENCOUNTER — TELEPHONE (OUTPATIENT)
Dept: NEUROLOGY | Facility: CLINIC | Age: 75
End: 2020-08-19

## 2020-08-19 NOTE — TELEPHONE ENCOUNTER
Pt informed that she has osteoporosis, understanding verbalized. Pt will come in on September 8th to discuss treatment.

## 2020-08-19 NOTE — TELEPHONE ENCOUNTER
----- Message from Sharon Shields sent at 8/19/2020  8:49 AM CDT -----  Contact: Margarita  Type: Patient Call Back    Who called:Margarita    What is the request in detail:Patient returned call to nurse for MRI results. Please call to discuss    Can the clinic reply by [facility]?    Would the patient rather a call back or a response via [facility]? Call    Best call back number:[phone]

## 2020-08-20 ENCOUNTER — TELEPHONE (OUTPATIENT)
Dept: NEUROLOGY | Facility: CLINIC | Age: 75
End: 2020-08-20

## 2020-08-20 NOTE — TELEPHONE ENCOUNTER
----- Message from Kd Bruce sent at 8/20/2020  9:33 AM CDT -----  Name of Who is Calling: pt    What is the request in detail: states she left a previous message on yesterday regarding needing a call back for her test results. Pt states she is needing to speak with Dr. Oneal today. Please contact to further discuss and advise      Can the clinic reply by [facility]: no    What Number to Call Back [facility]: [phone]

## 2020-08-21 RX ORDER — DULOXETIN HYDROCHLORIDE 60 MG/1
60 CAPSULE, DELAYED RELEASE ORAL NIGHTLY
Qty: 90 CAPSULE | Refills: 3 | Status: SHIPPED | OUTPATIENT
Start: 2020-08-21 | End: 2020-10-26 | Stop reason: SDUPTHER

## 2020-08-29 ENCOUNTER — NURSE TRIAGE (OUTPATIENT)
Dept: ADMINISTRATIVE | Facility: CLINIC | Age: 75
End: 2020-08-29

## 2020-08-29 NOTE — PROGRESS NOTES
The patient location is: home  The chief complaint leading to consultation is: ovarian cancer    Visit type: audiovisual    Face to Face time with patient: 20  30 minutes of total time spent on the encounter, which includes face to face time and non-face to face time preparing to see the patient (eg, review of tests), Obtaining and/or reviewing separately obtained history, Documenting clinical information in the electronic or other health record, Independently interpreting results (not separately reported) and communicating results to the patient/family/caregiver, or Care coordination (not separately reported).         Each patient to whom he or she provides medical services by telemedicine is:  (1) informed of the relationship between the physician and patient and the respective role of any other health care provider with respect to management of the patient; and (2) notified that he or she may decline to receive medical services by telemedicine and may withdraw from such care at any time.    Notes:     REFERRING PROVIDER  No ref. provider found     REASON FOR CONSULT  Margarita Dunne  is a 75 y.o.  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 Unchanged nausea or constipation. -Emesis. +Flatus. +BM. -VB, VD, or abdominal pain. Increased fatigue and energy level.  -CP, SOA, palpitations.  -Hematuria with some streaked stool with straining.     Oncology History   Malignant neoplasm of ovary   9/11/2019 Imaging Significant Findings    CT C/A/P: Irregularity of the uterus in this patient with thickened endometrium identified on prior ultrasound with small, nonspecific soft tissue nodules in the pelvis and abdomen with surrounding streaking in the greater omentum as described above.         9/20/2019 Tumor Markers     = 75     11/12/2019 Surgery    LAPAROTOMY, EXPLORATORY  HYSTERECTOMY, TOTAL, ABDOMINAL    SALPINGO-OOPHORECTOMY (Bilateral)  OMENTECTOMY   APPENDECTOMY  EXCISION, TISSUE, PERITONEUM  RD 1 mm on the R diaphragm and sigmoid colon       12/20/2019 -  Chemotherapy    Treatment Summary   Plan Name: OP GYN PACLITAXEL CARBOPLATIN (AUC 6) Q3W  Treatment Goal: Curative  Status: Active  Start Date: 12/20/2019  End Date: 3/21/2020 (Planned)  Provider: Irvin Vinson MD  Chemotherapy: CARBOplatin (PARAPLATIN) 505 mg in sodium chloride 0.9% 250 mL chemo infusion, 505 mg (90.8 % of original dose 555.6 mg), Intravenous, Clinic/HOD 1 time, 5 of 6 cycles  Dose modification:   (original dose 555.6 mg, Cycle 1)  Administration: 505 mg (12/20/2019), 550 mg (1/10/2020), 460 mg (1/31/2020), 415 mg (2/28/2020)  PACLitaxel (TAXOL) 175 mg/m2 = 294 mg in sodium chloride 0.9% 500 mL chemo infusion, 175 mg/m2 = 294 mg, Intravenous, Clinic/HOD 1 time, 5 of 6 cycles  Administration: 294 mg (12/20/2019), 294 mg (1/10/2020), 294 mg (1/31/2020), 294 mg (2/28/2020), 294 mg (3/20/2020)    Completed 5 cycles of carboplatin/paclitex q21 days.  Opted out of cycle #6 due to COVID.  Chemotherapy was complicated by:  1. Cycle 2: Chemotherapy induced thrombocytopenia (104) and neutropenia (1.4)  2. Cycle 3: AUC 5 due to thrombocytopenia  3. Cycle 5: Grade 1 neuropathy    Her AUC was reduced to 5.  She didn't require CSF.          1/28/2020 Genetic Testing    Myriad: no mutations detected.     3/19/2020 Tumor Markers     = 12     4/13/2020 -  Maintenance Therapy    Nirparib 300 mg qDaily     5/6/2020 Imaging Significant Findings    CT C/A/P: SILVINA    (This CT scan was delayed 1 month after completion of 5 cycles of adjuvant CT on 3/30.  It was performed after starting on Niraparib maintenance therapy.  The patient opted to not receive cycle #6 due to COVID and then delay her CT scan by 1 month due to COVID)     7/9/2020 Tumor Markers    CA-125 = 9     7/9/2020 Imaging Significant Findings    CT C/A/P: 5 mm left lung nodule        The  following portions of the patient's history were reviewed and updated as appropriate: allergies, current medications, family history, medical history, social history and surgical history.    REVIEW OF SYSTEMS  All systems reviewed and negative except as noted in HPI.    OBJECTIVE   There were no vitals filed for this visit.   There is no height or weight on file to calculate BMI.      1. General: Well appearing, no apparent distress, alert and oriented.  2. Lymph: Neck symmetric without cervical or supraclavicular adenopathy or mass.  3. Lungs: Normal respiratory rate, no accessory muscle use.  4. Cardiac: Normal rate    ECOG status: 2    LABORATORY DATA  Lab data reviewed.    RADIOLOGICAL DATA  Radiology data reviewed.    ASSESSMENT / PLAN     1. Malignant neoplasm of ovary, unspecified laterality    2. Weakness of both lower extremities    3. Nausea without vomiting    4. Chemotherapy-induced thrombocytopenia    5. Chemotherapy-induced neuropathy    6. Antineoplastic chemotherapy induced anemia    7. Chronic idiopathic constipation       She had a lot of questions about toxicity related to treatment.  We discussed the risk of MDS.  I do not think she is currently at risk.  She has niraparib induced anemia and thrombocytopenia.  She denies VB, hematuria, melena, or hematochezia.    She is not on blood thinners.  I encouraged the patient to present to the hospital today immediately for a blood transfusion.  She is too nervous and anxious for one today.  She rather wait until Monday or Tuesday. I told her the alternative is a visit to the ED.  She wishes to wait until next week.  We will arrange for 2U PRBC next week.  We will D/C niraparib and revisit in 4 weeks virtually and consider re-starting it at 200 mg.  I gave her strong precaution including calling 911.  She voiced understanding and all questions were answered.      A lot of the symptoms she is concerned about are very non-specific especially with her  PMH.    PATIENT EDUCATION  Ready to learn, no apparent learning barriers were identified; learning preferences include listening. Explained diagnosis and treatment plan; patient expressed understanding of the content.    ADMINISTRATIVE BILLING  Greater than 50% of was spent in counseling.

## 2020-08-30 NOTE — TELEPHONE ENCOUNTER
Drug interaction; taking duloxetine -Cymbalta 60mg. Last Took 60mg 24 hours ago. Accidentally took Fluoxetine (prozac)- took 3- 10mg tablets. Does not feel any different. Secure chatted Dr. Abdi who stated it should not lead to any significant issues and to not take the duloxetine tomorrow night. Information relayed to the patient. Verbalized understanding.     Reason for Disposition   [1] DOUBLE DOSE (an extra dose or lesser amount) of prescription drug AND [2] NO symptoms (Exception: a double dose of antibiotics)    Additional Information   Negative: MORE THAN A DOUBLE DOSE of a prescription or over-the-counter (OTC) drug   Negative: [1] DOUBLE DOSE (an extra dose or lesser amount) of over-the-counter (OTC) drug AND [2] any symptoms (e.g., dizziness, nausea, pain, sleepiness)   Negative: [1] DOUBLE DOSE (an extra dose or lesser amount) of prescription drug AND [2] any symptoms (e.g., dizziness, nausea, pain, sleepiness)   Negative: Took another person's prescription drug    Protocols used: ST MEDICATION QUESTION CALL-A-AH

## 2020-09-01 ENCOUNTER — HOSPITAL ENCOUNTER (OUTPATIENT)
Dept: RADIOLOGY | Facility: HOSPITAL | Age: 75
Discharge: HOME | End: 2020-09-01
Attending: INTERNAL MEDICINE
Payer: MEDICARE

## 2020-09-01 DIAGNOSIS — Z12.31 ENCOUNTER FOR SCREENING MAMMOGRAM FOR BREAST CANCER: ICD-10-CM

## 2020-09-01 PROCEDURE — 77067 SCR MAMMO BI INCL CAD: CPT | Mod: 26,,, | Performed by: RADIOLOGY

## 2020-09-01 PROCEDURE — 77063 MAMMO DIGITAL SCREENING BILAT WITH TOMOSYNTHESIS_CAD: ICD-10-PCS | Mod: 26,,, | Performed by: RADIOLOGY

## 2020-09-01 PROCEDURE — 77063 BREAST TOMOSYNTHESIS BI: CPT | Mod: 26,,, | Performed by: RADIOLOGY

## 2020-09-01 PROCEDURE — 77067 SCR MAMMO BI INCL CAD: CPT | Mod: TC

## 2020-09-01 PROCEDURE — 77067 MAMMO DIGITAL SCREENING BILAT WITH TOMOSYNTHESIS_CAD: ICD-10-PCS | Mod: 26,,, | Performed by: RADIOLOGY

## 2020-09-04 ENCOUNTER — LAB VISIT (OUTPATIENT)
Dept: LAB | Facility: HOSPITAL | Age: 75
End: 2020-09-04
Attending: OBSTETRICS & GYNECOLOGY
Payer: MEDICARE

## 2020-09-04 ENCOUNTER — OFFICE VISIT (OUTPATIENT)
Dept: GYNECOLOGIC ONCOLOGY | Facility: CLINIC | Age: 75
End: 2020-09-04
Payer: MEDICARE

## 2020-09-04 ENCOUNTER — DOCUMENTATION ONLY (OUTPATIENT)
Dept: GYNECOLOGIC ONCOLOGY | Facility: CLINIC | Age: 75
End: 2020-09-04

## 2020-09-04 DIAGNOSIS — C56.9 MALIGNANT NEOPLASM OF OVARY, UNSPECIFIED LATERALITY: Primary | ICD-10-CM

## 2020-09-04 DIAGNOSIS — C56.9 MALIGNANT NEOPLASM OF OVARY, UNSPECIFIED LATERALITY: ICD-10-CM

## 2020-09-04 DIAGNOSIS — K59.04 CHRONIC IDIOPATHIC CONSTIPATION: ICD-10-CM

## 2020-09-04 DIAGNOSIS — T45.1X5A CHEMOTHERAPY-INDUCED NEUROPATHY: ICD-10-CM

## 2020-09-04 DIAGNOSIS — R29.898 WEAKNESS OF BOTH LOWER EXTREMITIES: ICD-10-CM

## 2020-09-04 DIAGNOSIS — R11.0 NAUSEA WITHOUT VOMITING: ICD-10-CM

## 2020-09-04 DIAGNOSIS — D64.81 ANTINEOPLASTIC CHEMOTHERAPY INDUCED ANEMIA: ICD-10-CM

## 2020-09-04 DIAGNOSIS — G62.0 CHEMOTHERAPY-INDUCED NEUROPATHY: ICD-10-CM

## 2020-09-04 DIAGNOSIS — D69.59 CHEMOTHERAPY-INDUCED THROMBOCYTOPENIA: ICD-10-CM

## 2020-09-04 DIAGNOSIS — T45.1X5A CHEMOTHERAPY-INDUCED THROMBOCYTOPENIA: ICD-10-CM

## 2020-09-04 DIAGNOSIS — T45.1X5A ANTINEOPLASTIC CHEMOTHERAPY INDUCED ANEMIA: ICD-10-CM

## 2020-09-04 LAB
ALBUMIN SERPL BCP-MCNC: 4 G/DL (ref 3.5–5.2)
ALP SERPL-CCNC: 128 U/L (ref 55–135)
ALT SERPL W/O P-5'-P-CCNC: 24 U/L (ref 10–44)
ANION GAP SERPL CALC-SCNC: 9 MMOL/L (ref 8–16)
AST SERPL-CCNC: 28 U/L (ref 10–40)
BILIRUB SERPL-MCNC: 0.5 MG/DL (ref 0.1–1)
BUN SERPL-MCNC: 18 MG/DL (ref 8–23)
CALCIUM SERPL-MCNC: 9.3 MG/DL (ref 8.7–10.5)
CHLORIDE SERPL-SCNC: 104 MMOL/L (ref 95–110)
CO2 SERPL-SCNC: 23 MMOL/L (ref 23–29)
CREAT SERPL-MCNC: 1 MG/DL (ref 0.5–1.4)
ERYTHROCYTE [DISTWIDTH] IN BLOOD BY AUTOMATED COUNT: 16.2 % (ref 11.5–14.5)
EST. GFR  (AFRICAN AMERICAN): >60 ML/MIN/1.73 M^2
EST. GFR  (NON AFRICAN AMERICAN): 54 ML/MIN/1.73 M^2
GLUCOSE SERPL-MCNC: 117 MG/DL (ref 70–110)
HCT VFR BLD AUTO: 18.5 % (ref 37–48.5)
HGB BLD-MCNC: 6.3 G/DL (ref 12–16)
IMM GRANULOCYTES # BLD AUTO: 0 K/UL (ref 0–0.04)
MCH RBC QN AUTO: 37.3 PG (ref 27–31)
MCHC RBC AUTO-ENTMCNC: 34.1 G/DL (ref 32–36)
MCV RBC AUTO: 110 FL (ref 82–98)
NEUTROPHILS # BLD AUTO: 2.3 K/UL (ref 1.8–7.7)
PLATELET # BLD AUTO: 94 K/UL (ref 150–350)
PMV BLD AUTO: 11 FL (ref 9.2–12.9)
POTASSIUM SERPL-SCNC: 4.9 MMOL/L (ref 3.5–5.1)
PROT SERPL-MCNC: 6.9 G/DL (ref 6–8.4)
RBC # BLD AUTO: 1.69 M/UL (ref 4–5.4)
SODIUM SERPL-SCNC: 136 MMOL/L (ref 136–145)
WBC # BLD AUTO: 3.75 K/UL (ref 3.9–12.7)

## 2020-09-04 PROCEDURE — 36415 COLL VENOUS BLD VENIPUNCTURE: CPT

## 2020-09-04 PROCEDURE — 85027 COMPLETE CBC AUTOMATED: CPT

## 2020-09-04 PROCEDURE — 80053 COMPREHEN METABOLIC PANEL: CPT

## 2020-09-04 PROCEDURE — 99214 PR OFFICE/OUTPT VISIT, EST, LEVL IV, 30-39 MIN: ICD-10-PCS | Mod: 95,,, | Performed by: OBSTETRICS & GYNECOLOGY

## 2020-09-04 PROCEDURE — 99214 OFFICE O/P EST MOD 30 MIN: CPT | Mod: 95,,, | Performed by: OBSTETRICS & GYNECOLOGY

## 2020-09-04 RX ORDER — HYDROCODONE BITARTRATE AND ACETAMINOPHEN 500; 5 MG/1; MG/1
TABLET ORAL
Status: DISCONTINUED | OUTPATIENT
Start: 2020-09-04 | End: 2021-01-01

## 2020-09-08 ENCOUNTER — OFFICE VISIT (OUTPATIENT)
Dept: INTERNAL MEDICINE | Facility: CLINIC | Age: 75
End: 2020-09-08
Payer: MEDICARE

## 2020-09-08 ENCOUNTER — LAB VISIT (OUTPATIENT)
Dept: LAB | Facility: HOSPITAL | Age: 75
End: 2020-09-08
Payer: MEDICARE

## 2020-09-08 VITALS
HEART RATE: 79 BPM | BODY MASS INDEX: 22.62 KG/M2 | DIASTOLIC BLOOD PRESSURE: 60 MMHG | WEIGHT: 132.5 LBS | HEIGHT: 64 IN | OXYGEN SATURATION: 99 % | SYSTOLIC BLOOD PRESSURE: 150 MMHG

## 2020-09-08 DIAGNOSIS — C56.9 MALIGNANT NEOPLASM OF OVARY, UNSPECIFIED LATERALITY: ICD-10-CM

## 2020-09-08 DIAGNOSIS — M81.8 OTHER OSTEOPOROSIS WITHOUT CURRENT PATHOLOGICAL FRACTURE: Primary | ICD-10-CM

## 2020-09-08 LAB
ABO + RH BLD: NORMAL
BLD GP AB SCN CELLS X3 SERPL QL: NORMAL

## 2020-09-08 PROCEDURE — 86901 BLOOD TYPING SEROLOGIC RH(D): CPT

## 2020-09-08 PROCEDURE — 99999 PR PBB SHADOW E&M-EST. PATIENT-LVL IV: CPT | Mod: PBBFAC,,, | Performed by: INTERNAL MEDICINE

## 2020-09-08 PROCEDURE — 99999 PR PBB SHADOW E&M-EST. PATIENT-LVL IV: ICD-10-PCS | Mod: PBBFAC,,, | Performed by: INTERNAL MEDICINE

## 2020-09-08 PROCEDURE — 99214 OFFICE O/P EST MOD 30 MIN: CPT | Mod: PBBFAC,25 | Performed by: INTERNAL MEDICINE

## 2020-09-08 PROCEDURE — 99213 OFFICE O/P EST LOW 20 MIN: CPT | Mod: S$PBB,,, | Performed by: INTERNAL MEDICINE

## 2020-09-08 PROCEDURE — 36415 COLL VENOUS BLD VENIPUNCTURE: CPT

## 2020-09-08 PROCEDURE — 99213 PR OFFICE/OUTPT VISIT, EST, LEVL III, 20-29 MIN: ICD-10-PCS | Mod: S$PBB,,, | Performed by: INTERNAL MEDICINE

## 2020-09-09 ENCOUNTER — TELEPHONE (OUTPATIENT)
Dept: INTERNAL MEDICINE | Facility: CLINIC | Age: 75
End: 2020-09-09

## 2020-09-09 ENCOUNTER — INFUSION (OUTPATIENT)
Dept: INFUSION THERAPY | Facility: HOSPITAL | Age: 75
End: 2020-09-09
Payer: MEDICARE

## 2020-09-09 VITALS
DIASTOLIC BLOOD PRESSURE: 67 MMHG | HEART RATE: 91 BPM | SYSTOLIC BLOOD PRESSURE: 147 MMHG | TEMPERATURE: 97 F | RESPIRATION RATE: 18 BRPM

## 2020-09-09 DIAGNOSIS — T45.1X5A ANTINEOPLASTIC CHEMOTHERAPY INDUCED ANEMIA: Primary | ICD-10-CM

## 2020-09-09 DIAGNOSIS — T45.1X5A ANTINEOPLASTIC CHEMOTHERAPY INDUCED ANEMIA: ICD-10-CM

## 2020-09-09 DIAGNOSIS — D64.81 ANTINEOPLASTIC CHEMOTHERAPY INDUCED ANEMIA: Primary | ICD-10-CM

## 2020-09-09 DIAGNOSIS — D64.81 ANTINEOPLASTIC CHEMOTHERAPY INDUCED ANEMIA: ICD-10-CM

## 2020-09-09 DIAGNOSIS — C56.9 MALIGNANT NEOPLASM OF OVARY, UNSPECIFIED LATERALITY: ICD-10-CM

## 2020-09-09 LAB
BLD PROD TYP BPU: NORMAL
BLD PROD TYP BPU: NORMAL
BLOOD UNIT EXPIRATION DATE: NORMAL
BLOOD UNIT EXPIRATION DATE: NORMAL
BLOOD UNIT TYPE CODE: 5100
BLOOD UNIT TYPE CODE: 5100
BLOOD UNIT TYPE: NORMAL
BLOOD UNIT TYPE: NORMAL
CODING SYSTEM: NORMAL
CODING SYSTEM: NORMAL
DISPENSE STATUS: NORMAL
DISPENSE STATUS: NORMAL
NUM UNITS TRANS PACKED RBC: NORMAL
NUM UNITS TRANS PACKED RBC: NORMAL

## 2020-09-09 PROCEDURE — 25000003 PHARM REV CODE 250: Performed by: OBSTETRICS & GYNECOLOGY

## 2020-09-09 PROCEDURE — 63600175 PHARM REV CODE 636 W HCPCS: Performed by: OBSTETRICS & GYNECOLOGY

## 2020-09-09 PROCEDURE — 96374 THER/PROPH/DIAG INJ IV PUSH: CPT

## 2020-09-09 PROCEDURE — 86920 COMPATIBILITY TEST SPIN: CPT | Mod: 59

## 2020-09-09 PROCEDURE — 96375 TX/PRO/DX INJ NEW DRUG ADDON: CPT

## 2020-09-09 PROCEDURE — P9016 RBC LEUKOCYTES REDUCED: HCPCS

## 2020-09-09 PROCEDURE — 36430 TRANSFUSION BLD/BLD COMPNT: CPT

## 2020-09-09 RX ORDER — DIPHENHYDRAMINE HCL 25 MG
25 CAPSULE ORAL
Status: COMPLETED | OUTPATIENT
Start: 2020-09-09 | End: 2020-09-09

## 2020-09-09 RX ORDER — HYDROCODONE BITARTRATE AND ACETAMINOPHEN 500; 5 MG/1; MG/1
TABLET ORAL ONCE
Status: COMPLETED | OUTPATIENT
Start: 2020-09-09 | End: 2020-09-09

## 2020-09-09 RX ORDER — LORAZEPAM 2 MG/ML
0.5 INJECTION INTRAMUSCULAR ONCE
Status: COMPLETED | OUTPATIENT
Start: 2020-09-09 | End: 2020-09-09

## 2020-09-09 RX ORDER — ACETAMINOPHEN 325 MG/1
650 TABLET ORAL
Status: COMPLETED | OUTPATIENT
Start: 2020-09-09 | End: 2020-09-09

## 2020-09-09 RX ORDER — HYDROCODONE BITARTRATE AND ACETAMINOPHEN 500; 5 MG/1; MG/1
TABLET ORAL
Status: DISCONTINUED | OUTPATIENT
Start: 2020-09-09 | End: 2021-01-01

## 2020-09-09 RX ORDER — ACETAMINOPHEN 325 MG/1
650 TABLET ORAL
Status: CANCELLED | OUTPATIENT
Start: 2020-09-09

## 2020-09-09 RX ORDER — DIPHENHYDRAMINE HCL 25 MG
25 CAPSULE ORAL
Status: CANCELLED | OUTPATIENT
Start: 2020-09-09

## 2020-09-09 RX ORDER — SODIUM CHLORIDE 0.9 % (FLUSH) 0.9 %
10 SYRINGE (ML) INJECTION
Status: DISCONTINUED | OUTPATIENT
Start: 2020-09-09 | End: 2020-11-02

## 2020-09-09 RX ORDER — HYDROCODONE BITARTRATE AND ACETAMINOPHEN 500; 5 MG/1; MG/1
TABLET ORAL ONCE
Status: CANCELLED | OUTPATIENT
Start: 2020-09-09 | End: 2020-09-09

## 2020-09-09 RX ADMIN — DIPHENHYDRAMINE HYDROCHLORIDE 25 MG: 25 CAPSULE ORAL at 01:09

## 2020-09-09 RX ADMIN — SODIUM CHLORIDE: 0.9 INJECTION, SOLUTION INTRAVENOUS at 01:09

## 2020-09-09 RX ADMIN — ACETAMINOPHEN 650 MG: 325 TABLET ORAL at 01:09

## 2020-09-09 RX ADMIN — LORAZEPAM 0.5 MG: 2 INJECTION INTRAMUSCULAR; INTRAVENOUS at 01:09

## 2020-09-09 NOTE — TELEPHONE ENCOUNTER
----- Message from Irvin Vinson MD sent at 9/9/2020  7:47 AM CDT -----  Regarding: RE: Osteoporosis  I do not!  Thank you for taking care of her.   ----- Message -----  From: Columba Anderson MD  Sent: 9/9/2020   6:31 AM CDT  To: Irvin Vinson MD  Subject: Osteoporosis                                      Good morning,    Margarita has  osteoporosis and has significant elevation in her FRAX score.  I have spoken to her about treatment and she would prefer oral treatment.  Do have any objection to starting fosamax.  she believe she may have use this in the past and can tolerate it.    Thank you  Columba

## 2020-09-09 NOTE — PROGRESS NOTES
Subjective:      Patient ID: Margarita Dunne is a 75 y.o. female.    Chief Complaint: Follow-up    HPI:  HPI   Margarita comes in to discussed treatment of osteoporosis.  Her bone density does show osteoporosis and her FRAX score is significantly elevated.  She has been thinking about this and is aware that in the past she may have taken but certainly was prescribed Fosamax.  She would prefer an oral agent to an injection or IV.  We discussed dental issues and she states that currently she has none.  I have explained how this is to be taken and will check with her oncologist.  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    Glaucoma suspect - Both Eyes    Nuclear sclerosis - Both Eyes    Chronic rhinitis    Eyelid myokymia - Right Eye    Visual field defect - Both Eyes    Penicillin allergy    Itching    Hyperlipidemia    Migraine with aura and without status migrainosus, not intractable    Osteopenia    Numbness and tingling of both legs below knees    Neck pain    Vitamin D deficiency    Memory loss    Pain management    Incomplete bladder emptying    Malignant neoplasm of ovary    Anxiety    Depression    Moderate malnutrition    Chemotherapy induced neutropenia     Past Medical History:   Diagnosis Date    Allergy     Angio-edema     Anxiety     Arthritis     Asthma     Cancer     Cataract     Chest pain at rest     Depression     Diverticulosis     Fibromyalgia 7/8/2012    Glaucoma suspect, steroid responders     Hand joint pain 7/8/2012    Headache(784.0)     Hx of psychiatric care     Migraine     Osteoporosis     Psychiatric problem     Recurrent upper respiratory infection (URI)     Shingles 12/8/2019    Sleep difficulties     Therapy     Urticaria      Past Surgical History:   Procedure Laterality Date    APPENDECTOMY  11/12/2019    Procedure: APPENDECTOMY;   Surgeon: Irvin Vinson MD;  Location: [facility] OR 2ND FLR;  Service: OB/GYN;;    BIOPSY      DEBULKING OF TUMOR N/A 11/12/2019    Procedure: DEBULKING, NEOPLASM;  Surgeon: Irvin Vinson MD;  Location: [facility] OR 2ND FLR;  Service: OB/GYN;  Laterality: N/A;    dexa  2014    osteopenia    MOBILIZATION OF SPLENIC FLEXURE  11/12/2019    Procedure: MOBILIZATION, SPLENIC FLEXURE;  Surgeon: Irvin Vinson MD;  Location: [facility] OR 2ND FLR;  Service: OB/GYN;;    OMENTECTOMY N/A 11/12/2019    Procedure: OMENTECTOMY;  Surgeon: Irvin Vinson MD;  Location: [facility] OR 2ND FLR;  Service: OB/GYN;  Laterality: N/A;    RESECTION OF PERITONEAL TISSUE  11/12/2019    Procedure: EXCISION, TISSUE, PERITONEUM;  Surgeon: Irvin Vinson MD;  Location: [facility] OR 2ND FLR;  Service: OB/GYN;;    SALPINGOOPHORECTOMY Bilateral 11/12/2019    Procedure: SALPINGO-OOPHORECTOMY;  Surgeon: Irvin Vinson MD;  Location: [facility] OR 2ND FLR;  Service: OB/GYN;  Laterality: Bilateral;    TOTAL ABDOMINAL HYSTERECTOMY N/A 11/12/2019    Procedure: HYSTERECTOMY, TOTAL, ABDOMINAL;  Surgeon: Irvin Vinson MD;  Location: [facility] OR 2ND FLR;  Service: OB/GYN;  Laterality: N/A;     Family History   Problem Relation Age of Onset    Hypertension Mother     Diabetes Mother     Stroke Mother     Dementia Mother     Cancer Father         pancreas    Allergies Father     Allergic rhinitis Father     Macular degeneration Cousin     Allergic rhinitis Paternal Aunt     Allergies Paternal Aunt     Allergic rhinitis Paternal Uncle     Allergies Paternal Uncle     Glaucoma Neg Hx     Amblyopia Neg Hx     Blindness Neg Hx     Cataracts Neg Hx     Retinal detachment Neg Hx     Strabismus Neg Hx     Thyroid disease Neg Hx     Angioedema Neg Hx     Asthma Neg Hx     Eczema Neg Hx     Immunodeficiency Neg Hx     Colon cancer Neg Hx     Cystic fibrosis Neg Hx     Ulcerative colitis Neg Hx     Stomach cancer Neg Hx     Esophageal cancer Neg Hx      Review of Systems  "  Patient is scheduled for transfusion Wednesday  Objective:     Vitals:    09/08/20 1034   BP: (!) 150/60   Pulse: 79   SpO2: 99%   Weight: 60.1 kg (132 lb 7.9 oz)   Height: 5' 4" (1.626 m)   PainSc:   3   PainLoc: Neck     Body mass index is 22.74 kg/m².  Physical Exam   No exam is done  Assessment:     1. Other osteoporosis without current pathological fracture      Plan:   Margarita was seen today for follow-up.    Diagnoses and all orders for this visit:    Other osteoporosis without current pathological fracture      Discussed treatment options with the patient she is willing to try Fosamax as she may have taken this in the past.  I have communicated with her oncologist.  Problem List Items Addressed This Visit     None      Visit Diagnoses     Other osteoporosis without current pathological fracture    -  Primary        No orders of the defined types were placed in this encounter.    No follow-ups on file.     Medication List          Accurate as of September 8, 2020 11:59 PM. If you have any questions, ask your nurse or doctor.            CONTINUE taking these medications    * albuterol 90 mcg/actuation inhaler  Commonly known as: PROVENTIL/VENTOLIN HFA  Inhale 2 puffs into the lungs every 6 (six) hours as needed for Wheezing. Rescue     * albuterol 2.5 mg /3 mL (0.083 %) nebulizer solution  Commonly known as: PROVENTIL  Take 3 mLs (2.5 mg total) by nebulization every 6 (six) hours as needed for Wheezing. Rescue     budesonide-formoterol 80-4.5 mcg 80-4.5 mcg/actuation Hfaa  Commonly known as: SYMBICORT  Inhale 2 puffs into the lungs once daily. Controller     CITRUCEL ORAL     DULoxetine 60 MG capsule  Commonly known as: CYMBALTA  Take 1 capsule (60 mg total) by mouth every evening.     ondansetron 4 MG tablet  Commonly known as: ZOFRAN  Take 1 tablet (4 mg total) by mouth every 6 (six) hours as needed for Nausea.     polyethylene glycol 17 gram Pwpk  Commonly known as: GLYCOLAX     rizatriptan 10 MG " tablet  Commonly known as: MAXALT  TAKE ONE TABLET BY MOUTH AT ONSET OF MIGRAINE, MAY REPEAT EVERY 2 HOURS. DO NOT EXCEED 3 TABLETS IN 24 HOURS.     rosuvastatin 5 MG tablet  Commonly known as: CRESTOR  Take 1 tablet (5 mg total) by mouth every evening.     senna 8.6 mg tablet  Commonly known as: SENOKOT  Take 1 tablet by mouth 2 (two) times daily.     valACYclovir 500 MG tablet  Commonly known as: VALTREX  Take 1 tablet (500 mg total) by mouth daily as needed.     VOLTAREN 1 % Gel  Generic drug: diclofenac sodium  APPLY 4 GRAMS TOPICALLY 4 TIMES A DAY AS DIRECTED     ZEJULA 100 mg Cap  Generic drug: niraparib  Take 200 mg (2 capsules) by mouth once daily.         * This list has 2 medication(s) that are the same as other medications prescribed for you. Read the directions carefully, and ask your doctor or other care provider to review them with you.

## 2020-09-09 NOTE — PLAN OF CARE
1645 patient was anxious at arrival, first time receiving blood. Per Karel SUÁREZ, okay to give Ativan along with premeds.  patient completed and tolerated 2u RBCs, VSS. Pt voiced no new complaints or concerns at this time. NAD noted. Pt d/c home.

## 2020-09-21 ENCOUNTER — TELEPHONE (OUTPATIENT)
Dept: PHARMACY | Facility: CLINIC | Age: 75
End: 2020-09-21

## 2020-09-21 NOTE — TELEPHONE ENCOUNTER
Spoke with patient and confirmed she is holding Zejula due to thrombocytopenia and anemia. She will hold until after office visit on 10/2. She reports having about 18 capsules on hand. Will follow-up with patient after this visit and complete reassessment at this time if patient is to resume therapy.

## 2020-09-25 NOTE — PATIENT INSTRUCTIONS
WORKUP:  -- nerve conduction study     PREVENTION (use daily regardless of headache):  -- start lamotrigine 25mg daily (night or day) and increase up to the dose that works for you based on the table   -- start a magnesium supplement (either magnesium citrate 600mg daily or magnesium oxide 400mg twice per day) magnesium oxide may cause loose stools     ACUTE TREATMENT (use total no more than 10 days per month unless otherwise stated):  -- continue using rizatriptan, may repeat in 2 hours for a total of 3 tabs per day. May take with Advil or Aleve for increased efficacy.     ----------------------------------------------------------------------------------------------------------------    Lamotrigine (Lamictal) for Nerve Pain, Facial Pain and Headaches   Lamotrigine is a medication FDA-approved for certain types of seizures and mood disorders, but which has been proven effective against certain types of nerve pain. We   find that the drug is particularly useful in trigeminal neuralgia. Whether it works for migraines is less certain, but there is some evidence that it may be particularly effective   against neurological symptoms (auras) that accompany some migraines. It has also   been reported to be useful in certain very rare headache disorders (e.g., SUNCT,   ILIA).     SIDE EFFECTS   The drug is generally well-tolerated, but can cause a number of annoying side effects,   including GI symptoms, unsteadiness, clumsiness, blurred vision, and dizziness. It   usually does not cause weight gain or problems with thinking, however. As with all seizure drugs, it has the potential for causing low mood or even suicidality, so let your   providers know if you are experiencing mood problems on the medication. A particularly concerning side effect is a potentially dangerous, even life-threatening rash. If you are using the medication for pain control (rather than for seizures or mood problems), and you develop a rash, stop it  immediately and then contact us. Lamotrigine can also interact with other medications. Be sure that other healthcare providers are aware you are taking it.     DOSING   To minimize the risk of serious rash, the drug is increased slowly as below. It is typically   taken once a day, in the morning (at higher doses, it is taken twice daily). It usually does   not provide any significant benefit for painful conditions until at least 100mg a day is   reached. We typically check blood levels to increase further than 200mg.     For patients NOT taking any of carbamazepine (Tegretol), phenobarbital, phenytoin   (Dilantin), primidone (Mysoline), or divalproex (Depakote), dosing is as follows:       Lamotrigine     Week #  Daily dose    1-2  25mg daily    3-4 25mg + 25mg = 50mg daily    4-6 100mg daily    7 and after  100mg + 100mg = 200mg daily          ICU/ Cardiology

## 2020-10-02 ENCOUNTER — OFFICE VISIT (OUTPATIENT)
Dept: GYNECOLOGIC ONCOLOGY | Facility: CLINIC | Age: 75
End: 2020-10-02
Payer: MEDICARE

## 2020-10-02 ENCOUNTER — LAB VISIT (OUTPATIENT)
Dept: LAB | Facility: HOSPITAL | Age: 75
End: 2020-10-02
Attending: OBSTETRICS & GYNECOLOGY
Payer: MEDICARE

## 2020-10-02 DIAGNOSIS — D64.81 ANTINEOPLASTIC CHEMOTHERAPY INDUCED ANEMIA: ICD-10-CM

## 2020-10-02 DIAGNOSIS — C56.9 MALIGNANT NEOPLASM OF OVARY, UNSPECIFIED LATERALITY: Primary | ICD-10-CM

## 2020-10-02 DIAGNOSIS — C56.9 MALIGNANT NEOPLASM OF OVARY, UNSPECIFIED LATERALITY: ICD-10-CM

## 2020-10-02 DIAGNOSIS — K59.00 CONSTIPATION, UNSPECIFIED CONSTIPATION TYPE: ICD-10-CM

## 2020-10-02 DIAGNOSIS — T45.1X5A ANTINEOPLASTIC CHEMOTHERAPY INDUCED ANEMIA: ICD-10-CM

## 2020-10-02 DIAGNOSIS — G62.0 CHEMOTHERAPY-INDUCED NEUROPATHY: ICD-10-CM

## 2020-10-02 DIAGNOSIS — T45.1X5A CHEMOTHERAPY-INDUCED NEUROPATHY: ICD-10-CM

## 2020-10-02 DIAGNOSIS — R11.0 NAUSEA WITHOUT VOMITING: ICD-10-CM

## 2020-10-02 LAB
ALBUMIN SERPL BCP-MCNC: 4 G/DL (ref 3.5–5.2)
ALP SERPL-CCNC: 120 U/L (ref 55–135)
ALT SERPL W/O P-5'-P-CCNC: 38 U/L (ref 10–44)
ANION GAP SERPL CALC-SCNC: 9 MMOL/L (ref 8–16)
AST SERPL-CCNC: 34 U/L (ref 10–40)
BILIRUB SERPL-MCNC: 0.3 MG/DL (ref 0.1–1)
BUN SERPL-MCNC: 22 MG/DL (ref 8–23)
CALCIUM SERPL-MCNC: 9 MG/DL (ref 8.7–10.5)
CHLORIDE SERPL-SCNC: 106 MMOL/L (ref 95–110)
CO2 SERPL-SCNC: 26 MMOL/L (ref 23–29)
CREAT SERPL-MCNC: 0.8 MG/DL (ref 0.5–1.4)
ERYTHROCYTE [DISTWIDTH] IN BLOOD BY AUTOMATED COUNT: 17.9 % (ref 11.5–14.5)
EST. GFR  (AFRICAN AMERICAN): >60 ML/MIN/1.73 M^2
EST. GFR  (NON AFRICAN AMERICAN): >60 ML/MIN/1.73 M^2
GLUCOSE SERPL-MCNC: 81 MG/DL (ref 70–110)
HCT VFR BLD AUTO: 32.1 % (ref 37–48.5)
HGB BLD-MCNC: 10.6 G/DL (ref 12–16)
IMM GRANULOCYTES # BLD AUTO: 0.01 K/UL (ref 0–0.04)
MCH RBC QN AUTO: 35.2 PG (ref 27–31)
MCHC RBC AUTO-ENTMCNC: 33 G/DL (ref 32–36)
MCV RBC AUTO: 107 FL (ref 82–98)
NEUTROPHILS # BLD AUTO: 2.7 K/UL (ref 1.8–7.7)
PLATELET # BLD AUTO: 213 K/UL (ref 150–350)
PMV BLD AUTO: 9.2 FL (ref 9.2–12.9)
POTASSIUM SERPL-SCNC: 4.1 MMOL/L (ref 3.5–5.1)
PROT SERPL-MCNC: 6.9 G/DL (ref 6–8.4)
RBC # BLD AUTO: 3.01 M/UL (ref 4–5.4)
SODIUM SERPL-SCNC: 141 MMOL/L (ref 136–145)
WBC # BLD AUTO: 4.23 K/UL (ref 3.9–12.7)

## 2020-10-02 PROCEDURE — 85027 COMPLETE CBC AUTOMATED: CPT

## 2020-10-02 PROCEDURE — 36415 COLL VENOUS BLD VENIPUNCTURE: CPT

## 2020-10-02 PROCEDURE — 80053 COMPREHEN METABOLIC PANEL: CPT

## 2020-10-02 PROCEDURE — 99214 PR OFFICE/OUTPT VISIT, EST, LEVL IV, 30-39 MIN: ICD-10-PCS | Mod: 95,,, | Performed by: OBSTETRICS & GYNECOLOGY

## 2020-10-02 PROCEDURE — 99214 OFFICE O/P EST MOD 30 MIN: CPT | Mod: 95,,, | Performed by: OBSTETRICS & GYNECOLOGY

## 2020-10-02 NOTE — PROGRESS NOTES
Audio Only Telehealth Visit     The patient location is: home  The chief complaint leading to consultation is: ovarian cancer  Visit type: Virtual visit with audio only (telephone)  Total time spent with patient: 10     The reason for the audio only service rather than synchronous audio and video virtual visit was related to technical difficulties or patient preference/necessity.     Each patient to whom I provide medical services by telemedicine is:  (1) informed of the relationship between the physician and patient and the respective role of any other health care provider with respect to management of the patient; and (2) notified that they may decline to receive medical services by telemedicine and may withdraw from such care at any time. Patient verbally consented to receive this service via voice-only telephone call.       This service was not originating from a related E/M service provided within the previous 7 days nor will  to an E/M service or procedure within the next 24 hours or my soonest available appointment.  Prevailing standard of care was able to be met in this audio-only visit.      REFERRING PROVIDER  No ref. provider found     REASON FOR CONSULT  Margarita Dunne  is a 75 y.o.  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 Improved nausea. -Emesis. +Flatus. +BM. -VB, VD, or abdominal pain.      Oncology History   Malignant neoplasm of ovary   9/11/2019 Imaging Significant Findings    CT C/A/P: Irregularity of the uterus in this patient with thickened endometrium identified on prior ultrasound with small, nonspecific soft tissue nodules in the pelvis and abdomen with surrounding streaking in the greater omentum as described above.         9/20/2019 Tumor Markers     = 75     11/12/2019 Surgery    LAPAROTOMY, EXPLORATORY  HYSTERECTOMY, TOTAL, ABDOMINAL   SALPINGO-OOPHORECTOMY  (Bilateral)  OMENTECTOMY   APPENDECTOMY  EXCISION, TISSUE, PERITONEUM  RD 1 mm on the R diaphragm and sigmoid colon       12/20/2019 -  Chemotherapy    Treatment Summary   Plan Name: OP GYN PACLITAXEL CARBOPLATIN (AUC 6) Q3W  Treatment Goal: Curative  Status: Active  Start Date: 12/20/2019  End Date: 3/21/2020 (Planned)  Provider: Irvin Vinson MD  Chemotherapy: CARBOplatin (PARAPLATIN) 505 mg in sodium chloride 0.9% 250 mL chemo infusion, 505 mg (90.8 % of original dose 555.6 mg), Intravenous, Clinic/HOD 1 time, 5 of 6 cycles  Dose modification:   (original dose 555.6 mg, Cycle 1)  Administration: 505 mg (12/20/2019), 550 mg (1/10/2020), 460 mg (1/31/2020), 415 mg (2/28/2020)  PACLitaxel (TAXOL) 175 mg/m2 = 294 mg in sodium chloride 0.9% 500 mL chemo infusion, 175 mg/m2 = 294 mg, Intravenous, Clinic/HOD 1 time, 5 of 6 cycles  Administration: 294 mg (12/20/2019), 294 mg (1/10/2020), 294 mg (1/31/2020), 294 mg (2/28/2020), 294 mg (3/20/2020)    Completed 5 cycles of carboplatin/paclitex q21 days.  Opted out of cycle #6 due to COVID.  Chemotherapy was complicated by:  1. Cycle 2: Chemotherapy induced thrombocytopenia (104) and neutropenia (1.4)  2. Cycle 3: AUC 5 due to thrombocytopenia  3. Cycle 5: Grade 1 neuropathy    Her AUC was reduced to 5.  She didn't require CSF.          1/28/2020 Genetic Testing    [facility]: no mutations detected.     3/19/2020 Tumor Markers     = 12     4/13/2020 - 9/4/2020 Maintenance Therapy    Nirparib 300 mg qDaily     5/6/2020 Imaging Significant Findings    CT C/A/P: SILVINA    (This CT scan was delayed 1 month after completion of 5 cycles of adjuvant CT on 3/30.  It was performed after starting on Niraparib maintenance therapy.  The patient opted to not receive cycle #6 due to COVID and then delay her CT scan by 1 month due to COVID)     7/9/2020 Tumor Markers    CA-125 = 9     7/9/2020 Imaging Significant Findings    CT C/A/P: 5 mm left lung nodule     10/2/2020 -  Maintenance  Therapy    Niraparib 200 mg qDaily (dose reduction due to chemotherapy induced anemia requiring 2U PBRC)        The following portions of the patient's history were reviewed and updated as appropriate: allergies, current medications, family history, medical history, social history and surgical history.    REVIEW OF SYSTEMS  All systems reviewed and negative except as noted in HPI.    OBJECTIVE   There were no vitals filed for this visit.   There is no height or weight on file to calculate BMI.      ECOG status: 2    LABORATORY DATA  Lab data reviewed.    RADIOLOGICAL DATA  Radiology data reviewed.    ASSESSMENT / PLAN     1. Malignant neoplasm of ovary, unspecified laterality    2. Antineoplastic chemotherapy induced anemia    3. Chemotherapy-induced neuropathy    4. Nausea without vomiting    5. Constipation, unspecified constipation type       Resume Niraparib at reduced dose of 200 mg qDaily  CBC, CMP, CA-125, RONC 1 month      PATIENT EDUCATION  Ready to learn, no apparent learning barriers were identified; learning preferences include listening. Explained diagnosis and treatment plan; patient expressed understanding of the content.    ADMINISTRATIVE BILLING  Greater than 50% of was spent in counseling.

## 2020-10-03 ENCOUNTER — PATIENT MESSAGE (OUTPATIENT)
Dept: GYNECOLOGIC ONCOLOGY | Facility: CLINIC | Age: 75
End: 2020-10-03

## 2020-10-05 NOTE — TELEPHONE ENCOUNTER
Spoke with patient regarding Zejula. She confirmed she had a virtual visit with Dr. Vinson on 10/2. She was instructed its ok to resume as her platelets are now within normal limits (213k on 10/2/2020) and hgb has increased from 6.3 (9/4/2020) to 10.6 (10/2/2020). Ms. Dunne reports feeling well today.     She noted that Dr. Vinson mentioned reducing the dose by 1/3, however, she was unsure if this was a reduction of 1/3 from full 300mg dose or reducing her most recent dose of 200mg by 1/3. Discussed that it is most likely a reduction of 1/3 from full 300mg/day dose, as current office notes recommend she restart at a dose of 200mg/day, but will confirm with provider. Ms. Dunne expressed understanding. Once dose is confirmed by provider, will reach back out to patient, discuss dose and set up shipment.

## 2020-10-09 ENCOUNTER — TELEPHONE (OUTPATIENT)
Dept: PHARMACY | Facility: CLINIC | Age: 75
End: 2020-10-09

## 2020-10-09 NOTE — TELEPHONE ENCOUNTER
Contacted patient regarding her Zejula therapy to confirm that she resumed therapy at 200 mg/day dosing as noted in her [facility] IT[employer] message with MD office, complete clinical follow, and get dose count of medication on hand to assess if refill is needed at this time. Patient states that her MD verbally instructed her resume at 100 mg/day two days ago. Informed her that it appears she was instructed to resume at 200 mg/day dose based on the [facility] IT[employer] message sent to her by MD office. Patient states MD thought she was previously on the 300 mg/day, which is why they initially instructed her to resume at 200 mg/day, but states they later verbally told her to reduce to 100 mg//day. Patient has 16 - 100 mg capsules on hand. Informed patient that I will follow up with MD to clarify dose reduction and request new script reflecting her current regimen to have on file prior to her refill needed by ~10/25. Patient verbalized understanding. She would prefer to hold off doing her clinical follow up until her refill call. She has no questions or concerns at this time. She is aware to contact [facility] if she needs anything. **Will close out care plan at this time as patient confirmed she resumed therapy on 10/6. Opening physician intervention to confirm patient's Zejula dose and request new script reflecting current regimen. Will pend refill activity and follow up to 1 week when patient should have ~9 day supply on hand to complete.**

## 2020-10-13 DIAGNOSIS — C56.9 MALIGNANT NEOPLASM OF OVARY, UNSPECIFIED LATERALITY: Primary | ICD-10-CM

## 2020-10-13 NOTE — TELEPHONE ENCOUNTER
Dosing, how taking Zejula: Patient reports that she is now taking 1 capsule (100mg) by mouth once daily. Decreased due to previous side effects on higher dose. Recently restarted but notes no missed doses.   Storage: room temperature  Handling: put into cap and does not touch pills directly. Aware of appropriate handling precautions.   Side effects: Offered to review over common side effects and warnings/precautions related to Zejula; however, patient declined. Previously, she stated that she was experiencing headaches, achiness all over, nausea, and decreased energy at increased dose. Although she still has headaches, she finds that the other side effects have improved.   Recent infections: no fever, chills, cough, SOB  Pain: 3/10 today. When she has her headaches, pain is much higher (8/10)  Appetite: good. Weight has increased but she is at a comfortable weight at this time. She lost quite a bit before. Trying to drink enough water each day.  Energy, fatigue: has improved. Denies any missed planned activities due to fatigue or decreased energy.   Health, mood, QOL: 6-7/10 (10 being the best)  ED/UC visits: no  Next clinical follow up: 3 months  Medication list reviewed. No new allergies or health conditions.

## 2020-10-14 ENCOUNTER — HOSPITAL ENCOUNTER (OUTPATIENT)
Dept: RADIOLOGY | Facility: HOSPITAL | Age: 75
Discharge: HOME | End: 2020-10-14
Attending: OBSTETRICS & GYNECOLOGY
Payer: MEDICARE

## 2020-10-14 ENCOUNTER — PATIENT MESSAGE (OUTPATIENT)
Dept: GYNECOLOGIC ONCOLOGY | Facility: CLINIC | Age: 75
End: 2020-10-14

## 2020-10-14 DIAGNOSIS — C56.9 MALIGNANT NEOPLASM OF OVARY, UNSPECIFIED LATERALITY: ICD-10-CM

## 2020-10-14 PROCEDURE — A9698 NON-RAD CONTRAST MATERIALNOC: HCPCS | Performed by: OBSTETRICS & GYNECOLOGY

## 2020-10-14 PROCEDURE — 25500020 PHARM REV CODE 255: Performed by: OBSTETRICS & GYNECOLOGY

## 2020-10-14 PROCEDURE — 74176 CT ABD & PELVIS W/O CONTRAST: CPT | Mod: TC

## 2020-10-14 PROCEDURE — 74176 CT CHEST ABDOMEN PELVIS WITHOUT CONTRAST(XPD): ICD-10-PCS | Mod: 26,,, | Performed by: RADIOLOGY

## 2020-10-14 PROCEDURE — 74176 CT ABD & PELVIS W/O CONTRAST: CPT | Mod: 26,,, | Performed by: RADIOLOGY

## 2020-10-14 PROCEDURE — 71250 CT CHEST ABDOMEN PELVIS WITHOUT CONTRAST(XPD): ICD-10-PCS | Mod: 26,,, | Performed by: RADIOLOGY

## 2020-10-14 PROCEDURE — 71250 CT THORAX DX C-: CPT | Mod: TC

## 2020-10-14 PROCEDURE — 71250 CT THORAX DX C-: CPT | Mod: 26,,, | Performed by: RADIOLOGY

## 2020-10-14 RX ADMIN — IOHEXOL 1000 ML: 9 SOLUTION ORAL at 12:10

## 2020-10-15 NOTE — TELEPHONE ENCOUNTER
Patient reached regarding dose change of Zejula. Ms. Dunne was aware of the dose reduction to 100mg/day and has already started taking the reduced dose. She currently has about 4 days of medication on hand (however based on last count, she may have about 7 days, patient did not wish to provide exact count). Denies missed doses and confirmed daily dose of 1 capsule (100mg). She is reducing her dose due to reduced platelets and side effects such as body aches, headaches and fatigue. Reduced dose appropriate at this time.     Confirmed readiness for refill. She will  her Zejula on 10/19 after her eye appointment at 2pm. No new medications, allergies or health conditions to report. No ER/urgent care visits. No questions or concerns about side effects. Copay $7.02. Two patient identifiers confirmed - name and . Therapy appropriate.

## 2020-10-19 ENCOUNTER — CLINICAL SUPPORT (OUTPATIENT)
Dept: OPHTHALMOLOGY | Facility: CLINIC | Age: 75
End: 2020-10-19
Payer: MEDICARE

## 2020-10-19 ENCOUNTER — OFFICE VISIT (OUTPATIENT)
Dept: OPHTHALMOLOGY | Facility: CLINIC | Age: 75
End: 2020-10-19
Payer: MEDICARE

## 2020-10-19 DIAGNOSIS — H25.13 NUCLEAR SCLEROTIC CATARACT OF BOTH EYES: ICD-10-CM

## 2020-10-19 DIAGNOSIS — H40.013 OPEN ANGLE WITH BORDERLINE FINDINGS AND LOW GLAUCOMA RISK IN BOTH EYES: Primary | ICD-10-CM

## 2020-10-19 DIAGNOSIS — G43.709 CHRONIC MIGRAINE WITHOUT AURA WITHOUT STATUS MIGRAINOSUS, NOT INTRACTABLE: ICD-10-CM

## 2020-10-19 DIAGNOSIS — H53.433 ARCUATE VISUAL FIELD DEFECT OF BOTH EYES: ICD-10-CM

## 2020-10-19 DIAGNOSIS — H47.013: ICD-10-CM

## 2020-10-19 PROCEDURE — 99999 PR PBB SHADOW E&M-EST. PATIENT-LVL III: CPT | Mod: PBBFAC,,, | Performed by: OPHTHALMOLOGY

## 2020-10-19 PROCEDURE — 99999 PR PBB SHADOW E&M-EST. PATIENT-LVL III: ICD-10-PCS | Mod: PBBFAC,,, | Performed by: OPHTHALMOLOGY

## 2020-10-19 PROCEDURE — 92083 EXTENDED VISUAL FIELD XM: CPT | Mod: PBBFAC | Performed by: OPHTHALMOLOGY

## 2020-10-19 PROCEDURE — 92012 INTRM OPH EXAM EST PATIENT: CPT | Mod: S$PBB,,, | Performed by: OPHTHALMOLOGY

## 2020-10-19 PROCEDURE — 92012 PR EYE EXAM, EST PATIENT,INTERMED: ICD-10-PCS | Mod: S$PBB,,, | Performed by: OPHTHALMOLOGY

## 2020-10-19 PROCEDURE — 92083 HUMPHREY VISUAL FIELD - OU - BOTH EYES: ICD-10-PCS | Mod: 26,S$PBB,, | Performed by: OPHTHALMOLOGY

## 2020-10-19 PROCEDURE — 99213 OFFICE O/P EST LOW 20 MIN: CPT | Mod: PBBFAC,25 | Performed by: OPHTHALMOLOGY

## 2020-10-19 NOTE — PROGRESS NOTES
HPI     Patient is 75 y.o. female here for annual f/u. Patient last seen on   6/3/2019 by Dr. Painting and was dx w open angle w borderline findings   and low glaucoma risk OU. Patient states that vision hasn't changed and   denies any other complaints.    No gtts      Last edited by Melanie Armando on 10/19/2020  2:46 PM. (History)          Assessment /Plan     For exam results, see Encounter Report.    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 NON-GLAUCOMATOUS VF LOSS- etiology uncertain- ?AION     - ?? 2/2 vascular spasms assoc with migraines  Has seen neuro-ophthalmologist - Dr. Bhakta   Has seen Dr. Iyer - [facility]     2. Glaucoma suspect 2/2 VF loss - see above - would be LTG // felt to be AION's not glaucoma   First HVF 1999   First photos 1994     Family history neg   Glaucoma meds none   H/O adverse rxn to glaucoma drops none   LASERS none   GLAUCOMA SURGERIES none   OTHER EYE SURGERIES none   CDR 0.8 with sup notch / 0.85 with sup notch   Tbase 11-16 / 13-16   Tmax 16/16   Ttarget 14 OU  HVF 15 test 1999 to 2020 - IAD od // IAD os   Gonio +3 ou with pale TM   /586   OCT 5 tests 2006 to 2019 - RNFL - dec. S od / dec S os   HRT 4 tests 2007 to 2014 - MR nl od / dec S/ IT os   Disc photos 1994, 2005-slides / 3/30/2009, 8/2/2011, 2019  - OIS     - Ttoday 10/10  - Test done today HVF    3. Nuclear sclerosis - not visually significant. Observe. MR =Rx today. Distance only. Patient happy w/ OTC readers    4. Migraines   -Triggered by certain foods, sausage, cheese, conley   -More frequent - pre-menopausal     5. Steroid exposure - ? Steroid responder - on every other day inhaled steroids     Plan   Stable IOP  VF  Appears stable today and has been  stable for some time now  Patient not very interested in starting any IOP drops  Discussed with patient that her nerves do appear glaucomatous with the notching but still not interested in treatment  Photos from 1994, 2005 and 2011 all similar    Cataracts may start becoming visually significnat- can start testing at future appointments    Pt wants to see if she needs  Glasses - she use to use them - old pt of [facility] - make appt     Patient had steroid injections in joints in March- but IOP remains good- no more steroid injections    RTC  6 month HRT -  ? same day as ave for HRT / gonio

## 2020-10-26 ENCOUNTER — OFFICE VISIT (OUTPATIENT)
Dept: NEUROLOGY | Facility: CLINIC | Age: 75
End: 2020-10-26
Payer: MEDICARE

## 2020-10-26 VITALS
HEIGHT: 64 IN | WEIGHT: 141.75 LBS | HEART RATE: 92 BPM | SYSTOLIC BLOOD PRESSURE: 140 MMHG | BODY MASS INDEX: 24.2 KG/M2 | DIASTOLIC BLOOD PRESSURE: 74 MMHG

## 2020-10-26 DIAGNOSIS — M54.2 CERVICALGIA: ICD-10-CM

## 2020-10-26 DIAGNOSIS — G43.109 MIGRAINE WITH AURA AND WITHOUT STATUS MIGRAINOSUS, NOT INTRACTABLE: ICD-10-CM

## 2020-10-26 DIAGNOSIS — G60.3 IDIOPATHIC PROGRESSIVE POLYNEUROPATHY: Primary | ICD-10-CM

## 2020-10-26 DIAGNOSIS — R73.03 PREDIABETES: ICD-10-CM

## 2020-10-26 PROCEDURE — 99999 PR PBB SHADOW E&M-EST. PATIENT-LVL IV: CPT | Mod: PBBFAC,,, | Performed by: PSYCHIATRY & NEUROLOGY

## 2020-10-26 PROCEDURE — 99214 OFFICE O/P EST MOD 30 MIN: CPT | Mod: PBBFAC | Performed by: PSYCHIATRY & NEUROLOGY

## 2020-10-26 PROCEDURE — 99215 PR OFFICE/OUTPT VISIT, EST, LEVL V, 40-54 MIN: ICD-10-PCS | Mod: S$PBB,,, | Performed by: PSYCHIATRY & NEUROLOGY

## 2020-10-26 PROCEDURE — 99215 OFFICE O/P EST HI 40 MIN: CPT | Mod: S$PBB,,, | Performed by: PSYCHIATRY & NEUROLOGY

## 2020-10-26 PROCEDURE — 99999 PR PBB SHADOW E&M-EST. PATIENT-LVL IV: ICD-10-PCS | Mod: PBBFAC,,, | Performed by: PSYCHIATRY & NEUROLOGY

## 2020-10-26 RX ORDER — DULOXETIN HYDROCHLORIDE 30 MG/1
30 CAPSULE, DELAYED RELEASE ORAL DAILY
Qty: 30 CAPSULE | Refills: 11 | Status: SHIPPED | OUTPATIENT
Start: 2020-10-26 | End: 2020-11-02 | Stop reason: ALTCHOICE

## 2020-10-26 RX ORDER — RIZATRIPTAN BENZOATE 10 MG/1
TABLET ORAL
Qty: 10 TABLET | Refills: 11 | Status: SHIPPED | OUTPATIENT
Start: 2020-10-26 | End: 2021-01-01 | Stop reason: SDUPTHER

## 2020-10-26 RX ORDER — DULOXETIN HYDROCHLORIDE 30 MG/1
30 CAPSULE, DELAYED RELEASE ORAL NIGHTLY
Qty: 10 CAPSULE | Refills: 0 | Status: SHIPPED | OUTPATIENT
Start: 2020-10-26 | End: 2020-11-02 | Stop reason: ALTCHOICE

## 2020-10-26 RX ORDER — NORTRIPTYLINE HYDROCHLORIDE 10 MG/1
10 CAPSULE ORAL NIGHTLY
Qty: 30 CAPSULE | Refills: 11 | Status: SHIPPED | OUTPATIENT
Start: 2020-10-26 | End: 2021-01-01

## 2020-10-26 NOTE — PROGRESS NOTES
NEUROLOGY  Outpatient Follow Up    [address] [address]  [phone] (office) / [phone] ( (fax)    Patient Name:  Margarita Dunne  :  1945  MR #:  [id number]  Acct #:  [id number]    Date of Neurology Visit: 10/29/2020  Name of Neurologist: Kaylie Oneal MD    Other Physicians:  Columba Anderson MD (Primary Care Physician); No ref. provider found (Referring)      Chief Complaint: Follow-up      History of Present Illness (HPI):  Margarita Dunne is a 75 y.o. female  here for evaluation numbness/ tingling involving the bottom of her feet presents for follow up.    She wants to go down on Cymbalta ,she is not sure if this helped. She read up that Cymbalta can cause Tardive dyskinesia and she does not want to continue the medication. Headaches are not any better.    Neuropathy:   The pain is in the anterior plantar aspect of the foot. It is 6/10 in intensity. If she is sitting the pain is not there, it is worse when she moves around. She does not think Cymbalta helped this. She has had left hand symptoms for several years which she describes as pain in the wrist and palm region. The foot symptoms are constant. She also has symptoms over the dorsum of her feet. The feet symptoms happen when she moves her feet. The left hand hurts at all times. She applies Voltaren gel on her left hand and feet which helps her symptoms.     The left wrist has been hurting for 1 year. She thinks feet symptoms have been going on for 1-2 years and has worsened to the point where she has it all the time. Fell once but dog caused her to fall. No weakness.     She has two types of headaches- one is migraine preceeded by aura and the other is facial headache pain which are headaches she wakes up with.     First type      migraine:   Headache: Increased in frequency since she started the oral Niraparib. She stopped the Niraparib for a month and she hs not noted a change in her migraines  "  Severity: 7-8/10  Type: throbbing   Location:right temple or left temple and whole side of face   Frequency: 3/ week on average ( when she moves from one home to another , the travel/ new home may trigger her headache and crossing the bridge makes it worse)  Duration:  lasts several hours can roll over into the next day, Maxalt always works ( runs out of Maxalt every month)  Aura: sees sparkling lights- moves from one side to another- left to right mainly but can be right to left- lasts 30 minutes   Photophobia:++  Phonophobia:++  Nausea/ vomiting: ++, rarely has had episodes of vomiting   Aggravating factors: sparkling/ flashing light  Relieving factors: Rizatriptan- takes it at aura onset and has no headache after, acetaminophen- reduces the intensity 4/10 , food helps   Previously failed therapies:  Has taken several medications since college years, does not remember names   Autonomic symptoms: nose runs at times   Activity during headache: goes to sleep to help       Headache: "face headache"  Type: achy type   Intensity: 5-6/10  Location: across forehead, top of cheeks or bilateral jaw  Frequency:  Wakes up with it at times , happens 7 days/ week - goes away once she eats and drinks her coffeee   Duration: usually 1-2 hours very rarely will last all day  Aura: none   Photophobia: none   Phonophobia: none   Nausea/ vomiting: rare nausea   Aggravating factors:  No specific reason  Relieving factors: 2 acetaminophen helps diminish the headache, sometimes will take it away, eating helps these headaches always    Previously failed therapies:  None   Autonomic symptoms: nose runs at times   Activity during headache:  No limitation   Hormonal therapies: none   Previous or current pregnancy: never   Sleep: does not know if she snores at night, does not feel refreshed when she wakes up, she has had accidental naps         Initial HPI:     She was last seen by Dr. Delong on 12/21/2017 for migraine " management.    November 2019 diagnosed with ovarian cancer s/p chemotherapy. She received iv Paclitaxel/ Carboplatin chemotherapy in December and completed the treatment in January 2020 and is on oral Niraparib now. She states she had foot pain in the past but the numbness and tingling came on after she received her chemotherapy early this year. She noted difficulty walking and her toe gets caught and that is when she began to notoce the sensory symptoms. The pain is in the anterior plantar aspect of the foot. It is 6/10 in intensity. If she is sitting the pain is not there, it is worse when she moves around. She has not taken medication for it. She has had left hand symptoms for several years which she describes as pain in the wrist and palm region. These symptoms are ocassional when she carries something. The foot symptoms are constant.    Duration: 6 months   Location: bilateral feet   Intensity: moderate to severe   Aggravating factors: walking on feet  Relieving factors: resting   Frequency: moderate to severe  Timing: all day  Associated symptoms: tingling       Ovarian cancer history:  On maintenance Niraparib for a gBRCA-, sBRCA- stage IIIC HGSOCShe underwent placlitaxel/ Carboplatin iv.        Smoking: none    Alcohol: none     She has developed a new type of headache different from her migraines and her migraines have increased in frequency. Migraine and new headache history is mentioned below-    First type of migraine:   Headache: Increased in frequency since she started the oral Niraparib.   Type: throbbing   Location:right temple and whole side of face   Frequency: 5 days/ week , sometimes has 2 migraines in one day   Duration: always takes medication - lessens the headache- lasts several hours can roll over into the next day  Aura: sees sparkling lights- moves from one side to another- left to right mainly but can be right to left- lasts 30 minutes   Photophobia:++  Phonophobia:++  Nausea/ vomiting:  "++, rarely has had episodes of vomiting   Aggravating factors: sparkling/ flashing light  Relieving factors: Rizatriptan- takes it at aura onset, acetaminophen, food helps   Previously failed therapies:  Has taken several medications since college years, does not remember names   Autonomic symptoms: nose runs at times   Activity during headache: goes to sleep to help             She additionally has new " face" headaches outside of her migraines.    Headache: "face headache"  Type: achy type   Intensity: 5-6/10  Location: across forehead, top of cheeks or bilateral jaw  Frequency:  Wakes up with it at times , happens 5 days./ week   Duration: can last all day , shortest duration- 1 hour   Aura: none   Photophobia: none   Phonophobia: none   Nausea/ vomiting: sometimes nausea   Aggravating factors:  Relieving factors: 2 acetaminophen helps diminish the headache, sometimes will take it away, eating helps these headaches always    Previously failed therapies:  None   Autonomic symptoms: nose runs at times   Activity during headache:  Can limit activity if severe   Hormonal therapies: none   Previous or current pregnancy: never     Has had motion sickness for years     Treatment to date:   None    Review of Systems:    General: Weight gain: Yes, Weight Loss: Nio, Fatigue: Yes,   Fever: No, Chills: No, Night Sweats: Yes, Insomnia: No, Excessive sleeping: No   Respiratory:  Cough: No, Shortness of Breath: Yes,   Wheezing: No, Excessive Snoring: No, Coughing up blood: No  Endocrine: Heat Intolerance: Yes, Cold Intolerance: Yes,   Excessive Thirst: No, Excessive Hunger: No,   Eyes:  Blurred Vision: No, Double Vision: No   Light Sensitivity: Yes, Eye pain: No  Musculoskeletal: Muscle Aches/Pain: Yes, Joint Pain/Swelling:Yes , Muscle Cramps: Yes, Muscle Weakness: No, Neck Pain: Yes, Back Pain: No  Neurological: Difficulty Walking/Falls: Yes, Headache Migraine: Yes, Dizziness/Vertigo: No, Fainting: No, Difficulty with Speech: " No, Weakness: Yes, Tingling/Numbness: Yes, Tremors: No, Memory Problems: No, Seizures: No, Difficulty Swallowing: No, Altered Taste: No.  Cardiovascular: Chest Pain: No, Shortness of Breath: Yes,   Palpitations: No  Gastrointestinal: Nausea/Vomiting: No, Constipation: Yes, Diarrhea: No, Bloody Stools: No   Psych/Cog:  Depression: No, Anxiety: Yes, Hallucinations: No, Problems Concentrating: Yes  : Frequent Urination: No, Incontinence: No, Blood of Urine: No, Urinary Infections: No, Changes in Sex Drive: No   ENT:Hearing Loss: No, Earache: No, Ringing in Ears: No,   Facial Pain: Yes, Chronic Congestion: No   Immune: Seasonal Allergies: Yes, Hives and/or Rashes: No  The remainder of the review of twelve body systems was reviewed and normal.    Past Medical, Surgical, Family & Social History:   Past Medical History:   Diagnosis Date    Allergy     Angio-edema     Anxiety     Arthritis     Asthma     Cancer     Cataract     Chest pain at rest     Depression     Diverticulosis     Fibromyalgia 7/8/2012    Glaucoma suspect, steroid responders     Hand joint pain 7/8/2012    Headache(784.0)     Hx of psychiatric care     Migraine     Osteoporosis     Psychiatric problem     Recurrent upper respiratory infection (URI)     Shingles 12/8/2019    Sleep difficulties     Therapy     Urticaria        Home Medications:     Current Outpatient Medications:     albuterol (PROVENTIL) 2.5 mg /3 mL (0.083 %) nebulizer solution, Take 3 mLs (2.5 mg total) by nebulization every 6 (six) hours as needed for Wheezing. Rescue, Disp: 90 mL, Rfl: 1    albuterol (PROVENTIL/VENTOLIN HFA) 90 mcg/actuation inhaler, Inhale 2 puffs into the lungs every 6 (six) hours as needed for Wheezing. Rescue, Disp: 1 Inhaler, Rfl: 11    budesonide-formoterol 80-4.5 mcg (SYMBICORT) 80-4.5 mcg/actuation HFAA, Inhale 2 puffs into the lungs once daily. Controller, Disp: 1 Inhaler, Rfl: 11    DULoxetine (CYMBALTA) 30 MG capsule, Take 1  "capsule (30 mg total) by mouth every evening., Disp: 10 capsule, Rfl: 0    methylcellulose (CITRUCEL ORAL), Take by mouth 2 (two) times daily., Disp: , Rfl:     niraparib (ZEJULA) 100 mg Cap, Take 100 mg by mouth once daily., Disp: 30 capsule, Rfl: 5    ondansetron (ZOFRAN) 4 MG tablet, Take 1 tablet (4 mg total) by mouth every 6 (six) hours as needed for Nausea., Disp: 60 tablet, Rfl: 1    polyethylene glycol (GLYCOLAX) 17 gram PwPk, Take 1 g by mouth once daily., Disp: , Rfl:     rizatriptan (MAXALT) 10 MG tablet, TAKE ONE TABLET BY MOUTH AT ONSET OF MIGRAINE, MAY REPEAT EVERY 2 HOURS. DO NOT EXCEED 3 TABLETS IN 24 HOURS., Disp: 10 tablet, Rfl: 11    rosuvastatin (CRESTOR) 5 MG tablet, Take 1 tablet (5 mg total) by mouth every evening., Disp: 30 tablet, Rfl: 5    senna (SENOKOT) 8.6 mg tablet, Take 1 tablet by mouth 2 (two) times daily., Disp: 60 tablet, Rfl: 2    valACYclovir (VALTREX) 500 MG tablet, Take 1 tablet (500 mg total) by mouth daily as needed., Disp: 30 tablet, Rfl: 0    VOLTAREN 1 % Gel, APPLY 4 GRAMS TOPICALLY 4 TIMES A DAY AS DIRECTED, Disp: 200 g, Rfl: 0    DULoxetine (CYMBALTA) 30 MG capsule, Take 1 capsule (30 mg total) by mouth once daily., Disp: 30 capsule, Rfl: 11    nortriptyline (PAMELOR) 10 MG capsule, Take 1 capsule (10 mg total) by mouth every evening., Disp: 30 capsule, Rfl: 11    Current Facility-Administered Medications:     0.9%  NaCl infusion (for blood administration), , Intravenous, Q24H PRN, Irvin Vinson MD    0.9%  NaCl infusion (for blood administration), , Intravenous, Q24H PRN, Irvin Vinson MD    sodium chloride 0.9% flush 10 mL, 10 mL, Intravenous, PRN, Irvin Vinson MD    Physical Examination:  BP (!) 140/74   Pulse 92   Ht 5' 4" (1.626 m)   Wt 64.3 kg (141 lb 12.1 oz)   LMP  (LMP Unknown)   BMI 24.33 kg/m²     GENERAL:  General appearance: Well, non-toxic appearing.  No apparent distress.  Fundi exam: normal.  Neck: supple.  Carotid auscultation: " normal.  Heart auscultation: normal.  Peripheral pulses: normal.  Extremities: normal.    MENTAL STATUS:  Alertness, attention span & concentration: normal.  Language: normal.  Orientation to self, place & time:  normal.  Memory, recent & remote: normal.  Fund of knowledge: normal.      SPEECH:  Clear and fluent.  Follows complex commands.    CRANIAL NERVES:  Cranial Nerves II-XII were examined.  II - Visual fields: normal- except loss of vision in lower visual fields bilaterally   III, IV, VI: PERRL, EOMI, No ptosis, No nystagmus.  V - Facial sensation: normal.  VII - Face symmetry & mobility: normal.  VIII - Hearing: normal.  IX, X - Palate: mobile & midline.  XI - Shoulder shrug: normal.  XII - Tongue protrusion: normal.    GROSS MOTOR:  Gait & station:Slightly wide based, unable to tandem, unable to walk on heels   Tone: normal.  Abnormal movements: none.  Finger-nose & Heel-knee-shin: normal.  Rapid alternating movements & drift: normal.  Romberg: positive    MUSCLE STRENGTH:     Fascics Atrophy RIGHT    LEFT Atrophy Fascics     5 Neck Ext. 5       5 Neck Flex 5       5 Deltoids 5       5 Sh.Ext.Rot. 5       5 Sh.Int.Rot. 5       5 Biceps 5       5 Triceps 5       5 Forearm.Pr. 5       5 Wrist Ext. 5       5 Wrist Flex 5       5 Finger Ext. 5       5 Finger Flex 5       5 FPL 5       5 Inteross. 5                         5 Iliopsoas 5       5 Hip Abduct 5       5 Hip Adduct 5       5 Quads 5       5 Hams 5       5 Dorsiflex 5       5 Plantar Flex 5       5 Ankle Rubio 5       5 Ankle Invert 5       4 Toe Ext. 4       4+ Toe Flex 4                         REFLEXES:    RIGHT Reflex   LEFT   1+ Biceps 1+   1+ Brachiorad. 1+   1+ Triceps 1+        2+ Patellar 3+   0 Ankle 0        Down PLANTAR Down     SENSORY:  Light touch: Normal throughout.  Sharp touch: Normal on the right, gradient to left hip, at left hip- 3 seconds  Vibration:  gradient to left hip, at left hip- 3 seconds, on the right absent to knee- at  knee- 3 seconds  Temperature: Gradient to knees bilaterally   Joint Position: Lost to low amplitude change at the toes, intact to high amplitude changes    Diagnostic Data Reviewed:   3/4/2015:  NCS of upper extremity: WNL  Patient refused EMG    7/24/2020:  Selenium- 142.2  ESR- 43  RPR- negative  SERGEY: wnl  hbA1c- 5.8  B1-33  B6-6  Vitamin E-1201  Copper- 1223    Assessment and Plan:    Margarita Dunne is a 75 y.o. female  here for evaluation numbness/ tingling involving the bottom of her feet presents for follow up.     She reports pain, numbness and tingling involving the anterior plantar aspect of her feet. Examination with evidence of significant loss of sensation to all sensory modalities left > right. 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 In addition other possible etiologies include low B1 level and pre-diabetes.  She has developed new  Morning headaches upon awakening  and has chronic migraine with aura. She is unsure of exact number of migraines/ month, states she often runs out of Maxalt.     I spoke to the patient at length about Cymbalta not being typically associated with TD. She still prefers to come off medication. I spoke to her about a trial of Nortryptiline and she agrees with the same. She does not want to try Propranolol because she states she has bradycardia at baseline. I reassured her that she is typically not bradycardic. She does not want to try Botox or Emgality.     MRI brain obtained at outside facility     Assessment:  1. Idiopathic progressive polyneuropathy  2. Chronic migraine with aura  3. Bilateral facial headaches ( short lasting )  4. Cervical spine stenosis     Plan:  1. Taper off Duloxetine - schedule given to patient(patient does not want to continue)  2. MRI brain with and without contrast to evaluate new headaches- wnl  3. Daily am headaches resolve after 1-2 hours could be due to underlying sleep apnea. The  patient does not want to pursue sleep study testing at this time.   4. Continue Rizatriptan for acute migraine management.  5. Start Nortryptiline 10 mg nightly for neuropathic pain and migraine prophylaxis   6. Hyperreflexia at the knees, neck pain would like to evaluate for cervical spine stenosis     7. Patient asked to maintain a headache diary  8. Recheck B1 and HbA1c     Future:  Repeat ESR, CRP  Repeat EMG     The patient will return to clinic in 3 months.    Time Spent: 40 minutes spent face-to-face, >50% spent advising about: counseling and/or coordination of care    This note was created with voice recognition software.  Grammatical, syntax and spelling errors may be inevitable.           Kaylie Oneal MD  Medicine- Neurology, Clinical Neurophysiology    MRI brain without contrast on 08/07/2020:  Mild senescent/atrophy changes with a areas of white matter signal abnormality, these are nonspecific but most likely represent microangiopathic changes.  2.  Small bilateral mastoid effusions and mild bilateral ethmoid mucosal thickening.  3.  No acute abnormality

## 2020-10-26 NOTE — PATIENT INSTRUCTIONS
Thanks for coming    You have chronic migraine     You want to taper off Cymbalta given concerns that it can cause tradive dyskinesia which is unlikely   I recommend 30 mg daily x 7 days then stop    Then start nortryptiline 10 mg at night. Let me know how you do on this 4 weeks in     This will bring your headaches down     Please maintain a headache diary    You do not want to sleep study, think about it     Blood work    MRI C spine

## 2020-11-02 ENCOUNTER — PATIENT MESSAGE (OUTPATIENT)
Dept: NEUROLOGY | Facility: CLINIC | Age: 75
End: 2020-11-02

## 2020-11-02 ENCOUNTER — LAB VISIT (OUTPATIENT)
Dept: LAB | Facility: OTHER | Age: 75
End: 2020-11-02
Attending: OBSTETRICS & GYNECOLOGY
Payer: MEDICARE

## 2020-11-02 ENCOUNTER — OFFICE VISIT (OUTPATIENT)
Dept: GYNECOLOGIC ONCOLOGY | Facility: CLINIC | Age: 75
End: 2020-11-02
Payer: MEDICARE

## 2020-11-02 VITALS
WEIGHT: 142.19 LBS | DIASTOLIC BLOOD PRESSURE: 83 MMHG | HEART RATE: 94 BPM | BODY MASS INDEX: 24.41 KG/M2 | SYSTOLIC BLOOD PRESSURE: 143 MMHG

## 2020-11-02 DIAGNOSIS — C56.9 MALIGNANT NEOPLASM OF OVARY, UNSPECIFIED LATERALITY: Primary | ICD-10-CM

## 2020-11-02 DIAGNOSIS — C56.9 MALIGNANT NEOPLASM OF OVARY, UNSPECIFIED LATERALITY: ICD-10-CM

## 2020-11-02 LAB
ANION GAP SERPL CALC-SCNC: 10 MMOL/L (ref 8–16)
BUN SERPL-MCNC: 15 MG/DL (ref 8–23)
CALCIUM SERPL-MCNC: 9.2 MG/DL (ref 8.7–10.5)
CANCER AG125 SERPL-ACNC: 10 U/ML (ref 0–30)
CHLORIDE SERPL-SCNC: 107 MMOL/L (ref 95–110)
CO2 SERPL-SCNC: 24 MMOL/L (ref 23–29)
CREAT SERPL-MCNC: 0.8 MG/DL (ref 0.5–1.4)
ERYTHROCYTE [DISTWIDTH] IN BLOOD BY AUTOMATED COUNT: 15.2 % (ref 11.5–14.5)
EST. GFR  (AFRICAN AMERICAN): >60 ML/MIN/1.73 M^2
EST. GFR  (NON AFRICAN AMERICAN): >60 ML/MIN/1.73 M^2
GLUCOSE SERPL-MCNC: 93 MG/DL (ref 70–110)
HCT VFR BLD AUTO: 37.8 % (ref 37–48.5)
HGB BLD-MCNC: 12.3 G/DL (ref 12–16)
IMM GRANULOCYTES # BLD AUTO: 0.01 K/UL (ref 0–0.04)
MCH RBC QN AUTO: 34.1 PG (ref 27–31)
MCHC RBC AUTO-ENTMCNC: 32.5 G/DL (ref 32–36)
MCV RBC AUTO: 105 FL (ref 82–98)
NEUTROPHILS # BLD AUTO: 2.5 K/UL (ref 1.8–7.7)
PLATELET # BLD AUTO: 174 K/UL (ref 150–350)
PMV BLD AUTO: 9 FL (ref 9.2–12.9)
POTASSIUM SERPL-SCNC: 4.3 MMOL/L (ref 3.5–5.1)
RBC # BLD AUTO: 3.61 M/UL (ref 4–5.4)
SODIUM SERPL-SCNC: 141 MMOL/L (ref 136–145)
WBC # BLD AUTO: 4 K/UL (ref 3.9–12.7)

## 2020-11-02 PROCEDURE — 36415 COLL VENOUS BLD VENIPUNCTURE: CPT

## 2020-11-02 PROCEDURE — 99214 OFFICE O/P EST MOD 30 MIN: CPT | Mod: S$PBB,,, | Performed by: OBSTETRICS & GYNECOLOGY

## 2020-11-02 PROCEDURE — 86304 IMMUNOASSAY TUMOR CA 125: CPT

## 2020-11-02 PROCEDURE — 99999 PR PBB SHADOW E&M-EST. PATIENT-LVL III: ICD-10-PCS | Mod: PBBFAC,,, | Performed by: OBSTETRICS & GYNECOLOGY

## 2020-11-02 PROCEDURE — 80048 BASIC METABOLIC PNL TOTAL CA: CPT

## 2020-11-02 PROCEDURE — 85027 COMPLETE CBC AUTOMATED: CPT

## 2020-11-02 PROCEDURE — 99999 PR PBB SHADOW E&M-EST. PATIENT-LVL III: CPT | Mod: PBBFAC,,, | Performed by: OBSTETRICS & GYNECOLOGY

## 2020-11-02 PROCEDURE — 99214 PR OFFICE/OUTPT VISIT, EST, LEVL IV, 30-39 MIN: ICD-10-PCS | Mod: S$PBB,,, | Performed by: OBSTETRICS & GYNECOLOGY

## 2020-11-02 PROCEDURE — 99213 OFFICE O/P EST LOW 20 MIN: CPT | Mod: PBBFAC | Performed by: OBSTETRICS & GYNECOLOGY

## 2020-11-02 NOTE — PROGRESS NOTES
REFERRING PROVIDER  No ref. provider found     REASON FOR CONSULT  Margarita Dunne  is a 75 y.o.  woman who presents on maintenance niraparib for a gBRCA-, sBRCA- stage IIIC HGSOC (no mutations on STRATA)      HISTORY OF PRESENT ILLNESS    Tolerating PO. Improved nausea. -Emesis. +Flatus. +BM. -VB, VD, or abdominal pain.      Oncology History   Malignant neoplasm of ovary   9/11/2019 Imaging Significant Findings    CT C/A/P: Irregularity of the uterus in this patient with thickened endometrium identified on prior ultrasound with small, nonspecific soft tissue nodules in the pelvis and abdomen with surrounding streaking in the greater omentum as described above.         9/20/2019 Tumor Markers     = 75     11/12/2019 Surgery    LAPAROTOMY, EXPLORATORY  HYSTERECTOMY, TOTAL, ABDOMINAL   SALPINGO-OOPHORECTOMY (Bilateral)  OMENTECTOMY   APPENDECTOMY  EXCISION, TISSUE, PERITONEUM  RD 1 mm on the R diaphragm and sigmoid colon       12/20/2019 -  Chemotherapy    Treatment Summary   Plan Name: OP GYN PACLITAXEL CARBOPLATIN (AUC 6) Q3W  Treatment Goal: Curative  Status: Active  Start Date: 12/20/2019  End Date: 3/21/2020 (Planned)  Provider: Irvin Vinson MD  Chemotherapy: CARBOplatin (PARAPLATIN) 505 mg in sodium chloride 0.9% 250 mL chemo infusion, 505 mg (90.8 % of original dose 555.6 mg), Intravenous, Clinic/HOD 1 time, 5 of 6 cycles  Dose modification:   (original dose 555.6 mg, Cycle 1)  Administration: 505 mg (12/20/2019), 550 mg (1/10/2020), 460 mg (1/31/2020), 415 mg (2/28/2020)  PACLitaxel (TAXOL) 175 mg/m2 = 294 mg in sodium chloride 0.9% 500 mL chemo infusion, 175 mg/m2 = 294 mg, Intravenous, Clinic/HOD 1 time, 5 of 6 cycles  Administration: 294 mg (12/20/2019), 294 mg (1/10/2020), 294 mg (1/31/2020), 294 mg (2/28/2020), 294 mg (3/20/2020)    Completed 5 cycles of carboplatin/paclitex q21 days.  Opted out of cycle #6 due to COVID.  Chemotherapy was complicated by:  1. Cycle 2: Chemotherapy induced  thrombocytopenia (104) and neutropenia (1.4)  2. Cycle 3: AUC 5 due to thrombocytopenia  3. Cycle 5: Grade 1 neuropathy    Her AUC was reduced to 5.  She didn't require CSF.          1/28/2020 Genetic Testing    Myriad: no mutations detected.     3/19/2020 Tumor Markers     = 12     4/13/2020 - 9/4/2020 Maintenance Therapy    Nirparib 300 mg qDaily     5/6/2020 Imaging Significant Findings    CT C/A/P: SILVINA    (This CT scan was delayed 1 month after completion of 5 cycles of adjuvant CT on 3/30.  It was performed after starting on Niraparib maintenance therapy.  The patient opted to not receive cycle #6 due to COVID and then delay her CT scan by 1 month due to COVID)     7/9/2020 Tumor Markers    CA-125 = 9     7/9/2020 Imaging Significant Findings    CT C/A/P: 5 mm left lung nodule     10/2/2020 -  Maintenance Therapy    Niraparib 100 mg qDaily (dose reduction due to chemotherapy induced anemia requiring 2U PBRC)     10/14/2020 Imaging Significant Findings    CT C/A/P: SILVINA.        The following portions of the patient's history were reviewed and updated as appropriate: allergies, current medications, family history, medical history, social history and surgical history.    REVIEW OF SYSTEMS  All systems reviewed and negative except as noted in HPI.    OBJECTIVE   Vitals:    11/02/20 1035   BP: (!) 143/83   Pulse: 94      Body mass index is 24.41 kg/m².      1. General: Well appearing, no apparent distress, alert and oriented.  2. Lymph: Neck symmetric without cervical or supraclavicular adenopathy or mass.  3. Lungs: Normal respiratory rate, no accessory muscle use.  4. Psych: Normal affect.  5. Abdomen:  non-distended, soft, non-tender, are no masses, no ascites, no hepatosplenomegaly.  6. Skin: Warm, dry, no rashes or lesions.   7. Extremities: Bilateral lower extremities without edema or tenderness.  8. Genitourinary               Pelvic Examination including:                a. External genitalia are normal  in appearance. No lesions noted.               b. Urethral meatus is normal size, location, and appearance.               c. Urethra is negative.               d. Bladder is nontender. No masses noted.               e. Vagina has normal mucosa with physiologic discharge. No lesions noted.              f. Uterus absent              g. Adnexa absent   h. Rectum normal    ECOG status: 2    LABORATORY DATA  Lab data reviewed.    RADIOLOGICAL DATA  Radiology data reviewed.    ASSESSMENT / PLAN     1. Malignant neoplasm of ovary, unspecified laterality         SILVINA. CBC, CMP, VV 1 month      PATIENT EDUCATION  Ready to learn, no apparent learning barriers were identified; learning preferences include listening. Explained diagnosis and treatment plan; patient expressed understanding of the content.    ADMINISTRATIVE BILLING  Greater than 50% of was spent in counseling.

## 2020-11-03 ENCOUNTER — PATIENT MESSAGE (OUTPATIENT)
Dept: GYNECOLOGIC ONCOLOGY | Facility: CLINIC | Age: 75
End: 2020-11-03

## 2020-11-03 ENCOUNTER — OFFICE VISIT (OUTPATIENT)
Dept: OPTOMETRY | Facility: CLINIC | Age: 75
End: 2020-11-03
Payer: MEDICARE

## 2020-11-03 DIAGNOSIS — H52.203 ASTIGMATISM OF BOTH EYES, UNSPECIFIED TYPE: ICD-10-CM

## 2020-11-03 DIAGNOSIS — H52.4 PRESBYOPIA OF BOTH EYES: ICD-10-CM

## 2020-11-03 DIAGNOSIS — H25.13 NUCLEAR SCLEROSIS OF BOTH EYES: Primary | ICD-10-CM

## 2020-11-03 PROCEDURE — 99213 OFFICE O/P EST LOW 20 MIN: CPT | Mod: PBBFAC | Performed by: OPTOMETRIST

## 2020-11-03 PROCEDURE — 99999 PR PBB SHADOW E&M-EST. PATIENT-LVL III: ICD-10-PCS | Mod: PBBFAC,,, | Performed by: OPTOMETRIST

## 2020-11-03 PROCEDURE — 99999 PR PBB SHADOW E&M-EST. PATIENT-LVL III: CPT | Mod: PBBFAC,,, | Performed by: OPTOMETRIST

## 2020-11-03 PROCEDURE — 92012 INTRM OPH EXAM EST PATIENT: CPT | Mod: S$PBB,,, | Performed by: OPTOMETRIST

## 2020-11-03 PROCEDURE — 92012 PR EYE EXAM, EST PATIENT,INTERMED: ICD-10-PCS | Mod: S$PBB,,, | Performed by: OPTOMETRIST

## 2020-11-03 PROCEDURE — 92015 PR REFRACTION: ICD-10-PCS | Mod: ,,, | Performed by: OPTOMETRIST

## 2020-11-03 PROCEDURE — 92015 DETERMINE REFRACTIVE STATE: CPT | Mod: ,,, | Performed by: OPTOMETRIST

## 2020-11-03 NOTE — PROGRESS NOTES
HPI     Refraction      Additional comments: In for recheck of refraction.  Recently saw Dr. Painting on 10/19/2020.  Followed as glaucoma suspect.  HVF test done then  Stable findings per patient.  No treatment for IOP control initiated.                 Comments     Patient's age: 75 y.o. WF  Occupation: retired   Approximate date of last eye examination:  10/19/2020 -    Name of last eye doctor seen: -10/11/2019  City/State: [facility]  Wears glasses? No      If yes, wears  Full-time or part-time?  Part time for distance   Wears CLs?:  n/o        Headaches?  Migraine headaches  Eye pain/discomfort?  no                                                                                    Flashes?  No  Floaters?  No   Diplopia/Double vision?  No  Patient's Ocular History:         Any eye surgeries? No         Any eye injury?  No         Any treatment for eye disease?  Followed as Glaucoma Suspect by Dr. Painting.  No treatment for IOP control/reduction in either eye.   Family history of eye disease?  P-Aunt Glaucoma  Significant patient medical history:         1. Diabetes?  No       If yes, IDDM or NIDDM?  n/a   2. HBP?  No              3. Other (describe):  Joint pain   ! OTC eyedrops currently using: no    ! Prescription eye meds currently using:  No   ! Any history of allergy/adverse reaction to any eye meds used   previously?  No   ! Any history of allergy/adverse reaction to eyedrops used during prior   eye exam(s)? No   ! Any history of allergy/adverse reaction to Novacaine or similar meds?   No   ! Any history of allergy/adverse reaction to Epinephrine or similar meds?   No     ! Patient okay with use of anesthetic eyedrops to check eye pressure?    Yes        ! Patient okay with use of eyedrops to dilate pupils today?  NO -   REQUESTS PUPILS NOT BE DILATED TODAY ONLY IF NEED NECESSARY    !  Allergies/Medications/Medical History/Family History reviewed today?    Yes      PD =    "66/63  Desired reading distance =   20"                                                                                                                      Last edited by Garth Larsen, OD on 11/3/2020 10:56 AM. (History)            Assessment /Plan     For exam results, see Encounter Report.    1. Nuclear sclerosis of both eyes     2. Astigmatism of both eyes, unspecified type     3. Presbyopia of both eyes                 Bilateral nuclear sclerosis, with bilateral central axial punctate lens opacities.  Still no need for cataract surgery in either eye at this time, based on the best-corrected VA achieved with refraction today.      Astigmatic refractive error in each eye, with satisfactory (and equal) best-corrected VA.  Presbyopia consistent with age.   New spectacle lens Rx issued for use as desired.     Followed by Dr. Painting as glaucoma suspect.  Mrs. Dunne notes prior finding of visual field defect.  Note prior finding of visual field defect in the inferonasal quadrant of the left eye on confrontation visual field test.   Not currently under treatment for IOP control/reduction.  Seeing Dr. Painting on regular basis.     Recheck refraction in one year - or prior if notes any problems noted in the interim.                 "

## 2020-11-03 NOTE — PATIENT INSTRUCTIONS
Bilateral nuclear sclerosis, with bilateral central axial punctate lens opacities.  Still no need for cataract surgery in either eye at this time, based on the best-corrected VA achieved with refraction today.      Astigmatic refractive error in each eye, with satisfactory (and equal) best-corrected VA.  Presbyopia consistent with age.   New spectacle lens Rx issued for use as desired.     Followed by Dr. Painting as glaucoma suspect.  Mrs. Dunne notes prior finding of visual field defect.  Note prior finding of visual field defect in the inferonasal quadrant of the left eye on confrontation visual field test.   Not currently under treatment for IOP control/reduction.  Seeing Dr. Painting on regular basis.     Recheck refraction in one year - or prior if notes any problems noted in the interim.

## 2020-11-06 ENCOUNTER — OFFICE VISIT (OUTPATIENT)
Dept: DERMATOLOGY | Facility: CLINIC | Age: 75
End: 2020-11-06
Payer: MEDICARE

## 2020-11-06 DIAGNOSIS — Z12.83 SCREENING EXAM FOR SKIN CANCER: ICD-10-CM

## 2020-11-06 DIAGNOSIS — D18.01 ANGIOMA OF SKIN: ICD-10-CM

## 2020-11-06 DIAGNOSIS — D22.9 BENIGN NEVUS: ICD-10-CM

## 2020-11-06 DIAGNOSIS — R20.2 NOTALGIA PARESTHETICA: Primary | ICD-10-CM

## 2020-11-06 PROCEDURE — 99202 OFFICE O/P NEW SF 15 MIN: CPT | Mod: S$PBB,,, | Performed by: DERMATOLOGY

## 2020-11-06 PROCEDURE — 99202 PR OFFICE/OUTPT VISIT, NEW, LEVL II, 15-29 MIN: ICD-10-PCS | Mod: S$PBB,,, | Performed by: DERMATOLOGY

## 2020-11-06 PROCEDURE — 99999 PR PBB SHADOW E&M-EST. PATIENT-LVL III: CPT | Mod: PBBFAC,,, | Performed by: DERMATOLOGY

## 2020-11-06 PROCEDURE — 99999 PR PBB SHADOW E&M-EST. PATIENT-LVL III: ICD-10-PCS | Mod: PBBFAC,,, | Performed by: DERMATOLOGY

## 2020-11-06 PROCEDURE — 99213 OFFICE O/P EST LOW 20 MIN: CPT | Mod: PBBFAC | Performed by: DERMATOLOGY

## 2020-11-06 RX ORDER — TRIAMCINOLONE ACETONIDE 1 MG/G
CREAM TOPICAL
Qty: 80 G | Refills: 3 | Status: ON HOLD | OUTPATIENT
Start: 2020-11-06 | End: 2021-01-01 | Stop reason: HOSPADM

## 2020-11-06 NOTE — PROGRESS NOTES
Subjective:       Patient ID:  Margarita Dunne is a 75 y.o. female who presents for   Chief Complaint   Patient presents with    Skin Check     ubse    Lesion     right back, left foot      Patient complains of lesion(s)  Location: right back  Duration: 3 mo  Symptoms: itch  Severity: hydrocortisone      Patient complains of lesion(s)  Location: left foot  Duration: 1 year  Symptoms: itch   treatment: hydrocortisone     No h/x nmsc  Last seen by derm 2 years ago. On NS.       Review of Systems   Skin: Positive for daily sunscreen use and activity-related sunscreen use. Negative for recent sunburn and wears hat.   Hematologic/Lymphatic: Does not bruise/bleed easily.        Objective:    Physical Exam   Constitutional: She appears well-developed and well-nourished. No distress.   Neurological: She is alert and oriented to person, place, and time. She is not disoriented.   Psychiatric: She has a normal mood and affect.   Skin:   Areas Examined (abnormalities noted in diagram):   Scalp / Hair Palpated and Inspected  Head / Face Inspection Performed  Neck Inspection Performed  Chest / Axilla Inspection Performed  Back Inspection Performed  RUE Inspected  LUE Inspection Performed  LLE Inspection Performed              Diagram Legend     Erythematous scaling macule/papule c/w actinic keratosis       Vascular papule c/w angioma      Pigmented verrucoid papule/plaque c/w seborrheic keratosis      Yellow umbilicated papule c/w sebaceous hyperplasia      Irregularly shaped tan macule c/w lentigo     1-2 mm smooth white papules consistent with Milia      Movable subcutaneous cyst with punctum c/w epidermal inclusion cyst      Subcutaneous movable cyst c/w pilar cyst      Firm pink to brown papule c/w dermatofibroma      Pedunculated fleshy papule(s) c/w skin tag(s)      Evenly pigmented macule c/w junctional nevus     Mildly variegated pigmented, slightly irregular-bordered macule c/w mildly atypical nevus      Flesh colored  to evenly pigmented papule c/w intradermal nevus       Pink pearly papule/plaque c/w basal cell carcinoma      Erythematous hyperkeratotic cursted plaque c/w SCC      Surgical scar with no sign of skin cancer recurrence      Open and closed comedones      Inflammatory papules and pustules      Verrucoid papule consistent consistent with wart     Erythematous eczematous patches and plaques     Dystrophic onycholytic nail with subungual debris c/w onychomycosis     Umbilicated papule    Erythematous-base heme-crusted tan verrucoid plaque consistent with inflamed seborrheic keratosis     Erythematous Silvery Scaling Plaque c/w Psoriasis     See annotation      Assessment / Plan:        Notalgia paresthetica - right back  Avoid hot water and avoid scratching  Use ice for extreme itching    -     triamcinolone acetonide 0.1% (KENALOG) 0.1 % cream; AAA back bid  Dispense: 80 g; Refill: 3    Screening exam for skin cancer  -     Ambulatory referral/consult to Dermatology    Upper body skin examination performed today including at least 6 points as noted in physical examination. No lesions suspicious for malignancy noted.    Benign nevus  This is a benign lesion. No further treatment is necessary.     Angioma of skin  This is a benign vascular lesion. Reassurance given. No treatment required.            Follow up if symptoms worsen or fail to improve.

## 2020-11-06 NOTE — LETTER
November 6, 2020      Columba Anderson MD  [address]  [facility] [address]           [facility] D[facility]at[facility] [address]  [address]  [facility] [address]  Phone: [phone]  Fax: [phone]          Patient: Margarita Dunne   MR Number: [id number]   YOB: 1945   Date of Visit: 11/6/2020       Dear Dr. Columba Anderson:    Thank you for referring Margarita Dunne to me for evaluation. Attached you will find relevant portions of my assessment and plan of care.    If you have questions, please do not hesitate to call me. I look forward to following Margarita Dunne along with you.    Sincerely,    Kelsey De León MD    Enclosure  CC:  No Recipients    If you would like to receive this communication electronically, please contact [email or web address] or [phone] to request more information on [employer] Link access.    For providers and/or their staff who would like to refer a patient to [facility], please contact us through our one-stop-shop provider referral line, [facility], at [phone].    If you feel you have received this communication in error or would no longer like to receive these types of communications, please e-mail [email or web address]

## 2020-11-11 ENCOUNTER — SPECIALTY PHARMACY (OUTPATIENT)
Dept: PHARMACY | Facility: CLINIC | Age: 75
End: 2020-11-11

## 2020-11-11 NOTE — TELEPHONE ENCOUNTER
Specialty Pharmacy - Refill Coordination    Specialty Medication Orders Linked to Encounter      Most Recent Value   Medication #1  niraparib (ZEJULA) 100 mg Cap (Order#[id number], Rx#[id number])          Refill Questions - Documented Responses      Most Recent Value   Relationship to patient of person spoken to?  Self   HIPAA/medical authority confirmed?  Yes   Any changes in contact preferences or allowed representatives?  No   Has the patient had any insurance changes?  No   Has the patient had any changes to specialty medication, dose, or instructions?  No   Has the patient started taking any new medications, herbals, or supplements?  No   Has the patient been diagnosed with any new medical conditions?  No   Does the patient have any new allergies to medications or foods?  No   Does the patient have any concerns about side effects?  No   Can the patient store medication/sharps container properly (at the correct temperature, away from children/pets, etc.)?  Yes   Can the patient call emergency services (911) in the event of an emergency?  Yes   Does the patient have any concerns or questions about taking or administering this medication as prescribed?  No   How many doses did the patient miss in the past 4 weeks or since the last fill?  0   How many doses does the patient have on hand?  7   How many days does the patient report on hand quantity will last?  7   Does the number of doses/days supply remaining match pharmacy expected amounts?  Yes   Does the patient feel that this medication is effective?  Yes   During the past 4 weeks, has patient missed any activities due to condition or medication?  No   During the past 4 weeks, did patient have any of the following urgent care visits?  None   How will the patient receive the medication?  Mail   When does the patient need to receive the medication?  11/18/20   Shipping Address  Home   Address in [facility] confirmed and updated if neccessary?  Yes    Expected Copay ($)  0   Is the patient able to afford the medication copay?  Yes   Payment Method  zero copay   Days supply of Refill  30   Would patient like to speak to a pharmacist?  No   Do you want to trigger an intervention?  No   Do you want to trigger an additional referral task?  No   Refill activity completed?  Yes   Refill activity plan  Refill scheduled   Shipment/Pickup Date:  11/12/20          Current Outpatient Medications   Medication Sig    albuterol (PROVENTIL) 2.5 mg /3 mL (0.083 %) nebulizer solution Take 3 mLs (2.5 mg total) by nebulization every 6 (six) hours as needed for Wheezing. Rescue    albuterol (PROVENTIL/VENTOLIN HFA) 90 mcg/actuation inhaler Inhale 2 puffs into the lungs every 6 (six) hours as needed for Wheezing. Rescue    budesonide-formoterol 80-4.5 mcg (SYMBICORT) 80-4.5 mcg/actuation HFAA Inhale 2 puffs into the lungs once daily. Controller    niraparib (ZEJULA) 100 mg Cap Take 100 mg by mouth once daily.    nortriptyline (PAMELOR) 10 MG capsule Take 1 capsule (10 mg total) by mouth every evening.    ondansetron (ZOFRAN) 4 MG tablet Take 1 tablet (4 mg total) by mouth every 6 (six) hours as needed for Nausea.    polyethylene glycol (GLYCOLAX) 17 gram PwPk Take 1 g by mouth once daily.    rizatriptan (MAXALT) 10 MG tablet TAKE ONE TABLET BY MOUTH AT ONSET OF MIGRAINE, MAY REPEAT EVERY 2 HOURS. DO NOT EXCEED 3 TABLETS IN 24 HOURS.    senna (SENOKOT) 8.6 mg tablet Take 1 tablet by mouth 2 (two) times daily.    triamcinolone acetonide 0.1% (KENALOG) 0.1 % cream AAA back bid    valACYclovir (VALTREX) 500 MG tablet TAKE 1 TABLET BY MOUTH ONCE DAILY AS NEEDED    VOLTAREN 1 % Gel APPLY 4 GRAMS TOPICALLY 4 TIMES A DAY AS DIRECTED   Last reviewed on 11/8/2020  7:07 PM by Garth Larsen OD    Review of patient's allergies indicates:   Allergen Reactions    Doxycycline Nausea And Vomiting    Corticosteroids (glucocorticoids) Other (See Comments)     Causes pt to have  migraines for 2 weeks    Pcn [penicillins] Rash    Sulfa (sulfonamide antibiotics) Rash    Last reviewed on  11/8/2020 7:07 PM by Garth Larsen      Tasks added this encounter   No tasks added.   Tasks due within next 3 months   11/10/2020 - Refill Call  1/8/2021 - Clinical - Follow Up Assesement (90 day)     Belen Jeffrey  [facility]  [address] [place] [address]  Phone: [phone]  Fax: [phone]

## 2020-11-12 NOTE — TELEPHONE ENCOUNTER
Picked up at [facility] on 11/12    Shoaib Malcolm, PharmD  Clinical Pharmacist  [facility]  P: [phone]

## 2020-11-23 ENCOUNTER — TELEPHONE (OUTPATIENT)
Dept: GYNECOLOGIC ONCOLOGY | Facility: CLINIC | Age: 75
End: 2020-11-23

## 2020-11-23 NOTE — TELEPHONE ENCOUNTER
Spoke with our patient about her reschedule appointment in gyn oncology she agreed she voiced understanding of the date, time and location. All questions answered appointment mail. MA/TRISTEN /Preceptor Chris ADDISON

## 2020-11-27 ENCOUNTER — PATIENT MESSAGE (OUTPATIENT)
Dept: NEUROLOGY | Facility: CLINIC | Age: 75
End: 2020-11-27

## 2020-12-02 ENCOUNTER — LAB VISIT (OUTPATIENT)
Dept: LAB | Facility: HOSPITAL | Age: 75
End: 2020-12-02
Attending: OBSTETRICS & GYNECOLOGY
Payer: MEDICARE

## 2020-12-02 ENCOUNTER — OFFICE VISIT (OUTPATIENT)
Dept: GYNECOLOGIC ONCOLOGY | Facility: CLINIC | Age: 75
End: 2020-12-02
Payer: MEDICARE

## 2020-12-02 DIAGNOSIS — C56.9 MALIGNANT NEOPLASM OF OVARY, UNSPECIFIED LATERALITY: Primary | ICD-10-CM

## 2020-12-02 DIAGNOSIS — C56.9 MALIGNANT NEOPLASM OF OVARY, UNSPECIFIED LATERALITY: ICD-10-CM

## 2020-12-02 LAB
ALBUMIN SERPL BCP-MCNC: 4.1 G/DL (ref 3.5–5.2)
ALP SERPL-CCNC: 138 U/L (ref 55–135)
ALT SERPL W/O P-5'-P-CCNC: 28 U/L (ref 10–44)
ANION GAP SERPL CALC-SCNC: 10 MMOL/L (ref 8–16)
AST SERPL-CCNC: 30 U/L (ref 10–40)
BILIRUB SERPL-MCNC: 0.3 MG/DL (ref 0.1–1)
BUN SERPL-MCNC: 23 MG/DL (ref 8–23)
CALCIUM SERPL-MCNC: 9.7 MG/DL (ref 8.7–10.5)
CANCER AG125 SERPL-ACNC: 10 U/ML (ref 0–30)
CHLORIDE SERPL-SCNC: 106 MMOL/L (ref 95–110)
CO2 SERPL-SCNC: 25 MMOL/L (ref 23–29)
CREAT SERPL-MCNC: 0.8 MG/DL (ref 0.5–1.4)
ERYTHROCYTE [DISTWIDTH] IN BLOOD BY AUTOMATED COUNT: 14.2 % (ref 11.5–14.5)
EST. GFR  (AFRICAN AMERICAN): >60 ML/MIN/1.73 M^2
EST. GFR  (NON AFRICAN AMERICAN): >60 ML/MIN/1.73 M^2
GLUCOSE SERPL-MCNC: 101 MG/DL (ref 70–110)
HCT VFR BLD AUTO: 37.8 % (ref 37–48.5)
HGB BLD-MCNC: 12.4 G/DL (ref 12–16)
IMM GRANULOCYTES # BLD AUTO: 0.02 K/UL (ref 0–0.04)
MCH RBC QN AUTO: 33.8 PG (ref 27–31)
MCHC RBC AUTO-ENTMCNC: 32.8 G/DL (ref 32–36)
MCV RBC AUTO: 103 FL (ref 82–98)
NEUTROPHILS # BLD AUTO: 2.6 K/UL (ref 1.8–7.7)
PLATELET # BLD AUTO: 185 K/UL (ref 150–350)
PMV BLD AUTO: 8.6 FL (ref 9.2–12.9)
POTASSIUM SERPL-SCNC: 4.6 MMOL/L (ref 3.5–5.1)
PROT SERPL-MCNC: 7.6 G/DL (ref 6–8.4)
RBC # BLD AUTO: 3.67 M/UL (ref 4–5.4)
SODIUM SERPL-SCNC: 141 MMOL/L (ref 136–145)
WBC # BLD AUTO: 4.21 K/UL (ref 3.9–12.7)

## 2020-12-02 PROCEDURE — 36415 COLL VENOUS BLD VENIPUNCTURE: CPT

## 2020-12-02 PROCEDURE — 99214 OFFICE O/P EST MOD 30 MIN: CPT | Mod: 95,,, | Performed by: OBSTETRICS & GYNECOLOGY

## 2020-12-02 PROCEDURE — 99214 PR OFFICE/OUTPT VISIT, EST, LEVL IV, 30-39 MIN: ICD-10-PCS | Mod: 95,,, | Performed by: OBSTETRICS & GYNECOLOGY

## 2020-12-02 PROCEDURE — 85027 COMPLETE CBC AUTOMATED: CPT

## 2020-12-02 PROCEDURE — 80053 COMPREHEN METABOLIC PANEL: CPT

## 2020-12-02 PROCEDURE — 86304 IMMUNOASSAY TUMOR CA 125: CPT

## 2020-12-02 NOTE — PROGRESS NOTES
REFERRING PROVIDER  No ref. provider found     REASON FOR CONSULT  Margarita Dunne  is a 75 y.o.  woman who presents on maintenance niraparib for a gBRCA-, sBRCA- stage IIIC HGSOC (no mutations on STRATA)      HISTORY OF PRESENT ILLNESS    Tolerating PO. Improved nausea. -Emesis. +Flatus. +BM. -VB, VD, or abdominal pain.      Oncology History   Malignant neoplasm of ovary   9/11/2019 Imaging Significant Findings    CT C/A/P: Irregularity of the uterus in this patient with thickened endometrium identified on prior ultrasound with small, nonspecific soft tissue nodules in the pelvis and abdomen with surrounding streaking in the greater omentum as described above.         9/20/2019 Tumor Markers     = 75     11/12/2019 Surgery    LAPAROTOMY, EXPLORATORY  HYSTERECTOMY, TOTAL, ABDOMINAL   SALPINGO-OOPHORECTOMY (Bilateral)  OMENTECTOMY   APPENDECTOMY  EXCISION, TISSUE, PERITONEUM  RD 1 mm on the R diaphragm and sigmoid colon       12/20/2019 -  Chemotherapy    Treatment Summary   Plan Name: OP GYN PACLITAXEL CARBOPLATIN (AUC 6) Q3W  Treatment Goal: Curative  Status: Active  Start Date: 12/20/2019  End Date: 3/21/2020 (Planned)  Provider: Irvin Vinson MD  Chemotherapy: CARBOplatin (PARAPLATIN) 505 mg in sodium chloride 0.9% 250 mL chemo infusion, 505 mg (90.8 % of original dose 555.6 mg), Intravenous, Clinic/HOD 1 time, 5 of 6 cycles  Dose modification:   (original dose 555.6 mg, Cycle 1)  Administration: 505 mg (12/20/2019), 550 mg (1/10/2020), 460 mg (1/31/2020), 415 mg (2/28/2020)  PACLitaxel (TAXOL) 175 mg/m2 = 294 mg in sodium chloride 0.9% 500 mL chemo infusion, 175 mg/m2 = 294 mg, Intravenous, Clinic/HOD 1 time, 5 of 6 cycles  Administration: 294 mg (12/20/2019), 294 mg (1/10/2020), 294 mg (1/31/2020), 294 mg (2/28/2020), 294 mg (3/20/2020)    Completed 5 cycles of carboplatin/paclitex q21 days.  Opted out of cycle #6 due to COVID.  Chemotherapy was complicated by:  1. Cycle 2: Chemotherapy induced  thrombocytopenia (104) and neutropenia (1.4)  2. Cycle 3: AUC 5 due to thrombocytopenia  3. Cycle 5: Grade 1 neuropathy    Her AUC was reduced to 5.  She didn't require CSF.          1/28/2020 Genetic Testing    Myriad: no mutations detected.     3/19/2020 Tumor Markers     = 12     4/13/2020 - 9/4/2020 Maintenance Therapy    Nirparib 300 mg qDaily     5/6/2020 Imaging Significant Findings    CT C/A/P: SILVINA    (This CT scan was delayed 1 month after completion of 5 cycles of adjuvant CT on 3/30.  It was performed after starting on Niraparib maintenance therapy.  The patient opted to not receive cycle #6 due to COVID and then delay her CT scan by 1 month due to COVID)     7/9/2020 Tumor Markers    CA-125 = 9     7/9/2020 Imaging Significant Findings    CT C/A/P: 5 mm left lung nodule     10/2/2020 -  Maintenance Therapy    Niraparib 100 mg qDaily (dose reduction due to chemotherapy induced anemia requiring 2U PBRC)     10/14/2020 Imaging Significant Findings    CT C/A/P: SILVINA.        The following portions of the patient's history were reviewed and updated as appropriate: allergies, current medications, family history, medical history, social history and surgical history.    REVIEW OF SYSTEMS  All systems reviewed and negative except as noted in HPI.    OBJECTIVE   There were no vitals filed for this visit.   There is no height or weight on file to calculate BMI.      1. General: Well appearing, no apparent distress, alert and oriented.  2. Lymph: Neck symmetric without cervical or supraclavicular adenopathy or mass.  3. Lungs: Normal respiratory rate, no accessory muscle use.  4. Psych: Normal affect.  5. Abdomen:  non-distended, soft, non-tender, are no masses, no ascites, no hepatosplenomegaly.    ECOG status: 2    LABORATORY DATA  Lab data reviewed.    RADIOLOGICAL DATA  Radiology data reviewed.    ASSESSMENT / PLAN     1. Malignant neoplasm of ovary, unspecified laterality         SILVINA. CBC, CMP, CA-125, CT  C/A/P, [facility] 1 month      PATIENT EDUCATION  Ready to learn, no apparent learning barriers were identified; learning preferences include listening. Explained diagnosis and treatment plan; patient expressed understanding of the content.    ADMINISTRATIVE BILLING  Greater than 50% of was spent in counseling.

## 2020-12-13 ENCOUNTER — PATIENT MESSAGE (OUTPATIENT)
Dept: NEUROLOGY | Facility: CLINIC | Age: 75
End: 2020-12-13

## 2020-12-18 ENCOUNTER — SPECIALTY PHARMACY (OUTPATIENT)
Dept: PHARMACY | Facility: CLINIC | Age: 75
End: 2020-12-18

## 2020-12-18 NOTE — TELEPHONE ENCOUNTER
Specialty Pharmacy - Refill Coordination    Specialty Medication Orders Linked to Encounter      Most Recent Value   Medication #1  niraparib (ZEJULA) 100 mg Cap (Order#[id number], Rx#[id number])          Refill Questions - Documented Responses      Most Recent Value   Relationship to patient of person spoken to?  Self   HIPAA/medical authority confirmed?  Yes   Any changes in contact preferences or allowed representatives?  No   Has the patient had any insurance changes?  No   Has the patient had any changes to specialty medication, dose, or instructions?  No   Has the patient started taking any new medications, herbals, or supplements?  No   Has the patient been diagnosed with any new medical conditions?  No   Does the patient have any new allergies to medications or foods?  No   Does the patient have any concerns about side effects?  No   Can the patient store medication/sharps container properly (at the correct temperature, away from children/pets, etc.)?  Yes   Can the patient call emergency services (911) in the event of an emergency?  Yes   Does the patient have any concerns or questions about taking or administering this medication as prescribed?  No   How many doses did the patient miss in the past 4 weeks or since the last fill?  0   How many doses does the patient have on hand?  4   How many days does the patient report on hand quantity will last?  4   Does the number of doses/days supply remaining match pharmacy expected amounts?  Yes   Does the patient feel that this medication is effective?  Yes   During the past 4 weeks, has patient missed any activities due to condition or medication?  No   During the past 4 weeks, did patient have any of the following urgent care visits?  None   How will the patient receive the medication?  Pickup   When does the patient need to receive the medication?  12/22/20   Address in [facility] confirmed and updated if neccessary?  Yes   Expected Copay ($)  0   Is the  patient able to afford the medication copay?  Yes   Payment Method  zero copay   Days supply of Refill  30   Would patient like to speak to a pharmacist?  No   Do you want to trigger an intervention?  No   Do you want to trigger an additional referral task?  No   Refill activity completed?  Yes   Refill activity plan  Refill scheduled   Shipment/Pickup Date:  12/22/20          Current Outpatient Medications   Medication Sig Note    albuterol (PROVENTIL) 2.5 mg /3 mL (0.083 %) nebulizer solution Take 3 mLs (2.5 mg total) by nebulization every 6 (six) hours as needed for Wheezing. Rescue     albuterol (PROVENTIL/VENTOLIN HFA) 90 mcg/actuation inhaler Inhale 2 puffs into the lungs every 6 (six) hours as needed for Wheezing. Rescue     budesonide-formoterol 80-4.5 mcg (SYMBICORT) 80-4.5 mcg/actuation HFAA Inhale 2 puffs into the lungs once daily. Controller     niraparib (ZEJULA) 100 mg Cap Take 100 mg by mouth once daily.     nortriptyline (PAMELOR) 10 MG capsule Take 1 capsule (10 mg total) by mouth every evening. (Patient not taking: Reported on 12/18/2020) 12/18/2020: Patient stated she stopped taking 2 days ago. Alerted [facility] EARLY    ondansetron (ZOFRAN) 4 MG tablet Take 1 tablet (4 mg total) by mouth every 6 (six) hours as needed for Nausea.     polyethylene glycol (GLYCOLAX) 17 gram PwPk Take 1 g by mouth once daily.     rizatriptan (MAXALT) 10 MG tablet TAKE ONE TABLET BY MOUTH AT ONSET OF MIGRAINE, MAY REPEAT EVERY 2 HOURS. DO NOT EXCEED 3 TABLETS IN 24 HOURS.     senna (SENOKOT) 8.6 mg tablet Take 1 tablet by mouth 2 (two) times daily.     triamcinolone acetonide 0.1% (KENALOG) 0.1 % cream AAA back bid     valACYclovir (VALTREX) 500 MG tablet TAKE 1 TABLET BY MOUTH ONCE DAILY AS NEEDED     VOLTAREN 1 % Gel APPLY 4 GRAMS TOPICALLY 4 TIMES A DAY AS DIRECTED    Last reviewed on 12/2/2020 11:04 AM by Irvin Vinson MD    Review of patient's allergies indicates:   Allergen Reactions    Doxycycline  Nausea And Vomiting    Corticosteroids (glucocorticoids) Other (See Comments)     Causes pt to have migraines for 2 weeks    Pcn [penicillins] Rash    Sulfa (sulfonamide antibiotics) Rash    Last reviewed on  12/2/2020 11:04 AM by Irvin Vinson      Tasks added this encounter   1/10/2021 - Refill Call (Auto Added)  12/23/2020 - Pickup Reminder   Tasks due within next 3 months   1/8/2021 - Clinical - Follow Up Assesement (90 day)     Alexandra Pardo  [facility] - Specialty Pharmacy  [address] [place] [id number]  Phone: [phone]  Fax: [phone]

## 2020-12-21 ENCOUNTER — TELEPHONE (OUTPATIENT)
Dept: GYNECOLOGIC ONCOLOGY | Facility: CLINIC | Age: 75
End: 2020-12-21

## 2021-01-01 ENCOUNTER — TELEPHONE (OUTPATIENT)
Dept: INTERNAL MEDICINE | Facility: CLINIC | Age: 76
End: 2021-01-01

## 2021-01-01 ENCOUNTER — OFFICE VISIT (OUTPATIENT)
Dept: OPHTHALMOLOGY | Facility: CLINIC | Age: 76
End: 2021-01-01
Payer: MEDICARE

## 2021-01-01 ENCOUNTER — HOSPITAL ENCOUNTER (OUTPATIENT)
Dept: RADIOLOGY | Facility: HOSPITAL | Age: 76
Discharge: HOME | End: 2021-11-19
Attending: OBSTETRICS & GYNECOLOGY
Payer: MEDICARE

## 2021-01-01 ENCOUNTER — PATIENT MESSAGE (OUTPATIENT)
Dept: GYNECOLOGIC ONCOLOGY | Facility: CLINIC | Age: 76
End: 2021-01-01

## 2021-01-01 ENCOUNTER — TELEPHONE (OUTPATIENT)
Dept: GYNECOLOGIC ONCOLOGY | Facility: CLINIC | Age: 76
End: 2021-01-01
Payer: MEDICARE

## 2021-01-01 ENCOUNTER — SPECIALTY PHARMACY (OUTPATIENT)
Dept: PHARMACY | Facility: CLINIC | Age: 76
End: 2021-01-01

## 2021-01-01 ENCOUNTER — IMMUNIZATION (OUTPATIENT)
Dept: INTERNAL MEDICINE | Facility: CLINIC | Age: 76
End: 2021-01-01
Payer: MEDICARE

## 2021-01-01 ENCOUNTER — HOSPITAL ENCOUNTER (INPATIENT)
Facility: HOSPITAL | Age: 76
LOS: 2 days | Discharge: NURSING, REHAB OR LONG-TERM CARE | DRG: 065 | End: 2021-12-24
Attending: EMERGENCY MEDICINE | Admitting: HOSPITALIST
Payer: MEDICARE

## 2021-01-01 ENCOUNTER — OFFICE VISIT (OUTPATIENT)
Dept: NEUROLOGY | Facility: CLINIC | Age: 76
End: 2021-01-01
Payer: MEDICARE

## 2021-01-01 ENCOUNTER — DOCUMENTATION ONLY (OUTPATIENT)
Dept: ONCOLOGY | Facility: HOSPITAL | Age: 76
End: 2021-01-01
Payer: MEDICARE

## 2021-01-01 ENCOUNTER — TELEPHONE (OUTPATIENT)
Dept: GYNECOLOGIC ONCOLOGY | Facility: CLINIC | Age: 76
End: 2021-01-01

## 2021-01-01 ENCOUNTER — PATIENT MESSAGE (OUTPATIENT)
Dept: INTERNAL MEDICINE | Facility: CLINIC | Age: 76
End: 2021-01-01

## 2021-01-01 ENCOUNTER — PATIENT MESSAGE (OUTPATIENT)
Dept: DERMATOLOGY | Facility: CLINIC | Age: 76
End: 2021-01-01

## 2021-01-01 ENCOUNTER — PATIENT MESSAGE (OUTPATIENT)
Dept: NEUROLOGY | Facility: CLINIC | Age: 76
End: 2021-01-01

## 2021-01-01 ENCOUNTER — PATIENT MESSAGE (OUTPATIENT)
Dept: GYNECOLOGIC ONCOLOGY | Facility: CLINIC | Age: 76
End: 2021-01-01
Payer: MEDICARE

## 2021-01-01 ENCOUNTER — OFFICE VISIT (OUTPATIENT)
Dept: GYNECOLOGIC ONCOLOGY | Facility: CLINIC | Age: 76
End: 2021-01-01
Payer: MEDICARE

## 2021-01-01 ENCOUNTER — EXTERNAL HOSPITAL ADMISSION (OUTPATIENT)
Dept: SKILLED NURSING FACILITY | Facility: HOSPITAL | Age: 76
End: 2021-01-01
Payer: MEDICARE

## 2021-01-01 ENCOUNTER — HOSPITAL ENCOUNTER (OUTPATIENT)
Dept: RADIOLOGY | Facility: HOSPITAL | Age: 76
Discharge: HOME | End: 2021-06-03
Attending: OBSTETRICS & GYNECOLOGY
Payer: MEDICARE

## 2021-01-01 ENCOUNTER — OFFICE VISIT (OUTPATIENT)
Dept: SURGERY | Facility: CLINIC | Age: 76
End: 2021-01-01
Payer: MEDICARE

## 2021-01-01 ENCOUNTER — PATIENT MESSAGE (OUTPATIENT)
Dept: PSYCHIATRY | Facility: CLINIC | Age: 76
End: 2021-01-01
Payer: MEDICARE

## 2021-01-01 ENCOUNTER — HOSPITAL ENCOUNTER (INPATIENT)
Facility: HOSPITAL | Age: 76
LOS: 3 days | Discharge: HOME | DRG: 884 | End: 2021-12-17
Attending: EMERGENCY MEDICINE | Admitting: EMERGENCY MEDICINE
Payer: MEDICARE

## 2021-01-01 ENCOUNTER — LAB VISIT (OUTPATIENT)
Dept: LAB | Facility: HOSPITAL | Age: 76
End: 2021-01-01
Attending: OBSTETRICS & GYNECOLOGY
Payer: MEDICARE

## 2021-01-01 ENCOUNTER — LAB VISIT (OUTPATIENT)
Dept: LAB | Facility: OTHER | Age: 76
End: 2021-01-01
Attending: PSYCHIATRY & NEUROLOGY
Payer: MEDICARE

## 2021-01-01 ENCOUNTER — PATIENT MESSAGE (OUTPATIENT)
Dept: OBSTETRICS AND GYNECOLOGY | Facility: CLINIC | Age: 76
End: 2021-01-01

## 2021-01-01 ENCOUNTER — PES CALL (OUTPATIENT)
Dept: ADMINISTRATIVE | Facility: CLINIC | Age: 76
End: 2021-01-01

## 2021-01-01 ENCOUNTER — LAB VISIT (OUTPATIENT)
Dept: LAB | Facility: HOSPITAL | Age: 76
End: 2021-01-01
Attending: INTERNAL MEDICINE
Payer: MEDICARE

## 2021-01-01 ENCOUNTER — HOSPITAL ENCOUNTER (OUTPATIENT)
Dept: RADIOLOGY | Facility: HOSPITAL | Age: 76
Discharge: HOME | End: 2021-06-23
Attending: OBSTETRICS & GYNECOLOGY
Payer: MEDICARE

## 2021-01-01 ENCOUNTER — TELEPHONE (OUTPATIENT)
Dept: SURGERY | Facility: CLINIC | Age: 76
End: 2021-01-01

## 2021-01-01 ENCOUNTER — PATIENT OUTREACH (OUTPATIENT)
Dept: ADMINISTRATIVE | Facility: OTHER | Age: 76
End: 2021-01-01

## 2021-01-01 ENCOUNTER — LAB VISIT (OUTPATIENT)
Dept: LAB | Facility: HOSPITAL | Age: 76
End: 2021-01-01
Attending: PSYCHIATRY & NEUROLOGY
Payer: MEDICARE

## 2021-01-01 ENCOUNTER — PATIENT MESSAGE (OUTPATIENT)
Dept: INTERNAL MEDICINE | Facility: CLINIC | Age: 76
End: 2021-01-01
Payer: MEDICARE

## 2021-01-01 ENCOUNTER — OFFICE VISIT (OUTPATIENT)
Dept: PSYCHIATRY | Facility: CLINIC | Age: 76
End: 2021-01-01
Payer: MEDICARE

## 2021-01-01 ENCOUNTER — PATIENT MESSAGE (OUTPATIENT)
Dept: PHARMACY | Facility: CLINIC | Age: 76
End: 2021-01-01

## 2021-01-01 ENCOUNTER — TELEPHONE (OUTPATIENT)
Dept: INTERNAL MEDICINE | Facility: CLINIC | Age: 76
End: 2021-01-01
Payer: MEDICARE

## 2021-01-01 ENCOUNTER — OFFICE VISIT (OUTPATIENT)
Dept: INTERNAL MEDICINE | Facility: CLINIC | Age: 76
End: 2021-01-01
Payer: MEDICARE

## 2021-01-01 ENCOUNTER — OFFICE VISIT (OUTPATIENT)
Dept: DERMATOLOGY | Facility: CLINIC | Age: 76
End: 2021-01-01
Payer: MEDICARE

## 2021-01-01 ENCOUNTER — PATIENT MESSAGE (OUTPATIENT)
Dept: SURGERY | Facility: CLINIC | Age: 76
End: 2021-01-01

## 2021-01-01 ENCOUNTER — OFFICE VISIT (OUTPATIENT)
Dept: OPTOMETRY | Facility: CLINIC | Age: 76
End: 2021-01-01
Payer: MEDICARE

## 2021-01-01 ENCOUNTER — TELEPHONE (OUTPATIENT)
Dept: NEUROLOGY | Facility: CLINIC | Age: 76
End: 2021-01-01

## 2021-01-01 ENCOUNTER — OFFICE VISIT (OUTPATIENT)
Dept: GASTROENTEROLOGY | Facility: CLINIC | Age: 76
End: 2021-01-01
Payer: MEDICARE

## 2021-01-01 ENCOUNTER — PES CALL (OUTPATIENT)
Dept: HOME HEALTH SERVICES | Facility: CLINIC | Age: 76
End: 2021-01-01
Payer: MEDICARE

## 2021-01-01 ENCOUNTER — PATIENT MESSAGE (OUTPATIENT)
Dept: GASTROENTEROLOGY | Facility: CLINIC | Age: 76
End: 2021-01-01
Payer: MEDICARE

## 2021-01-01 ENCOUNTER — HOSPITAL ENCOUNTER (OUTPATIENT)
Dept: RADIOLOGY | Facility: HOSPITAL | Age: 76
Discharge: HOME | End: 2021-10-28
Attending: INTERNAL MEDICINE
Payer: MEDICARE

## 2021-01-01 ENCOUNTER — HOSPITAL ENCOUNTER (OUTPATIENT)
Dept: RADIOLOGY | Facility: HOSPITAL | Age: 76
Discharge: HOME | End: 2021-08-02
Attending: OBSTETRICS & GYNECOLOGY
Payer: MEDICARE

## 2021-01-01 ENCOUNTER — HOSPITAL ENCOUNTER (OUTPATIENT)
Dept: RADIOLOGY | Facility: HOSPITAL | Age: 76
Discharge: HOME | End: 2021-08-26
Attending: STUDENT IN AN ORGANIZED HEALTH CARE EDUCATION/TRAINING PROGRAM
Payer: MEDICARE

## 2021-01-01 ENCOUNTER — TELEPHONE (OUTPATIENT)
Dept: INFUSION THERAPY | Facility: HOSPITAL | Age: 76
End: 2021-01-01
Payer: MEDICARE

## 2021-01-01 ENCOUNTER — LAB VISIT (OUTPATIENT)
Dept: LAB | Facility: HOSPITAL | Age: 76
End: 2021-01-01
Payer: MEDICARE

## 2021-01-01 ENCOUNTER — HOSPITAL ENCOUNTER (OUTPATIENT)
Dept: RADIOLOGY | Facility: HOSPITAL | Age: 76
Discharge: HOME | End: 2021-01-04
Attending: OBSTETRICS & GYNECOLOGY
Payer: MEDICARE

## 2021-01-01 ENCOUNTER — OUTPATIENT CASE MANAGEMENT (OUTPATIENT)
Dept: HEMATOLOGY/ONCOLOGY | Facility: CLINIC | Age: 76
End: 2021-01-01
Payer: MEDICARE

## 2021-01-01 VITALS
HEART RATE: 100 BPM | TEMPERATURE: 97 F | WEIGHT: 140.13 LBS | DIASTOLIC BLOOD PRESSURE: 86 MMHG | HEIGHT: 64 IN | RESPIRATION RATE: 18 BRPM | SYSTOLIC BLOOD PRESSURE: 143 MMHG | BODY MASS INDEX: 23.92 KG/M2 | OXYGEN SATURATION: 95 %

## 2021-01-01 VITALS
HEIGHT: 64 IN | BODY MASS INDEX: 26.91 KG/M2 | HEART RATE: 88 BPM | SYSTOLIC BLOOD PRESSURE: 142 MMHG | WEIGHT: 157.63 LBS | DIASTOLIC BLOOD PRESSURE: 67 MMHG

## 2021-01-01 VITALS
HEIGHT: 64 IN | HEART RATE: 91 BPM | BODY MASS INDEX: 25.9 KG/M2 | HEART RATE: 82 BPM | SYSTOLIC BLOOD PRESSURE: 156 MMHG | DIASTOLIC BLOOD PRESSURE: 72 MMHG | WEIGHT: 151.69 LBS | DIASTOLIC BLOOD PRESSURE: 70 MMHG | SYSTOLIC BLOOD PRESSURE: 150 MMHG | WEIGHT: 152.56 LBS | BODY MASS INDEX: 26.05 KG/M2 | HEIGHT: 64 IN

## 2021-01-01 VITALS
HEIGHT: 64 IN | SYSTOLIC BLOOD PRESSURE: 136 MMHG | OXYGEN SATURATION: 93 % | BODY MASS INDEX: 26.91 KG/M2 | DIASTOLIC BLOOD PRESSURE: 70 MMHG | HEART RATE: 112 BPM | WEIGHT: 157.63 LBS | TEMPERATURE: 98 F

## 2021-01-01 VITALS
RESPIRATION RATE: 18 BRPM | BODY MASS INDEX: 22.74 KG/M2 | SYSTOLIC BLOOD PRESSURE: 143 MMHG | HEART RATE: 90 BPM | WEIGHT: 133.19 LBS | HEIGHT: 64 IN | OXYGEN SATURATION: 93 % | DIASTOLIC BLOOD PRESSURE: 70 MMHG | TEMPERATURE: 97 F

## 2021-01-01 VITALS
HEART RATE: 107 BPM | BODY MASS INDEX: 27.31 KG/M2 | WEIGHT: 159.13 LBS | SYSTOLIC BLOOD PRESSURE: 145 MMHG | DIASTOLIC BLOOD PRESSURE: 77 MMHG

## 2021-01-01 VITALS
BODY MASS INDEX: 25.85 KG/M2 | HEART RATE: 113 BPM | SYSTOLIC BLOOD PRESSURE: 159 MMHG | DIASTOLIC BLOOD PRESSURE: 72 MMHG | HEIGHT: 64 IN | WEIGHT: 151.44 LBS

## 2021-01-01 VITALS
WEIGHT: 160 LBS | HEART RATE: 107 BPM | DIASTOLIC BLOOD PRESSURE: 67 MMHG | BODY MASS INDEX: 27.46 KG/M2 | SYSTOLIC BLOOD PRESSURE: 139 MMHG

## 2021-01-01 VITALS
WEIGHT: 144 LBS | HEART RATE: 95 BPM | BODY MASS INDEX: 24.72 KG/M2 | DIASTOLIC BLOOD PRESSURE: 81 MMHG | SYSTOLIC BLOOD PRESSURE: 139 MMHG

## 2021-01-01 VITALS — WEIGHT: 148.38 LBS | HEIGHT: 64 IN | BODY MASS INDEX: 25.33 KG/M2

## 2021-01-01 VITALS
BODY MASS INDEX: 26.91 KG/M2 | HEIGHT: 64 IN | HEART RATE: 76 BPM | WEIGHT: 157.63 LBS | SYSTOLIC BLOOD PRESSURE: 138 MMHG | DIASTOLIC BLOOD PRESSURE: 89 MMHG

## 2021-01-01 VITALS
DIASTOLIC BLOOD PRESSURE: 72 MMHG | BODY MASS INDEX: 26.34 KG/M2 | HEART RATE: 92 BPM | WEIGHT: 154.31 LBS | SYSTOLIC BLOOD PRESSURE: 150 MMHG | HEIGHT: 64 IN

## 2021-01-01 DIAGNOSIS — C56.9 MALIGNANT NEOPLASM OF OVARY, UNSPECIFIED LATERALITY: ICD-10-CM

## 2021-01-01 DIAGNOSIS — F01.50 VASCULAR DEMENTIA WITHOUT BEHAVIORAL DISTURBANCE: ICD-10-CM

## 2021-01-01 DIAGNOSIS — I67.9 CEREBROVASCULAR DISEASE: ICD-10-CM

## 2021-01-01 DIAGNOSIS — T45.1X5A CHEMOTHERAPY-INDUCED NEUROPATHY: ICD-10-CM

## 2021-01-01 DIAGNOSIS — F41.9 ANXIETY: Primary | ICD-10-CM

## 2021-01-01 DIAGNOSIS — E87.6 HYPOKALEMIA: ICD-10-CM

## 2021-01-01 DIAGNOSIS — Z76.89 ENCOUNTER FOR SOCIAL WORK INTERVENTION: ICD-10-CM

## 2021-01-01 DIAGNOSIS — E53.8 VITAMIN B12 DEFICIENCY: ICD-10-CM

## 2021-01-01 DIAGNOSIS — R11.0 NAUSEA WITHOUT VOMITING: ICD-10-CM

## 2021-01-01 DIAGNOSIS — L57.0 AK (ACTINIC KERATOSIS): Primary | ICD-10-CM

## 2021-01-01 DIAGNOSIS — H26.9 CORTICAL CATARACT OF BOTH EYES: ICD-10-CM

## 2021-01-01 DIAGNOSIS — Z23 NEED FOR VACCINATION: Primary | ICD-10-CM

## 2021-01-01 DIAGNOSIS — G62.0 CHEMOTHERAPY-INDUCED NEUROPATHY: ICD-10-CM

## 2021-01-01 DIAGNOSIS — R10.30 LOWER ABDOMINAL PAIN: ICD-10-CM

## 2021-01-01 DIAGNOSIS — E51.9 VITAMIN B1 DEFICIENCY: ICD-10-CM

## 2021-01-01 DIAGNOSIS — N39.46 MIXED INCONTINENCE: ICD-10-CM

## 2021-01-01 DIAGNOSIS — N32.89 BLADDER SPASM: Primary | ICD-10-CM

## 2021-01-01 DIAGNOSIS — N32.89 BLADDER SPASM: ICD-10-CM

## 2021-01-01 DIAGNOSIS — R10.84 GENERALIZED ABDOMINAL PAIN: ICD-10-CM

## 2021-01-01 DIAGNOSIS — Z86.69 HX OF MIGRAINES: ICD-10-CM

## 2021-01-01 DIAGNOSIS — H52.4 PRESBYOPIA OF BOTH EYES: ICD-10-CM

## 2021-01-01 DIAGNOSIS — C56.9 MALIGNANT NEOPLASM OF OVARY, UNSPECIFIED LATERALITY: Primary | ICD-10-CM

## 2021-01-01 DIAGNOSIS — K57.90 DIVERTICULOSIS: ICD-10-CM

## 2021-01-01 DIAGNOSIS — R10.9 ABDOMINAL PAIN, UNSPECIFIED ABDOMINAL LOCATION: ICD-10-CM

## 2021-01-01 DIAGNOSIS — F32.A DEPRESSION, UNSPECIFIED DEPRESSION TYPE: ICD-10-CM

## 2021-01-01 DIAGNOSIS — G62.0 CHEMOTHERAPY-INDUCED PERIPHERAL NEUROPATHY: Primary | ICD-10-CM

## 2021-01-01 DIAGNOSIS — H52.203 ASTIGMATISM OF BOTH EYES, UNSPECIFIED TYPE: ICD-10-CM

## 2021-01-01 DIAGNOSIS — G43.109 MIGRAINE WITH AURA AND WITHOUT STATUS MIGRAINOSUS, NOT INTRACTABLE: ICD-10-CM

## 2021-01-01 DIAGNOSIS — K40.90 UNILATERAL INGUINAL HERNIA WITHOUT OBSTRUCTION OR GANGRENE, RECURRENCE NOT SPECIFIED: ICD-10-CM

## 2021-01-01 DIAGNOSIS — H47.013 NON-ARTERITIC AION (ANTERIOR ISCHEMIC OPTIC NEUROPATHY), BOTH EYES: ICD-10-CM

## 2021-01-01 DIAGNOSIS — K59.00 CONSTIPATION, UNSPECIFIED CONSTIPATION TYPE: ICD-10-CM

## 2021-01-01 DIAGNOSIS — Z12.83 SCREENING EXAM FOR SKIN CANCER: ICD-10-CM

## 2021-01-01 DIAGNOSIS — M48.02 CERVICAL STENOSIS OF SPINE: ICD-10-CM

## 2021-01-01 DIAGNOSIS — J45.20 MILD INTERMITTENT CHRONIC ASTHMA WITHOUT COMPLICATION: ICD-10-CM

## 2021-01-01 DIAGNOSIS — R53.81 DEBILITY: Primary | ICD-10-CM

## 2021-01-01 DIAGNOSIS — R41.0 CONFUSION: ICD-10-CM

## 2021-01-01 DIAGNOSIS — G60.3 IDIOPATHIC PROGRESSIVE POLYNEUROPATHY: ICD-10-CM

## 2021-01-01 DIAGNOSIS — R10.30 LOWER ABDOMINAL PAIN: Primary | ICD-10-CM

## 2021-01-01 DIAGNOSIS — E51.11 THIAMINE DEFICIENCY NEUROPATHY: Primary | ICD-10-CM

## 2021-01-01 DIAGNOSIS — C56.9 MALIGNANT NEOPLASM OF OVARY: Primary | ICD-10-CM

## 2021-01-01 DIAGNOSIS — D22.9 MULTIPLE BENIGN NEVI: ICD-10-CM

## 2021-01-01 DIAGNOSIS — Z51.5 PALLIATIVE CARE ENCOUNTER: ICD-10-CM

## 2021-01-01 DIAGNOSIS — R07.9 CHEST PAIN: ICD-10-CM

## 2021-01-01 DIAGNOSIS — T45.1X5A CHEMOTHERAPY-INDUCED PERIPHERAL NEUROPATHY: Primary | ICD-10-CM

## 2021-01-01 DIAGNOSIS — N30.00 ACUTE CYSTITIS WITHOUT HEMATURIA: ICD-10-CM

## 2021-01-01 DIAGNOSIS — H25.13 NUCLEAR SCLEROSIS OF BOTH EYES: Primary | ICD-10-CM

## 2021-01-01 DIAGNOSIS — G93.40 ACUTE ENCEPHALOPATHY: ICD-10-CM

## 2021-01-01 DIAGNOSIS — H40.003 GLAUCOMA SUSPECT OF BOTH EYES: ICD-10-CM

## 2021-01-01 DIAGNOSIS — C56.9 MALIGNANT NEOPLASM OF OVARY: ICD-10-CM

## 2021-01-01 DIAGNOSIS — E86.0 DEHYDRATION: Primary | ICD-10-CM

## 2021-01-01 DIAGNOSIS — R73.03 PREDIABETES: ICD-10-CM

## 2021-01-01 DIAGNOSIS — Z12.31 ENCOUNTER FOR SCREENING MAMMOGRAM FOR BREAST CANCER: ICD-10-CM

## 2021-01-01 DIAGNOSIS — H53.433 ARCUATE VISUAL FIELD DEFECT OF BOTH EYES: ICD-10-CM

## 2021-01-01 DIAGNOSIS — H25.13 NUCLEAR SCLEROTIC CATARACT OF BOTH EYES: ICD-10-CM

## 2021-01-01 DIAGNOSIS — R53.83 FATIGUE, UNSPECIFIED TYPE: ICD-10-CM

## 2021-01-01 DIAGNOSIS — K52.9 COLITIS: ICD-10-CM

## 2021-01-01 DIAGNOSIS — Z23 NEED FOR VACCINATION: ICD-10-CM

## 2021-01-01 DIAGNOSIS — D64.81 ANTINEOPLASTIC CHEMOTHERAPY INDUCED ANEMIA: ICD-10-CM

## 2021-01-01 DIAGNOSIS — T45.1X5A ANTINEOPLASTIC CHEMOTHERAPY INDUCED ANEMIA: ICD-10-CM

## 2021-01-01 DIAGNOSIS — H47.013: ICD-10-CM

## 2021-01-01 DIAGNOSIS — Z71.89 ADVANCE CARE PLANNING: ICD-10-CM

## 2021-01-01 DIAGNOSIS — K57.92 ACUTE DIVERTICULITIS: ICD-10-CM

## 2021-01-01 DIAGNOSIS — H40.013 OPEN ANGLE WITH BORDERLINE FINDINGS AND LOW GLAUCOMA RISK IN BOTH EYES: Primary | ICD-10-CM

## 2021-01-01 DIAGNOSIS — G93.41 ENCEPHALOPATHY, METABOLIC: ICD-10-CM

## 2021-01-01 DIAGNOSIS — R41.82 ALTERED MENTAL STATUS, UNSPECIFIED ALTERED MENTAL STATUS TYPE: ICD-10-CM

## 2021-01-01 DIAGNOSIS — G60.3 IDIOPATHIC PROGRESSIVE POLYNEUROPATHY: Primary | ICD-10-CM

## 2021-01-01 DIAGNOSIS — R94.31 QT PROLONGATION: ICD-10-CM

## 2021-01-01 DIAGNOSIS — L82.1 SK (SEBORRHEIC KERATOSIS): ICD-10-CM

## 2021-01-01 DIAGNOSIS — R41.3 MEMORY LOSS: Chronic | ICD-10-CM

## 2021-01-01 DIAGNOSIS — Z00.8 MEDICAL CLEARANCE FOR PSYCHIATRIC ADMISSION: ICD-10-CM

## 2021-01-01 DIAGNOSIS — R10.9 ABDOMINAL PAIN, UNSPECIFIED ABDOMINAL LOCATION: Primary | ICD-10-CM

## 2021-01-01 DIAGNOSIS — Z12.31 ENCOUNTER FOR SCREENING MAMMOGRAM FOR BREAST CANCER: Primary | ICD-10-CM
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
## 2021-01-01 DIAGNOSIS — E44.0 MODERATE MALNUTRITION: ICD-10-CM

## 2021-01-01 DIAGNOSIS — K59.04 CHRONIC IDIOPATHIC CONSTIPATION: ICD-10-CM

## 2021-01-01 DIAGNOSIS — R10.84 GENERALIZED ABDOMINAL PAIN: Primary | ICD-10-CM

## 2021-01-01 DIAGNOSIS — R10.31 ABDOMINAL PAIN, RIGHT LOWER QUADRANT: Primary | ICD-10-CM

## 2021-01-01 LAB
25(OH)D3+25(OH)D2 SERPL-MCNC: 16 NG/ML (ref 30–96)
ALBUMIN SERPL BCP-MCNC: 1.9 G/DL (ref 3.5–5.2)
ALBUMIN SERPL BCP-MCNC: 1.9 G/DL (ref 3.5–5.2)
ALBUMIN SERPL BCP-MCNC: 2.2 G/DL (ref 3.5–5.2)
ALBUMIN SERPL BCP-MCNC: 2.2 G/DL (ref 3.5–5.2)
ALBUMIN SERPL BCP-MCNC: 2.4 G/DL (ref 3.5–5.2)
ALBUMIN SERPL BCP-MCNC: 2.5 G/DL (ref 3.5–5.2)
ALBUMIN SERPL BCP-MCNC: 2.5 G/DL (ref 3.5–5.2)
ALBUMIN SERPL BCP-MCNC: 2.9 G/DL (ref 3.5–5.2)
ALBUMIN SERPL BCP-MCNC: 3.4 G/DL (ref 3.5–5.2)
ALBUMIN SERPL BCP-MCNC: 3.8 G/DL (ref 3.5–5.2)
ALBUMIN SERPL BCP-MCNC: 3.9 G/DL (ref 3.5–5.2)
ALBUMIN SERPL BCP-MCNC: 4.1 G/DL (ref 3.5–5.2)
ALP SERPL-CCNC: 100 U/L (ref 55–135)
ALP SERPL-CCNC: 103 U/L (ref 55–135)
ALP SERPL-CCNC: 107 U/L (ref 55–135)
ALP SERPL-CCNC: 110 U/L (ref 55–135)
ALP SERPL-CCNC: 123 U/L (ref 55–135)
ALP SERPL-CCNC: 74 U/L (ref 55–135)
ALP SERPL-CCNC: 84 U/L (ref 55–135)
ALP SERPL-CCNC: 87 U/L (ref 55–135)
ALP SERPL-CCNC: 89 U/L (ref 55–135)
ALT SERPL W/O P-5'-P-CCNC: 18 U/L (ref 10–44)
ALT SERPL W/O P-5'-P-CCNC: 21 U/L (ref 10–44)
ALT SERPL W/O P-5'-P-CCNC: 26 U/L (ref 10–44)
ALT SERPL W/O P-5'-P-CCNC: 5 U/L (ref 10–44)
ALT SERPL W/O P-5'-P-CCNC: 6 U/L (ref 10–44)
ALT SERPL W/O P-5'-P-CCNC: 8 U/L (ref 10–44)
ALT SERPL W/O P-5'-P-CCNC: <5 U/L (ref 10–44)
AMPHET+METHAMPHET UR QL: NEGATIVE
ANION GAP SERPL CALC-SCNC: 10 MMOL/L (ref 8–16)
ANION GAP SERPL CALC-SCNC: 11 MMOL/L (ref 8–16)
ANION GAP SERPL CALC-SCNC: 12 MMOL/L (ref 8–16)
ANION GAP SERPL CALC-SCNC: 13 MMOL/L (ref 8–16)
ANION GAP SERPL CALC-SCNC: 18 MMOL/L (ref 8–16)
ANION GAP SERPL CALC-SCNC: 8 MMOL/L (ref 8–16)
ANION GAP SERPL CALC-SCNC: 9 MMOL/L (ref 8–16)
APAP SERPL-MCNC: <3 UG/ML (ref 10–20)
AST SERPL-CCNC: 13 U/L (ref 10–40)
AST SERPL-CCNC: 14 U/L (ref 10–40)
AST SERPL-CCNC: 15 U/L (ref 10–40)
AST SERPL-CCNC: 16 U/L (ref 10–40)
AST SERPL-CCNC: 21 U/L (ref 10–40)
AST SERPL-CCNC: 25 U/L (ref 10–40)
AST SERPL-CCNC: 27 U/L (ref 10–40)
AV INDEX (PROSTH): 0.64
AV MEAN GRADIENT: 4 MMHG
AV PEAK GRADIENT: 7 MMHG
AV VALVE AREA: 2.04 CM2
AV VELOCITY RATIO: 0.72
BACTERIA #/AREA URNS AUTO: ABNORMAL /HPF
BACTERIA #/AREA URNS AUTO: NORMAL /HPF
BACTERIA STL CULT: NORMAL
BACTERIA UR CULT: ABNORMAL
BACTERIA UR CULT: NORMAL
BARBITURATES UR QL SCN>200 NG/ML: NEGATIVE
BASOPHILS # BLD AUTO: 0.02 K/UL (ref 0–0.2)
BASOPHILS # BLD AUTO: 0.03 K/UL (ref 0–0.2)
BASOPHILS # BLD AUTO: 0.04 K/UL (ref 0–0.2)
BASOPHILS # BLD AUTO: 0.06 K/UL (ref 0–0.2)
BASOPHILS NFR BLD: 0.2 % (ref 0–1.9)
BASOPHILS NFR BLD: 0.2 % (ref 0–1.9)
BASOPHILS NFR BLD: 0.3 % (ref 0–1.9)
BASOPHILS NFR BLD: 0.7 % (ref 0–1.9)
BENZODIAZ UR QL SCN>200 NG/ML: NEGATIVE
BILIRUB SERPL-MCNC: 0.4 MG/DL (ref 0.1–1)
BILIRUB SERPL-MCNC: 0.4 MG/DL (ref 0.1–1)
BILIRUB SERPL-MCNC: 0.5 MG/DL (ref 0.1–1)
BILIRUB SERPL-MCNC: 0.5 MG/DL (ref 0.1–1)
BILIRUB SERPL-MCNC: 0.6 MG/DL (ref 0.1–1)
BILIRUB SERPL-MCNC: 0.7 MG/DL (ref 0.1–1)
BILIRUB SERPL-MCNC: 0.7 MG/DL (ref 0.1–1)
BILIRUB SERPL-MCNC: 0.8 MG/DL (ref 0.1–1)
BILIRUB SERPL-MCNC: 0.8 MG/DL (ref 0.1–1)
BILIRUB UR QL STRIP: NEGATIVE
BSA FOR ECHO PROCEDURE: 1.69 M2
BUN SERPL-MCNC: 11 MG/DL (ref 8–23)
BUN SERPL-MCNC: 13 MG/DL (ref 8–23)
BUN SERPL-MCNC: 15 MG/DL (ref 8–23)
BUN SERPL-MCNC: 15 MG/DL (ref 8–23)
BUN SERPL-MCNC: 16 MG/DL (ref 6–30)
BUN SERPL-MCNC: 16 MG/DL (ref 8–23)
BUN SERPL-MCNC: 17 MG/DL (ref 8–23)
BUN SERPL-MCNC: 19 MG/DL (ref 8–23)
BUN SERPL-MCNC: 21 MG/DL (ref 8–23)
BUN SERPL-MCNC: 23 MG/DL (ref 8–23)
BUN SERPL-MCNC: 25 MG/DL (ref 8–23)
BUN SERPL-MCNC: 7 MG/DL (ref 8–23)
BUN SERPL-MCNC: 7 MG/DL (ref 8–23)
BUN SERPL-MCNC: 9 MG/DL (ref 8–23)
BUN SERPL-MCNC: 9 MG/DL (ref 8–23)
BZE UR QL SCN: NEGATIVE
CALCIUM SERPL-MCNC: 8.2 MG/DL (ref 8.7–10.5)
CALCIUM SERPL-MCNC: 8.3 MG/DL (ref 8.7–10.5)
CALCIUM SERPL-MCNC: 8.3 MG/DL (ref 8.7–10.5)
CALCIUM SERPL-MCNC: 8.5 MG/DL (ref 8.7–10.5)
CALCIUM SERPL-MCNC: 8.6 MG/DL (ref 8.7–10.5)
CALCIUM SERPL-MCNC: 8.7 MG/DL (ref 8.7–10.5)
CALCIUM SERPL-MCNC: 8.8 MG/DL (ref 8.7–10.5)
CALCIUM SERPL-MCNC: 8.9 MG/DL (ref 8.7–10.5)
CALCIUM SERPL-MCNC: 9 MG/DL (ref 8.7–10.5)
CALCIUM SERPL-MCNC: 9 MG/DL (ref 8.7–10.5)
CALCIUM SERPL-MCNC: 9.3 MG/DL (ref 8.7–10.5)
CALCIUM SERPL-MCNC: 9.4 MG/DL (ref 8.7–10.5)
CALCIUM SERPL-MCNC: 9.4 MG/DL (ref 8.7–10.5)
CALCIUM SERPL-MCNC: 9.7 MG/DL (ref 8.7–10.5)
CANCER AG125 SERPL-ACNC: 21 U/ML (ref 0–30)
CANCER AG125 SERPL-ACNC: 22 U/ML (ref 0–30)
CANNABINOIDS UR QL SCN: NEGATIVE
CHLORIDE SERPL-SCNC: 102 MMOL/L (ref 95–110)
CHLORIDE SERPL-SCNC: 103 MMOL/L (ref 95–110)
CHLORIDE SERPL-SCNC: 103 MMOL/L (ref 95–110)
CHLORIDE SERPL-SCNC: 104 MMOL/L (ref 95–110)
CHLORIDE SERPL-SCNC: 105 MMOL/L (ref 95–110)
CHLORIDE SERPL-SCNC: 106 MMOL/L (ref 95–110)
CHLORIDE SERPL-SCNC: 107 MMOL/L (ref 95–110)
CHLORIDE SERPL-SCNC: 108 MMOL/L (ref 95–110)
CHLORIDE SERPL-SCNC: 108 MMOL/L (ref 95–110)
CHOLEST SERPL-MCNC: 141 MG/DL (ref 120–199)
CHOLEST/HDLC SERPL: 5.4 {RATIO} (ref 2–5)
CK SERPL-CCNC: 29 U/L (ref 20–180)
CLARITY UR REFRACT.AUTO: ABNORMAL
CLARITY UR REFRACT.AUTO: CLEAR
CO2 SERPL-SCNC: 17 MMOL/L (ref 23–29)
CO2 SERPL-SCNC: 18 MMOL/L (ref 23–29)
CO2 SERPL-SCNC: 20 MMOL/L (ref 23–29)
CO2 SERPL-SCNC: 22 MMOL/L (ref 23–29)
CO2 SERPL-SCNC: 25 MMOL/L (ref 23–29)
CO2 SERPL-SCNC: 26 MMOL/L (ref 23–29)
CO2 SERPL-SCNC: 27 MMOL/L (ref 23–29)
COLOR UR AUTO: ABNORMAL
COLOR UR AUTO: ABNORMAL
COLOR UR AUTO: YELLOW
COLOR UR AUTO: YELLOW
CREAT SERPL-MCNC: 0.6 MG/DL (ref 0.5–1.4)
CREAT SERPL-MCNC: 0.7 MG/DL (ref 0.5–1.4)
CREAT SERPL-MCNC: 0.8 MG/DL (ref 0.5–1.4)
CREAT SERPL-MCNC: 0.9 MG/DL (ref 0.5–1.4)
CREAT SERPL-MCNC: 1 MG/DL (ref 0.5–1.4)
CREAT UR-MCNC: >450 MG/DL (ref 15–325)
CRP SERPL-MCNC: 2.1 MG/L (ref 0–8.2)
CRYPTOSP AG STL QL IA: NEGATIVE
CTP QC/QA: YES
CV ECHO LV RWT: 0.44 CM
DIFFERENTIAL METHOD: ABNORMAL
DOP CALC AO PEAK VEL: 1.33 M/S
DOP CALC AO VTI: 22.75 CM
DOP CALC LVOT AREA: 3.2 CM2
DOP CALC LVOT DIAMETER: 2.02 CM
DOP CALC LVOT PEAK VEL: 0.96 M/S
DOP CALC LVOT STROKE VOLUME: 46.32 CM3
DOP CALCLVOT PEAK VEL VTI: 14.46 CM
E COLI SXT1 STL QL IA: NEGATIVE
E COLI SXT2 STL QL IA: NEGATIVE
E WAVE DECELERATION TIME: 299.52 MSEC
E/A RATIO: 0.67
E/E' RATIO: 12.86 M/S
ECHO LV POSTERIOR WALL: 0.81 CM (ref 0.6–1.1)
EJECTION FRACTION: 55 %
EOSINOPHIL # BLD AUTO: 0 K/UL (ref 0–0.5)
EOSINOPHIL # BLD AUTO: 0.1 K/UL (ref 0–0.5)
EOSINOPHIL # BLD AUTO: 0.1 K/UL (ref 0–0.5)
EOSINOPHIL NFR BLD: 0.1 % (ref 0–8)
EOSINOPHIL NFR BLD: 0.2 % (ref 0–8)
EOSINOPHIL NFR BLD: 0.2 % (ref 0–8)
EOSINOPHIL NFR BLD: 0.4 % (ref 0–8)
EOSINOPHIL NFR BLD: 0.9 % (ref 0–8)
EOSINOPHIL NFR BLD: 1.5 % (ref 0–8)
ERYTHROCYTE [DISTWIDTH] IN BLOOD BY AUTOMATED COUNT: 12.6 % (ref 11.5–14.5)
ERYTHROCYTE [DISTWIDTH] IN BLOOD BY AUTOMATED COUNT: 13.2 % (ref 11.5–14.5)
ERYTHROCYTE [DISTWIDTH] IN BLOOD BY AUTOMATED COUNT: 13.3 % (ref 11.5–14.5)
ERYTHROCYTE [DISTWIDTH] IN BLOOD BY AUTOMATED COUNT: 13.3 % (ref 11.5–14.5)
ERYTHROCYTE [DISTWIDTH] IN BLOOD BY AUTOMATED COUNT: 13.5 % (ref 11.5–14.5)
ERYTHROCYTE [DISTWIDTH] IN BLOOD BY AUTOMATED COUNT: 13.5 % (ref 11.5–14.5)
ERYTHROCYTE [DISTWIDTH] IN BLOOD BY AUTOMATED COUNT: 13.8 % (ref 11.5–14.5)
ERYTHROCYTE [DISTWIDTH] IN BLOOD BY AUTOMATED COUNT: 13.9 % (ref 11.5–14.5)
ERYTHROCYTE [DISTWIDTH] IN BLOOD BY AUTOMATED COUNT: 14.3 % (ref 11.5–14.5)
ERYTHROCYTE [SEDIMENTATION RATE] IN BLOOD: 15 MM/HR (ref 0–20)
EST. GFR  (AFRICAN AMERICAN): >60 ML/MIN/1.73 M^2
EST. GFR  (NON AFRICAN AMERICAN): 54 ML/MIN/1.73 M^2
EST. GFR  (NON AFRICAN AMERICAN): >60 ML/MIN/1.73 M^2
ESTIMATED AVG GLUCOSE: 111 MG/DL (ref 68–131)
ETHANOL SERPL-MCNC: <10 MG/DL
FOLATE SERPL-MCNC: 6.1 NG/ML (ref 4–24)
FRACTIONAL SHORTENING: 40 % (ref 28–44)
G LAMBLIA AG STL QL IA: NEGATIVE
GLUCOSE SERPL-MCNC: 104 MG/DL (ref 70–110)
GLUCOSE SERPL-MCNC: 104 MG/DL (ref 70–110)
GLUCOSE SERPL-MCNC: 105 MG/DL (ref 70–110)
GLUCOSE SERPL-MCNC: 133 MG/DL (ref 70–110)
GLUCOSE SERPL-MCNC: 144 MG/DL (ref 70–110)
GLUCOSE SERPL-MCNC: 84 MG/DL (ref 70–110)
GLUCOSE SERPL-MCNC: 87 MG/DL (ref 70–110)
GLUCOSE SERPL-MCNC: 88 MG/DL (ref 70–110)
GLUCOSE SERPL-MCNC: 89 MG/DL (ref 70–110)
GLUCOSE SERPL-MCNC: 90 MG/DL (ref 70–110)
GLUCOSE SERPL-MCNC: 92 MG/DL (ref 70–110)
GLUCOSE SERPL-MCNC: 93 MG/DL (ref 70–110)
GLUCOSE SERPL-MCNC: 96 MG/DL (ref 70–110)
GLUCOSE UR QL STRIP: NEGATIVE
HBA1C MFR BLD: 5.5 % (ref 4–5.6)
HCT VFR BLD AUTO: 35.3 % (ref 37–48.5)
HCT VFR BLD AUTO: 35.9 % (ref 37–48.5)
HCT VFR BLD AUTO: 36.3 % (ref 37–48.5)
HCT VFR BLD AUTO: 36.3 % (ref 37–48.5)
HCT VFR BLD AUTO: 36.8 % (ref 37–48.5)
HCT VFR BLD AUTO: 39.7 % (ref 37–48.5)
HCT VFR BLD AUTO: 41 % (ref 37–48.5)
HCT VFR BLD AUTO: 43.2 % (ref 37–48.5)
HCT VFR BLD AUTO: 43.6 % (ref 37–48.5)
HCT VFR BLD CALC: 34 %PCV (ref 36–54)
HDLC SERPL-MCNC: 26 MG/DL (ref 40–75)
HDLC SERPL: 18.4 % (ref 20–50)
HGB BLD-MCNC: 11.4 G/DL (ref 12–16)
HGB BLD-MCNC: 11.5 G/DL (ref 12–16)
HGB BLD-MCNC: 11.7 G/DL (ref 12–16)
HGB BLD-MCNC: 11.8 G/DL (ref 12–16)
HGB BLD-MCNC: 12.4 G/DL (ref 12–16)
HGB BLD-MCNC: 13.1 G/DL (ref 12–16)
HGB BLD-MCNC: 13.4 G/DL (ref 12–16)
HGB BLD-MCNC: 14.1 G/DL (ref 12–16)
HGB BLD-MCNC: 14.4 G/DL (ref 12–16)
HGB UR QL STRIP: ABNORMAL
HGB UR QL STRIP: ABNORMAL
HGB UR QL STRIP: NEGATIVE
HGB UR QL STRIP: NEGATIVE
HIV 1+2 AB+HIV1 P24 AG SERPL QL IA: NEGATIVE
HYALINE CASTS UR QL AUTO: 0 /LPF
HYALINE CASTS UR QL AUTO: 4 /LPF
IMM GRANULOCYTES # BLD AUTO: 0.01 K/UL (ref 0–0.04)
IMM GRANULOCYTES # BLD AUTO: 0.02 K/UL (ref 0–0.04)
IMM GRANULOCYTES # BLD AUTO: 0.03 K/UL (ref 0–0.04)
IMM GRANULOCYTES # BLD AUTO: 0.03 K/UL (ref 0–0.04)
IMM GRANULOCYTES # BLD AUTO: 0.06 K/UL (ref 0–0.04)
IMM GRANULOCYTES # BLD AUTO: 0.06 K/UL (ref 0–0.04)
IMM GRANULOCYTES # BLD AUTO: 0.08 K/UL (ref 0–0.04)
IMM GRANULOCYTES # BLD AUTO: 0.11 K/UL (ref 0–0.04)
IMM GRANULOCYTES NFR BLD AUTO: 0.2 % (ref 0–0.5)
IMM GRANULOCYTES NFR BLD AUTO: 0.3 % (ref 0–0.5)
IMM GRANULOCYTES NFR BLD AUTO: 0.5 % (ref 0–0.5)
IMM GRANULOCYTES NFR BLD AUTO: 0.5 % (ref 0–0.5)
IMM GRANULOCYTES NFR BLD AUTO: 0.7 % (ref 0–0.5)
IMM GRANULOCYTES NFR BLD AUTO: 1 % (ref 0–0.5)
INTERVENTRICULAR SEPTUM: 0.83 CM (ref 0.6–1.1)
KETONES UR QL STRIP: ABNORMAL
KETONES UR QL STRIP: ABNORMAL
KETONES UR QL STRIP: NEGATIVE
KETONES UR QL STRIP: NEGATIVE
LA MAJOR: 6.3 CM
LA MINOR: 5.9 CM
LA WIDTH: 3.5 CM
LDLC SERPL CALC-MCNC: 93.4 MG/DL (ref 63–159)
LEFT ATRIUM SIZE: 2.79 CM
LEFT ATRIUM VOLUME INDEX MOD: 25.4 ML/M2
LEFT ATRIUM VOLUME INDEX: 30.1 ML/M2
LEFT ATRIUM VOLUME MOD: 42.61 CM3
LEFT ATRIUM VOLUME: 50.58 CM3
LEFT INTERNAL DIMENSION IN SYSTOLE: 2.21 CM (ref 2.1–4)
LEFT VENTRICLE DIASTOLIC VOLUME INDEX: 33.88 ML/M2
LEFT VENTRICLE DIASTOLIC VOLUME: 56.92 ML
LEFT VENTRICLE MASS INDEX: 50 G/M2
LEFT VENTRICLE SYSTOLIC VOLUME INDEX: 9.8 ML/M2
LEFT VENTRICLE SYSTOLIC VOLUME: 16.48 ML
LEFT VENTRICULAR INTERNAL DIMENSION IN DIASTOLE: 3.67 CM (ref 3.5–6)
LEFT VENTRICULAR MASS: 84.04 G
LEUKOCYTE ESTERASE UR QL STRIP: ABNORMAL
LEUKOCYTE ESTERASE UR QL STRIP: NEGATIVE
LV LATERAL E/E' RATIO: 11.25 M/S
LV SEPTAL E/E' RATIO: 15 M/S
LYMPHOCYTES # BLD AUTO: 0.5 K/UL (ref 1–4.8)
LYMPHOCYTES # BLD AUTO: 0.5 K/UL (ref 1–4.8)
LYMPHOCYTES # BLD AUTO: 0.9 K/UL (ref 1–4.8)
LYMPHOCYTES # BLD AUTO: 1 K/UL (ref 1–4.8)
LYMPHOCYTES NFR BLD: 10.3 % (ref 18–48)
LYMPHOCYTES NFR BLD: 12.5 % (ref 18–48)
LYMPHOCYTES NFR BLD: 4.1 % (ref 18–48)
LYMPHOCYTES NFR BLD: 4.3 % (ref 18–48)
LYMPHOCYTES NFR BLD: 7.3 % (ref 18–48)
LYMPHOCYTES NFR BLD: 7.7 % (ref 18–48)
MAGNESIUM SERPL-MCNC: 1.6 MG/DL (ref 1.6–2.6)
MAGNESIUM SERPL-MCNC: 1.8 MG/DL (ref 1.6–2.6)
MAGNESIUM SERPL-MCNC: 1.8 MG/DL (ref 1.6–2.6)
MAGNESIUM SERPL-MCNC: 1.9 MG/DL (ref 1.6–2.6)
MAGNESIUM SERPL-MCNC: 2.1 MG/DL (ref 1.6–2.6)
MCH RBC QN AUTO: 28.1 PG (ref 27–31)
MCH RBC QN AUTO: 28.1 PG (ref 27–31)
MCH RBC QN AUTO: 28.2 PG (ref 27–31)
MCH RBC QN AUTO: 28.5 PG (ref 27–31)
MCH RBC QN AUTO: 28.5 PG (ref 27–31)
MCH RBC QN AUTO: 28.6 PG (ref 27–31)
MCH RBC QN AUTO: 32.8 PG (ref 27–31)
MCH RBC QN AUTO: 33.1 PG (ref 27–31)
MCH RBC QN AUTO: 33.3 PG (ref 27–31)
MCHC RBC AUTO-ENTMCNC: 31.7 G/DL (ref 32–36)
MCHC RBC AUTO-ENTMCNC: 32.3 G/DL (ref 32–36)
MCHC RBC AUTO-ENTMCNC: 32.5 G/DL (ref 32–36)
MCHC RBC AUTO-ENTMCNC: 32.6 G/DL (ref 32–36)
MCHC RBC AUTO-ENTMCNC: 32.6 G/DL (ref 32–36)
MCHC RBC AUTO-ENTMCNC: 32.7 G/DL (ref 32–36)
MCHC RBC AUTO-ENTMCNC: 33 G/DL (ref 32–36)
MCHC RBC AUTO-ENTMCNC: 33 G/DL (ref 32–36)
MCHC RBC AUTO-ENTMCNC: 33.7 G/DL (ref 32–36)
MCV RBC AUTO: 100 FL (ref 82–98)
MCV RBC AUTO: 102 FL (ref 82–98)
MCV RBC AUTO: 103 FL (ref 82–98)
MCV RBC AUTO: 85 FL (ref 82–98)
MCV RBC AUTO: 86 FL (ref 82–98)
MCV RBC AUTO: 86 FL (ref 82–98)
MCV RBC AUTO: 87 FL (ref 82–98)
MCV RBC AUTO: 87 FL (ref 82–98)
MCV RBC AUTO: 89 FL (ref 82–98)
METHADONE UR QL SCN>300 NG/ML: NEGATIVE
MICROSCOPIC COMMENT: ABNORMAL
MICROSCOPIC COMMENT: NORMAL
MONOCYTES # BLD AUTO: 1 K/UL (ref 0.3–1)
MONOCYTES # BLD AUTO: 1.1 K/UL (ref 0.3–1)
MONOCYTES # BLD AUTO: 1.1 K/UL (ref 0.3–1)
MONOCYTES # BLD AUTO: 1.2 K/UL (ref 0.3–1)
MONOCYTES # BLD AUTO: 1.3 K/UL (ref 0.3–1)
MONOCYTES # BLD AUTO: 1.5 K/UL (ref 0.3–1)
MONOCYTES NFR BLD: 10 % (ref 4–15)
MONOCYTES NFR BLD: 10.3 % (ref 4–15)
MONOCYTES NFR BLD: 10.9 % (ref 4–15)
MONOCYTES NFR BLD: 12.2 % (ref 4–15)
MONOCYTES NFR BLD: 12.3 % (ref 4–15)
MONOCYTES NFR BLD: 12.7 % (ref 4–15)
MV PEAK A VEL: 1.35 M/S
MV PEAK E VEL: 0.9 M/S
MV STENOSIS PRESSURE HALF TIME: 86.86 MS
MV VALVE AREA P 1/2 METHOD: 2.53 CM2
NEUTROPHILS # BLD AUTO: 10.2 K/UL (ref 1.8–7.7)
NEUTROPHILS # BLD AUTO: 3.5 K/UL (ref 1.8–7.7)
NEUTROPHILS # BLD AUTO: 4.1 K/UL (ref 1.8–7.7)
NEUTROPHILS # BLD AUTO: 5.9 K/UL (ref 1.8–7.7)
NEUTROPHILS # BLD AUTO: 6.8 K/UL (ref 1.8–7.7)
NEUTROPHILS # BLD AUTO: 8.9 K/UL (ref 1.8–7.7)
NEUTROPHILS # BLD AUTO: 9.3 K/UL (ref 1.8–7.7)
NEUTROPHILS # BLD AUTO: 9.7 K/UL (ref 1.8–7.7)
NEUTROPHILS NFR BLD: 72.8 % (ref 38–73)
NEUTROPHILS NFR BLD: 76 % (ref 38–73)
NEUTROPHILS NFR BLD: 78.4 % (ref 38–73)
NEUTROPHILS NFR BLD: 80.8 % (ref 38–73)
NEUTROPHILS NFR BLD: 84.4 % (ref 38–73)
NEUTROPHILS NFR BLD: 84.5 % (ref 38–73)
NITRITE UR QL STRIP: NEGATIVE
NITRITE UR QL STRIP: POSITIVE
NONHDLC SERPL-MCNC: 115 MG/DL
NRBC BLD-RTO: 0 /100 WBC
O+P STL MICRO: NORMAL
OB PNL STL: NEGATIVE
OPIATES UR QL SCN: NEGATIVE
PCP UR QL SCN>25 NG/ML: NEGATIVE
PH UR STRIP: 5 [PH] (ref 5–8)
PH UR STRIP: 6 [PH] (ref 5–8)
PHOSPHATE SERPL-MCNC: 2 MG/DL (ref 2.7–4.5)
PHOSPHATE SERPL-MCNC: 2.7 MG/DL (ref 2.7–4.5)
PHOSPHATE SERPL-MCNC: 2.7 MG/DL (ref 2.7–4.5)
PHOSPHATE SERPL-MCNC: 2.9 MG/DL (ref 2.7–4.5)
PHOSPHATE SERPL-MCNC: 3 MG/DL (ref 2.7–4.5)
PHOSPHATE SERPL-MCNC: 3.1 MG/DL (ref 2.7–4.5)
PHOSPHATE SERPL-MCNC: 3.1 MG/DL (ref 2.7–4.5)
PHOSPHATE SERPL-MCNC: 3.3 MG/DL (ref 2.7–4.5)
PISA TR MAX VEL: 1.86 M/S
PLATELET # BLD AUTO: 138 K/UL (ref 150–450)
PLATELET # BLD AUTO: 155 K/UL (ref 150–450)
PLATELET # BLD AUTO: 155 K/UL (ref 150–450)
PLATELET # BLD AUTO: 165 K/UL (ref 150–450)
PLATELET # BLD AUTO: 203 K/UL (ref 150–450)
PLATELET # BLD AUTO: 204 K/UL (ref 150–450)
PLATELET # BLD AUTO: 205 K/UL (ref 150–350)
PLATELET # BLD AUTO: 223 K/UL (ref 150–450)
PLATELET # BLD AUTO: 231 K/UL (ref 150–450)
PMV BLD AUTO: 10.1 FL (ref 9.2–12.9)
PMV BLD AUTO: 10.6 FL (ref 9.2–12.9)
PMV BLD AUTO: 10.7 FL (ref 9.2–12.9)
PMV BLD AUTO: 8.8 FL (ref 9.2–12.9)
PMV BLD AUTO: 9.3 FL (ref 9.2–12.9)
PMV BLD AUTO: 9.3 FL (ref 9.2–12.9)
PMV BLD AUTO: 9.6 FL (ref 9.2–12.9)
PMV BLD AUTO: 9.8 FL (ref 9.2–12.9)
PMV BLD AUTO: 9.8 FL (ref 9.2–12.9)
POC IONIZED CALCIUM: 1.15 MMOL/L (ref 1.06–1.42)
POC TCO2 (MEASURED): 25 MMOL/L (ref 23–29)
POTASSIUM BLD-SCNC: 3.1 MMOL/L (ref 3.5–5.1)
POTASSIUM SERPL-SCNC: 2.9 MMOL/L (ref 3.5–5.1)
POTASSIUM SERPL-SCNC: 3.1 MMOL/L (ref 3.5–5.1)
POTASSIUM SERPL-SCNC: 3.2 MMOL/L (ref 3.5–5.1)
POTASSIUM SERPL-SCNC: 3.3 MMOL/L (ref 3.5–5.1)
POTASSIUM SERPL-SCNC: 3.5 MMOL/L (ref 3.5–5.1)
POTASSIUM SERPL-SCNC: 3.5 MMOL/L (ref 3.5–5.1)
POTASSIUM SERPL-SCNC: 4.2 MMOL/L (ref 3.5–5.1)
POTASSIUM SERPL-SCNC: 4.2 MMOL/L (ref 3.5–5.1)
POTASSIUM SERPL-SCNC: 4.4 MMOL/L (ref 3.5–5.1)
POTASSIUM SERPL-SCNC: 4.5 MMOL/L (ref 3.5–5.1)
PROT SERPL-MCNC: 5.6 G/DL (ref 6–8.4)
PROT SERPL-MCNC: 5.6 G/DL (ref 6–8.4)
PROT SERPL-MCNC: 5.7 G/DL (ref 6–8.4)
PROT SERPL-MCNC: 5.9 G/DL (ref 6–8.4)
PROT SERPL-MCNC: 6.1 G/DL (ref 6–8.4)
PROT SERPL-MCNC: 7.1 G/DL (ref 6–8.4)
PROT SERPL-MCNC: 7.1 G/DL (ref 6–8.4)
PROT SERPL-MCNC: 7.3 G/DL (ref 6–8.4)
PROT SERPL-MCNC: 7.4 G/DL (ref 6–8.4)
PROT UR QL STRIP: ABNORMAL
PROT UR QL STRIP: ABNORMAL
PROT UR QL STRIP: NEGATIVE
PROT UR QL STRIP: NEGATIVE
RA MAJOR: 4.61 CM
RA WIDTH: 3 CM
RBC # BLD AUTO: 3.44 M/UL (ref 4–5.4)
RBC # BLD AUTO: 3.99 M/UL (ref 4–5.4)
RBC # BLD AUTO: 4.03 M/UL (ref 4–5.4)
RBC # BLD AUTO: 4.08 M/UL (ref 4–5.4)
RBC # BLD AUTO: 4.17 M/UL (ref 4–5.4)
RBC # BLD AUTO: 4.2 M/UL (ref 4–5.4)
RBC # BLD AUTO: 4.35 M/UL (ref 4–5.4)
RBC # BLD AUTO: 4.94 M/UL (ref 4–5.4)
RBC # BLD AUTO: 5.03 M/UL (ref 4–5.4)
RBC #/AREA URNS AUTO: 0 /HPF (ref 0–4)
RBC #/AREA URNS AUTO: 0 /HPF (ref 0–4)
RBC #/AREA URNS AUTO: 1 /HPF (ref 0–4)
RBC #/AREA URNS AUTO: 1 /HPF (ref 0–4)
RIGHT VENTRICULAR END-DIASTOLIC DIMENSION: 3.59 CM
RPR SER QL: NORMAL
RV TISSUE DOPPLER FREE WALL SYSTOLIC VELOCITY 1 (APICAL 4 CHAMBER VIEW): 12.92 CM/S
SAMPLE: ABNORMAL
SAMPLE: NORMAL
SAMPLE: NORMAL
SARS-COV-2 RDRP RESP QL NAA+PROBE: NEGATIVE
SARS-COV-2 RNA RESP QL NAA+PROBE: NOT DETECTED
SINUS: 2.83 CM
SODIUM BLD-SCNC: 140 MMOL/L (ref 136–145)
SODIUM SERPL-SCNC: 134 MMOL/L (ref 136–145)
SODIUM SERPL-SCNC: 134 MMOL/L (ref 136–145)
SODIUM SERPL-SCNC: 137 MMOL/L (ref 136–145)
SODIUM SERPL-SCNC: 138 MMOL/L (ref 136–145)
SODIUM SERPL-SCNC: 139 MMOL/L (ref 136–145)
SODIUM SERPL-SCNC: 140 MMOL/L (ref 136–145)
SODIUM SERPL-SCNC: 141 MMOL/L (ref 136–145)
SODIUM SERPL-SCNC: 141 MMOL/L (ref 136–145)
SP GR UR STRIP: 1.01 (ref 1–1.03)
SP GR UR STRIP: 1.01 (ref 1–1.03)
SP GR UR STRIP: 1.03 (ref 1–1.03)
SP GR UR STRIP: 1.03 (ref 1–1.03)
SQUAMOUS #/AREA URNS AUTO: 1 /HPF
SQUAMOUS #/AREA URNS AUTO: 1 /HPF
SQUAMOUS #/AREA URNS AUTO: 2 /HPF
SQUAMOUS #/AREA URNS AUTO: 2 /HPF
STJ: 2.27 CM
TDI LATERAL: 0.08 M/S
TDI SEPTAL: 0.06 M/S
TDI: 0.07 M/S
TOXICOLOGY INFORMATION: ABNORMAL
TR MAX PG: 14 MMHG
TRICUSPID ANNULAR PLANE SYSTOLIC EXCURSION: 2.12 CM
TRIGL SERPL-MCNC: 108 MG/DL (ref 30–150)
TSH SERPL DL<=0.005 MIU/L-ACNC: 2.24 UIU/ML (ref 0.4–4)
URN SPEC COLLECT METH UR: ABNORMAL
VIT B1 BLD-MCNC: 39 UG/L (ref 38–122)
VIT B1 BLD-MCNC: 46 UG/L (ref 38–122)
VIT B1 BLD-MCNC: 58 UG/L (ref 38–122)
VIT B12 SERPL-MCNC: 354 PG/ML (ref 210–950)
VIT B12 SERPL-MCNC: 394 PG/ML (ref 210–950)
WBC # BLD AUTO: 11 K/UL (ref 3.9–12.7)
WBC # BLD AUTO: 11.4 K/UL (ref 3.9–12.7)
WBC # BLD AUTO: 12.03 K/UL (ref 3.9–12.7)
WBC # BLD AUTO: 12.04 K/UL (ref 3.9–12.7)
WBC # BLD AUTO: 4.79 K/UL (ref 3.9–12.7)
WBC # BLD AUTO: 5.39 K/UL (ref 3.9–12.7)
WBC # BLD AUTO: 5.93 K/UL (ref 3.9–12.7)
WBC # BLD AUTO: 8.07 K/UL (ref 3.9–12.7)
WBC # BLD AUTO: 8.97 K/UL (ref 3.9–12.7)
WBC #/AREA STL HPF: NORMAL /[HPF]
WBC #/AREA URNS AUTO: 0 /HPF (ref 0–5)
WBC #/AREA URNS AUTO: 1 /HPF (ref 0–5)
WBC #/AREA URNS AUTO: 2 /HPF (ref 0–5)
WBC #/AREA URNS AUTO: 3 /HPF (ref 0–5)

## 2021-01-01 PROCEDURE — 99232 SBSQ HOSP IP/OBS MODERATE 35: CPT | Mod: ,,, | Performed by: PSYCHIATRY & NEUROLOGY

## 2021-01-01 PROCEDURE — 25000003 PHARM REV CODE 250: Performed by: STUDENT IN AN ORGANIZED HEALTH CARE EDUCATION/TRAINING PROGRAM

## 2021-01-01 PROCEDURE — 99219 PR INITIAL OBSERVATION CARE,LEVL II: CPT | Mod: GC,,, | Performed by: INTERNAL MEDICINE

## 2021-01-01 PROCEDURE — 74178 CT CHEST ABDOMEN PELVIS W W/O CONTRAST GYN ONC (XPD): ICD-10-PCS | Mod: 26,,, | Performed by: RADIOLOGY

## 2021-01-01 PROCEDURE — 76770 US EXAM ABDO BACK WALL COMP: CPT | Mod: 26,,, | Performed by: RADIOLOGY

## 2021-01-01 PROCEDURE — 99285 EMERGENCY DEPT VISIT HI MDM: CPT | Mod: GC,CS,, | Performed by: EMERGENCY MEDICINE

## 2021-01-01 PROCEDURE — 71260 CT THORAX DX C+: CPT | Mod: TC

## 2021-01-01 PROCEDURE — 63600175 PHARM REV CODE 636 W HCPCS: Performed by: STUDENT IN AN ORGANIZED HEALTH CARE EDUCATION/TRAINING PROGRAM

## 2021-01-01 PROCEDURE — 99285 EMERGENCY DEPT VISIT HI MDM: CPT | Mod: 25

## 2021-01-01 PROCEDURE — 96365 THER/PROPH/DIAG IV INF INIT: CPT

## 2021-01-01 PROCEDURE — 99999 PR PBB SHADOW E&M-EST. PATIENT-LVL I: CPT | Mod: PBBFAC,,, | Performed by: INTERNAL MEDICINE

## 2021-01-01 PROCEDURE — 80053 COMPREHEN METABOLIC PANEL: CPT

## 2021-01-01 PROCEDURE — 25500020 PHARM REV CODE 255: Performed by: EMERGENCY MEDICINE

## 2021-01-01 PROCEDURE — 87045 FECES CULTURE AEROBIC BACT: CPT | Performed by: INTERNAL MEDICINE

## 2021-01-01 PROCEDURE — 99215 OFFICE O/P EST HI 40 MIN: CPT | Mod: 95,,, | Performed by: OBSTETRICS & GYNECOLOGY

## 2021-01-01 PROCEDURE — 92014 COMPRE OPH EXAM EST PT 1/>: CPT | Mod: S$PBB,,, | Performed by: OPTOMETRIST

## 2021-01-01 PROCEDURE — 71260 CT CHEST ABDOMEN PELVIS WITH CONTRAST (XPD): ICD-10-PCS | Mod: 26,,, | Performed by: RADIOLOGY

## 2021-01-01 PROCEDURE — G0378 HOSPITAL OBSERVATION PER HR: HCPCS

## 2021-01-01 PROCEDURE — 85025 COMPLETE CBC W/AUTO DIFF WBC: CPT

## 2021-01-01 PROCEDURE — 99999 PR PBB SHADOW E&M-EST. PATIENT-LVL IV: ICD-10-PCS | Mod: PBBFAC,,, | Performed by: PSYCHIATRY & NEUROLOGY

## 2021-01-01 PROCEDURE — 92012 PR EYE EXAM, EST PATIENT,INTERMED: ICD-10-PCS | Mod: S$PBB,,, | Performed by: OPHTHALMOLOGY

## 2021-01-01 PROCEDURE — 36415 COLL VENOUS BLD VENIPUNCTURE: CPT | Performed by: HOSPITALIST

## 2021-01-01 PROCEDURE — 80053 COMPREHEN METABOLIC PANEL: CPT | Performed by: STUDENT IN AN ORGANIZED HEALTH CARE EDUCATION/TRAINING PROGRAM

## 2021-01-01 PROCEDURE — 99999 PR PBB SHADOW E&M-EST. PATIENT-LVL III: CPT | Mod: PBBFAC,,, | Performed by: INTERNAL MEDICINE

## 2021-01-01 PROCEDURE — 85025 COMPLETE CBC W/AUTO DIFF WBC: CPT | Performed by: STUDENT IN AN ORGANIZED HEALTH CARE EDUCATION/TRAINING PROGRAM

## 2021-01-01 PROCEDURE — 99900035 HC TECH TIME PER 15 MIN (STAT)

## 2021-01-01 PROCEDURE — 99999 PR PBB SHADOW E&M-EST. PATIENT-LVL III: CPT | Mod: PBBFAC,,, | Performed by: PSYCHIATRY & NEUROLOGY

## 2021-01-01 PROCEDURE — 63600175 PHARM REV CODE 636 W HCPCS

## 2021-01-01 PROCEDURE — 80053 COMPREHEN METABOLIC PANEL: CPT | Performed by: OBSTETRICS & GYNECOLOGY

## 2021-01-01 PROCEDURE — 93010 EKG 12-LEAD: ICD-10-PCS | Mod: ,,, | Performed by: INTERNAL MEDICINE

## 2021-01-01 PROCEDURE — 91300 COVID-19, MRNA, LNP-S, PF, 30 MCG/0.3 ML DOSE VACCINE: CPT | Mod: PBBFAC,PO

## 2021-01-01 PROCEDURE — 97535 SELF CARE MNGMENT TRAINING: CPT

## 2021-01-01 PROCEDURE — 99213 OFFICE O/P EST LOW 20 MIN: CPT | Mod: PBBFAC | Performed by: STUDENT IN AN ORGANIZED HEALTH CARE EDUCATION/TRAINING PROGRAM

## 2021-01-01 PROCEDURE — 96374 THER/PROPH/DIAG INJ IV PUSH: CPT

## 2021-01-01 PROCEDURE — 97530 THERAPEUTIC ACTIVITIES: CPT

## 2021-01-01 PROCEDURE — 86304 IMMUNOASSAY TUMOR CA 125: CPT | Performed by: OBSTETRICS & GYNECOLOGY

## 2021-01-01 PROCEDURE — 80069 RENAL FUNCTION PANEL: CPT | Performed by: HOSPITALIST

## 2021-01-01 PROCEDURE — A9698 NON-RAD CONTRAST MATERIALNOC: HCPCS | Performed by: OBSTETRICS & GYNECOLOGY

## 2021-01-01 PROCEDURE — 99999 PR PBB SHADOW E&M-EST. PATIENT-LVL III: ICD-10-PCS | Mod: PBBFAC,,, | Performed by: STUDENT IN AN ORGANIZED HEALTH CARE EDUCATION/TRAINING PROGRAM

## 2021-01-01 PROCEDURE — 25500020 PHARM REV CODE 255: Performed by: OBSTETRICS & GYNECOLOGY

## 2021-01-01 PROCEDURE — 11000001 HC ACUTE MED/SURG PRIVATE ROOM

## 2021-01-01 PROCEDURE — 99999 PR PBB SHADOW E&M-EST. PATIENT-LVL III: CPT | Mod: PBBFAC,,, | Performed by: OBSTETRICS & GYNECOLOGY

## 2021-01-01 PROCEDURE — 99214 PR OFFICE/OUTPT VISIT, EST, LEVL IV, 30-39 MIN: ICD-10-PCS | Mod: S$PBB,,, | Performed by: OBSTETRICS & GYNECOLOGY

## 2021-01-01 PROCEDURE — 92015 DETERMINE REFRACTIVE STATE: CPT | Mod: ,,, | Performed by: OPTOMETRIST

## 2021-01-01 PROCEDURE — 99220 PR INITIAL OBSERVATION CARE,LEVL III: ICD-10-PCS | Mod: GC,,, | Performed by: HOSPITALIST

## 2021-01-01 PROCEDURE — 99222 PR INITIAL HOSPITAL CARE,LEVL II: ICD-10-PCS | Mod: ,,, | Performed by: PSYCHIATRY & NEUROLOGY

## 2021-01-01 PROCEDURE — U0002 COVID-19 LAB TEST NON-CDC: HCPCS | Performed by: STUDENT IN AN ORGANIZED HEALTH CARE EDUCATION/TRAINING PROGRAM

## 2021-01-01 PROCEDURE — 99232 PR SUBSEQUENT HOSPITAL CARE,LEVL II: ICD-10-PCS | Mod: GC,,, | Performed by: HOSPITALIST

## 2021-01-01 PROCEDURE — 82306 VITAMIN D 25 HYDROXY: CPT

## 2021-01-01 PROCEDURE — 99223 PR INITIAL HOSPITAL CARE,LEVL III: ICD-10-PCS | Mod: ,,, | Performed by: CLINICAL NURSE SPECIALIST

## 2021-01-01 PROCEDURE — 17000 PR DESTRUCTION(LASER SURGERY,CRYOSURGERY,CHEMOSURGERY),PREMALIGNANT LESIONS,FIRST LESION: ICD-10-PCS | Mod: S$PBB,,, | Performed by: DERMATOLOGY

## 2021-01-01 PROCEDURE — 71270 CT CHEST ABDOMEN PELVIS W W/O CONTRAST GYN ONC (XPD): ICD-10-PCS | Mod: 26,,, | Performed by: RADIOLOGY

## 2021-01-01 PROCEDURE — 99225 PR SUBSEQUENT OBSERVATION CARE,LEVEL II: CPT | Mod: GC,,, | Performed by: HOSPITALIST

## 2021-01-01 PROCEDURE — 99999 PR PBB SHADOW E&M-EST. PATIENT-LVL III: CPT | Mod: PBBFAC,,, | Performed by: STUDENT IN AN ORGANIZED HEALTH CARE EDUCATION/TRAINING PROGRAM

## 2021-01-01 PROCEDURE — 90837 PR PSYCHOTHERAPY W/PATIENT, 60 MIN: ICD-10-PCS | Mod: 95,,, | Performed by: PSYCHOLOGIST

## 2021-01-01 PROCEDURE — 97166 OT EVAL MOD COMPLEX 45 MIN: CPT

## 2021-01-01 PROCEDURE — 97112 NEUROMUSCULAR REEDUCATION: CPT

## 2021-01-01 PROCEDURE — 85651 RBC SED RATE NONAUTOMATED: CPT

## 2021-01-01 PROCEDURE — 99214 OFFICE O/P EST MOD 30 MIN: CPT | Mod: 95,,, | Performed by: OBSTETRICS & GYNECOLOGY

## 2021-01-01 PROCEDURE — 99214 PR OFFICE/OUTPT VISIT, EST, LEVL IV, 30-39 MIN: ICD-10-PCS | Mod: 95,,, | Performed by: INTERNAL MEDICINE

## 2021-01-01 PROCEDURE — 92012 INTRM OPH EXAM EST PATIENT: CPT | Mod: S$PBB,,, | Performed by: OPHTHALMOLOGY

## 2021-01-01 PROCEDURE — 99226 PR SUBSEQUENT OBSERVATION CARE,LEVEL III: ICD-10-PCS | Mod: GC,,, | Performed by: HOSPITALIST

## 2021-01-01 PROCEDURE — 81001 URINALYSIS AUTO W/SCOPE: CPT | Performed by: OBSTETRICS & GYNECOLOGY

## 2021-01-01 PROCEDURE — 97116 GAIT TRAINING THERAPY: CPT

## 2021-01-01 PROCEDURE — 99233 SBSQ HOSP IP/OBS HIGH 50: CPT | Mod: GC,,, | Performed by: HOSPITALIST

## 2021-01-01 PROCEDURE — 99232 PR SUBSEQUENT HOSPITAL CARE,LEVL II: ICD-10-PCS | Mod: ,,, | Performed by: PSYCHIATRY & NEUROLOGY

## 2021-01-01 PROCEDURE — 82272 OCCULT BLD FECES 1-3 TESTS: CPT | Performed by: INTERNAL MEDICINE

## 2021-01-01 PROCEDURE — 99212 PR OFFICE/OUTPT VISIT, EST, LEVL II, 10-19 MIN: ICD-10-PCS | Mod: S$PBB,,, | Performed by: STUDENT IN AN ORGANIZED HEALTH CARE EDUCATION/TRAINING PROGRAM

## 2021-01-01 PROCEDURE — 91300 COVID-19, MRNA, LNP-S, PF, 30 MCG/0.3 ML DOSE VACCINE: CPT | Mod: ,,, | Performed by: INTERNAL MEDICINE

## 2021-01-01 PROCEDURE — 84100 ASSAY OF PHOSPHORUS: CPT | Performed by: STUDENT IN AN ORGANIZED HEALTH CARE EDUCATION/TRAINING PROGRAM

## 2021-01-01 PROCEDURE — 87186 SC STD MICRODIL/AGAR DIL: CPT | Performed by: OBSTETRICS & GYNECOLOGY

## 2021-01-01 PROCEDURE — 83036 HEMOGLOBIN GLYCOSYLATED A1C: CPT

## 2021-01-01 PROCEDURE — 99233 PR SUBSEQUENT HOSPITAL CARE,LEVL III: ICD-10-PCS | Mod: GC,,, | Performed by: HOSPITALIST

## 2021-01-01 PROCEDURE — 99999 PR PBB SHADOW E&M-EST. PATIENT-LVL III: ICD-10-PCS | Mod: PBBFAC,,, | Performed by: OPTOMETRIST

## 2021-01-01 PROCEDURE — 99442 PR PHYSICIAN TELEPHONE EVALUATION 11-20 MIN: CPT | Mod: 95,,, | Performed by: OBSTETRICS & GYNECOLOGY

## 2021-01-01 PROCEDURE — 92015 PR REFRACTION: ICD-10-PCS | Mod: ,,, | Performed by: OPTOMETRIST

## 2021-01-01 PROCEDURE — 87088 URINE BACTERIA CULTURE: CPT | Performed by: OBSTETRICS & GYNECOLOGY

## 2021-01-01 PROCEDURE — 71270 CT THORAX DX C-/C+: CPT | Mod: 26,,, | Performed by: RADIOLOGY

## 2021-01-01 PROCEDURE — 25000003 PHARM REV CODE 250

## 2021-01-01 PROCEDURE — 84100 ASSAY OF PHOSPHORUS: CPT

## 2021-01-01 PROCEDURE — 99999 PR PBB SHADOW E&M-EST. PATIENT-LVL III: ICD-10-PCS | Mod: PBBFAC,,, | Performed by: PSYCHIATRY & NEUROLOGY

## 2021-01-01 PROCEDURE — 99999 PR PBB SHADOW E&M-EST. PATIENT-LVL III: ICD-10-PCS | Mod: PBBFAC,,, | Performed by: OPHTHALMOLOGY

## 2021-01-01 PROCEDURE — 99211 OFF/OP EST MAY X REQ PHY/QHP: CPT | Mod: PBBFAC,PO | Performed by: INTERNAL MEDICINE

## 2021-01-01 PROCEDURE — 74176 CT ABD & PELVIS W/O CONTRAST: CPT | Mod: 26,,, | Performed by: RADIOLOGY

## 2021-01-01 PROCEDURE — 99214 PR OFFICE/OUTPT VISIT, EST, LEVL IV, 30-39 MIN: ICD-10-PCS | Mod: 95,,, | Performed by: OBSTETRICS & GYNECOLOGY

## 2021-01-01 PROCEDURE — 99239 HOSP IP/OBS DSCHRG MGMT >30: CPT | Mod: GC,,, | Performed by: HOSPITALIST

## 2021-01-01 PROCEDURE — 99999 PR PBB SHADOW E&M-EST. PATIENT-LVL III: CPT | Mod: PBBFAC,,, | Performed by: DERMATOLOGY

## 2021-01-01 PROCEDURE — U0005 INFEC AGEN DETEC AMPLI PROBE: HCPCS | Performed by: HOSPITALIST

## 2021-01-01 PROCEDURE — 82607 VITAMIN B-12: CPT

## 2021-01-01 PROCEDURE — 99219 PR INITIAL OBSERVATION CARE,LEVL II: ICD-10-PCS | Mod: GC,,, | Performed by: INTERNAL MEDICINE

## 2021-01-01 PROCEDURE — 99239 PR HOSPITAL DISCHARGE DAY,>30 MIN: ICD-10-PCS | Mod: GC,,, | Performed by: HOSPITALIST

## 2021-01-01 PROCEDURE — 17000 DESTRUCT PREMALG LESION: CPT | Mod: S$PBB,,, | Performed by: DERMATOLOGY

## 2021-01-01 PROCEDURE — 99999 PR PBB SHADOW E&M-EST. PATIENT-LVL III: CPT | Mod: PBBFAC,,, | Performed by: OPHTHALMOLOGY

## 2021-01-01 PROCEDURE — 77067 MAMMO DIGITAL SCREENING BILAT WITH TOMO: ICD-10-PCS | Mod: 26,,, | Performed by: RADIOLOGY

## 2021-01-01 PROCEDURE — 84443 ASSAY THYROID STIM HORMONE: CPT | Performed by: STUDENT IN AN ORGANIZED HEALTH CARE EDUCATION/TRAINING PROGRAM

## 2021-01-01 PROCEDURE — 97165 OT EVAL LOW COMPLEX 30 MIN: CPT

## 2021-01-01 PROCEDURE — 99225 PR SUBSEQUENT OBSERVATION CARE,LEVEL II: ICD-10-PCS | Mod: GC,,, | Performed by: HOSPITALIST

## 2021-01-01 PROCEDURE — 36415 COLL VENOUS BLD VENIPUNCTURE: CPT | Performed by: STUDENT IN AN ORGANIZED HEALTH CARE EDUCATION/TRAINING PROGRAM

## 2021-01-01 PROCEDURE — 99215 OFFICE O/P EST HI 40 MIN: CPT | Mod: S$PBB,,, | Performed by: OBSTETRICS & GYNECOLOGY

## 2021-01-01 PROCEDURE — 80047 BASIC METABLC PNL IONIZED CA: CPT

## 2021-01-01 PROCEDURE — 99225 PR SUBSEQUENT OBSERVATION CARE,LEVEL II: CPT | Mod: ,,, | Performed by: PSYCHIATRY & NEUROLOGY

## 2021-01-01 PROCEDURE — 76770 US EXAM ABDO BACK WALL COMP: CPT | Mod: TC,PO

## 2021-01-01 PROCEDURE — 99214 OFFICE O/P EST MOD 30 MIN: CPT | Mod: S$PBB,,, | Performed by: INTERNAL MEDICINE

## 2021-01-01 PROCEDURE — 71270 CT CHEST ABDOMEN PELVIS W W/O CONTRAST GYN ONC (XPD): ICD-10-PCS | Mod: 26,,, | Performed by: STUDENT IN AN ORGANIZED HEALTH CARE EDUCATION/TRAINING PROGRAM

## 2021-01-01 PROCEDURE — 99214 PR OFFICE/OUTPT VISIT, EST, LEVL IV, 30-39 MIN: ICD-10-PCS | Mod: S$PBB,,, | Performed by: INTERNAL MEDICINE

## 2021-01-01 PROCEDURE — 99214 OFFICE O/P EST MOD 30 MIN: CPT | Mod: PBBFAC | Performed by: PSYCHIATRY & NEUROLOGY

## 2021-01-01 PROCEDURE — 99215 PR OFFICE/OUTPT VISIT, EST, LEVL V, 40-54 MIN: ICD-10-PCS | Mod: S$PBB,,, | Performed by: PSYCHIATRY & NEUROLOGY

## 2021-01-01 PROCEDURE — 83735 ASSAY OF MAGNESIUM: CPT | Performed by: STUDENT IN AN ORGANIZED HEALTH CARE EDUCATION/TRAINING PROGRAM

## 2021-01-01 PROCEDURE — 99999 PR PBB SHADOW E&M-EST. PATIENT-LVL III: ICD-10-PCS | Mod: PBBFAC,,, | Performed by: OBSTETRICS & GYNECOLOGY

## 2021-01-01 PROCEDURE — 99214 OFFICE O/P EST MOD 30 MIN: CPT | Mod: 95,,, | Performed by: INTERNAL MEDICINE

## 2021-01-01 PROCEDURE — 71260 CT THORAX DX C+: CPT | Mod: 26,,, | Performed by: RADIOLOGY

## 2021-01-01 PROCEDURE — 99232 SBSQ HOSP IP/OBS MODERATE 35: CPT | Mod: GC,,, | Performed by: HOSPITALIST

## 2021-01-01 PROCEDURE — 81001 URINALYSIS AUTO W/SCOPE: CPT | Performed by: STUDENT IN AN ORGANIZED HEALTH CARE EDUCATION/TRAINING PROGRAM

## 2021-01-01 PROCEDURE — 74178 CT ABD&PLV WO CNTR FLWD CNTR: CPT | Mod: 26,,, | Performed by: RADIOLOGY

## 2021-01-01 PROCEDURE — 87086 URINE CULTURE/COLONY COUNT: CPT | Performed by: INTERNAL MEDICINE

## 2021-01-01 PROCEDURE — 74177 CT ABD & PELVIS W/CONTRAST: CPT | Mod: TC

## 2021-01-01 PROCEDURE — 99220 PR INITIAL OBSERVATION CARE,LEVL III: CPT | Mod: GC,,, | Performed by: HOSPITALIST

## 2021-01-01 PROCEDURE — 80307 DRUG TEST PRSMV CHEM ANLYZR: CPT | Performed by: STUDENT IN AN ORGANIZED HEALTH CARE EDUCATION/TRAINING PROGRAM

## 2021-01-01 PROCEDURE — 99999 PR PBB SHADOW E&M-EST. PATIENT-LVL I: ICD-10-PCS | Mod: PBBFAC,,, | Performed by: INTERNAL MEDICINE

## 2021-01-01 PROCEDURE — 99215 PR OFFICE/OUTPT VISIT, EST, LEVL V, 40-54 MIN: ICD-10-PCS | Mod: S$PBB,,, | Performed by: OBSTETRICS & GYNECOLOGY

## 2021-01-01 PROCEDURE — 80143 DRUG ASSAY ACETAMINOPHEN: CPT | Performed by: STUDENT IN AN ORGANIZED HEALTH CARE EDUCATION/TRAINING PROGRAM

## 2021-01-01 PROCEDURE — 84425 ASSAY OF VITAMIN B-1: CPT

## 2021-01-01 PROCEDURE — 99215 OFFICE O/P EST HI 40 MIN: CPT | Mod: S$PBB,,, | Performed by: PSYCHIATRY & NEUROLOGY

## 2021-01-01 PROCEDURE — 25500020 PHARM REV CODE 255: Performed by: HOSPITALIST

## 2021-01-01 PROCEDURE — 87086 URINE CULTURE/COLONY COUNT: CPT | Performed by: OBSTETRICS & GYNECOLOGY

## 2021-01-01 PROCEDURE — 87046 STOOL CULTR AEROBIC BACT EA: CPT | Mod: 59 | Performed by: INTERNAL MEDICINE

## 2021-01-01 PROCEDURE — 99999 PR PBB SHADOW E&M-EST. PATIENT-LVL IV: CPT | Mod: PBBFAC,,, | Performed by: PSYCHIATRY & NEUROLOGY

## 2021-01-01 PROCEDURE — 99214 OFFICE O/P EST MOD 30 MIN: CPT | Mod: S$PBB,,, | Performed by: OBSTETRICS & GYNECOLOGY

## 2021-01-01 PROCEDURE — 77063 MAMMO DIGITAL SCREENING BILAT WITH TOMO: ICD-10-PCS | Mod: 26,,, | Performed by: RADIOLOGY

## 2021-01-01 PROCEDURE — 36415 COLL VENOUS BLD VENIPUNCTURE: CPT | Performed by: OBSTETRICS & GYNECOLOGY

## 2021-01-01 PROCEDURE — 99225 PR SUBSEQUENT OBSERVATION CARE,LEVEL II: ICD-10-PCS | Mod: ,,, | Performed by: PSYCHIATRY & NEUROLOGY

## 2021-01-01 PROCEDURE — 74178 CT ABD&PLV WO CNTR FLWD CNTR: CPT | Mod: 26,,, | Performed by: STUDENT IN AN ORGANIZED HEALTH CARE EDUCATION/TRAINING PROGRAM

## 2021-01-01 PROCEDURE — 74177 CT CHEST ABDOMEN PELVIS WITH CONTRAST (XPD): ICD-10-PCS | Mod: 26,,, | Performed by: RADIOLOGY

## 2021-01-01 PROCEDURE — 85027 COMPLETE CBC AUTOMATED: CPT | Performed by: OBSTETRICS & GYNECOLOGY

## 2021-01-01 PROCEDURE — 0003A COVID-19, MRNA, LNP-S, PF, 30 MCG/0.3 ML DOSE VACCINE: CPT | Mod: CV19,,, | Performed by: INTERNAL MEDICINE

## 2021-01-01 PROCEDURE — 83735 ASSAY OF MAGNESIUM: CPT

## 2021-01-01 PROCEDURE — 93010 ELECTROCARDIOGRAM REPORT: CPT | Mod: ,,, | Performed by: INTERNAL MEDICINE

## 2021-01-01 PROCEDURE — 86580 TB INTRADERMAL TEST: CPT | Performed by: HOSPITALIST

## 2021-01-01 PROCEDURE — 97161 PT EVAL LOW COMPLEX 20 MIN: CPT

## 2021-01-01 PROCEDURE — 87427 SHIGA-LIKE TOXIN AG IA: CPT | Mod: 59 | Performed by: INTERNAL MEDICINE

## 2021-01-01 PROCEDURE — 80048 BASIC METABOLIC PNL TOTAL CA: CPT

## 2021-01-01 PROCEDURE — 36415 COLL VENOUS BLD VENIPUNCTURE: CPT

## 2021-01-01 PROCEDURE — U0003 INFECTIOUS AGENT DETECTION BY NUCLEIC ACID (DNA OR RNA); SEVERE ACUTE RESPIRATORY SYNDROME CORONAVIRUS 2 (SARS-COV-2) (CORONAVIRUS DISEASE [COVID-19]), AMPLIFIED PROBE TECHNIQUE, MAKING USE OF HIGH THROUGHPUT TECHNOLOGIES AS DESCRIBED BY CMS-2020-01-R: HCPCS | Performed by: HOSPITALIST

## 2021-01-01 PROCEDURE — 99999 PR PBB SHADOW E&M-EST. PATIENT-LVL III: ICD-10-PCS | Mod: PBBFAC,,, | Performed by: INTERNAL MEDICINE

## 2021-01-01 PROCEDURE — 82746 ASSAY OF FOLIC ACID SERUM: CPT

## 2021-01-01 PROCEDURE — 87329 GIARDIA AG IA: CPT | Performed by: INTERNAL MEDICINE

## 2021-01-01 PROCEDURE — 93005 ELECTROCARDIOGRAM TRACING: CPT

## 2021-01-01 PROCEDURE — 99213 OFFICE O/P EST LOW 20 MIN: CPT | Mod: PBBFAC | Performed by: OBSTETRICS & GYNECOLOGY

## 2021-01-01 PROCEDURE — 99213 OFFICE O/P EST LOW 20 MIN: CPT | Mod: PBBFAC | Performed by: DERMATOLOGY

## 2021-01-01 PROCEDURE — 99204 PR OFFICE/OUTPT VISIT, NEW, LEVL IV, 45-59 MIN: ICD-10-PCS | Mod: S$PBB,,, | Performed by: STUDENT IN AN ORGANIZED HEALTH CARE EDUCATION/TRAINING PROGRAM

## 2021-01-01 PROCEDURE — 99223 1ST HOSP IP/OBS HIGH 75: CPT | Mod: ,,, | Performed by: CLINICAL NURSE SPECIALIST

## 2021-01-01 PROCEDURE — 99223 1ST HOSP IP/OBS HIGH 75: CPT | Mod: ,,, | Performed by: PSYCHIATRY & NEUROLOGY

## 2021-01-01 PROCEDURE — 80061 LIPID PANEL: CPT | Performed by: HOSPITALIST

## 2021-01-01 PROCEDURE — 99213 OFFICE O/P EST LOW 20 MIN: CPT | Mod: 25,S$PBB,, | Performed by: DERMATOLOGY

## 2021-01-01 PROCEDURE — 99999 PR PBB SHADOW E&M-EST. PATIENT-LVL III: CPT | Mod: PBBFAC,,, | Performed by: OPTOMETRIST

## 2021-01-01 PROCEDURE — 92014 PR EYE EXAM, EST PATIENT,COMPREHESV: ICD-10-PCS | Mod: S$PBB,,, | Performed by: OPTOMETRIST

## 2021-01-01 PROCEDURE — 86592 SYPHILIS TEST NON-TREP QUAL: CPT

## 2021-01-01 PROCEDURE — 99214 OFFICE O/P EST MOD 30 MIN: CPT | Mod: S$PBB,,, | Performed by: PSYCHIATRY & NEUROLOGY

## 2021-01-01 PROCEDURE — 99285 PR EMERGENCY DEPT VISIT,LEVEL V: ICD-10-PCS | Mod: GC,CS,, | Performed by: EMERGENCY MEDICINE

## 2021-01-01 PROCEDURE — 99223 PR INITIAL HOSPITAL CARE,LEVL III: ICD-10-PCS | Mod: ,,, | Performed by: PSYCHIATRY & NEUROLOGY

## 2021-01-01 PROCEDURE — 99213 OFFICE O/P EST LOW 20 MIN: CPT | Mod: PBBFAC | Performed by: OPTOMETRIST

## 2021-01-01 PROCEDURE — 0002A COVID-19, MRNA, LNP-S, PF, 30 MCG/0.3 ML DOSE VACCINE: CPT | Mod: PBBFAC,PO

## 2021-01-01 PROCEDURE — 36415 COLL VENOUS BLD VENIPUNCTURE: CPT | Performed by: PSYCHIATRY & NEUROLOGY

## 2021-01-01 PROCEDURE — 81001 URINALYSIS AUTO W/SCOPE: CPT | Performed by: INTERNAL MEDICINE

## 2021-01-01 PROCEDURE — 25500020 PHARM REV CODE 255: Performed by: STUDENT IN AN ORGANIZED HEALTH CARE EDUCATION/TRAINING PROGRAM

## 2021-01-01 PROCEDURE — 77063 BREAST TOMOSYNTHESIS BI: CPT | Mod: 26,,, | Performed by: RADIOLOGY

## 2021-01-01 PROCEDURE — 86140 C-REACTIVE PROTEIN: CPT

## 2021-01-01 PROCEDURE — 99213 OFFICE O/P EST LOW 20 MIN: CPT | Mod: PBBFAC | Performed by: PSYCHIATRY & NEUROLOGY

## 2021-01-01 PROCEDURE — 74177 CT ABD & PELVIS W/CONTRAST: CPT | Mod: 26,,, | Performed by: RADIOLOGY

## 2021-01-01 PROCEDURE — 92610 EVALUATE SWALLOWING FUNCTION: CPT

## 2021-01-01 PROCEDURE — 99215 PR OFFICE/OUTPT VISIT, EST, LEVL V, 40-54 MIN: ICD-10-PCS | Mod: 95,,, | Performed by: OBSTETRICS & GYNECOLOGY

## 2021-01-01 PROCEDURE — 99204 OFFICE O/P NEW MOD 45 MIN: CPT | Mod: S$PBB,,, | Performed by: STUDENT IN AN ORGANIZED HEALTH CARE EDUCATION/TRAINING PROGRAM

## 2021-01-01 PROCEDURE — 99222 1ST HOSP IP/OBS MODERATE 55: CPT | Mod: ,,, | Performed by: PSYCHIATRY & NEUROLOGY

## 2021-01-01 PROCEDURE — 80048 BASIC METABOLIC PNL TOTAL CA: CPT | Performed by: STUDENT IN AN ORGANIZED HEALTH CARE EDUCATION/TRAINING PROGRAM

## 2021-01-01 PROCEDURE — 91300 COVID-19, MRNA, LNP-S, PF, 30 MCG/0.3 ML DOSE VACCINE: ICD-10-PCS | Mod: ,,, | Performed by: INTERNAL MEDICINE

## 2021-01-01 PROCEDURE — 71270 CT THORAX DX C-/C+: CPT | Mod: TC

## 2021-01-01 PROCEDURE — 99213 PR OFFICE/OUTPT VISIT, EST, LEVL III, 20-29 MIN: ICD-10-PCS | Mod: 25,S$PBB,, | Performed by: DERMATOLOGY

## 2021-01-01 PROCEDURE — 99442 PR PHYSICIAN TELEPHONE EVALUATION 11-20 MIN: ICD-10-PCS | Mod: 95,,, | Performed by: OBSTETRICS & GYNECOLOGY

## 2021-01-01 PROCEDURE — 77067 SCR MAMMO BI INCL CAD: CPT | Mod: 26,,, | Performed by: RADIOLOGY

## 2021-01-01 PROCEDURE — 87077 CULTURE AEROBIC IDENTIFY: CPT | Performed by: OBSTETRICS & GYNECOLOGY

## 2021-01-01 PROCEDURE — A9698 NON-RAD CONTRAST MATERIALNOC: HCPCS | Performed by: STUDENT IN AN ORGANIZED HEALTH CARE EDUCATION/TRAINING PROGRAM

## 2021-01-01 PROCEDURE — 99212 OFFICE O/P EST SF 10 MIN: CPT | Mod: S$PBB,,, | Performed by: STUDENT IN AN ORGANIZED HEALTH CARE EDUCATION/TRAINING PROGRAM

## 2021-01-01 PROCEDURE — 89055 LEUKOCYTE ASSESSMENT FECAL: CPT | Performed by: INTERNAL MEDICINE

## 2021-01-01 PROCEDURE — 30200315 PPD INTRADERMAL TEST REV CODE 302: Performed by: HOSPITALIST

## 2021-01-01 PROCEDURE — 90837 PSYTX W PT 60 MINUTES: CPT | Mod: 95,,, | Performed by: PSYCHOLOGIST

## 2021-01-01 PROCEDURE — 76770 US KIDNEY: ICD-10-PCS | Mod: 26,,, | Performed by: RADIOLOGY

## 2021-01-01 PROCEDURE — 0003A COVID-19, MRNA, LNP-S, PF, 30 MCG/0.3 ML DOSE VACCINE: ICD-10-PCS | Mod: CV19,,, | Performed by: INTERNAL MEDICINE

## 2021-01-01 PROCEDURE — 74176 CT ABDOMEN PELVIS WITHOUT CONTRAST: ICD-10-PCS | Mod: 26,,, | Performed by: RADIOLOGY

## 2021-01-01 PROCEDURE — 82077 ASSAY SPEC XCP UR&BREATH IA: CPT | Performed by: STUDENT IN AN ORGANIZED HEALTH CARE EDUCATION/TRAINING PROGRAM

## 2021-01-01 PROCEDURE — 17000 DESTRUCT PREMALG LESION: CPT | Mod: PBBFAC | Performed by: DERMATOLOGY

## 2021-01-01 PROCEDURE — 71270 CT THORAX DX C-/C+: CPT | Mod: 26,,, | Performed by: STUDENT IN AN ORGANIZED HEALTH CARE EDUCATION/TRAINING PROGRAM

## 2021-01-01 PROCEDURE — 82550 ASSAY OF CK (CPK): CPT | Performed by: STUDENT IN AN ORGANIZED HEALTH CARE EDUCATION/TRAINING PROGRAM

## 2021-01-01 PROCEDURE — 99214 PR OFFICE/OUTPT VISIT, EST, LEVL IV, 30-39 MIN: ICD-10-PCS | Mod: S$PBB,,, | Performed by: PSYCHIATRY & NEUROLOGY

## 2021-01-01 PROCEDURE — 99226 PR SUBSEQUENT OBSERVATION CARE,LEVEL III: CPT | Mod: GC,,, | Performed by: HOSPITALIST

## 2021-01-01 PROCEDURE — 77067 SCR MAMMO BI INCL CAD: CPT | Mod: TC

## 2021-01-01 PROCEDURE — 99213 OFFICE O/P EST LOW 20 MIN: CPT | Mod: PBBFAC | Performed by: INTERNAL MEDICINE

## 2021-01-01 PROCEDURE — 81001 URINALYSIS AUTO W/SCOPE: CPT | Performed by: EMERGENCY MEDICINE

## 2021-01-01 PROCEDURE — 99999 PR PBB SHADOW E&M-EST. PATIENT-LVL III: ICD-10-PCS | Mod: PBBFAC,,, | Performed by: DERMATOLOGY

## 2021-01-01 PROCEDURE — 94761 N-INVAS EAR/PLS OXIMETRY MLT: CPT

## 2021-01-01 PROCEDURE — 74178 CT CHEST ABDOMEN PELVIS W W/O CONTRAST GYN ONC (XPD): ICD-10-PCS | Mod: 26,,, | Performed by: STUDENT IN AN ORGANIZED HEALTH CARE EDUCATION/TRAINING PROGRAM

## 2021-01-01 PROCEDURE — 99213 OFFICE O/P EST LOW 20 MIN: CPT | Mod: PBBFAC | Performed by: OPHTHALMOLOGY

## 2021-01-01 PROCEDURE — 91300 COVID-19, MRNA, LNP-S, PF, 30 MCG/0.3 ML DOSE VACCINE: CPT | Mod: PBBFAC | Performed by: FAMILY MEDICINE

## 2021-01-01 PROCEDURE — 74176 CT ABD & PELVIS W/O CONTRAST: CPT | Mod: TC

## 2021-01-01 PROCEDURE — 87389 HIV-1 AG W/HIV-1&-2 AB AG IA: CPT

## 2021-01-01 PROCEDURE — 84425 ASSAY OF VITAMIN B-1: CPT | Performed by: PSYCHIATRY & NEUROLOGY

## 2021-01-01 RX ORDER — ENOXAPARIN SODIUM 100 MG/ML
40 INJECTION SUBCUTANEOUS EVERY 24 HOURS
Status: DISCONTINUED | OUTPATIENT
Start: 2021-01-01 | End: 2021-01-01 | Stop reason: HOSPADM

## 2021-01-01 RX ORDER — CARVEDILOL 6.25 MG/1
6.25 TABLET ORAL 2 TIMES DAILY
Qty: 60 TABLET | Refills: 11
Start: 2021-01-01 | End: 2022-12-23

## 2021-01-01 RX ORDER — ONDANSETRON 2 MG/ML
4 INJECTION INTRAMUSCULAR; INTRAVENOUS EVERY 6 HOURS PRN
Start: 2021-01-01 | End: 2021-01-01 | Stop reason: HOSPADM

## 2021-01-01 RX ORDER — CARVEDILOL 6.25 MG/1
6.25 TABLET ORAL 2 TIMES DAILY
Status: DISCONTINUED | OUTPATIENT
Start: 2021-01-01 | End: 2021-01-01 | Stop reason: HOSPADM

## 2021-01-01 RX ORDER — IBUPROFEN 200 MG
16 TABLET ORAL
Refills: 12
Start: 2021-01-01 | End: 2021-01-01 | Stop reason: HOSPADM

## 2021-01-01 RX ORDER — NALOXONE HCL 0.4 MG/ML
0.02 VIAL (ML) INJECTION
Status: DISCONTINUED | OUTPATIENT
Start: 2021-01-01 | End: 2021-01-01 | Stop reason: HOSPADM

## 2021-01-01 RX ORDER — GABAPENTIN 100 MG/1
100 CAPSULE ORAL 3 TIMES DAILY
Qty: 90 CAPSULE | Refills: 11 | Status: SHIPPED | OUTPATIENT
Start: 2021-01-01 | End: 2021-01-01

## 2021-01-01 RX ORDER — IBUPROFEN 200 MG
16 TABLET ORAL
Status: DISCONTINUED | OUTPATIENT
Start: 2021-01-01 | End: 2021-01-01 | Stop reason: HOSPADM

## 2021-01-01 RX ORDER — POTASSIUM CHLORIDE 7.45 MG/ML
10 INJECTION INTRAVENOUS
Status: COMPLETED | OUTPATIENT
Start: 2021-01-01 | End: 2021-01-01

## 2021-01-01 RX ORDER — LISINOPRIL 10 MG/1
10 TABLET ORAL DAILY
Status: DISCONTINUED | OUTPATIENT
Start: 2021-01-01 | End: 2021-01-01

## 2021-01-01 RX ORDER — LISINOPRIL 10 MG/1
10 TABLET ORAL DAILY
Qty: 90 TABLET | Refills: 3 | Status: CANCELLED | OUTPATIENT
Start: 2021-01-01 | End: 2022-12-16

## 2021-01-01 RX ORDER — PHENAZOPYRIDINE HYDROCHLORIDE 200 MG/1
200 TABLET, FILM COATED ORAL 3 TIMES DAILY PRN
Qty: 20 TABLET | Refills: 0 | Status: SHIPPED | OUTPATIENT
Start: 2021-01-01 | End: 2021-01-01 | Stop reason: SDUPTHER

## 2021-01-01 RX ORDER — ONDANSETRON 4 MG/1
4 TABLET, ORALLY DISINTEGRATING ORAL
Status: COMPLETED | OUTPATIENT
Start: 2021-01-01 | End: 2021-01-01

## 2021-01-01 RX ORDER — SYRING-NEEDL,DISP,INSUL,0.3 ML 29 G X1/2"
296 SYRINGE, EMPTY DISPOSABLE MISCELLANEOUS
Status: COMPLETED | OUTPATIENT
Start: 2021-01-01 | End: 2021-01-01

## 2021-01-01 RX ORDER — PROMETHAZINE HYDROCHLORIDE 25 MG/1
25 TABLET ORAL EVERY 6 HOURS PRN
Qty: 10 TABLET | Refills: 0 | Status: ON HOLD | OUTPATIENT
Start: 2021-01-01 | End: 2021-01-01

## 2021-01-01 RX ORDER — ACETAMINOPHEN 325 MG/1
650 TABLET ORAL EVERY 4 HOURS PRN
Status: DISCONTINUED | OUTPATIENT
Start: 2021-01-01 | End: 2021-01-01 | Stop reason: HOSPADM

## 2021-01-01 RX ORDER — SYRING-NEEDL,DISP,INSUL,0.3 ML 29 G X1/2"
296 SYRINGE, EMPTY DISPOSABLE MISCELLANEOUS
Status: DISCONTINUED | OUTPATIENT
Start: 2021-01-01 | End: 2021-01-01

## 2021-01-01 RX ORDER — POTASSIUM CHLORIDE 7.45 MG/ML
10 INJECTION INTRAVENOUS ONCE
Status: COMPLETED | OUTPATIENT
Start: 2021-01-01 | End: 2021-01-01

## 2021-01-01 RX ORDER — ACETAMINOPHEN 500 MG
500 TABLET ORAL DAILY PRN
Status: ON HOLD | COMMUNITY
End: 2021-01-01 | Stop reason: HOSPADM

## 2021-01-01 RX ORDER — GABAPENTIN 100 MG/1
100 CAPSULE ORAL 3 TIMES DAILY
Qty: 90 CAPSULE | Refills: 11 | Status: ON HOLD | OUTPATIENT
Start: 2021-01-01 | End: 2021-01-01

## 2021-01-01 RX ORDER — ERGOCALCIFEROL 1.25 MG/1
50000 CAPSULE ORAL
Status: DISCONTINUED | OUTPATIENT
Start: 2021-01-01 | End: 2021-01-01 | Stop reason: HOSPADM

## 2021-01-01 RX ORDER — SODIUM,POTASSIUM PHOSPHATES 280-250MG
2 POWDER IN PACKET (EA) ORAL EVERY 4 HOURS
Status: COMPLETED | OUTPATIENT
Start: 2021-01-01 | End: 2021-01-01

## 2021-01-01 RX ORDER — PSEUDOEPHEDRINE/ACETAMINOPHEN 30MG-500MG
100 TABLET ORAL
Status: COMPLETED | OUTPATIENT
Start: 2021-01-01 | End: 2021-01-01

## 2021-01-01 RX ORDER — ALBUTEROL SULFATE 90 UG/1
2 AEROSOL, METERED RESPIRATORY (INHALATION) EVERY 6 HOURS PRN
Qty: 18 G | Refills: 12 | Status: SHIPPED | OUTPATIENT
Start: 2021-01-01

## 2021-01-01 RX ORDER — SODIUM CHLORIDE 9 MG/ML
INJECTION, SOLUTION INTRAVENOUS CONTINUOUS
Status: ACTIVE | OUTPATIENT
Start: 2021-01-01 | End: 2021-01-01

## 2021-01-01 RX ORDER — AMOXICILLIN 250 MG
1 CAPSULE ORAL 2 TIMES DAILY
Status: DISCONTINUED | OUTPATIENT
Start: 2021-01-01 | End: 2021-01-01 | Stop reason: HOSPADM

## 2021-01-01 RX ORDER — ONDANSETRON 2 MG/ML
4 INJECTION INTRAMUSCULAR; INTRAVENOUS EVERY 6 HOURS PRN
Status: DISCONTINUED | OUTPATIENT
Start: 2021-01-01 | End: 2021-01-01 | Stop reason: HOSPADM

## 2021-01-01 RX ORDER — POTASSIUM CHLORIDE 750 MG/1
10 TABLET, EXTENDED RELEASE ORAL DAILY
Qty: 30 TABLET | Refills: 0 | Status: SHIPPED | OUTPATIENT
Start: 2021-01-01

## 2021-01-01 RX ORDER — MIRTAZAPINE 15 MG/1
15 TABLET, ORALLY DISINTEGRATING ORAL NIGHTLY
Status: DISCONTINUED | OUTPATIENT
Start: 2021-01-01 | End: 2021-01-01 | Stop reason: HOSPADM

## 2021-01-01 RX ORDER — POLYETHYLENE GLYCOL 3350 17 G/17G
17 POWDER, FOR SOLUTION ORAL DAILY
Status: DISCONTINUED | OUTPATIENT
Start: 2021-01-01 | End: 2021-01-01

## 2021-01-01 RX ORDER — GLUCAGON 1 MG
1 KIT INJECTION
Status: DISCONTINUED | OUTPATIENT
Start: 2021-01-01 | End: 2021-01-01 | Stop reason: HOSPADM

## 2021-01-01 RX ORDER — POTASSIUM CHLORIDE 750 MG/1
30 CAPSULE, EXTENDED RELEASE ORAL ONCE
Status: COMPLETED | OUTPATIENT
Start: 2021-01-01 | End: 2021-01-01

## 2021-01-01 RX ORDER — POTASSIUM CHLORIDE 7.45 MG/ML
10 INJECTION INTRAVENOUS
Status: ACTIVE | OUTPATIENT
Start: 2021-01-01 | End: 2021-01-01

## 2021-01-01 RX ORDER — POTASSIUM CHLORIDE 20 MEQ/1
40 TABLET, EXTENDED RELEASE ORAL ONCE
Status: DISCONTINUED | OUTPATIENT
Start: 2021-01-01 | End: 2021-01-01

## 2021-01-01 RX ORDER — MIRTAZAPINE 15 MG/1
15 TABLET, ORALLY DISINTEGRATING ORAL NIGHTLY
Qty: 30 TABLET | Refills: 3 | Status: SHIPPED | OUTPATIENT
Start: 2021-01-01 | End: 2022-12-17

## 2021-01-01 RX ORDER — MAGNESIUM SULFATE HEPTAHYDRATE 40 MG/ML
2 INJECTION, SOLUTION INTRAVENOUS ONCE
Status: COMPLETED | OUTPATIENT
Start: 2021-01-01 | End: 2021-01-01

## 2021-01-01 RX ORDER — LISINOPRIL 20 MG/1
20 TABLET ORAL DAILY
Qty: 90 TABLET | Refills: 3 | Status: SHIPPED | OUTPATIENT
Start: 2021-01-01 | End: 2022-12-18

## 2021-01-01 RX ORDER — CARVEDILOL 3.12 MG/1
3.12 TABLET ORAL 2 TIMES DAILY
Status: DISCONTINUED | OUTPATIENT
Start: 2021-01-01 | End: 2021-01-01

## 2021-01-01 RX ORDER — LISINOPRIL 20 MG/1
40 TABLET ORAL DAILY
Status: CANCELLED | OUTPATIENT
Start: 2021-01-01

## 2021-01-01 RX ORDER — NAPROXEN SODIUM 220 MG/1
81 TABLET, FILM COATED ORAL DAILY
Status: DISCONTINUED | OUTPATIENT
Start: 2021-01-01 | End: 2021-01-01 | Stop reason: HOSPADM

## 2021-01-01 RX ORDER — ASPIRIN 81 MG/1
81 TABLET ORAL DAILY
Status: DISCONTINUED | OUTPATIENT
Start: 2021-01-01 | End: 2021-01-01

## 2021-01-01 RX ORDER — IBUPROFEN 200 MG
24 TABLET ORAL
Status: DISCONTINUED | OUTPATIENT
Start: 2021-01-01 | End: 2021-01-01 | Stop reason: HOSPADM

## 2021-01-01 RX ORDER — MORPHINE SULFATE 2 MG/ML
3 INJECTION, SOLUTION INTRAMUSCULAR; INTRAVENOUS
Status: COMPLETED | OUTPATIENT
Start: 2021-01-01 | End: 2021-01-01

## 2021-01-01 RX ORDER — AMOXICILLIN 250 MG
1 CAPSULE ORAL DAILY
Status: DISCONTINUED | OUTPATIENT
Start: 2021-01-01 | End: 2021-01-01 | Stop reason: HOSPADM

## 2021-01-01 RX ORDER — CARVEDILOL 3.12 MG/1
3.12 TABLET ORAL 2 TIMES DAILY
Status: DISCONTINUED | OUTPATIENT
Start: 2021-01-01 | End: 2021-01-01 | Stop reason: HOSPADM

## 2021-01-01 RX ORDER — POLYETHYLENE GLYCOL 3350 17 G/17G
17 POWDER, FOR SOLUTION ORAL 2 TIMES DAILY
Status: DISCONTINUED | OUTPATIENT
Start: 2021-01-01 | End: 2021-01-01 | Stop reason: HOSPADM

## 2021-01-01 RX ORDER — METRONIDAZOLE 500 MG/1
500 TABLET ORAL 3 TIMES DAILY
Qty: 30 TABLET | Refills: 0 | Status: SHIPPED | OUTPATIENT
Start: 2021-01-01 | End: 2021-01-01

## 2021-01-01 RX ORDER — AMOXICILLIN 250 MG
1 CAPSULE ORAL DAILY
Status: DISCONTINUED | OUTPATIENT
Start: 2021-01-01 | End: 2021-01-01

## 2021-01-01 RX ORDER — PROMETHAZINE HYDROCHLORIDE 25 MG/1
25 TABLET ORAL EVERY 6 HOURS PRN
Qty: 10 TABLET | Refills: 0 | Status: SHIPPED | OUTPATIENT
Start: 2021-01-01 | End: 2021-01-01 | Stop reason: SDUPTHER

## 2021-01-01 RX ORDER — IPRATROPIUM BROMIDE AND ALBUTEROL SULFATE 2.5; .5 MG/3ML; MG/3ML
3 SOLUTION RESPIRATORY (INHALATION) EVERY 4 HOURS PRN
Status: DISCONTINUED | OUTPATIENT
Start: 2021-01-01 | End: 2021-01-01 | Stop reason: HOSPADM

## 2021-01-01 RX ORDER — DEXTROSE MONOHYDRATE, SODIUM CHLORIDE, AND POTASSIUM CHLORIDE 50; 1.49; 4.5 G/1000ML; G/1000ML; G/1000ML
INJECTION, SOLUTION INTRAVENOUS CONTINUOUS
Status: DISCONTINUED | OUTPATIENT
Start: 2021-01-01 | End: 2021-01-01

## 2021-01-01 RX ORDER — TRAMADOL HYDROCHLORIDE 50 MG/1
50 TABLET ORAL EVERY 4 HOURS PRN
Qty: 20 TABLET | Refills: 0 | Status: ON HOLD | OUTPATIENT
Start: 2021-01-01 | End: 2021-01-01 | Stop reason: HOSPADM

## 2021-01-01 RX ORDER — IPRATROPIUM BROMIDE AND ALBUTEROL SULFATE 2.5; .5 MG/3ML; MG/3ML
3 SOLUTION RESPIRATORY (INHALATION) EVERY 4 HOURS PRN
Qty: 75 ML | Refills: 0
Start: 2021-01-01 | End: 2022-12-23

## 2021-01-01 RX ORDER — RIZATRIPTAN BENZOATE 10 MG/1
TABLET ORAL
Qty: 10 TABLET | Refills: 11 | Status: SHIPPED | OUTPATIENT
Start: 2021-01-01 | End: 2021-01-01 | Stop reason: SDUPTHER

## 2021-01-01 RX ORDER — ENOXAPARIN SODIUM 100 MG/ML
40 INJECTION SUBCUTANEOUS NIGHTLY
Start: 2021-01-01 | End: 2021-01-01 | Stop reason: HOSPADM

## 2021-01-01 RX ORDER — ONDANSETRON 4 MG/1
4 TABLET, FILM COATED ORAL EVERY 8 HOURS PRN
Status: DISCONTINUED | OUTPATIENT
Start: 2021-01-01 | End: 2021-01-01 | Stop reason: HOSPADM

## 2021-01-01 RX ORDER — NITROFURANTOIN 25; 75 MG/1; MG/1
100 CAPSULE ORAL 2 TIMES DAILY
Qty: 14 CAPSULE | Refills: 0 | Status: SHIPPED | OUTPATIENT
Start: 2021-01-01 | End: 2021-01-01

## 2021-01-01 RX ORDER — ATORVASTATIN CALCIUM 40 MG/1
40 TABLET, FILM COATED ORAL DAILY
Qty: 90 TABLET | Refills: 3
Start: 2021-01-01 | End: 2022-12-23

## 2021-01-01 RX ORDER — RIZATRIPTAN BENZOATE 10 MG/1
TABLET ORAL
Qty: 10 TABLET | Refills: 11 | Status: SHIPPED | OUTPATIENT
Start: 2021-01-01

## 2021-01-01 RX ORDER — CIPROFLOXACIN 500 MG/1
500 TABLET ORAL 2 TIMES DAILY
Qty: 20 TABLET | Refills: 0 | Status: SHIPPED | OUTPATIENT
Start: 2021-01-01 | End: 2021-01-01

## 2021-01-01 RX ORDER — MULTIVIT WITH MINERALS/HERBS
1 TABLET ORAL DAILY
COMMUNITY
End: 2021-01-01

## 2021-01-01 RX ORDER — SODIUM CHLORIDE 0.9 % (FLUSH) 0.9 %
10 SYRINGE (ML) INJECTION EVERY 12 HOURS PRN
Status: DISCONTINUED | OUTPATIENT
Start: 2021-01-01 | End: 2021-01-01 | Stop reason: HOSPADM

## 2021-01-01 RX ORDER — AMOXICILLIN 250 MG
1 CAPSULE ORAL 2 TIMES DAILY
Start: 2021-01-01

## 2021-01-01 RX ORDER — LISINOPRIL 20 MG/1
20 TABLET ORAL DAILY
Status: DISCONTINUED | OUTPATIENT
Start: 2021-01-01 | End: 2021-01-01 | Stop reason: HOSPADM

## 2021-01-01 RX ORDER — POLYETHYLENE GLYCOL 3350 17 G/17G
17 POWDER, FOR SOLUTION ORAL 2 TIMES DAILY
Refills: 0
Start: 2021-01-01

## 2021-01-01 RX ORDER — TALC
6 POWDER (GRAM) TOPICAL NIGHTLY PRN
Status: DISCONTINUED | OUTPATIENT
Start: 2021-01-01 | End: 2021-01-01 | Stop reason: HOSPADM

## 2021-01-01 RX ORDER — NAPROXEN SODIUM 220 MG/1
81 TABLET, FILM COATED ORAL DAILY
Refills: 0 | COMMUNITY
Start: 2021-01-01 | End: 2022-12-17

## 2021-01-01 RX ORDER — POTASSIUM CHLORIDE 7.45 MG/ML
10 INJECTION INTRAVENOUS
Status: DISCONTINUED | OUTPATIENT
Start: 2021-01-01 | End: 2021-01-01

## 2021-01-01 RX ORDER — POLYETHYLENE GLYCOL 3350 17 G/17G
17 POWDER, FOR SOLUTION ORAL DAILY
Status: DISCONTINUED | OUTPATIENT
Start: 2021-01-01 | End: 2021-01-01 | Stop reason: HOSPADM

## 2021-01-01 RX ORDER — CARVEDILOL 3.12 MG/1
3.12 TABLET ORAL 2 TIMES DAILY
Qty: 60 TABLET | Refills: 11 | Status: ON HOLD | OUTPATIENT
Start: 2021-01-01 | End: 2021-01-01 | Stop reason: HOSPADM

## 2021-01-01 RX ORDER — ATORVASTATIN CALCIUM 20 MG/1
40 TABLET, FILM COATED ORAL DAILY
Status: DISCONTINUED | OUTPATIENT
Start: 2021-01-01 | End: 2021-01-01 | Stop reason: HOSPADM

## 2021-01-01 RX ORDER — ACETAMINOPHEN 325 MG/1
650 TABLET ORAL EVERY 4 HOURS PRN
Refills: 0
Start: 2021-01-01

## 2021-01-01 RX ORDER — PHENAZOPYRIDINE HYDROCHLORIDE 200 MG/1
200 TABLET, FILM COATED ORAL 3 TIMES DAILY PRN
Qty: 20 TABLET | Refills: 0 | Status: ON HOLD | OUTPATIENT
Start: 2021-01-01 | End: 2021-01-01

## 2021-01-01 RX ORDER — IBUPROFEN 200 MG
24 TABLET ORAL
Refills: 12
Start: 2021-01-01 | End: 2021-01-01 | Stop reason: HOSPADM

## 2021-01-01 RX ORDER — TALC
6 POWDER (GRAM) TOPICAL NIGHTLY PRN
Refills: 0
Start: 2021-01-01

## 2021-01-01 RX ORDER — ONDANSETRON 2 MG/ML
4 INJECTION INTRAMUSCULAR; INTRAVENOUS EVERY 8 HOURS PRN
Status: DISCONTINUED | OUTPATIENT
Start: 2021-01-01 | End: 2021-01-01 | Stop reason: ALTCHOICE

## 2021-01-01 RX ORDER — MORPHINE SULFATE 2 MG/ML
INJECTION, SOLUTION INTRAMUSCULAR; INTRAVENOUS
Status: DISPENSED
Start: 2021-01-01 | End: 2021-01-01

## 2021-01-01 RX ORDER — PSEUDOEPHEDRINE/ACETAMINOPHEN 30MG-500MG
100 TABLET ORAL
Status: DISCONTINUED | OUTPATIENT
Start: 2021-01-01 | End: 2021-01-01

## 2021-01-01 RX ORDER — ERGOCALCIFEROL 1.25 MG/1
50000 CAPSULE ORAL
Qty: 4 CAPSULE | Refills: 3 | Status: SHIPPED | OUTPATIENT
Start: 2021-01-01

## 2021-01-01 RX ADMIN — POLYETHYLENE GLYCOL 3350 17 G: 17 POWDER, FOR SOLUTION ORAL at 08:12

## 2021-01-01 RX ADMIN — POTASSIUM BICARBONATE 40 MEQ: 391 TABLET, EFFERVESCENT ORAL at 03:12

## 2021-01-01 RX ADMIN — POTASSIUM CHLORIDE 10 MEQ: 7.46 INJECTION, SOLUTION INTRAVENOUS at 06:12

## 2021-01-01 RX ADMIN — MELATONIN TAB 3 MG 6 MG: 3 TAB at 10:12

## 2021-01-01 RX ADMIN — POLYETHYLENE GLYCOL 3350 17 G: 17 POWDER, FOR SOLUTION ORAL at 10:12

## 2021-01-01 RX ADMIN — POTASSIUM CHLORIDE 10 MEQ: 7.46 INJECTION, SOLUTION INTRAVENOUS at 11:12

## 2021-01-01 RX ADMIN — ENOXAPARIN SODIUM 40 MG: 100 INJECTION SUBCUTANEOUS at 04:12

## 2021-01-01 RX ADMIN — CARVEDILOL 3.12 MG: 3.12 TABLET, FILM COATED ORAL at 09:12

## 2021-01-01 RX ADMIN — SODIUM CHLORIDE 1000 ML: 0.9 INJECTION, SOLUTION INTRAVENOUS at 03:12

## 2021-01-01 RX ADMIN — MIRTAZAPINE 15 MG: 15 TABLET, ORALLY DISINTEGRATING ORAL at 08:12

## 2021-01-01 RX ADMIN — CARVEDILOL 6.25 MG: 6.25 TABLET, FILM COATED ORAL at 09:12

## 2021-01-01 RX ADMIN — MIRTAZAPINE 15 MG: 15 TABLET, ORALLY DISINTEGRATING ORAL at 09:12

## 2021-01-01 RX ADMIN — SENNOSIDES AND DOCUSATE SODIUM 1 TABLET: 50; 8.6 TABLET ORAL at 08:12

## 2021-01-01 RX ADMIN — ASPIRIN 81 MG CHEWABLE TABLET 81 MG: 81 TABLET CHEWABLE at 10:12

## 2021-01-01 RX ADMIN — POTASSIUM CHLORIDE 10 MEQ: 7.46 INJECTION, SOLUTION INTRAVENOUS at 09:12

## 2021-01-01 RX ADMIN — IOHEXOL 75 ML: 350 INJECTION, SOLUTION INTRAVENOUS at 12:08

## 2021-01-01 RX ADMIN — ENOXAPARIN SODIUM 40 MG: 100 INJECTION SUBCUTANEOUS at 05:12

## 2021-01-01 RX ADMIN — POLYETHYLENE GLYCOL 3350 17 G: 17 POWDER, FOR SOLUTION ORAL at 09:12

## 2021-01-01 RX ADMIN — MIRTAZAPINE 15 MG: 15 TABLET, ORALLY DISINTEGRATING ORAL at 10:12

## 2021-01-01 RX ADMIN — DEXTROSE MONOHYDRATE, SODIUM CHLORIDE, AND POTASSIUM CHLORIDE: 50; 4.5; 1.49 INJECTION, SOLUTION INTRAVENOUS at 06:12

## 2021-01-01 RX ADMIN — Medication 2 PACKET: at 10:12

## 2021-01-01 RX ADMIN — SODIUM CHLORIDE: 0.9 INJECTION, SOLUTION INTRAVENOUS at 11:12

## 2021-01-01 RX ADMIN — LISINOPRIL 20 MG: 20 TABLET ORAL at 08:12

## 2021-01-01 RX ADMIN — IOHEXOL 1000 ML: 9 SOLUTION ORAL at 10:06

## 2021-01-01 RX ADMIN — SENNOSIDES AND DOCUSATE SODIUM 1 TABLET: 50; 8.6 TABLET ORAL at 09:12

## 2021-01-01 RX ADMIN — POTASSIUM CHLORIDE 10 MEQ: 7.46 INJECTION, SOLUTION INTRAVENOUS at 07:12

## 2021-01-01 RX ADMIN — ASPIRIN 81 MG CHEWABLE TABLET 81 MG: 81 TABLET CHEWABLE at 08:12

## 2021-01-01 RX ADMIN — MAGNESIUM CITRATE 296 ML: 1.75 LIQUID ORAL at 06:12

## 2021-01-01 RX ADMIN — IOHEXOL 75 ML: 350 INJECTION, SOLUTION INTRAVENOUS at 08:12

## 2021-01-01 RX ADMIN — POTASSIUM CHLORIDE 10 MEQ: 10 INJECTION, SOLUTION INTRAVENOUS at 11:12

## 2021-01-01 RX ADMIN — ATORVASTATIN CALCIUM 40 MG: 20 TABLET, FILM COATED ORAL at 10:12

## 2021-01-01 RX ADMIN — CARVEDILOL 3.12 MG: 3.12 TABLET, FILM COATED ORAL at 11:12

## 2021-01-01 RX ADMIN — ASPIRIN 81 MG CHEWABLE TABLET 81 MG: 81 TABLET CHEWABLE at 09:12

## 2021-01-01 RX ADMIN — Medication 2 PACKET: at 03:12

## 2021-01-01 RX ADMIN — SODIUM CHLORIDE 500 ML: 0.9 INJECTION, SOLUTION INTRAVENOUS at 10:12

## 2021-01-01 RX ADMIN — TUBERCULIN PURIFIED PROTEIN DERIVATIVE 5 UNITS: 5 INJECTION, SOLUTION INTRADERMAL at 10:12

## 2021-01-01 RX ADMIN — ERGOCALCIFEROL 50000 UNITS: 1.25 CAPSULE ORAL at 09:12

## 2021-01-01 RX ADMIN — CARVEDILOL 3.12 MG: 3.12 TABLET, FILM COATED ORAL at 10:12

## 2021-01-01 RX ADMIN — CARVEDILOL 6.25 MG: 6.25 TABLET, FILM COATED ORAL at 08:12

## 2021-01-01 RX ADMIN — LISINOPRIL 10 MG: 10 TABLET ORAL at 03:12

## 2021-01-01 RX ADMIN — MORPHINE SULFATE 3 MG: 2 INJECTION, SOLUTION INTRAMUSCULAR; INTRAVENOUS at 01:12

## 2021-01-01 RX ADMIN — ACETAMINOPHEN 650 MG: 325 TABLET ORAL at 09:12

## 2021-01-01 RX ADMIN — ATORVASTATIN CALCIUM 40 MG: 20 TABLET, FILM COATED ORAL at 09:12

## 2021-01-01 RX ADMIN — POTASSIUM CHLORIDE 10 MEQ: 10 INJECTION, SOLUTION INTRAVENOUS at 10:12

## 2021-01-01 RX ADMIN — IOHEXOL 1000 ML: 9 SOLUTION ORAL at 08:11

## 2021-01-01 RX ADMIN — ATORVASTATIN CALCIUM 40 MG: 20 TABLET, FILM COATED ORAL at 11:12

## 2021-01-01 RX ADMIN — ONDANSETRON 4 MG: 4 TABLET, ORALLY DISINTEGRATING ORAL at 01:12

## 2021-01-01 RX ADMIN — LISINOPRIL 20 MG: 20 TABLET ORAL at 09:12

## 2021-01-01 RX ADMIN — POTASSIUM CHLORIDE 10 MEQ: 10 INJECTION, SOLUTION INTRAVENOUS at 09:12

## 2021-01-01 RX ADMIN — ASPIRIN 81 MG: 81 TABLET, COATED ORAL at 03:12

## 2021-01-01 RX ADMIN — ACETAMINOPHEN 650 MG: 325 TABLET ORAL at 04:12

## 2021-01-01 RX ADMIN — POTASSIUM CHLORIDE 10 MEQ: 10 INJECTION, SOLUTION INTRAVENOUS at 12:12

## 2021-01-01 RX ADMIN — Medication 100 ML: at 06:12

## 2021-01-01 RX ADMIN — SENNOSIDES AND DOCUSATE SODIUM 1 TABLET: 50; 8.6 TABLET ORAL at 10:12

## 2021-01-01 RX ADMIN — ONDANSETRON 4 MG: 2 INJECTION INTRAMUSCULAR; INTRAVENOUS at 04:12

## 2021-01-01 RX ADMIN — ASPIRIN 81 MG: 81 TABLET, COATED ORAL at 08:12

## 2021-01-01 RX ADMIN — LISINOPRIL 10 MG: 10 TABLET ORAL at 08:12

## 2021-01-01 RX ADMIN — POTASSIUM CHLORIDE 10 MEQ: 7.46 INJECTION, SOLUTION INTRAVENOUS at 01:12

## 2021-01-01 RX ADMIN — POTASSIUM CHLORIDE 10 MEQ: 7.46 INJECTION, SOLUTION INTRAVENOUS at 02:12

## 2021-01-01 RX ADMIN — LISINOPRIL 20 MG: 20 TABLET ORAL at 10:12

## 2021-01-01 RX ADMIN — MAGNESIUM CITRATE 296 ML: 1.75 LIQUID ORAL at 10:12

## 2021-01-01 RX ADMIN — POTASSIUM CHLORIDE 10 MEQ: 7.46 INJECTION, SOLUTION INTRAVENOUS at 10:12

## 2021-01-01 RX ADMIN — IOHEXOL 1000 ML: 9 SOLUTION ORAL at 10:08

## 2021-01-01 RX ADMIN — IOHEXOL 75 ML: 350 INJECTION, SOLUTION INTRAVENOUS at 10:11

## 2021-01-01 RX ADMIN — Medication 100 ML: at 10:12

## 2021-01-01 RX ADMIN — POTASSIUM BICARBONATE 25 MEQ: 978 TABLET, EFFERVESCENT ORAL at 09:12

## 2021-01-01 RX ADMIN — POTASSIUM BICARBONATE 50 MEQ: 978 TABLET, EFFERVESCENT ORAL at 08:12

## 2021-01-01 RX ADMIN — ONDANSETRON HYDROCHLORIDE 4 MG: 4 TABLET, FILM COATED ORAL at 04:12

## 2021-01-01 RX ADMIN — POTASSIUM CHLORIDE 30 MEQ: 10 CAPSULE, COATED, EXTENDED RELEASE ORAL at 12:12

## 2021-01-01 RX ADMIN — POTASSIUM CHLORIDE 10 MEQ: 10 INJECTION, SOLUTION INTRAVENOUS at 08:12

## 2021-01-01 RX ADMIN — MELATONIN TAB 3 MG 6 MG: 3 TAB at 09:12

## 2021-01-01 RX ADMIN — MAGNESIUM SULFATE 2 G: 2 INJECTION INTRAVENOUS at 09:12

## 2021-01-01 RX ADMIN — ONDANSETRON 4 MG: 2 INJECTION INTRAMUSCULAR; INTRAVENOUS at 03:12

## 2021-01-01 RX ADMIN — IOHEXOL 75 ML: 350 INJECTION, SOLUTION INTRAVENOUS at 10:01

## 2021-01-01 RX ADMIN — CARVEDILOL 3.12 MG: 3.12 TABLET, FILM COATED ORAL at 08:12

## 2021-01-01 RX ADMIN — POTASSIUM CHLORIDE 10 MEQ: 7.46 INJECTION, SOLUTION INTRAVENOUS at 04:12

## 2021-01-01 RX ADMIN — IOHEXOL 100 ML: 350 INJECTION, SOLUTION INTRAVENOUS at 02:12

## 2021-01-01 RX ADMIN — IOHEXOL 1000 ML: 9 SOLUTION ORAL at 12:08

## 2021-01-01 RX ADMIN — IOHEXOL 1000 ML: 9 SOLUTION ORAL at 08:01

## 2021-01-01 RX ADMIN — IOHEXOL 100 ML: 350 INJECTION, SOLUTION INTRAVENOUS at 05:12

## 2021-01-01 RX ADMIN — ONDANSETRON 4 MG: 2 INJECTION INTRAMUSCULAR; INTRAVENOUS at 10:12

## 2021-01-01 RX ADMIN — CARVEDILOL 6.25 MG: 6.25 TABLET, FILM COATED ORAL at 10:12

## 2021-01-01 RX ADMIN — POTASSIUM CHLORIDE 10 MEQ: 7.46 INJECTION, SOLUTION INTRAVENOUS at 12:12

## 2021-01-17 NOTE — PROCEDURES
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 Adequacy confirmed.    No immediate complications. Patient tolerated procedure well. Please see full dictated procedure report for additional details and recommendations.      Kashif Mclain MD  [facility]  Pager [phone]       Yes

## 2021-12-01 NOTE — ASSESSMENT & PLAN NOTE
As noted on CT of the abd---small volume scattered ascites. Likely related to ovarian CA. Monitor.      Gait Disorder

## 2021-12-11 PROBLEM — R41.82 ALTERED MENTAL STATUS, UNSPECIFIED: Status: ACTIVE | Noted: 2021-01-01

## 2021-12-12 PROBLEM — E83.39 HYPOPHOSPHATEMIA: Status: ACTIVE | Noted: 2021-01-01

## 2021-12-12 PROBLEM — G93.41 ENCEPHALOPATHY, METABOLIC: Status: ACTIVE | Noted: 2021-01-01

## 2021-12-12 PROBLEM — I67.9 CEREBROVASCULAR DISEASE: Status: ACTIVE | Noted: 2021-01-01

## 2021-12-12 PROBLEM — R10.9 ABDOMINAL PAIN: Status: ACTIVE | Noted: 2019-08-08

## 2021-12-12 PROBLEM — Z85.43 OVARIAN CANCER IN REMISSION: Status: ACTIVE | Noted: 2019-11-12

## 2021-12-14 PROBLEM — I10 HYPERTENSION: Status: ACTIVE | Noted: 2021-01-01

## 2021-12-22 PROBLEM — I63.89 ACUTE ARTERIAL ISCHEMIC STROKE, MULTIFOCAL, MULTIPLE VASCULAR TERRITORIES: Status: ACTIVE | Noted: 2021-01-01

## 2021-12-22 PROBLEM — R29.898 RIGHT ARM WEAKNESS: Status: ACTIVE | Noted: 2021-01-01

## 2021-12-26 PROBLEM — E43 SEVERE PROTEIN-CALORIE MALNUTRITION: Status: ACTIVE | Noted: 2019-11-15

## 2021-12-26 PROBLEM — K59.09 CHRONIC CONSTIPATION: Status: ACTIVE | Noted: 2021-01-01

## 2021-12-26 PROBLEM — F32.9 MAJOR DEPRESSIVE DISORDER: Status: ACTIVE | Noted: 2019-11-14

## 2021-12-26 PROBLEM — I63.9 ACUTE CVA (CEREBROVASCULAR ACCIDENT): Status: ACTIVE | Noted: 2021-01-01

## 2021-12-26 PROBLEM — K52.89 STERCORAL COLITIS: Status: ACTIVE | Noted: 2019-08-08

## 2021-12-26 PROBLEM — R53.81 DEBILITY: Status: ACTIVE | Noted: 2021-01-01

## 2022-01-01 ENCOUNTER — HOSPITAL ENCOUNTER (EMERGENCY)
Facility: HOSPITAL | Age: 77
End: 2022-01-02
Attending: EMERGENCY MEDICINE
Payer: MEDICARE

## 2022-01-01 DIAGNOSIS — I46.9 CARDIOPULMONARY ARREST: Primary | ICD-10-CM

## 2022-01-01 PROCEDURE — 99283 PR EMERGENCY DEPT VISIT,LEVEL III: ICD-10-PCS | Mod: ,,, | Performed by: EMERGENCY MEDICINE

## 2022-01-01 PROCEDURE — 99283 EMERGENCY DEPT VISIT LOW MDM: CPT | Mod: ,,, | Performed by: EMERGENCY MEDICINE

## 2022-01-01 PROCEDURE — 99283 EMERGENCY DEPT VISIT LOW MDM: CPT

## 2022-01-02 NOTE — ED PROVIDER NOTES
Chief complaint:  No chief complaint on file.      HPI:  Margarita Dunne is a 76 y.o. female presenting with acute onset of increased lethargy and hypoxia per the nursing home staff according to EMS.  Upon arrival of EMS they found her to be bradycardic and she quickly became more bradycardic.  They began transport and on route she became asystolic.  The patient is DNR.    ROS: As per HPI and below:  Unable to obtain a review of systems secondary to the patient having       Review of patient's allergies indicates:   Allergen Reactions    Doxycycline Nausea And Vomiting    Corticosteroids (glucocorticoids)      Causes pt to have migraines for 2 weeks    Pcn [penicillins] Rash    Sulfa (sulfonamide antibiotics) Rash       No current facility-administered medications on file prior to encounter.     Current Outpatient Medications on File Prior to Encounter   Medication Sig Dispense Refill    acetaminophen (TYLENOL) 325 MG tablet Take 2 tablets (650 mg total) by mouth every 4 (four) hours as needed.  0    albuterol (PROVENTIL/VENTOLIN HFA) 90 mcg/actuation inhaler Inhale 2 puffs into the lungs every 6 (six) hours as needed for Wheezing. Rescue 18 g 12    albuterol-ipratropium (DUO-NEB) 2.5 mg-0.5 mg/3 mL nebulizer solution Take 3 mLs by nebulization every 4 (four) hours as needed for Wheezing or Shortness of Breath. Rescue 75 mL 0    aspirin 81 MG Chew Take 1 tablet (81 mg total) by mouth once daily.  0    atorvastatin (LIPITOR) 40 MG tablet Take 1 tablet (40 mg total) by mouth once daily. 90 tablet 3    budesonide-formoterol 80-4.5 mcg (SYMBICORT) 80-4.5 mcg/actuation HFAA INHALE 2 PUFFS BY MOUTH ONCE DAILY 11 g 3    carvediloL (COREG) 6.25 MG tablet Take 1 tablet (6.25 mg total) by mouth 2 (two) times daily. 60 tablet 11    ergocalciferol (ERGOCALCIFEROL) 50,000 unit Cap Take 1 capsule (50,000 Units total) by mouth every 7 days. 4 capsule 3    lisinopriL (PRINIVIL,ZESTRIL) 20 MG tablet Take 1 tablet  (20 mg total) by mouth once daily. 90 tablet 3    melatonin (MELATIN) 3 mg tablet Take 2 tablets (6 mg total) by mouth nightly as needed for Insomnia.  0    mirtazapine (REMERON SOL-TAB) 15 MG disintegrating tablet Dissolve 1 tablet (15 mg total) by mouth nightly. 30 tablet 3    polyethylene glycol (GLYCOLAX) 17 gram PwPk Take 17 g by mouth 2 (two) times daily.  0    potassium chloride SA (K-DUR,KLOR-CON) 10 MEQ tablet Take 1 tablet (10 mEq total) by mouth once daily. 30 tablet 0    rizatriptan (MAXALT) 10 MG tablet TAKE ONE TABLET BY MOUTH AT ONSET OF MIGRAINE, MAY REPEAT EVERY 2 HOURS. DO NOT EXCEED 3 TABLETS IN 24 HOURS. (Patient taking differently: TAKE ONE TABLET BY MOUTH AT ONSET OF MIGRAINE, MAY REPEAT EVERY 2 HOURS. DO NOT EXCEED 3 TABLETS IN 24 HOURS AS NEEDED) 10 tablet 11    senna-docusate 8.6-50 mg (PERICOLACE) 8.6-50 mg per tablet Take 1 tablet by mouth 2 (two) times daily.      VOLTAREN 1 % Gel APPLY 4 GRAMS TOPICALLY 4 TIMES A DAY AS DIRECTED (Patient taking differently: APPLY 4 GRAMS TOPICALLY 4 TIMES A DAY AS DIRECTED AS NEEDED) 200 g 0       PMH:  As per HPI and below:  Past Medical History:   Diagnosis Date    Anxiety     Asthma     Cataract     Chronic constipation     Chronic rhinitis     Diverticulosis     Fibromyalgia     Glaucoma     History of CVA (cerebrovascular accident)     History of ovarian cancer     s/p chemotherapy and REZA-BSO    History of shingles     Hyperlipidemia     Hypertension     Migraine headache     Mild major depressive disorder     Osteoarthritis     Osteoporosis     Vascular dementia     Vitamin D deficiency      Past Surgical History:   Procedure Laterality Date    APPENDECTOMY  11/12/2019    Procedure: APPENDECTOMY;  Surgeon: Irvin Vinson MD;  Location: [facility] OR [address] [place] [address];  Service: OB/GYN;;    BIOPSY      DEBULKING OF TUMOR N/A 11/12/2019    Procedure: DEBULKING, NEOPLASM;  Surgeon: Irvin Vinson MD;  Location: [facility] OR [address] [place] [address];  Service:  OB/GYN;  Laterality: N/A;    MOBILIZATION OF SPLENIC FLEXURE  11/12/2019    Procedure: MOBILIZATION, SPLENIC FLEXURE;  Surgeon: Irvin Vinson MD;  [facility];  Service: OB/GYN;;    OMENTECTOMY N/A 11/12/2019    Procedure: OMENTECTOMY;  Surgeon: Irvin Vinson MD;  Location: [facility] OR [address] [place] [address];  Service: OB/GYN;  Laterality: N/A;    RESECTION OF PERITONEAL TISSUE  11/12/2019    Procedure: EXCISION, TISSUE, PERITONEUM;  Surgeon: Irvin Vinson MD;  Location: [facility] OR [address] [place] [address];  Service: OB/GYN;;    SALPINGOOPHORECTOMY Bilateral 11/12/2019    Procedure: SALPINGO-OOPHORECTOMY;  Surgeon: Irvin Vinson MD;  Location: [facility] OR [address] [place] [address];  Service: OB/GYN;  Laterality: Bilateral;    TOTAL ABDOMINAL HYSTERECTOMY N/A 11/12/2019    Procedure: HYSTERECTOMY, TOTAL, ABDOMINAL;  Surgeon: Irvin Vinson MD;  Location: [facility] OR [address] [place] [address];  Service: OB/GYN;  Laterality: N/A;       Social History     Socioeconomic History    Marital status:     Number of children: 0   Tobacco Use    Smoking status: Never Smoker    Smokeless tobacco: Never Used   Substance and Sexual Activity    Alcohol use: Not Currently    Drug use: No    Sexual activity: Yes     Partners: Male   Social History Narrative    Patient is a former . Lives with  who has Multiple Myeloma.      Social Determinants of Health     Financial Resource Strain: Low Risk     Difficulty of Paying Living Expenses: Not hard at all   Food Insecurity: No Food Insecurity    Worried About Running Out of Food in the Last Year: Never true    Ran Out of Food in the Last Year: Never true   Transportation Needs: No Transportation Needs    Lack of Transportation (Medical): No    Lack of Transportation (Non-Medical): No   Physical Activity: Inactive    Days of Exercise per Week: 0 days    Minutes of Exercise per Session: 0 min   Stress: Stress Concern Present    Feeling of Stress : To some extent   Social Connections: Unknown    Frequency of  Communication with Friends and Family: More than three times a week    Frequency of Social Gatherings with Friends and Family: Once a week    Active Member of Clubs or Organizations: Yes    Attends Club or Organization Meetings: More than 4 times per year    Marital Status:    Housing Stability: Low Risk     Unable to Pay for Housing in the Last Year: No    Number of Places Lived in the Last Year: 2    Unstable Housing in the Last Year: No       Family History   Problem Relation Age of Onset    Hypertension Mother     Diabetes Mother     Stroke Mother     Dementia Mother     Allergies Father     Allergic rhinitis Father     Pancreatic cancer Father     Macular degeneration Cousin     Allergic rhinitis Paternal Aunt     Allergies Paternal Aunt     Allergic rhinitis Paternal Uncle     Allergies Paternal Uncle     Glaucoma Neg Hx     Amblyopia Neg Hx     Blindness Neg Hx     Cataracts Neg Hx     Retinal detachment Neg Hx     Strabismus Neg Hx     Thyroid disease Neg Hx     Angioedema Neg Hx     Asthma Neg Hx     Eczema Neg Hx     Immunodeficiency Neg Hx     Colon cancer Neg Hx     Cystic fibrosis Neg Hx     Ulcerative colitis Neg Hx     Stomach cancer Neg Hx     Esophageal cancer Neg Hx        Physical Exam:    There were no vitals filed for this visit.  Constitutional: Well-nourished, well-developed  Eyes:  Pupils fixed  Respiratory:  No respiratory effort  Cardiovascular:  No heart sounds  Skin:  Pale   Neurologic:  Unresponsive    No orders of the defined types were placed in this encounter.      Medications - No data to display      Labs Reviewed - No data to display        MDM  Number of Diagnoses or Management Options  Diagnosis management comments: Differential diagnosis:  Cardiopulmonary arrest    Patient presents via EMS for with cardiopulmonary arrest.  The patient is DNR.  EMS was called out and found the patient bradycardic.  While the patient was in transport, she  Sebastian down completely to asystole.  Upon arrival to the emergency department the patient is asystole in all 3 leads.  Time of death 12:46 a.m.       Amount and/or Complexity of Data Reviewed  Decide to obtain previous medical records or to obtain history from someone other than the patient: yes  Obtain history from someone other than the patient: yes (EMS)  Independent visualization of images, tracings, or specimens: yes (Asystole in all 3 leads)              ASSESSMENT  1. Cardiopulmonary arrest          Disposition:  Patient  at 12:46 a.m.    New Prescriptions    No medications on file     Modified Medications    No medications on file     Discontinued Medications    No medications on file          Sean Llamas III, MD  22 [phone]

## 2022-01-02 NOTE — ED NOTES
Report received from JES Yen. Awaiting transport of pt to [facility] [place] [place].

## 2022-01-04 ENCOUNTER — TELEPHONE (OUTPATIENT)
Dept: INTERNAL MEDICINE | Facility: CLINIC | Age: 77
End: 2022-01-04
Payer: MEDICARE

## 2022-01-04 NOTE — ASSESSMENT & PLAN NOTE
Chronic. No resp distress on exam.      Patient came in for blood pressure check. Blood pressure reading 130/78 p61

## 2022-01-04 NOTE — TELEPHONE ENCOUNTER
----- Message from Janette Troncoso sent at 2022  9:39 AM CST -----  Regarding: Death Certificate  Contact: [facility] [phone]  Patient of Dr. Anderson  is  and the  home needs the death certificate signed.  They will put it in [place] tomorrow.

## 2022-01-09 NOTE — PROVIDER PROGRESS NOTES - EMERGENCY DEPT.
Encounter Date: 1/2/2022    ED Physician Progress Notes        Physician Note:   Spoke with the patient's  and discussed that the patient arrived emergency department with all pulse and asystolic on the monitor.  We discussed that was uncertain exactly what the cause of death was but that we monitored her DNR status.  He was very thankful for the phone call.

## 2023-02-24 NOTE — PLAN OF CARE
Pt received Taxol, carbo and IVFs; tolerated well. VSS and NAD. Pt instructed to call MD with any concerns. Pt discharged home independently.    Statement Selected Olive Gilliam Nabeel

## 2023-05-09 NOTE — PROGRESS NOTES
HPI     DLS: 2/06/18    Pt here for HVF review;  Pt didn't want to be dilated due to  was dilated today and one of   them has to drive home.    Meds: No GTTS    1) Glaucoma Suspect   2) AION   3) NS OU   4) Migraines   5) VF Defect     Last edited by Elle Goodman on 6/19/2018 11:06 AM. (History)            Assessment /Plan     For exam results, see Encounter Report.    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 NON-GLAUCOMATOUS VF LOSS- etiology uncertain- ?AION     - ?? 2/2 vascular spasms assoc with migraines  Has seen neuro-ophthalmologist - Dr. Bhakta   Has seen Dr. Iyer - [facility]     2. Glaucoma suspect 2/2 VF loss - see above - would be LTG   First HVF 1999   First photos 1994     Family history neg   Glaucoma meds none   H/O adverse rxn to glaucoma drops none   LASERS none   GLAUCOMA SURGERIES none   OTHER EYE SURGERIES none   CDR 0.8 with sup notch / 0.85 with sup notch   Tbase 11-16 / 13-16   Tmax 16/16   Ttarget 14 OU  HVF 13 test 1999 to 2014 - IAD od // IAD os   Gonio +3 ou with pale TM   /586   OCT 4 tests 2006 to 2014 - RNFL - bord. S od / dec S os   HRT 4 tests 2007 to 2014 - MR nl od / dec S/ IT os   Disc photos 1994, 2005-slides / 3/30/2009, 8/2/2011 - OIS     - Ttoday 10/11  - Test done today HVF    3. Nuclear sclerosis - not visually significant. Observe. MR =Rx today. Distance only. Patient happy w/ OTC readers    4. Migraines   -Triggered by certain foods, sausage, cheese, conley   -More frequent - pre-menopausal     5. Steroid exposure - ? Steroid responder - on every other day inhaled steroids     Plan   Stable IOP  VF  Appears stable today and has been stable for some time now  Patient not very interested in starting any IOP drops  Discussed with patient that her  [facility] at [phone] scheduled 2:00 pm transport to [facility] and [address] [place] [address] ([address] [place] [address]) with Anais and received reference number [id number]. Informed Desire Andrade transport has been scheduled. nerves do appear glaucomatous with the notching but still not interested in treatment  Photos from 1994, 2005 and 2011 all similar    Cataracts may start becoming visually significnat- can start testing at future appointments      Patient had steroid injections in joints in March- but IOP remains good- no more steroid injections    RTC 4 month DFE/Photos  - did not get dilated today,  dilated so she wanted to defer    I have seen and personally examined the patient.  I agree with the findings, assessment and plan of the resident and/or fellow.     Opal Painting MD

## (undated) DEVICE — SEE MEDLINE ITEM 154981

## (undated) DEVICE — RELOAD PROXIMATE CUT BLUE 75MM

## (undated) DEVICE — SEALER LIGASURE IMPACT 18CM

## (undated) DEVICE — SUT MONOCRYL 4-0 PS-1 UND

## (undated) DEVICE — SEE MEDLINE ITEM 146417

## (undated) DEVICE — SOL WATER STRL IRR 1000ML

## (undated) DEVICE — GLOVE PROTEXIS LTX PF SURG 7.5

## (undated) DEVICE — SUT VICRYL+ 1 CT1 18IN

## (undated) DEVICE — SUT 2-0 VICRYL / SH (J417)

## (undated) DEVICE — DRESSING ABSRBNT ISLAND 3.6X8

## (undated) DEVICE — SEE MEDLINE ITEM 152622

## (undated) DEVICE — SUT 5/0 30IN SILK BLK BRAID

## (undated) DEVICE — CLIP LIGATION MEDIUM CLIPS 1

## (undated) DEVICE — SYR 10CC LUER LOCK

## (undated) DEVICE — SYR 50ML CATH TIP

## (undated) DEVICE — APPLICATOR CHLORAPREP ORN 26ML

## (undated) DEVICE — SUT VICRYL BR 1 GEN 27 CT-1

## (undated) DEVICE — SEE MEDLINE ITEM 156902

## (undated) DEVICE — SUT 0 54IN COATED VICRYL U

## (undated) DEVICE — CLIP LIGACLIP XTRA TITANIUM

## (undated) DEVICE — DRAPE STERI INSTRUMENT 1018

## (undated) DEVICE — GOWN SURGICAL X-LARGE

## (undated) DEVICE — DRESSING ADH ISLAND 3.6 X 14

## (undated) DEVICE — SEE MEDLINE ITEM 157181

## (undated) DEVICE — SYR ONLY LUER LOCK 20CC

## (undated) DEVICE — CLOSURE SKIN STERI STRIP 1/2X4

## (undated) DEVICE — NDL 22GA X1 1/2 REG BEVEL

## (undated) DEVICE — SUT CTD VICRYL VIL BR SH 27

## (undated) DEVICE — SUT 2-0 27IN VICRYL PLUS CT

## (undated) DEVICE — DRAPE IOBAN 6635

## (undated) DEVICE — SOL BETADINE 5%

## (undated) DEVICE — SUT 1 36IN COATED VICRYL UN

## (undated) DEVICE — ELECTRODE EXTENDED BLADE

## (undated) DEVICE — CLIPPER BLADE MOD 4406 (CAREF)

## (undated) DEVICE — TRAY FOLEY 16FR INFECTION CONT

## (undated) DEVICE — SUT PDS II 1 TP-1 VIL

## (undated) DEVICE — ELECTRODE REM PLYHSV RETURN 9

## (undated) DEVICE — SUT CTD VICRYL 1VIL BR 54IN

## (undated) DEVICE — SEE MEDLINE ITEM 152487

## (undated) DEVICE — CUTTER PROXIMATE BLUE 75MM

## (undated) DEVICE — LEGGING CLEAR POLY 2/PACK

## (undated) DEVICE — SUT CHROMIC 3-0 SH 27IN GUT

## (undated) DEVICE — SUT 0 18IN COATED VICRYL V

## (undated) DEVICE — WARMER DRAPE STERILE LF

## (undated) DEVICE — LUBRICANT SURGILUBE 2 OZ

## (undated) DEVICE — DRAPE ABDOMINAL TIBURON 14X11

## (undated) DEVICE — SUT 2/0 54IN COATED VICRYL

## (undated) DEVICE — GLOVE PROTEXIS NEOPRENE 7.5

## (undated) DEVICE — SYR 30CC LUER LOCK

## (undated) DEVICE — STAPLER SKIN PROXIMATE WIDE